# Patient Record
Sex: MALE | Race: WHITE | Employment: OTHER | URBAN - METROPOLITAN AREA
[De-identification: names, ages, dates, MRNs, and addresses within clinical notes are randomized per-mention and may not be internally consistent; named-entity substitution may affect disease eponyms.]

---

## 2017-10-21 PROBLEM — Z98.890 STATUS POST CATHETER ABLATION OF ATRIAL FLUTTER: Status: ACTIVE | Noted: 2017-10-21

## 2017-10-21 PROBLEM — Z98.890 STATUS POST CATHETER ABLATION OF ATRIAL FIBRILLATION: Status: ACTIVE | Noted: 2017-10-21

## 2018-04-23 ENCOUNTER — TELEPHONE (OUTPATIENT)
Dept: NON INVASIVE DIAGNOSTICS | Age: 68
End: 2018-04-23

## 2018-05-15 RX ORDER — DOFETILIDE 0.5 MG/1
500 CAPSULE ORAL EVERY 12 HOURS SCHEDULED
Qty: 180 CAPSULE | Refills: 1 | Status: SHIPPED | OUTPATIENT
Start: 2018-05-15 | End: 2018-11-11 | Stop reason: SDUPTHER

## 2018-07-05 PROBLEM — I45.10 RIGHT BUNDLE-BRANCH BLOCK: Status: ACTIVE | Noted: 2018-07-05

## 2018-07-10 ENCOUNTER — OFFICE VISIT (OUTPATIENT)
Dept: NON INVASIVE DIAGNOSTICS | Age: 68
End: 2018-07-10
Payer: MEDICARE

## 2018-07-10 VITALS
WEIGHT: 270.6 LBS | RESPIRATION RATE: 18 BRPM | HEIGHT: 69 IN | HEART RATE: 50 BPM | SYSTOLIC BLOOD PRESSURE: 138 MMHG | BODY MASS INDEX: 40.08 KG/M2 | DIASTOLIC BLOOD PRESSURE: 86 MMHG

## 2018-07-10 DIAGNOSIS — Z98.890 STATUS POST CATHETER ABLATION OF ATRIAL FLUTTER: ICD-10-CM

## 2018-07-10 DIAGNOSIS — I50.22 CHRONIC SYSTOLIC HEART FAILURE (HCC): ICD-10-CM

## 2018-07-10 DIAGNOSIS — I48.3 TYPICAL ATRIAL FLUTTER (HCC): ICD-10-CM

## 2018-07-10 DIAGNOSIS — Z98.890 STATUS POST CATHETER ABLATION OF ATRIAL FIBRILLATION: ICD-10-CM

## 2018-07-10 DIAGNOSIS — I48.19 PERSISTENT ATRIAL FIBRILLATION (HCC): ICD-10-CM

## 2018-07-10 DIAGNOSIS — I10 ESSENTIAL HYPERTENSION: ICD-10-CM

## 2018-07-10 DIAGNOSIS — I45.10 RIGHT BUNDLE-BRANCH BLOCK: ICD-10-CM

## 2018-07-10 PROCEDURE — 3017F COLORECTAL CA SCREEN DOC REV: CPT | Performed by: INTERNAL MEDICINE

## 2018-07-10 PROCEDURE — 93000 ELECTROCARDIOGRAM COMPLETE: CPT | Performed by: INTERNAL MEDICINE

## 2018-07-10 PROCEDURE — 4040F PNEUMOC VAC/ADMIN/RCVD: CPT | Performed by: INTERNAL MEDICINE

## 2018-07-10 PROCEDURE — 1036F TOBACCO NON-USER: CPT | Performed by: INTERNAL MEDICINE

## 2018-07-10 PROCEDURE — 99213 OFFICE O/P EST LOW 20 MIN: CPT | Performed by: INTERNAL MEDICINE

## 2018-07-10 PROCEDURE — 1101F PT FALLS ASSESS-DOCD LE1/YR: CPT | Performed by: INTERNAL MEDICINE

## 2018-07-10 PROCEDURE — 1123F ACP DISCUSS/DSCN MKR DOCD: CPT | Performed by: INTERNAL MEDICINE

## 2018-07-10 PROCEDURE — G8427 DOCREV CUR MEDS BY ELIG CLIN: HCPCS | Performed by: INTERNAL MEDICINE

## 2018-07-10 PROCEDURE — G8417 CALC BMI ABV UP PARAM F/U: HCPCS | Performed by: INTERNAL MEDICINE

## 2018-07-10 RX ORDER — MELOXICAM 15 MG/1
TABLET ORAL
Refills: 1 | COMMUNITY
Start: 2018-06-13 | End: 2019-03-19 | Stop reason: SINTOL

## 2018-07-10 NOTE — LETTER
[facility] Electrophysiology  [address] [place] [id number]  Phone: [phone]  Fax: [phone]    No ref. provider found        July 18, 2018       Patient: Daniel Rebollar   MR Number: [id number]   YOB: 1950   Date of Visit: 7/10/2018       Dear Dr. Valri Galeazzi ref. provider found: Thank you for the request for consultation for Daniel Rebollar to me for the evaluation of atrial fibrillation/atrial flutter. Below are the relevant portions of my assessment and plan of care. \  1. Atrial fibrillation and flutter  - s/p CTI ablation in May 4769, procedure complicated by airway compromise, anatomical variants c/w Eustachian ridge and pouch, extended procedure time and multiple lines required to achieve bidirectional block. He had no documented recurrence in over 2 year after procedure. Had recurrence of flutter, possibly typical.    - post RFA with subsequent development of persistent atrial fibrillation and atypical flutter. He was highly symptomatic despite apparent rate control, at least at rest. The patient  opted for biatrial catheter ablation but first underwent a series of back injections, during which he had to temporarily interrupt his oral anticoagulation therapy, so ablation was postponed. - [id number] risk score would now be considered 3 based on HTN and systolic dysfunction, age greater than 72, he is on Eliquis he denies any bleeding problems.    - He underwent redo flutter ablation and PVI (RFA) on 8/10/16 and was doing well. He was weaned off his sotalol and was started back on a low-dose Toprol-XL for his cardiomyopathy.   - On October 20, 2017 he underwent a successful BUSHRA/DCCV with Tikosyn drug loading for persistent symptomatic atrial flutter.    - Currently on Tikosyn 500 mcg BID.  - Currently in sinus bradycardia with RBBB  - discussed his relatively low burden symptoms and repetitive ablations,

## 2018-07-10 NOTE — PROGRESS NOTES
Electrophysiology Outpatient Follow-Up Note    Maykel Ellington  1950  Date of Service: 7/10/2018  Referring Provider/PCP: Celestino Valentin MD  Chief Complaint: atrial fibrillation and flutter, status post ablation, HTN        Subjective: Maykel Ellington is seen today for follow up for his AF/AFL. He is doing well. He is not having recent palpitations. He is very happy with tikosyn. He notes mild fatigue, but overall doing well. He was previously followed with Dr. Darlyn Mcgill but now is transitioning to me. His wife is with him today and he has numerous questions regarding supplements. He cannot find any pattern of exacerbation or relief regarding his fatigue.       Patient Active Problem List   Diagnosis    Typical atrial flutter (HCC)    HTN (hypertension)    Obesity    Eczema    Former smoker    Midline low back pain with right-sided sciatica    Persistent atrial fibrillation (HCC)    Chronic systolic heart failure (HCC)    CKD (chronic kidney disease)    Status post catheter ablation of atrial fibrillation    Status post catheter ablation of atrial flutter    Right bundle-branch block     Past Medical History:   Diagnosis Date    Arthritis     Atrial fibrillation ([address]     Bulging lumbar disc     L4-L5    CAD (coronary artery disease) 07/07/2016    ct scan heart    Chronic lower back pain     BREWER (dyspnea on exertion) 3/26/2013    Eczema     Hypertension     Kidney stones     Obesity     bmi 42.1   weight 276 #    Sleep apnea     c pap  stting 2     Medications:  Current Outpatient Prescriptions on File Prior to Visit   Medication Sig Dispense Refill    dofetilide (TIKOSYN) 500 MCG capsule Take 1 capsule by mouth every 12 hours 180 capsule 1    metoprolol succinate (TOPROL XL) 25 MG extended release tablet TAKE ONE TABLET BY MOUTH DAILY 30 tablet 11    magnesium oxide (MAG-OX) 400 (240 Mg) MG tablet Take 1 tablet by mouth 2 times daily (Patient taking differently: Take 400 mg by mouth daily ) 60 tablet 0    Omega-3 Fatty Acids (OMEGA-3 FISH OIL) 1200 MG CAPS Take by mouth daily      BIOTIN PO Take 1 capsule by mouth daily      apixaban (ELIQUIS) 5 MG TABS tablet TAKE ONE TABLET BY MOUTH TWO TIMES A DAY 60 tablet 11    Methylsulfonylmethane (MSM PO) Take 1 tablet by mouth daily       Multiple Vitamins-Minerals (THERAPEUTIC MULTIVITAMIN-MINERALS) tablet Take 1 tablet by mouth daily.  Coenzyme Q10 (COQ10 PO) Take 1 tablet by mouth daily.  Alpha-Lipoic Acid 300 MG CAPS Take 300 mg by mouth daily On hold      ascorbic acid (VITAMIN C) 500 MG tablet Take 500 mg by mouth daily.  Vitamin D (CHOLECALCIFEROL) 1000 UNITS CAPS capsule Take 1,000 Units by mouth daily.  Cyanocobalamin (VITAMIN B 12) 250 MCG LOZG Take 500 mcg by mouth daily. No current facility-administered medications on file prior to visit. Allergies   Allergen Reactions    Adhesive Tape Rash     bandaids     Wt Readings from Last 3 Encounters:   07/10/18 270 lb 9.6 oz (122.7 kg)   11/30/17 274 lb (124.3 kg)   10/20/17 266 lb 8 oz (120.9 kg)     Temp Readings from Last 3 Encounters:   10/23/17 98.3 °F (36.8 °C) (Oral)   09/14/17 98.1 °F (36.7 °C)   08/11/16 97.5 °F (36.4 °C) (Oral)     BP Readings from Last 3 Encounters:   07/10/18 138/86   11/30/17 (!) 140/78   10/23/17 (!) 147/62     Pulse Readings from Last 3 Encounters:   07/10/18 50   11/30/17 53   10/23/17 56     No intake or output data in the 24 hours ending 07/10/18 1023     Physical exam:  Constitutional:   /86   Pulse 50   Resp 18   Ht 5' 9\" (1.753 m)   Wt 270 lb 9.6 oz (122.7 kg)   BMI 39.96 kg/m²  well developed, in no acute distress   Eyes: conjunctivae normal, no xanthelasma   Ears, Nose, Throat: oral mucosa moist, no cyanosis   Neck: supple, no JVD, no bruits, no thyromegaly   CV: bradycardic, regular rhythm, normal S1 & S2, No S3 or S4. No murmurs, rubs, or gallops.  Peripheral pulses normal   Lungs: clear to auscultation

## 2018-11-11 DIAGNOSIS — I48.19 PERSISTENT ATRIAL FIBRILLATION (HCC): Primary | ICD-10-CM

## 2018-11-12 RX ORDER — DOFETILIDE 0.5 MG/1
CAPSULE ORAL
Qty: 14 CAPSULE | Refills: 0 | Status: SHIPPED | OUTPATIENT
Start: 2018-11-12 | End: 2018-11-21 | Stop reason: SDUPTHER

## 2018-11-20 ENCOUNTER — TELEPHONE (OUTPATIENT)
Dept: NON INVASIVE DIAGNOSTICS | Age: 68
End: 2018-11-20

## 2018-11-21 RX ORDER — DOFETILIDE 0.5 MG/1
CAPSULE ORAL
Qty: 60 CAPSULE | Refills: 0 | Status: SHIPPED | OUTPATIENT
Start: 2018-11-21 | End: 2018-12-11 | Stop reason: SDUPTHER

## 2018-12-07 ENCOUNTER — APPOINTMENT (OUTPATIENT)
Dept: GENERAL RADIOLOGY | Age: 68
End: 2018-12-07
Payer: MEDICARE

## 2018-12-07 ENCOUNTER — HOSPITAL ENCOUNTER (OUTPATIENT)
Age: 68
Setting detail: OBSERVATION
Discharge: HOME | End: 2018-12-08
Attending: EMERGENCY MEDICINE | Admitting: INTERNAL MEDICINE
Payer: MEDICARE

## 2018-12-07 DIAGNOSIS — R00.0 TACHYCARDIA: Primary | ICD-10-CM

## 2018-12-07 PROBLEM — I48.91 ATRIAL FIBRILLATION WITH RVR (HCC): Status: ACTIVE | Noted: 2018-12-07

## 2018-12-07 LAB
ALBUMIN SERPL-MCNC: 4.2 G/DL (ref 3.5–5.2)
ALP BLD-CCNC: 91 U/L (ref 40–129)
ALT SERPL-CCNC: 22 U/L (ref 0–40)
ANION GAP SERPL CALCULATED.3IONS-SCNC: 11 MMOL/L (ref 7–16)
AST SERPL-CCNC: 16 U/L (ref 0–39)
BASOPHILS ABSOLUTE: 0.11 E9/L (ref 0–0.2)
BASOPHILS RELATIVE PERCENT: 0.8 % (ref 0–2)
BILIRUB SERPL-MCNC: 0.7 MG/DL (ref 0–1.2)
BUN BLDV-MCNC: 30 MG/DL (ref 8–23)
CALCIUM SERPL-MCNC: 9.4 MG/DL (ref 8.6–10.2)
CHLORIDE BLD-SCNC: 101 MMOL/L (ref 98–107)
CO2: 25 MMOL/L (ref 22–29)
CREAT SERPL-MCNC: 1.1 MG/DL (ref 0.7–1.2)
EKG ATRIAL RATE: 202 BPM
EKG P AXIS: -75 DEGREES
EKG Q-T INTERVAL: 380 MS
EKG QRS DURATION: 136 MS
EKG QTC CALCULATION (BAZETT): 492 MS
EKG R AXIS: 17 DEGREES
EKG T AXIS: 21 DEGREES
EKG VENTRICULAR RATE: 101 BPM
EOSINOPHILS ABSOLUTE: 0.31 E9/L (ref 0.05–0.5)
EOSINOPHILS RELATIVE PERCENT: 2.2 % (ref 0–6)
GFR AFRICAN AMERICAN: >60
GFR NON-AFRICAN AMERICAN: >60 ML/MIN/1.73
GLUCOSE BLD-MCNC: 102 MG/DL (ref 74–99)
HCT VFR BLD CALC: 50.4 % (ref 37–54)
HEMOGLOBIN: 17 G/DL (ref 12.5–16.5)
IMMATURE GRANULOCYTES #: 0.08 E9/L
IMMATURE GRANULOCYTES %: 0.6 % (ref 0–5)
LYMPHOCYTES ABSOLUTE: 2.45 E9/L (ref 1.5–4)
LYMPHOCYTES RELATIVE PERCENT: 17.6 % (ref 20–42)
MCH RBC QN AUTO: 31.4 PG (ref 26–35)
MCHC RBC AUTO-ENTMCNC: 33.7 % (ref 32–34.5)
MCV RBC AUTO: 93.2 FL (ref 80–99.9)
MONOCYTES ABSOLUTE: 0.85 E9/L (ref 0.1–0.95)
MONOCYTES RELATIVE PERCENT: 6.1 % (ref 2–12)
NEUTROPHILS ABSOLUTE: 10.09 E9/L (ref 1.8–7.3)
NEUTROPHILS RELATIVE PERCENT: 72.7 % (ref 43–80)
PDW BLD-RTO: 14.9 FL (ref 11.5–15)
PLATELET # BLD: 223 E9/L (ref 130–450)
PMV BLD AUTO: 11.5 FL (ref 7–12)
POTASSIUM SERPL-SCNC: 4.3 MMOL/L (ref 3.5–5)
PRO-BNP: 97 PG/ML (ref 0–125)
RBC # BLD: 5.41 E12/L (ref 3.8–5.8)
SODIUM BLD-SCNC: 137 MMOL/L (ref 132–146)
TOTAL PROTEIN: 7.9 G/DL (ref 6.4–8.3)
TROPONIN: <0.01 NG/ML (ref 0–0.03)
TSH SERPL DL<=0.05 MIU/L-ACNC: 1.25 UIU/ML (ref 0.27–4.2)
WBC # BLD: 13.9 E9/L (ref 4.5–11.5)

## 2018-12-07 PROCEDURE — 93005 ELECTROCARDIOGRAM TRACING: CPT | Performed by: EMERGENCY MEDICINE

## 2018-12-07 PROCEDURE — 2500000003 HC RX 250 WO HCPCS: Performed by: EMERGENCY MEDICINE

## 2018-12-07 PROCEDURE — 85025 COMPLETE CBC W/AUTO DIFF WBC: CPT

## 2018-12-07 PROCEDURE — 96374 THER/PROPH/DIAG INJ IV PUSH: CPT

## 2018-12-07 PROCEDURE — G0378 HOSPITAL OBSERVATION PER HR: HCPCS

## 2018-12-07 PROCEDURE — 36415 COLL VENOUS BLD VENIPUNCTURE: CPT

## 2018-12-07 PROCEDURE — 80053 COMPREHEN METABOLIC PANEL: CPT

## 2018-12-07 PROCEDURE — 2580000003 HC RX 258: Performed by: EMERGENCY MEDICINE

## 2018-12-07 PROCEDURE — 94760 N-INVAS EAR/PLS OXIMETRY 1: CPT

## 2018-12-07 PROCEDURE — 84484 ASSAY OF TROPONIN QUANT: CPT

## 2018-12-07 PROCEDURE — 84443 ASSAY THYROID STIM HORMONE: CPT

## 2018-12-07 PROCEDURE — 99285 EMERGENCY DEPT VISIT HI MDM: CPT

## 2018-12-07 PROCEDURE — 71045 X-RAY EXAM CHEST 1 VIEW: CPT

## 2018-12-07 PROCEDURE — 83880 ASSAY OF NATRIURETIC PEPTIDE: CPT

## 2018-12-07 PROCEDURE — 96361 HYDRATE IV INFUSION ADD-ON: CPT

## 2018-12-07 RX ORDER — MELOXICAM 7.5 MG/1
15 TABLET ORAL DAILY
Status: DISCONTINUED | OUTPATIENT
Start: 2018-12-08 | End: 2018-12-08 | Stop reason: HOSPADM

## 2018-12-07 RX ORDER — SODIUM CHLORIDE 0.9 % (FLUSH) 0.9 %
10 SYRINGE (ML) INJECTION EVERY 12 HOURS SCHEDULED
Status: DISCONTINUED | OUTPATIENT
Start: 2018-12-07 | End: 2018-12-08 | Stop reason: HOSPADM

## 2018-12-07 RX ORDER — VIT C/B6/B5/MAGNESIUM/HERB 173 50-5-6-5MG
500 CAPSULE ORAL DAILY
COMMUNITY
End: 2019-08-23

## 2018-12-07 RX ORDER — ALPHA LIPOIC ACID 300 MG
300 CAPSULE ORAL DAILY
Status: DISCONTINUED | OUTPATIENT
Start: 2018-12-08 | End: 2018-12-07 | Stop reason: CLARIF

## 2018-12-07 RX ORDER — DOFETILIDE 0.5 MG/1
500 CAPSULE ORAL EVERY 12 HOURS SCHEDULED
Status: DISCONTINUED | OUTPATIENT
Start: 2018-12-08 | End: 2018-12-08 | Stop reason: HOSPADM

## 2018-12-07 RX ORDER — LANOLIN ALCOHOL/MO/W.PET/CERES
400 CREAM (GRAM) TOPICAL DAILY
Status: DISCONTINUED | OUTPATIENT
Start: 2018-12-08 | End: 2018-12-08 | Stop reason: HOSPADM

## 2018-12-07 RX ORDER — SODIUM CHLORIDE 0.9 % (FLUSH) 0.9 %
10 SYRINGE (ML) INJECTION PRN
Status: DISCONTINUED | OUTPATIENT
Start: 2018-12-07 | End: 2018-12-08 | Stop reason: HOSPADM

## 2018-12-07 RX ORDER — METOPROLOL SUCCINATE 25 MG/1
25 TABLET, EXTENDED RELEASE ORAL DAILY
Status: DISCONTINUED | OUTPATIENT
Start: 2018-12-08 | End: 2018-12-08

## 2018-12-07 RX ORDER — M-VIT,TX,IRON,MINS/CALC/FOLIC 27MG-0.4MG
1 TABLET ORAL DAILY
Status: DISCONTINUED | OUTPATIENT
Start: 2018-12-08 | End: 2018-12-08 | Stop reason: HOSPADM

## 2018-12-07 RX ORDER — METOPROLOL TARTRATE 5 MG/5ML
5 INJECTION INTRAVENOUS ONCE
Status: COMPLETED | OUTPATIENT
Start: 2018-12-07 | End: 2018-12-07

## 2018-12-07 RX ORDER — ASCORBIC ACID 500 MG
500 TABLET ORAL DAILY
Status: DISCONTINUED | OUTPATIENT
Start: 2018-12-08 | End: 2018-12-08 | Stop reason: HOSPADM

## 2018-12-07 RX ORDER — 0.9 % SODIUM CHLORIDE 0.9 %
1000 INTRAVENOUS SOLUTION INTRAVENOUS ONCE
Status: COMPLETED | OUTPATIENT
Start: 2018-12-07 | End: 2018-12-07

## 2018-12-07 RX ORDER — ONDANSETRON 2 MG/ML
4 INJECTION INTRAMUSCULAR; INTRAVENOUS EVERY 6 HOURS PRN
Status: DISCONTINUED | OUTPATIENT
Start: 2018-12-07 | End: 2018-12-08 | Stop reason: HOSPADM

## 2018-12-07 RX ADMIN — SODIUM CHLORIDE 1000 ML: 9 INJECTION, SOLUTION INTRAVENOUS at 18:30

## 2018-12-07 RX ADMIN — METOPROLOL TARTRATE 5 MG: 1 INJECTION, SOLUTION INTRAVENOUS at 18:05

## 2018-12-07 ASSESSMENT — ENCOUNTER SYMPTOMS
SORE THROAT: 0
DIARRHEA: 0
SHORTNESS OF BREATH: 1
SINUS PAIN: 0
NAUSEA: 0
ABDOMINAL PAIN: 0
CONSTIPATION: 0
RHINORRHEA: 0
CHEST TIGHTNESS: 0
COUGH: 0

## 2018-12-07 ASSESSMENT — PAIN SCALES - GENERAL: PAINLEVEL_OUTOF10: 0

## 2018-12-07 NOTE — ED PROVIDER NOTES
Neutrophils # 10.09 (H) 1.80 - 7.30 E9/L    Immature Granulocytes # 0.08 E9/L    Lymphocytes # 2.45 1.50 - 4.00 E9/L    Monocytes # 0.85 0.10 - 0.95 E9/L    Eosinophils # 0.31 0.05 - 0.50 E9/L    Basophils # 0.11 0.00 - 0.20 E9/L   Brain Natriuretic Peptide   Result Value Ref Range    Pro-BNP 97 0 - 125 pg/mL   TSH without Reflex   Result Value Ref Range    TSH 1.250 0.270 - 4.200 uIU/mL   EKG 12 Lead   Result Value Ref Range    Ventricular Rate 101 BPM    Atrial Rate 202 BPM    QRS Duration 136 ms    Q-T Interval 380 ms    QTc Calculation (Bazett) 492 ms    P Axis -75 degrees    R Axis 17 degrees    T Axis 21 degrees       RADIOLOGY:  XR CHEST PORTABLE   Final Result   Bibasilar subsegmental atelectatic changes. No obvious focal pneumonic   infiltrate at this point.              ------------------------- NURSING NOTES AND VITALS REVIEWED ---------------------------  Date / Time Roomed:  12/7/2018  5:57 PM  ED Bed Assignment:  04/04    The nursing notes within the ED encounter and vital signs as below have been reviewed. Patient Vitals for the past 24 hrs:   BP Temp Temp src Pulse Resp SpO2 Height Weight   12/07/18 1932 (!) 159/99 - - 103 22 92 % - -   12/07/18 1824 (!) 144/99 - - 101 18 92 % - -   12/07/18 1806 (!) 187/93 - - 109 16 94 % - -   12/07/18 1800 (!) 113/99 97.5 °F (36.4 °C) Temporal (!) 204 18 97 % 5' 9\" (1.753 m) 272 lb (123.4 kg)       Oxygen Saturation Interpretation: normal    ------------------------------------------ PROGRESS NOTES ------------------------------------------  Re-evaluation(s):      Counseling:  I have spoken with the patient and discussed todays results, in addition to providing specific details for the plan of care and counseling regarding the diagnosis and prognosis.   Their questions are answered at this time and they are agreeable with the plan of admission.    --------------------------------- ADDITIONAL PROVIDER NOTES ---------------------------------  Consultations:  See

## 2018-12-08 VITALS
DIASTOLIC BLOOD PRESSURE: 65 MMHG | HEART RATE: 71 BPM | BODY MASS INDEX: 38.26 KG/M2 | SYSTOLIC BLOOD PRESSURE: 137 MMHG | WEIGHT: 258.3 LBS | HEIGHT: 69 IN | TEMPERATURE: 96.7 F | RESPIRATION RATE: 16 BRPM | OXYGEN SATURATION: 91 %

## 2018-12-08 PROCEDURE — 99220 PR INITIAL OBSERVATION CARE/DAY 70 MINUTES: CPT | Performed by: INTERNAL MEDICINE

## 2018-12-08 PROCEDURE — 6370000000 HC RX 637 (ALT 250 FOR IP): Performed by: INTERNAL MEDICINE

## 2018-12-08 PROCEDURE — G0378 HOSPITAL OBSERVATION PER HR: HCPCS

## 2018-12-08 PROCEDURE — 2580000003 HC RX 258: Performed by: INTERNAL MEDICINE

## 2018-12-08 RX ORDER — ZOLPIDEM TARTRATE 5 MG/1
5 TABLET ORAL ONCE
Status: COMPLETED | OUTPATIENT
Start: 2018-12-08 | End: 2018-12-08

## 2018-12-08 RX ORDER — METOPROLOL SUCCINATE 25 MG/1
50 TABLET, EXTENDED RELEASE ORAL DAILY
Qty: 30 TABLET | Refills: 11
Start: 2018-12-08 | End: 2018-12-11 | Stop reason: SDUPTHER

## 2018-12-08 RX ORDER — METOPROLOL SUCCINATE 50 MG/1
50 TABLET, EXTENDED RELEASE ORAL DAILY
Status: DISCONTINUED | OUTPATIENT
Start: 2018-12-09 | End: 2018-12-08 | Stop reason: HOSPADM

## 2018-12-08 RX ADMIN — Medication 400 MG: at 09:04

## 2018-12-08 RX ADMIN — MULTIPLE VITAMINS W/ MINERALS TAB 1 TABLET: TAB at 09:04

## 2018-12-08 RX ADMIN — Medication 10 ML: at 09:04

## 2018-12-08 RX ADMIN — Medication 500 MG: at 09:04

## 2018-12-08 RX ADMIN — METOPROLOL SUCCINATE 25 MG: 25 TABLET, FILM COATED, EXTENDED RELEASE ORAL at 09:04

## 2018-12-08 RX ADMIN — ZOLPIDEM TARTRATE 5 MG: 5 TABLET ORAL at 01:34

## 2018-12-08 RX ADMIN — APIXABAN 5 MG: 5 TABLET, FILM COATED ORAL at 09:02

## 2018-12-08 RX ADMIN — Medication 10 ML: at 00:18

## 2018-12-08 ASSESSMENT — PAIN SCALES - GENERAL
PAINLEVEL_OUTOF10: 0
PAINLEVEL_OUTOF10: 4
PAINLEVEL_OUTOF10: 0

## 2018-12-08 ASSESSMENT — PAIN DESCRIPTION - LOCATION: LOCATION: GENERALIZED

## 2018-12-08 ASSESSMENT — PAIN DESCRIPTION - PAIN TYPE: TYPE: CHRONIC PAIN

## 2018-12-08 NOTE — H&P
12/07/18   1823   TROPONINI  <0.01       Urinalysis:    No results found for: Nery Mantis, BACTERIA, RBCUA, BLOODU, SPECGRAV, GLUCOSEU      ASSESSMENT:    Active Hospital Problems    Diagnosis Date Noted    Tachyarrhythmia [R00.0] 12/07/2018    Atrial fibrillation with RVR ([address] [I48.91] 12/07/2018   CAD  HTN  Mild dehydration    PLAN:  Vitals per protocol  Continue toprol, tikosyn  Continue home meds  Consult EP      DVT Prophylaxis: eliquis   Diet: DIET GENERAL;  Code Status: Full Code    PT/OT Eval Status: na    Dispo - obs        Mirta Ibanez MD    Thank you Ivy Reza MD for the opportunity to be involved in this patient's care. If you have any questions or concerns please feel free to contact me at [phone].

## 2018-12-08 NOTE — PROGRESS NOTES
Discharge instructions given to the patient , verbalized understanding, d/c hep lock and cardiac monitor.

## 2018-12-08 NOTE — CONSULTS
[address]  [place] [facility] [address] [facility] Electrophysiology  Consultation Report  Patient: Yong Kern  Medical Record Number: [id number]  Date of Service:  12/8/2018  Attending Electrophysiologist: Jaye Jay MD  Referring Physician: Garo Malik MD and No ref. provider found and Dr. Dacosta So: paroxysmal atrial flutter    HPI: This is a 80 y.o. male with a history of HTN, CKD, RBBB, obesity, prior AFL ablation x2 and AF PVI x1 with Dr. Zechariah Muse. He had acute onset palpitations and SOB yesterday and came to ER. He was found to be in atrial flutter. He was given IV lopressor which dropped his AFL from ~1:1 to 2:1. He spontaneously converted overnight. His main questions revolve around his arthritis and AF. He denies any current chest pain, SOB, palpitations or syncope.       Patient Active Problem List    Diagnosis Date Noted    Obesity 06/03/2013     Priority: Medium    Former smoker 06/03/2013     Priority: Medium    Typical atrial flutter ([address] 04/05/2013     Priority: Medium     Overview Note:     S/p atrial flutter ablation 5/07/2013      HTN (hypertension) 04/05/2013     Priority: Medium    Eczema 06/03/2013     Priority: Low    Tachyarrhythmia 12/07/2018    Atrial fibrillation with RVR ([address] 12/07/2018    Right bundle-branch block 07/05/2018    Status post catheter ablation of atrial fibrillation 10/21/2017    Status post catheter ablation of atrial flutter 10/21/2017    CKD (chronic kidney disease)     Chronic systolic heart failure (HCC)     Persistent atrial fibrillation (HCC)     Midline low back pain with right-sided sciatica 01/12/2016       Past Medical History:   Diagnosis Date    Arthritis     Atrial fibrillation ([address]     Bulging lumbar disc     L4-L5    CAD (coronary artery disease) 07/07/2016    ct scan heart    Chronic lower back pain     BREWER (dyspnea on exertion) 3/26/2013    Eczema     Hypertension     Kidney stones     Obesity PM   Exam: XR CHEST PORTABLE   Number of Images: 1 views       Indication:   cough    cough       COMPARISON:  10/19/2017       FINDINGS:   Heart size and pulmonary vascular distribution unremarkable.     Somewhat shallow pulmonary inflation with basilar subsegmental   atelectatic changes. No visible focal pulmonary consolidation or pleural effusion.               Impression   Bibasilar subsegmental atelectatic changes. No obvious focal pneumonic   infiltrate at this point. Telemetry: AFL->sinus    EKG: AFL- 2:1 likely typical  - see scan in Cardiology    Last Echo: 2/3/2017  Imelda Gastelum MD 2/3/2017    Narrative     Transthoracic Echocardiography Report (TTE)     Demographics      Patient Name         Jerri Olguin Gender                 Male      Medical Record Number [id number]    Room Number            RME      Account #             [de-identified]  Procedure Date         02/01/2017      Corporate ID                      Ordering Physician     Joan Mitchell MD      Accession Number      [id number]   Referring Physician    IVET Alvares      Date of Birth         1950  Sonographer           Michael Adams RDCS      Age                   73 year(s)  Interpreting Physician Omkar Gatica MD                                        Any Other     Procedure    Type of Study      TTE procedure:Echo Complete W/ Dop & Color Flow.     Procedure Date  Date: 02/01/2017 Start: 01:02 PM    Study Location: Echo Lab  Technical Quality: Adequate visualization    Indications:Atrial fibrillation and Cardiomyopathy.     Patient Status: Routine    Height: 69 inches Weight: 277 pounds BSA: 2.37 m^2 BMI: 40.91 kg/m^2    BP: 120/70 mmHg    Allergies    - Adhesive tape:Severity - Mild;Sensitivity - Intolerance.     Findings      Left Ventricle   Left ventricle size is normal.   Proximal septal thickening (no LVOT obstruction).   Normal left ventricular systolic function.   Ejection fraction is calculated at 55%.   There is doppler evidence of

## 2018-12-08 NOTE — PROGRESS NOTES
Pharmacy Note    Kevin Lauren was ordered Alpha-lipioc acid caps. As per the [address] [place] [address], herbals and certain dietary supplements will be discontinued.   The herbal or dietary supplement may be continued after discharge from the hospital.

## 2018-12-10 ENCOUNTER — TELEPHONE (OUTPATIENT)
Dept: NON INVASIVE DIAGNOSTICS | Age: 68
End: 2018-12-10

## 2018-12-10 DIAGNOSIS — I48.91 ATRIAL FIBRILLATION WITH RVR (HCC): Primary | ICD-10-CM

## 2018-12-10 DIAGNOSIS — Z98.890 STATUS POST CATHETER ABLATION OF ATRIAL FIBRILLATION: ICD-10-CM

## 2018-12-11 ENCOUNTER — NURSE ONLY (OUTPATIENT)
Dept: NON INVASIVE DIAGNOSTICS | Age: 68
End: 2018-12-11

## 2018-12-11 RX ORDER — METOPROLOL SUCCINATE 50 MG/1
50 TABLET, EXTENDED RELEASE ORAL DAILY
Qty: 90 TABLET | Refills: 3 | Status: SHIPPED | OUTPATIENT
Start: 2018-12-11 | End: 2019-12-03 | Stop reason: SDUPTHER

## 2018-12-11 RX ORDER — DOFETILIDE 0.5 MG/1
CAPSULE ORAL
Qty: 180 CAPSULE | Refills: 1 | Status: SHIPPED | OUTPATIENT
Start: 2018-12-11 | End: 2019-06-14 | Stop reason: SDUPTHER

## 2019-01-03 DIAGNOSIS — I48.91 ATRIAL FIBRILLATION WITH RVR (HCC): ICD-10-CM

## 2019-01-03 DIAGNOSIS — Z98.890 STATUS POST CATHETER ABLATION OF ATRIAL FIBRILLATION: ICD-10-CM

## 2019-01-07 ENCOUNTER — TELEPHONE (OUTPATIENT)
Dept: CARDIOLOGY CLINIC | Age: 69
End: 2019-01-07

## 2019-02-12 ENCOUNTER — OFFICE VISIT (OUTPATIENT)
Dept: PAIN MANAGEMENT | Age: 69
End: 2019-02-12
Payer: MEDICARE

## 2019-02-12 VITALS
WEIGHT: 265 LBS | DIASTOLIC BLOOD PRESSURE: 88 MMHG | OXYGEN SATURATION: 96 % | TEMPERATURE: 98.8 F | HEART RATE: 76 BPM | HEIGHT: 69 IN | RESPIRATION RATE: 18 BRPM | BODY MASS INDEX: 39.25 KG/M2 | SYSTOLIC BLOOD PRESSURE: 152 MMHG

## 2019-02-12 DIAGNOSIS — M21.371 RIGHT FOOT DROP: ICD-10-CM

## 2019-02-12 DIAGNOSIS — M25.531 WRIST PAIN, CHRONIC, RIGHT: ICD-10-CM

## 2019-02-12 DIAGNOSIS — G89.4 CHRONIC PAIN SYNDROME: ICD-10-CM

## 2019-02-12 DIAGNOSIS — M19.012 PRIMARY OSTEOARTHRITIS OF BOTH SHOULDERS: ICD-10-CM

## 2019-02-12 DIAGNOSIS — M47.816 LUMBAR FACET ARTHROPATHY: ICD-10-CM

## 2019-02-12 DIAGNOSIS — M15.9 PRIMARY OSTEOARTHRITIS INVOLVING MULTIPLE JOINTS: ICD-10-CM

## 2019-02-12 DIAGNOSIS — M19.011 PRIMARY OSTEOARTHRITIS OF BOTH SHOULDERS: ICD-10-CM

## 2019-02-12 DIAGNOSIS — G89.29 WRIST PAIN, CHRONIC, RIGHT: ICD-10-CM

## 2019-02-12 DIAGNOSIS — M17.0 PRIMARY OSTEOARTHRITIS OF BOTH KNEES: Primary | ICD-10-CM

## 2019-02-12 PROCEDURE — 1123F ACP DISCUSS/DSCN MKR DOCD: CPT | Performed by: PAIN MEDICINE

## 2019-02-12 PROCEDURE — 1036F TOBACCO NON-USER: CPT | Performed by: PAIN MEDICINE

## 2019-02-12 PROCEDURE — G8417 CALC BMI ABV UP PARAM F/U: HCPCS | Performed by: PAIN MEDICINE

## 2019-02-12 PROCEDURE — G8427 DOCREV CUR MEDS BY ELIG CLIN: HCPCS | Performed by: PAIN MEDICINE

## 2019-02-12 PROCEDURE — 3017F COLORECTAL CA SCREEN DOC REV: CPT | Performed by: PAIN MEDICINE

## 2019-02-12 PROCEDURE — 99204 OFFICE O/P NEW MOD 45 MIN: CPT | Performed by: PAIN MEDICINE

## 2019-02-12 PROCEDURE — G8484 FLU IMMUNIZE NO ADMIN: HCPCS | Performed by: PAIN MEDICINE

## 2019-02-12 PROCEDURE — 1101F PT FALLS ASSESS-DOCD LE1/YR: CPT | Performed by: PAIN MEDICINE

## 2019-02-12 PROCEDURE — 4040F PNEUMOC VAC/ADMIN/RCVD: CPT | Performed by: PAIN MEDICINE

## 2019-02-15 ENCOUNTER — TELEPHONE (OUTPATIENT)
Dept: PAIN MANAGEMENT | Age: 69
End: 2019-02-15

## 2019-02-26 ENCOUNTER — PROCEDURE VISIT (OUTPATIENT)
Dept: PAIN MANAGEMENT | Age: 69
End: 2019-02-26
Payer: MEDICARE

## 2019-02-26 VITALS
WEIGHT: 252 LBS | DIASTOLIC BLOOD PRESSURE: 72 MMHG | SYSTOLIC BLOOD PRESSURE: 132 MMHG | TEMPERATURE: 98.5 F | RESPIRATION RATE: 16 BRPM | BODY MASS INDEX: 37.33 KG/M2 | OXYGEN SATURATION: 95 % | HEART RATE: 54 BPM | HEIGHT: 69 IN

## 2019-02-26 DIAGNOSIS — M17.0 PRIMARY OSTEOARTHRITIS OF BOTH KNEES: Primary | ICD-10-CM

## 2019-02-26 PROCEDURE — 20610 DRAIN/INJ JOINT/BURSA W/O US: CPT | Performed by: PAIN MEDICINE

## 2019-02-26 RX ORDER — HYALURONATE SODIUM 10 MG/ML
20 SYRINGE (ML) INTRAARTICULAR ONCE
Status: COMPLETED | OUTPATIENT
Start: 2019-02-26 | End: 2019-02-26

## 2019-02-26 RX ADMIN — Medication 20 MG: at 13:34

## 2019-02-26 RX ADMIN — Medication 20 MG: at 13:32

## 2019-03-05 ENCOUNTER — PROCEDURE VISIT (OUTPATIENT)
Dept: PAIN MANAGEMENT | Age: 69
End: 2019-03-05
Payer: MEDICARE

## 2019-03-05 VITALS
BODY MASS INDEX: 37.03 KG/M2 | OXYGEN SATURATION: 98 % | WEIGHT: 250 LBS | SYSTOLIC BLOOD PRESSURE: 138 MMHG | DIASTOLIC BLOOD PRESSURE: 72 MMHG | HEART RATE: 84 BPM | RESPIRATION RATE: 18 BRPM | HEIGHT: 69 IN

## 2019-03-05 DIAGNOSIS — M17.0 PRIMARY OSTEOARTHRITIS OF BOTH KNEES: Primary | ICD-10-CM

## 2019-03-05 PROCEDURE — 20610 DRAIN/INJ JOINT/BURSA W/O US: CPT | Performed by: ANESTHESIOLOGY

## 2019-03-05 RX ORDER — HYALURONATE SODIUM 10 MG/ML
20 SYRINGE (ML) INTRAARTICULAR ONCE
Status: COMPLETED | OUTPATIENT
Start: 2019-03-05 | End: 2019-03-05

## 2019-03-05 RX ADMIN — Medication 20 MG: at 10:50

## 2019-03-12 ENCOUNTER — PROCEDURE VISIT (OUTPATIENT)
Dept: PAIN MANAGEMENT | Age: 69
End: 2019-03-12
Payer: MEDICARE

## 2019-03-12 VITALS
BODY MASS INDEX: 36.29 KG/M2 | HEIGHT: 69 IN | RESPIRATION RATE: 18 BRPM | OXYGEN SATURATION: 98 % | WEIGHT: 245 LBS | HEART RATE: 88 BPM | SYSTOLIC BLOOD PRESSURE: 144 MMHG | DIASTOLIC BLOOD PRESSURE: 82 MMHG

## 2019-03-12 DIAGNOSIS — M17.0 PRIMARY OSTEOARTHRITIS OF BOTH KNEES: Primary | ICD-10-CM

## 2019-03-12 PROCEDURE — 20610 DRAIN/INJ JOINT/BURSA W/O US: CPT | Performed by: PAIN MEDICINE

## 2019-03-12 RX ORDER — HYALURONATE SODIUM 10 MG/ML
20 SYRINGE (ML) INTRAARTICULAR ONCE
Status: COMPLETED | OUTPATIENT
Start: 2019-03-12 | End: 2019-03-12

## 2019-03-12 RX ADMIN — Medication 20 MG: at 15:04

## 2019-03-19 ENCOUNTER — OFFICE VISIT (OUTPATIENT)
Dept: NON INVASIVE DIAGNOSTICS | Age: 69
End: 2019-03-19
Payer: MEDICARE

## 2019-03-19 VITALS
HEART RATE: 51 BPM | DIASTOLIC BLOOD PRESSURE: 80 MMHG | WEIGHT: 253 LBS | SYSTOLIC BLOOD PRESSURE: 138 MMHG | RESPIRATION RATE: 18 BRPM | HEIGHT: 69 IN | BODY MASS INDEX: 37.47 KG/M2

## 2019-03-19 DIAGNOSIS — I48.19 PERSISTENT ATRIAL FIBRILLATION (HCC): Primary | ICD-10-CM

## 2019-03-19 PROCEDURE — 93000 ELECTROCARDIOGRAM COMPLETE: CPT | Performed by: INTERNAL MEDICINE

## 2019-03-19 PROCEDURE — 4040F PNEUMOC VAC/ADMIN/RCVD: CPT | Performed by: INTERNAL MEDICINE

## 2019-03-19 PROCEDURE — 3017F COLORECTAL CA SCREEN DOC REV: CPT | Performed by: INTERNAL MEDICINE

## 2019-03-19 PROCEDURE — G8427 DOCREV CUR MEDS BY ELIG CLIN: HCPCS | Performed by: INTERNAL MEDICINE

## 2019-03-19 PROCEDURE — G8484 FLU IMMUNIZE NO ADMIN: HCPCS | Performed by: INTERNAL MEDICINE

## 2019-03-19 PROCEDURE — 1101F PT FALLS ASSESS-DOCD LE1/YR: CPT | Performed by: INTERNAL MEDICINE

## 2019-03-19 PROCEDURE — 1123F ACP DISCUSS/DSCN MKR DOCD: CPT | Performed by: INTERNAL MEDICINE

## 2019-03-19 PROCEDURE — 1036F TOBACCO NON-USER: CPT | Performed by: INTERNAL MEDICINE

## 2019-03-19 PROCEDURE — 99214 OFFICE O/P EST MOD 30 MIN: CPT | Performed by: INTERNAL MEDICINE

## 2019-03-19 PROCEDURE — G8417 CALC BMI ABV UP PARAM F/U: HCPCS | Performed by: INTERNAL MEDICINE

## 2019-03-26 ENCOUNTER — PROCEDURE VISIT (OUTPATIENT)
Dept: PAIN MANAGEMENT | Age: 69
End: 2019-03-26
Payer: MEDICARE

## 2019-03-26 ENCOUNTER — OFFICE VISIT (OUTPATIENT)
Dept: PAIN MANAGEMENT | Age: 69
End: 2019-03-26
Payer: MEDICARE

## 2019-03-26 VITALS
DIASTOLIC BLOOD PRESSURE: 74 MMHG | HEIGHT: 69 IN | BODY MASS INDEX: 36.29 KG/M2 | WEIGHT: 245 LBS | OXYGEN SATURATION: 94 % | TEMPERATURE: 98.5 F | SYSTOLIC BLOOD PRESSURE: 138 MMHG | HEART RATE: 84 BPM | RESPIRATION RATE: 18 BRPM

## 2019-03-26 DIAGNOSIS — M19.012 PRIMARY OSTEOARTHRITIS OF BOTH SHOULDERS: Primary | ICD-10-CM

## 2019-03-26 DIAGNOSIS — G89.29 WRIST PAIN, CHRONIC, RIGHT: ICD-10-CM

## 2019-03-26 DIAGNOSIS — M19.011 PRIMARY OSTEOARTHRITIS OF BOTH SHOULDERS: Primary | ICD-10-CM

## 2019-03-26 DIAGNOSIS — M15.9 PRIMARY OSTEOARTHRITIS INVOLVING MULTIPLE JOINTS: ICD-10-CM

## 2019-03-26 DIAGNOSIS — M47.816 LUMBAR FACET ARTHROPATHY: ICD-10-CM

## 2019-03-26 DIAGNOSIS — M19.011 PRIMARY OSTEOARTHRITIS OF BOTH SHOULDERS: ICD-10-CM

## 2019-03-26 DIAGNOSIS — G89.4 CHRONIC PAIN SYNDROME: ICD-10-CM

## 2019-03-26 DIAGNOSIS — M17.0 PRIMARY OSTEOARTHRITIS OF BOTH KNEES: Primary | ICD-10-CM

## 2019-03-26 DIAGNOSIS — M25.531 WRIST PAIN, CHRONIC, RIGHT: ICD-10-CM

## 2019-03-26 DIAGNOSIS — M21.371 RIGHT FOOT DROP: ICD-10-CM

## 2019-03-26 DIAGNOSIS — M19.012 PRIMARY OSTEOARTHRITIS OF BOTH SHOULDERS: ICD-10-CM

## 2019-03-26 PROCEDURE — 3017F COLORECTAL CA SCREEN DOC REV: CPT | Performed by: PAIN MEDICINE

## 2019-03-26 PROCEDURE — 1036F TOBACCO NON-USER: CPT | Performed by: PAIN MEDICINE

## 2019-03-26 PROCEDURE — G8417 CALC BMI ABV UP PARAM F/U: HCPCS | Performed by: PAIN MEDICINE

## 2019-03-26 PROCEDURE — 4040F PNEUMOC VAC/ADMIN/RCVD: CPT | Performed by: PAIN MEDICINE

## 2019-03-26 PROCEDURE — 1123F ACP DISCUSS/DSCN MKR DOCD: CPT | Performed by: PAIN MEDICINE

## 2019-03-26 PROCEDURE — 20610 DRAIN/INJ JOINT/BURSA W/O US: CPT | Performed by: PAIN MEDICINE

## 2019-03-26 PROCEDURE — 99214 OFFICE O/P EST MOD 30 MIN: CPT | Performed by: PAIN MEDICINE

## 2019-03-26 PROCEDURE — G8484 FLU IMMUNIZE NO ADMIN: HCPCS | Performed by: PAIN MEDICINE

## 2019-03-26 PROCEDURE — G8427 DOCREV CUR MEDS BY ELIG CLIN: HCPCS | Performed by: PAIN MEDICINE

## 2019-03-26 RX ORDER — METHYLPREDNISOLONE ACETATE 40 MG/ML
40 INJECTION, SUSPENSION INTRA-ARTICULAR; INTRALESIONAL; INTRAMUSCULAR; SOFT TISSUE ONCE
Qty: 1 ML | Refills: 0
Start: 2019-03-26 | End: 2019-03-26

## 2019-03-26 RX ORDER — BUPIVACAINE HYDROCHLORIDE 2.5 MG/ML
30 INJECTION, SOLUTION EPIDURAL; INFILTRATION; INTRACAUDAL ONCE
Status: COMPLETED | OUTPATIENT
Start: 2019-03-26 | End: 2019-03-26

## 2019-03-26 RX ORDER — METHYLPREDNISOLONE ACETATE 40 MG/ML
40 INJECTION, SUSPENSION INTRA-ARTICULAR; INTRALESIONAL; INTRAMUSCULAR; SOFT TISSUE ONCE
Status: COMPLETED | OUTPATIENT
Start: 2019-03-26 | End: 2019-03-26

## 2019-03-26 RX ADMIN — BUPIVACAINE HYDROCHLORIDE 75 MG: 2.5 INJECTION, SOLUTION EPIDURAL; INFILTRATION; INTRACAUDAL at 17:00

## 2019-03-26 RX ADMIN — METHYLPREDNISOLONE ACETATE 40 MG: 40 INJECTION, SUSPENSION INTRA-ARTICULAR; INTRALESIONAL; INTRAMUSCULAR; SOFT TISSUE at 17:03

## 2019-04-01 ENCOUNTER — PROCEDURE VISIT (OUTPATIENT)
Dept: PAIN MANAGEMENT | Age: 69
End: 2019-04-01
Payer: MEDICARE

## 2019-04-01 VITALS
SYSTOLIC BLOOD PRESSURE: 132 MMHG | BODY MASS INDEX: 36.29 KG/M2 | OXYGEN SATURATION: 95 % | HEART RATE: 84 BPM | RESPIRATION RATE: 18 BRPM | DIASTOLIC BLOOD PRESSURE: 78 MMHG | HEIGHT: 69 IN | WEIGHT: 245 LBS

## 2019-04-01 DIAGNOSIS — M17.0 OSTEOARTHRITIS OF BOTH KNEES, UNSPECIFIED OSTEOARTHRITIS TYPE: Primary | ICD-10-CM

## 2019-04-01 PROCEDURE — 20610 DRAIN/INJ JOINT/BURSA W/O US: CPT | Performed by: ANESTHESIOLOGY

## 2019-04-01 RX ORDER — HYALURONATE SODIUM 10 MG/ML
20 SYRINGE (ML) INTRAARTICULAR ONCE
Status: COMPLETED | OUTPATIENT
Start: 2019-04-01 | End: 2019-04-01

## 2019-04-01 RX ADMIN — Medication 20 MG: at 16:12

## 2019-04-01 NOTE — PROGRESS NOTES
(TIKOSYN) 500 MCG capsule TAKE ONE CAPSULE BY MOUTH EVERY 12 HOURS 12/11/18  Yes Rocky Rodriguez MD   metoprolol succinate (TOPROL XL) 50 MG extended release tablet Take 1 tablet by mouth daily 12/11/18  Yes Rocky Rodriguez MD   Turmeric 500 MG CAPS Take 500 mg by mouth daily   Yes Historical Provider, MD   apixaban (ELIQUIS) 5 MG TABS tablet TAKE ONE TABLET BY MOUTH TWO TIMES A DAY 7/10/18  Yes Rocky Rodriguez MD   magnesium oxide (MAG-OX) 400 (240 Mg) MG tablet Take 1 tablet by mouth 2 times daily  Patient taking differently: Take 400 mg by mouth daily  10/23/17  Yes Genny Hill,    Omega-3 Fatty Acids (OMEGA-3 FISH OIL) 1200 MG CAPS Take by mouth daily   Yes Historical Provider, MD   Methylsulfonylmethane (MSM PO) Take 1 tablet by mouth daily    Yes Historical Provider, MD   Multiple Vitamins-Minerals (THERAPEUTIC MULTIVITAMIN-MINERALS) tablet Take 1 tablet by mouth daily. Yes Historical Provider, MD   Coenzyme Q10 (COQ10 PO) Take 1 tablet by mouth daily. Yes Historical Provider, MD   Alpha-Lipoic Acid 300 MG CAPS Take 300 mg by mouth daily On hold   Yes Historical Provider, MD   ascorbic acid (VITAMIN C) 500 MG tablet Take 500 mg by mouth daily. Yes Historical Provider, MD   Vitamin D (CHOLECALCIFEROL) 1000 UNITS CAPS capsule Take 1,000 Units by mouth daily. Yes Historical Provider, MD   Cyanocobalamin (VITAMIN B 12 PO) Take 500 mcg by mouth daily.    Yes Historical Provider, MD       Allergies   Allergen Reactions    Adhesive Tape Rash     bandaids       Social History     Socioeconomic History    Marital status:      Spouse name: Not on file    Number of children: Not on file    Years of education: Not on file    Highest education level: Not on file   Occupational History    Not on file   Social Needs    Financial resource strain: Not on file    Food insecurity:     Worry: Not on file     Inability: Not on file    Transportation needs:     Medical: Not on file     Non-medical: Not on file   Tobacco Use    Smoking status: Former Smoker     Packs/day: 1.00     Years: 20.00     Pack years: 20.00     Types: Cigarettes     Last attempt to quit: 2007     Years since quittin.2    Smokeless tobacco: Never Used    Tobacco comment: 1.5 PACKS EACH DAY stop    Substance and Sexual Activity    Alcohol use: Yes     Alcohol/week: 1.8 oz     Types: 3 Shots of liquor per week     Comment: 1 x week    Drug use: No    Sexual activity: Not on file   Lifestyle    Physical activity:     Days per week: Not on file     Minutes per session: Not on file    Stress: Not on file   Relationships    Social connections:     Talks on phone: Not on file     Gets together: Not on file     Attends Christianity service: Not on file     Active member of club or organization: Not on file     Attends meetings of clubs or organizations: Not on file     Relationship status: Not on file    Intimate partner violence:     Fear of current or ex partner: Not on file     Emotionally abused: Not on file     Physically abused: Not on file     Forced sexual activity: Not on file   Other Topics Concern    Not on file   Social History Narrative    Not on file       Family History   Problem Relation Age of Onset    Cancer Mother     Diabetes Mother     Cancer Father     Diabetes Sister     Kidney Disease Sister     Early Death Neg Hx        REVIEW OF SYSTEMS:     Tawnya Lizama denies fever/chills, chest pain, shortness of breath, new bowel or bladder complaints. All other review of systems was negative. PHYSICAL EXAMINATION:      /78   Pulse 84   Resp 18   Ht 5' 9\" (1.753 m)   Wt 245 lb (111.1 kg)   SpO2 95%   BMI 36.18 kg/m²     Knee:   Left knee scar from the prior surgery noted, ROM some limitation with pain on ROM, tenderness of the joint line +    Right knee: ROM - some pain noted, tenderness over the joint line +    Assessment/Plan:   Diagnosis Orders   1.  Osteoarthritis of both knees, unspecified osteoarthritis type         Knee OA B/l for Hyalgan injection #4    Piter AYLIN Brightgegowda      Procedure notes:     Patient: Paco Fernandes  :  1950  Age: Cielo.Coffee y.o.  Sex:  male      PRE-OPERATIVE DIAGNOSIS:Bilateral Knee pain, osteoarthritis.     POST-OPERATIVE DIAGNOSIS: Same.     PROCEDURE PERFORMED: Bilateral knee Hyalgan injection # 4.     SURGEON: Evans Cherry MD     ANESTHESIA: None      ESTIMATED BLOOD LOSS: None.     BRIEF HISTORY: Paco Fernandes comes in today for Bilateral Knee hylagan injection. After discussing the potential risks and benefits of the procedure with the patient. Paco did request that we proceed. A complete History & Physical was reviewed and it is unchanged.     DESCRIPTION OF PROCEDURE:   The patient was placed in a seated position. The area of the Bilateral knee was prepped with chloraprep and draped in a sterile manner. Then the targeted area of the patellar tendon was palpated and marked. A 25 gauge 1- 1/2 inch  needle was advanced into the joint capsule on both sides (one side injected at a time). 2cc of  Hyalgan (20mg/2ml-Sodium Hyluronate) was injected on each side easily without complications.  The needle was then removed and Band-Aid applied.     Disposition the patient tolerated the procedure well and there were no complications .      Paco will follow up with Dr. Deborha Dakin, MD for # 5 injection as scheduled.      He was encouraged to call with questions, concerns or if worsening of symptoms occurs.     Evans Cherry MD      CC:  Dr. Edinson England

## 2019-04-01 NOTE — PROGRESS NOTES
4/1/2019    Patient Active Problem List   Diagnosis    Typical atrial flutter (HCC)    HTN (hypertension)    Obesity    Eczema    Former smoker    Midline low back pain with right-sided sciatica    Persistent atrial fibrillation (HCC)    Chronic systolic heart failure (HCC)    CKD (chronic kidney disease)    Status post catheter ablation of atrial fibrillation    Status post catheter ablation of atrial flutter    Right bundle-branch block    Tachyarrhythmia    Atrial fibrillation with RVR (HCC)    Primary osteoarthritis of both shoulders    Primary osteoarthritis of both knees    Wrist pain, chronic, right    Chronic pain syndrome    Primary osteoarthritis involving multiple joints    Right foot drop    Lumbar facet arthropathy       Allergies as of 04/01/2019 - Review Complete 04/01/2019   Allergen Reaction Noted    Adhesive tape Rash 03/26/2013        Procedure: hyalgan injection  Bilateral knee     Blocks:  n    y consent signed y procedure verified with patient  y allergies noted y pt confirms not pregnant    Patient rates pain a 5/10 on the VAS scale. Skin Prep:  Chlor prep    Anesthetic:  n    Medication:  Hyalgan     Vitals:    04/01/19 1513   BP: 132/78   Pulse: 84   Resp: 18   SpO2: 95%   Weight: 245 lb (111.1 kg)   Height: 5' 9\" (1.753 m)       I witnessed patient signing consent to Medical Procedure and Treatment form.      Goldy Cha

## 2019-04-08 ENCOUNTER — TELEPHONE (OUTPATIENT)
Dept: PAIN MANAGEMENT | Age: 69
End: 2019-04-08

## 2019-04-08 ENCOUNTER — PROCEDURE VISIT (OUTPATIENT)
Dept: PAIN MANAGEMENT | Age: 69
End: 2019-04-08
Payer: MEDICARE

## 2019-04-08 VITALS
OXYGEN SATURATION: 98 % | SYSTOLIC BLOOD PRESSURE: 138 MMHG | RESPIRATION RATE: 18 BRPM | HEART RATE: 84 BPM | WEIGHT: 235 LBS | HEIGHT: 69 IN | DIASTOLIC BLOOD PRESSURE: 72 MMHG | BODY MASS INDEX: 34.8 KG/M2

## 2019-04-08 DIAGNOSIS — M17.0 OSTEOARTHRITIS OF BOTH KNEES, UNSPECIFIED OSTEOARTHRITIS TYPE: Primary | ICD-10-CM

## 2019-04-08 PROCEDURE — 20610 DRAIN/INJ JOINT/BURSA W/O US: CPT | Performed by: ANESTHESIOLOGY

## 2019-04-08 RX ORDER — HYALURONATE SODIUM 10 MG/ML
20 SYRINGE (ML) INTRAARTICULAR ONCE
Status: COMPLETED | OUTPATIENT
Start: 2019-04-08 | End: 2019-04-08

## 2019-04-08 RX ADMIN — Medication 20 MG: at 15:54

## 2019-04-08 NOTE — TELEPHONE ENCOUNTER
Patient had his series of 5 injection Hyalgan he feels that they are not working,  He stated you told him he may need a rheumatist he wanted to know if you will do a referral now he does see you in 4 weeks.

## 2019-04-08 NOTE — PROGRESS NOTES
4/8/2019    Patient Active Problem List   Diagnosis    Typical atrial flutter (HCC)    HTN (hypertension)    Obesity    Eczema    Former smoker    Midline low back pain with right-sided sciatica    Persistent atrial fibrillation (HCC)    Chronic systolic heart failure (HCC)    CKD (chronic kidney disease)    Status post catheter ablation of atrial fibrillation    Status post catheter ablation of atrial flutter    Right bundle-branch block    Tachyarrhythmia    Atrial fibrillation with RVR (HCC)    Primary osteoarthritis of both shoulders    Primary osteoarthritis of both knees    Wrist pain, chronic, right    Chronic pain syndrome    Primary osteoarthritis involving multiple joints    Right foot drop    Lumbar facet arthropathy       Allergies as of 04/08/2019 - Review Complete 04/08/2019   Allergen Reaction Noted    Adhesive tape Rash 03/26/2013        Procedure: hyalgan in bilateral knees      Blocks:  n      y consent signed y procedure verified with patient  y allergies noted y pt confirms not pregnant    Patient rates pain a 10/10 on the VAS scale. Skin Prep:  cholea prep    Anesthetic:  n    Medication:  hyalgan     Vitals:    04/08/19 1457   BP: 138/72   Pulse: 84   Resp: 18   SpO2: 98%   Weight: 235 lb (106.6 kg)   Height: 5' 9\" (1.753 m)       I witnessed patient signing consent to Medical Procedure and Treatment form.      Goldy Cha

## 2019-04-08 NOTE — PROGRESS NOTES
[facility]        [address] [facility], [facility] [place] Matilde Garcia  Dept: [phone]        Follow up Note      Maude Loredo     Date of Visit:  4/8/2019    CC:  Patient presents for follow up   Chief Complaint   Patient presents with    Pain     5 hylagan        HPI:  Mr. Maude Loredo is a 80 yrs pleasant gentleman, a patient of Dr. Shantanu Nguyen with H/o b/l knee pain from OA. Has been getting Hyalgan injection. Had total of 4 injection so far.     He is here for # 5 injection.     Pain is unchanged. Change in quality of symptoms:no. Medication side effects:none. Recent diagnostic testing:none. Recent interventional procedures:b/l knee Hyalgan    He has been on anticoagulation medications to include Eliquis. Imaging studies:none recently       Potential Aberrant Drug-Related Behavior:  No    Urine Drug Screening:No    OARRS report::No    Past Medical History:   Diagnosis Date    Arthritis     Atrial fibrillation (HCC)     Bulging lumbar disc     L4-L5    CAD (coronary artery disease) 07/07/2016    ct scan heart    Chronic lower back pain     BREWER (dyspnea on exertion) 3/26/2013    Eczema     Hypertension     Kidney stones     Obesity     bmi 42.1   weight 276 #    Sleep apnea     c pap  stting 2       Past Surgical History:   Procedure Laterality Date    ABLATION OF DYSRHYTHMIC FOCUS  05/2013    heart ablation dr John Hayes  08/10/2016    CARDIOVERSION  3-    Dr. Jared Randolph  10/20/2017    Dr. Emely Corey ECHOCARDIOGRAM TRANSESOPHAGEAL  5/7/2013         KNEE SURGERY Left 1969    repair left knee ligaments    LITHOTRIPSY  1986    LUMBAR [address]  11/30/2016    [facility] [place]    TONSILLECTOMY      TRANSESOPHAGEAL ECHOCARDIOGRAM  3-    Dr. Laila Narvaez       Prior to Admission medications    Medication Sig Start Date End Date Taking?  Authorizing Provider   NONFORMULARY Compound cream for knee's and shoulder's   Yes Historical Provider, MD   dofetilide (TIKOSYN) 500 MCG capsule TAKE ONE CAPSULE BY MOUTH EVERY 12 HOURS 12/11/18  Yes Taylor Fall MD   metoprolol succinate (TOPROL XL) 50 MG extended release tablet Take 1 tablet by mouth daily 12/11/18  Yes Taylor Fall MD   Turmeric 500 MG CAPS Take 500 mg by mouth daily   Yes Historical Provider, MD   apixaban (ELIQUIS) 5 MG TABS tablet TAKE ONE TABLET BY MOUTH TWO TIMES A DAY 7/10/18  Yes Taylor Fall MD   magnesium oxide (MAG-OX) 400 (240 Mg) MG tablet Take 1 tablet by mouth 2 times daily  Patient taking differently: Take 400 mg by mouth daily  10/23/17  Yes Montserrat Hill,    Omega-3 Fatty Acids (OMEGA-3 FISH OIL) 1200 MG CAPS Take by mouth daily   Yes Historical Provider, MD   Methylsulfonylmethane (MSM PO) Take 1 tablet by mouth daily    Yes Historical Provider, MD   Multiple Vitamins-Minerals (THERAPEUTIC MULTIVITAMIN-MINERALS) tablet Take 1 tablet by mouth daily. Yes Historical Provider, MD   Coenzyme Q10 (COQ10 PO) Take 1 tablet by mouth daily. Yes Historical Provider, MD   Alpha-Lipoic Acid 300 MG CAPS Take 300 mg by mouth daily On hold   Yes Historical Provider, MD   ascorbic acid (VITAMIN C) 500 MG tablet Take 500 mg by mouth daily. Yes Historical Provider, MD   Vitamin D (CHOLECALCIFEROL) 1000 UNITS CAPS capsule Take 1,000 Units by mouth daily. Yes Historical Provider, MD   Cyanocobalamin (VITAMIN B 12 PO) Take 500 mcg by mouth daily.    Yes Historical Provider, MD       Allergies   Allergen Reactions    Adhesive Tape Rash     bandaids       Social History     Socioeconomic History    Marital status:      Spouse name: Not on file    Number of children: Not on file    Years of education: Not on file    Highest education level: Not on file   Occupational History    Not on file   Social Needs    Financial resource strain: Not on file    Food insecurity:     Worry: Not on file     Inability: Not on file    Transportation needs: Medical: Not on file     Non-medical: Not on file   Tobacco Use    Smoking status: Former Smoker     Packs/day: 1.00     Years: 20.00     Pack years: 20.00     Types: Cigarettes     Last attempt to quit: 2007     Years since quittin.2    Smokeless tobacco: Never Used    Tobacco comment: 1.5 PACKS EACH DAY stop    Substance and Sexual Activity    Alcohol use: Yes     Alcohol/week: 1.8 oz     Types: 3 Shots of liquor per week     Comment: 1 x week    Drug use: No    Sexual activity: Not on file   Lifestyle    Physical activity:     Days per week: Not on file     Minutes per session: Not on file    Stress: Not on file   Relationships    Social connections:     Talks on phone: Not on file     Gets together: Not on file     Attends Yazdanism service: Not on file     Active member of club or organization: Not on file     Attends meetings of clubs or organizations: Not on file     Relationship status: Not on file    Intimate partner violence:     Fear of current or ex partner: Not on file     Emotionally abused: Not on file     Physically abused: Not on file     Forced sexual activity: Not on file   Other Topics Concern    Not on file   Social History Narrative    Not on file       Family History   Problem Relation Age of Onset    Cancer Mother     Diabetes Mother     Cancer Father     Diabetes Sister     Kidney Disease Sister     Early Death Neg Hx        REVIEW OF SYSTEMS:     Biscoe Promise denies fever/chills, chest pain, shortness of breath, new bowel or bladder complaints. All other review of systems was negative. PHYSICAL EXAMINATION:      /72   Pulse 84   Resp 18   Ht 5' 9\" (1.753 m)   Wt 235 lb (106.6 kg)   SpO2 98%   BMI 34.70 kg/m²   Knee:   Left knee scar from the prior surgery noted, ROM some limitation with pain on ROM, tenderness of the joint line +     Right knee: ROM - some pain noted, tenderness over the joint line +      Assessment/Plan:   Diagnosis Orders   1. Osteoarthritis of both knees, unspecified osteoarthritis type       H/o Knee OA B/l for Hyalgan injection #5     Piter Kohli       Procedure notes:     Patient: Paco Fernandes  :  1950  Age: [age] y.o.  Sex:  male      PRE-OPERATIVE DIAGNOSIS:Bilateral Knee pain, osteoarthritis.     POST-OPERATIVE DIAGNOSIS: Same.     PROCEDURE PERFORMED: Bilateral knee Hyalgan injection # 5.     SURGEON:  Piter Kohli MD     ANESTHESIA: None      ESTIMATED BLOOD LOSS: None.     BRIEF HISTORY: Paco Fernandes comes in today for Bilateral Knee hylagan injection. After discussing the potential risks and benefits of the procedure with the patient. Paco did request that we proceed. A complete History & Physical was reviewed and it is unchanged.     DESCRIPTION OF PROCEDURE:   The patient was placed in a seated position. The area of the Bilateral knee was prepped with chloraprep and draped in a sterile manner. Then the targeted area of the patellar tendon was palpated and marked. A 25 gauge 1- 1/2 inch  needle was advanced into the joint capsule on both sides (one side injected at a time). 2cc of  Hyalgan (20mg/2ml-Sodium Hyluronate) was injected on each side easily without complications.  The needle was then removed and Band-Aid applied.     Disposition the patient tolerated the procedure well and there were no complications .      Paco will follow Cameron Conrad MD for office visit in 4-6 weeks for further plan of care.     He was encouraged to call with questions, concerns or if worsening of symptoms occurs.     Orion Ji MD

## 2019-05-07 ENCOUNTER — OFFICE VISIT (OUTPATIENT)
Dept: PAIN MANAGEMENT | Age: 69
End: 2019-05-07
Payer: MEDICARE

## 2019-05-07 VITALS
OXYGEN SATURATION: 98 % | WEIGHT: 231 LBS | HEIGHT: 69 IN | HEART RATE: 60 BPM | BODY MASS INDEX: 34.21 KG/M2 | DIASTOLIC BLOOD PRESSURE: 80 MMHG | SYSTOLIC BLOOD PRESSURE: 132 MMHG | RESPIRATION RATE: 18 BRPM | TEMPERATURE: 98 F

## 2019-05-07 DIAGNOSIS — M17.0 OSTEOARTHRITIS OF BOTH KNEES, UNSPECIFIED OSTEOARTHRITIS TYPE: ICD-10-CM

## 2019-05-07 DIAGNOSIS — M21.371 RIGHT FOOT DROP: ICD-10-CM

## 2019-05-07 DIAGNOSIS — M15.9 PRIMARY OSTEOARTHRITIS INVOLVING MULTIPLE JOINTS: ICD-10-CM

## 2019-05-07 DIAGNOSIS — M19.012 PRIMARY OSTEOARTHRITIS OF BOTH SHOULDERS: ICD-10-CM

## 2019-05-07 DIAGNOSIS — G89.29 WRIST PAIN, CHRONIC, RIGHT: ICD-10-CM

## 2019-05-07 DIAGNOSIS — M17.0 PRIMARY OSTEOARTHRITIS OF BOTH KNEES: Primary | ICD-10-CM

## 2019-05-07 DIAGNOSIS — M25.531 WRIST PAIN, CHRONIC, RIGHT: ICD-10-CM

## 2019-05-07 DIAGNOSIS — M47.816 LUMBAR FACET ARTHROPATHY: ICD-10-CM

## 2019-05-07 DIAGNOSIS — M19.011 PRIMARY OSTEOARTHRITIS OF BOTH SHOULDERS: ICD-10-CM

## 2019-05-07 DIAGNOSIS — G89.4 CHRONIC PAIN SYNDROME: ICD-10-CM

## 2019-05-07 PROCEDURE — 4040F PNEUMOC VAC/ADMIN/RCVD: CPT | Performed by: PAIN MEDICINE

## 2019-05-07 PROCEDURE — 99213 OFFICE O/P EST LOW 20 MIN: CPT | Performed by: PAIN MEDICINE

## 2019-05-07 PROCEDURE — 1036F TOBACCO NON-USER: CPT | Performed by: PAIN MEDICINE

## 2019-05-07 PROCEDURE — G8427 DOCREV CUR MEDS BY ELIG CLIN: HCPCS | Performed by: PAIN MEDICINE

## 2019-05-07 PROCEDURE — G8417 CALC BMI ABV UP PARAM F/U: HCPCS | Performed by: PAIN MEDICINE

## 2019-05-07 PROCEDURE — 1123F ACP DISCUSS/DSCN MKR DOCD: CPT | Performed by: PAIN MEDICINE

## 2019-05-07 PROCEDURE — 3017F COLORECTAL CA SCREEN DOC REV: CPT | Performed by: PAIN MEDICINE

## 2019-05-07 RX ORDER — TRAMADOL HYDROCHLORIDE 50 MG/1
50 TABLET ORAL 2 TIMES DAILY PRN
Qty: 60 TABLET | Refills: 0 | Status: SHIPPED | OUTPATIENT
Start: 2019-05-07 | End: 2019-06-06

## 2019-05-07 NOTE — PROGRESS NOTES
[address]  [address] [facility], [address] [place] [address] Hector  [phone]    Follow up Note      Michoacano Brace     Date of Visit:  5/7/2019    CC:  Patient presents for follow up   Chief Complaint   Patient presents with    Pain     knee     HPI:    Pain is unchanged. Change in quality of symptoms:no. Medication side effects:none. Recent diagnostic testing:none. Recent interventional procedures:knee Hylgan and Right shoulder poor    He has been on anticoagulation medications to include Eliquis and has not been on herbal supplements. He is not diabetic.     Imaging:  Lumbar spine MRI 01/2016  1. No acute fracture or subluxation. 2. No focal disc herniation or central canal stenosis. 3. Spondylitic changes and degenerative disc disease is noted   especially in the posterior elements. There is no focal disc   herniation or central canal stenosis. 4. There is mild-to-moderate central canal stenosis at the level   of L4-L5 with bilateral neural foraminal narrowing at the level of   L3-L4 but there is no focal disc herniation or central canal   stenosis.      Left knee Xray 02/2019  Tricompartmental osteoarthritis, most pronounced and severe medially  Lateral tibiofemoral chondrocalcinosis     Right knee Xray 02/2019  Tricompartmental osteoarthritis, most pronounced in the medial compartment   Multiple intra-articular loose bodies  Probable joint effusion     Left shoulder Xray 02/2019  Marked degenerative hypertrophic changes of acromioclavicular and glenohumeral joint   Suspect chronic impingement. MRI Left shoulder for continued pain    Right shoulder Xray 02/2019   Glenohumeral and acromioclavicular osteoarthritis with a 6 to 7 mm loose body in the glenohumeral joint     Previous treatments: Physical Therapy, Surgery low back surgery/Left knee surgery, LESI and medications. .       Potential Aberrant Drug-Related Behavior:  No    Urine Drug Screening:  None no opioids started OARRS report:  03/2019 consistent   05/2019 consistent     Past Medical History:   Diagnosis Date    Arthritis     Atrial fibrillation ([address]     Bulging lumbar disc     L4-L5    CAD (coronary artery disease) 07/07/2016    ct scan heart    Chronic lower back pain     BREWER (dyspnea on exertion) 3/26/2013    Eczema     Hypertension     Kidney stones     Obesity     bmi 42.1   weight 276 #    Sleep apnea     c pap  stting 2     Past Surgical History:   Procedure Laterality Date    ABLATION OF DYSRHYTHMIC FOCUS  05/2013    heart ablation dr Lanita Councilman  08/10/2016    CARDIOVERSION  3-    Dr. Pietro Chavez  10/20/2017    Dr. Blake Vanceburg ECHOCARDIOGRAM TRANSESOPHAGEAL  5/7/2013         KNEE SURGERY Left 1969    repair left knee ligaments    LITHOTRIPSY  1986    LUMBAR [address]  11/30/2016    [facility] [place]    TONSILLECTOMY      TRANSESOPHAGEAL ECHOCARDIOGRAM  3-    Dr. Lemuel Fernando     Prior to Admission medications    Medication Sig Start Date End Date Taking?  Authorizing Provider   NONFORMULARY Compound cream for knee's and shoulder's   Yes Historical Provider, MD   dofetilide (TIKOSYN) 500 MCG capsule TAKE ONE CAPSULE BY MOUTH EVERY 12 HOURS 12/11/18  Yes Luis Alfredo Estevez MD   metoprolol succinate (TOPROL XL) 50 MG extended release tablet Take 1 tablet by mouth daily 12/11/18  Yes Luis Alfredo Estevez MD   Turmeric 500 MG CAPS Take 500 mg by mouth daily   Yes Historical Provider, MD   apixaban (ELIQUIS) 5 MG TABS tablet TAKE ONE TABLET BY MOUTH TWO TIMES A DAY 7/10/18  Yes Luis Alfredo Estevez MD   magnesium oxide (MAG-OX) 400 (240 Mg) MG tablet Take 1 tablet by mouth 2 times daily  Patient taking differently: Take 400 mg by mouth daily  10/23/17  Yes Attila Hill,    Omega-3 Fatty Acids (OMEGA-3 FISH OIL) 1200 MG CAPS Take by mouth daily   Yes Historical Provider, MD   Methylsulfonylmethane (MSM PO) Take 1 tablet by mouth daily    Yes Historical Provider, MD   Multiple Vitamins-Minerals (THERAPEUTIC MULTIVITAMIN-MINERALS) tablet Take 1 tablet by mouth daily. Yes Historical Provider, MD   Coenzyme Q10 (COQ10 PO) Take 1 tablet by mouth daily. Yes Historical Provider, MD   Alpha-Lipoic Acid 300 MG CAPS Take 300 mg by mouth daily On hold   Yes Historical Provider, MD   ascorbic acid (VITAMIN C) 500 MG tablet Take 500 mg by mouth daily. Yes Historical Provider, MD   Vitamin D (CHOLECALCIFEROL) 1000 UNITS CAPS capsule Take 1,000 Units by mouth daily. Yes Historical Provider, MD   Cyanocobalamin (VITAMIN B 12 PO) Take 500 mcg by mouth daily. Yes Historical Provider, MD     Allergies   Allergen Reactions    Adhesive Tape Rash     bandaids     Social History     Socioeconomic History    Marital status:      Spouse name: Not on file    Number of children: Not on file    Years of education: Not on file    Highest education level: Not on file   Occupational History    Not on file   Social Needs    Financial resource strain: Not on file    Food insecurity:     Worry: Not on file     Inability: Not on file    Transportation needs:     Medical: Not on file     Non-medical: Not on file   Tobacco Use    Smoking status: Former Smoker     Packs/day: 1.00     Years: 20.00     Pack years: 20.00     Types: Cigarettes     Last attempt to quit: 2007     Years since quittin.3    Smokeless tobacco: Never Used    Tobacco comment: 1.5 PACKS EACH DAY stop    Substance and Sexual Activity    Alcohol use:  Yes     Alcohol/week: 1.8 oz     Types: 3 Shots of liquor per week     Comment: 1 x week    Drug use: No    Sexual activity: Not on file   Lifestyle    Physical activity:     Days per week: Not on file     Minutes per session: Not on file    Stress: Not on file   Relationships    Social connections:     Talks on phone: Not on file     Gets together: Not on file     Attends Hinduism service: Not on file     Active member of club or organization: Not on file     Attends meetings of clubs or organizations: Not on file     Relationship status: Not on file    Intimate partner violence:     Fear of current or ex partner: Not on file     Emotionally abused: Not on file     Physically abused: Not on file     Forced sexual activity: Not on file   Other Topics Concern    Not on file   Social History Narrative    Not on file     Family History   Problem Relation Age of Onset    Cancer Mother     Diabetes Mother    Yasmani Mckeon Father     Diabetes Sister     Kidney Disease Sister     Early Death Neg Hx      REVIEW OF SYSTEMS:     Orlando Breaux denies fever/chills, chest pain, shortness of breath, new bowel or bladder complaints. All other review of systems was negative. PHYSICAL EXAMINATION:      /80   Pulse 60   Temp 98 °F (36.7 °C)   Resp 18   Ht 5' 9\" (1.753 m)   Wt 231 lb (104.8 kg)   SpO2 98%   BMI 34.11 kg/m²     General:       General appearance:   elderly, pleasant and well-hydrated. , in moderate discomfort and A & O x3  Build:Overweight  Function:Rises from a seated position with difficulty     HEENT:     Head:normocephalic and atraumatic  Pupils:regular, round and equal.  Sclera: icterus absent,      Lungs:     Breathing:Breathing Pattern: regular, no distress     Abdomen:     Shape:obese and non-distended  Tenderness:none  Guarding:none     Cervical spine:     Inspection:normal  Palpation:facet loading, left, right, positive and tenderness. Range of motion:abnormal mildly flexion, extension rotation bilateral and is not painful.     Lumbar spine:     Spine inspection:surgical incision scar   CVA tenderness:No   Palpation:facet loading, left, right, positive and tenderness.   Range of motion:abnormal moderately Lateral bending, flexion, extension rotation bilateral and is  painful.     Musculoskeletal:     Trigger points in trapezius:absent bilaterally  Trigger points in Paraveteral:absent bilaterally  Trigger points in supraspinatus/infraspinatus:present  Spurling's:negative right, negative left    Miranda's:negative right, negative left   SI joint tenderness:negative right, negative left              KUSUM test:not done right, not done             left  Piriformis tenderness:negative right, negative left  Trochanteric bursa tenderness:negative right, negative left  SLR:negative right, negative left, sitting      Extremities:     Tremors:None bilaterally upper and lower  Range of motion:Limited abduction of right shoulder above 90 degrees/limited extension/normal Right, pain with internal rotation of hips negative.   Intact:Yes  Edema:Normal  Very limited extension/flexsion of right wrist     Knee:     Inspection:asymmetric, swelling none bilaterally  Tenderness of Bony Landmarks:Posterior and Medial, left  Drawer Test:negative  Effusion:absent bilaterally  Crepitus:present right  ROM:Left limited by pain  Right limited by pain      Neurological:     Sensory:normal to light touch bilateral upper and lower extremities   Motor:   Right Grip5/5              Left Grip5/5               Right Bicep5/5           Left Bicep5/5              Right Triceps5/5       Left Triceps5/5          Right Deltoid5/5     Left Deltoid5/5                  Right Quadriceps5/5          Left Quadriceps5/5           Right Gastrocnemius3+/5    Left Gastrocnemius5/5  Right Ant Tibialis3-/5  Left Ant Tibialis5/5  Reflexes:    Right Quadriceps reflex2+  Left Quadriceps reflex2+  Right Achilles reflex0  Left Achilles reflex0  Gait:antalgic     Dermatology:     Skin:no unusual rashes and no skin lesions     Impression:  Chronic bilateral shoulder/wrist/knee and foot pain with h/o low back surgery currently not complaining of low back pain  Patient is against referral to orthopedic surgery  Plan:  Follow up on his shoulder/wrist and bilateral knee, no acute issues   Failed Hylgan injection and Right shoulder steroid injection   Patient will discuss with his PCP possible referral to a rheuamtologist  Will start patient on Ultram 50 mg BID  Continue with compound cream  Patient is not a candidate for NSAIDs(blood thinners)  OARRS report reviewed  Patient encouraged to stay active and to lose weight, discussed aquatherapy but patient wants to hold off   Treatment plan discussed with the patient including medications side effects     ccreferring cesia Red M.D.

## 2019-05-07 NOTE — PROGRESS NOTES
Nick García presents to the [facility] on 5/7/2019. Helen Ybarra is complaining of pain knee . The pain is constant. The pain is described as aching, throbbing, shooting and stabbing. Pain is rated on his best day at a , on his worst day at a 7, and on average at a 7 on the VAS scale. He took his last dose of pain cream    Any procedures since your last visit: Yes, with no  % relief. Pacemaker or defibrilator: No managed by none. He has been on anticoagulation medications to include eliquis and is managed by michael.       /80   Pulse 60   Temp 98 °F (36.7 °C)   Resp 18   Ht 5' 9\" (1.753 m)   Wt 231 lb (104.8 kg)   SpO2 98%   BMI 34.11 kg/m²

## 2019-06-05 ENCOUNTER — HOSPITAL ENCOUNTER (EMERGENCY)
Age: 69
Discharge: HOME | End: 2019-06-05
Payer: MEDICARE

## 2019-06-05 ENCOUNTER — APPOINTMENT (OUTPATIENT)
Dept: GENERAL RADIOLOGY | Age: 69
End: 2019-06-05
Payer: MEDICARE

## 2019-06-05 ENCOUNTER — APPOINTMENT (OUTPATIENT)
Dept: CT IMAGING | Age: 69
End: 2019-06-05
Payer: MEDICARE

## 2019-06-05 VITALS
TEMPERATURE: 97.6 F | DIASTOLIC BLOOD PRESSURE: 98 MMHG | RESPIRATION RATE: 18 BRPM | SYSTOLIC BLOOD PRESSURE: 200 MMHG | OXYGEN SATURATION: 98 % | HEART RATE: 82 BPM | WEIGHT: 225 LBS | BODY MASS INDEX: 33.23 KG/M2

## 2019-06-05 DIAGNOSIS — N20.0 KIDNEY STONE: Primary | ICD-10-CM

## 2019-06-05 LAB
ANION GAP SERPL CALCULATED.3IONS-SCNC: 10 MMOL/L (ref 7–16)
BACTERIA: ABNORMAL /HPF
BASOPHILS ABSOLUTE: 0.04 E9/L (ref 0–0.2)
BASOPHILS RELATIVE PERCENT: 0.4 % (ref 0–2)
BILIRUBIN URINE: NEGATIVE
BLOOD, URINE: ABNORMAL
BUN BLDV-MCNC: 28 MG/DL (ref 8–23)
CALCIUM SERPL-MCNC: 9.4 MG/DL (ref 8.6–10.2)
CHLORIDE BLD-SCNC: 96 MMOL/L (ref 98–107)
CLARITY: CLEAR
CO2: 26 MMOL/L (ref 22–29)
COLOR: YELLOW
CREAT SERPL-MCNC: 1.3 MG/DL (ref 0.7–1.2)
EOSINOPHILS ABSOLUTE: 0.05 E9/L (ref 0.05–0.5)
EOSINOPHILS RELATIVE PERCENT: 0.5 % (ref 0–6)
EPITHELIAL CELLS, UA: ABNORMAL /HPF
GFR AFRICAN AMERICAN: >60
GFR NON-AFRICAN AMERICAN: 55 ML/MIN/1.73
GLUCOSE BLD-MCNC: 112 MG/DL (ref 74–99)
GLUCOSE URINE: NEGATIVE MG/DL
HCT VFR BLD CALC: 47.6 % (ref 37–54)
HEMOGLOBIN: 15.9 G/DL (ref 12.5–16.5)
IMMATURE GRANULOCYTES #: 0.06 E9/L
IMMATURE GRANULOCYTES %: 0.6 % (ref 0–5)
KETONES, URINE: ABNORMAL MG/DL
LEUKOCYTE ESTERASE, URINE: NEGATIVE
LYMPHOCYTES ABSOLUTE: 1.04 E9/L (ref 1.5–4)
LYMPHOCYTES RELATIVE PERCENT: 10.5 % (ref 20–42)
MCH RBC QN AUTO: 32.4 PG (ref 26–35)
MCHC RBC AUTO-ENTMCNC: 33.4 % (ref 32–34.5)
MCV RBC AUTO: 97.1 FL (ref 80–99.9)
MONOCYTES ABSOLUTE: 0.72 E9/L (ref 0.1–0.95)
MONOCYTES RELATIVE PERCENT: 7.3 % (ref 2–12)
NEUTROPHILS ABSOLUTE: 7.98 E9/L (ref 1.8–7.3)
NEUTROPHILS RELATIVE PERCENT: 80.7 % (ref 43–80)
NITRITE, URINE: NEGATIVE
PDW BLD-RTO: 13.8 FL (ref 11.5–15)
PH UA: 5 (ref 5–9)
PLATELET # BLD: 151 E9/L (ref 130–450)
PMV BLD AUTO: 10.9 FL (ref 7–12)
POTASSIUM SERPL-SCNC: 4.5 MMOL/L (ref 3.5–5)
PROTEIN UA: ABNORMAL MG/DL
RBC # BLD: 4.9 E12/L (ref 3.8–5.8)
RBC UA: ABNORMAL /HPF (ref 0–2)
SODIUM BLD-SCNC: 132 MMOL/L (ref 132–146)
SPECIFIC GRAVITY UA: 1.02 (ref 1–1.03)
TROPONIN: 0.03 NG/ML (ref 0–0.03)
UROBILINOGEN, URINE: 0.2 E.U./DL
WBC # BLD: 9.9 E9/L (ref 4.5–11.5)
WBC UA: ABNORMAL /HPF (ref 0–5)

## 2019-06-05 PROCEDURE — 6370000000 HC RX 637 (ALT 250 FOR IP): Performed by: NURSE PRACTITIONER

## 2019-06-05 PROCEDURE — 93010 ELECTROCARDIOGRAM REPORT: CPT | Performed by: INTERNAL MEDICINE

## 2019-06-05 PROCEDURE — 36415 COLL VENOUS BLD VENIPUNCTURE: CPT

## 2019-06-05 PROCEDURE — 85025 COMPLETE CBC W/AUTO DIFF WBC: CPT

## 2019-06-05 PROCEDURE — 96372 THER/PROPH/DIAG INJ SC/IM: CPT

## 2019-06-05 PROCEDURE — 74176 CT ABD & PELVIS W/O CONTRAST: CPT

## 2019-06-05 PROCEDURE — 99284 EMERGENCY DEPT VISIT MOD MDM: CPT

## 2019-06-05 PROCEDURE — 6360000002 HC RX W HCPCS: Performed by: NURSE PRACTITIONER

## 2019-06-05 PROCEDURE — 71045 X-RAY EXAM CHEST 1 VIEW: CPT

## 2019-06-05 PROCEDURE — 80048 BASIC METABOLIC PNL TOTAL CA: CPT

## 2019-06-05 PROCEDURE — 93005 ELECTROCARDIOGRAM TRACING: CPT | Performed by: NURSE PRACTITIONER

## 2019-06-05 PROCEDURE — 84484 ASSAY OF TROPONIN QUANT: CPT

## 2019-06-05 PROCEDURE — 81001 URINALYSIS AUTO W/SCOPE: CPT

## 2019-06-05 RX ORDER — TAMSULOSIN HYDROCHLORIDE 0.4 MG/1
0.4 CAPSULE ORAL DAILY
Qty: 14 CAPSULE | Refills: 0 | Status: SHIPPED | OUTPATIENT
Start: 2019-06-05 | End: 2019-08-23

## 2019-06-05 RX ORDER — OXYCODONE HYDROCHLORIDE AND ACETAMINOPHEN 5; 325 MG/1; MG/1
1 TABLET ORAL EVERY 6 HOURS PRN
Qty: 10 TABLET | Refills: 0 | Status: SHIPPED | OUTPATIENT
Start: 2019-06-05 | End: 2019-06-08

## 2019-06-05 RX ORDER — KETOROLAC TROMETHAMINE 30 MG/ML
30 INJECTION, SOLUTION INTRAMUSCULAR; INTRAVENOUS ONCE
Status: DISCONTINUED | OUTPATIENT
Start: 2019-06-05 | End: 2019-06-05

## 2019-06-05 RX ORDER — KETOROLAC TROMETHAMINE 30 MG/ML
30 INJECTION, SOLUTION INTRAMUSCULAR; INTRAVENOUS ONCE
Status: COMPLETED | OUTPATIENT
Start: 2019-06-05 | End: 2019-06-05

## 2019-06-05 RX ORDER — OXYCODONE HYDROCHLORIDE AND ACETAMINOPHEN 5; 325 MG/1; MG/1
1 TABLET ORAL ONCE
Status: COMPLETED | OUTPATIENT
Start: 2019-06-05 | End: 2019-06-05

## 2019-06-05 RX ADMIN — OXYCODONE HYDROCHLORIDE AND ACETAMINOPHEN 1 TABLET: 5; 325 TABLET ORAL at 15:56

## 2019-06-05 RX ADMIN — KETOROLAC TROMETHAMINE 30 MG: 30 INJECTION, SOLUTION INTRAMUSCULAR; INTRAVENOUS at 16:01

## 2019-06-05 ASSESSMENT — PAIN SCALES - GENERAL
PAINLEVEL_OUTOF10: 10
PAINLEVEL_OUTOF10: 10
PAINLEVEL_OUTOF10: 6

## 2019-06-05 ASSESSMENT — PAIN DESCRIPTION - PAIN TYPE: TYPE: ACUTE PAIN

## 2019-06-05 ASSESSMENT — PAIN DESCRIPTION - ORIENTATION: ORIENTATION: LEFT

## 2019-06-05 ASSESSMENT — PAIN DESCRIPTION - LOCATION: LOCATION: ABDOMEN;FLANK

## 2019-06-05 ASSESSMENT — PAIN DESCRIPTION - DESCRIPTORS: DESCRIPTORS: SHARP

## 2019-06-05 NOTE — ED PROVIDER NOTES
Adhesive tape    -------------------------------------------------- RESULTS -------------------------------------------------  All laboratory and radiology results have been personally reviewed by myself   LABS:  Results for orders placed or performed during the hospital encounter of 06/05/19   Urinalysis   Result Value Ref Range    Color, UA Yellow Straw/Yellow    Clarity, UA Clear Clear    Glucose, Ur Negative Negative mg/dL    Bilirubin Urine Negative Negative    Ketones, Urine TRACE (A) Negative mg/dL    Specific Gravity, UA 1.020 1.005 - 1.030    Blood, Urine LARGE (A) Negative    pH, UA 5.0 5.0 - 9.0    Protein, UA TRACE Negative mg/dL    Urobilinogen, Urine 0.2 <2.0 E.U./dL    Nitrite, Urine Negative Negative    Leukocyte Esterase, Urine Negative Negative   Troponin   Result Value Ref Range    Troponin 0.03 0.00 - 0.03 ng/mL   CBC Auto Differential   Result Value Ref Range    WBC 9.9 4.5 - 11.5 E9/L    RBC 4.90 3.80 - 5.80 E12/L    Hemoglobin 15.9 12.5 - 16.5 g/dL    Hematocrit 47.6 37.0 - 54.0 %    MCV 97.1 80.0 - 99.9 fL    MCH 32.4 26.0 - 35.0 pg    MCHC 33.4 32.0 - 34.5 %    RDW 13.8 11.5 - 15.0 fL    Platelets 187 108 - 333 E9/L    MPV 10.9 7.0 - 12.0 fL    Neutrophils % 80.7 (H) 43.0 - 80.0 %    Immature Granulocytes % 0.6 0.0 - 5.0 %    Lymphocytes % 10.5 (L) 20.0 - 42.0 %    Monocytes % 7.3 2.0 - 12.0 %    Eosinophils % 0.5 0.0 - 6.0 %    Basophils % 0.4 0.0 - 2.0 %    Neutrophils # 7.98 (H) 1.80 - 7.30 E9/L    Immature Granulocytes # 0.06 E9/L    Lymphocytes # 1.04 (L) 1.50 - 4.00 E9/L    Monocytes # 0.72 0.10 - 0.95 E9/L    Eosinophils # 0.05 0.05 - 0.50 E9/L    Basophils # 0.04 0.00 - 0.20 L7/J   Basic Metabolic Panel   Result Value Ref Range    Sodium 132 132 - 146 mmol/L    Potassium 4.5 3.5 - 5.0 mmol/L    Chloride 96 (L) 98 - 107 mmol/L    CO2 26 22 - 29 mmol/L    Anion Gap 10 7 - 16 mmol/L    Glucose 112 (H) 74 - 99 mg/dL    BUN 28 (H) 8 - 23 mg/dL    CREATININE 1.3 (H) 0.7 - 1.2 mg/dL    GFR Non- 55 >=60 mL/min/1.73    GFR African American >60     Calcium 9.4 8.6 - 10.2 mg/dL   Microscopic Urinalysis   Result Value Ref Range    WBC, UA NONE 0 - 5 /HPF    RBC, UA 5-10 (A) 0 - 2 /HPF    Epi Cells NONE /HPF    Bacteria, UA NONE /HPF       RADIOLOGY:  Interpreted by Radiologist.  CT ABDOMEN PELVIS WO CONTRAST   Final Result   1. Presence of a 6 x 4 mm calculus in the bladder site of the left   ureterovesical junction (UVJ) which is near to be passed through the   bladder wall, causing severe obstructive uropathy for the left kidney. 2. Presence of bilateral nonobstructing renal calculi, the largest   ones are seen in the right kidney. 3. No obstructive uropathy in the right kidney. 4. Multiple gallstones. 5. Fat infiltration of the liver. ALERT:  ABNORMAL REPORT. Presence of a      XR CHEST PORTABLE   Final Result   1. Indeterminate right and left basilar opacifications, greatest on   the right, findings can be seen in atelectasis/scarring and/or early   developing infiltrate/pneumonia. 2. Vascular calcifications thoracic aorta. 3. Suspected underlying mild central pulmonary vascular congestion          ------------------------- NURSING NOTES AND VITALS REVIEWED ---------------------------   The nursing notes within the ED encounter and vital signs as below have been reviewed. BP (!) 200/98   Pulse 82   Temp 97.6 °F (36.4 °C) (Temporal)   Resp 18   Wt 225 lb (102.1 kg)   SpO2 98%   BMI 33.23 kg/m²   Oxygen Saturation Interpretation: Normal      ---------------------------------------------------PHYSICAL EXAM--------------------------------------      Constitutional/General: Alert and oriented x3, well appearing, non toxic in NAD  Head: NC/AT  Eyes: PERRL, EOMI  Mouth: Oropharynx clear, handling secretions, no trismus  Neck: Supple, full ROM, no meningeal signs  Pulmonary: Lungs clear to auscultation bilaterally, no wheezes, rales, or rhonchi.  Not in

## 2019-06-06 LAB
EKG ATRIAL RATE: 69 BPM
EKG P AXIS: 77 DEGREES
EKG P-R INTERVAL: 162 MS
EKG Q-T INTERVAL: 466 MS
EKG QRS DURATION: 136 MS
EKG QTC CALCULATION (BAZETT): 499 MS
EKG R AXIS: 39 DEGREES
EKG T AXIS: 57 DEGREES
EKG VENTRICULAR RATE: 69 BPM

## 2019-06-14 ENCOUNTER — TELEPHONE (OUTPATIENT)
Dept: NON INVASIVE DIAGNOSTICS | Age: 69
End: 2019-06-14

## 2019-06-14 RX ORDER — DOFETILIDE 0.5 MG/1
CAPSULE ORAL
Qty: 180 CAPSULE | Refills: 1 | Status: SHIPPED | OUTPATIENT
Start: 2019-06-14 | End: 2019-11-26 | Stop reason: SDUPTHER

## 2019-06-14 NOTE — TELEPHONE ENCOUNTER
Requesting Tikosyn refill - CrCl calculated off the following information:    Tikosyn dosage: 500 mcg bid    Age: 69   Ht: 1.753 m  Wt: 102.1 kg  Cr: 1.3 mg/dl (based off labs on 1.3)  CrCl: 54.41 mL/min    Last QTc: 499 msec (based on last EKG on 6/2019 while in the hospital)  Last OTc: 522 msec on office EKG in 3/2019  Last Cr: 1.1 mg/dl from lab work in December 2018    Please advise if Tikosyn needs adjusted or if to continue on the current dosage.

## 2019-08-13 ENCOUNTER — HOSPITAL ENCOUNTER (OUTPATIENT)
Dept: MRI IMAGING | Age: 69
Discharge: HOME | End: 2019-08-15
Payer: MEDICARE

## 2019-08-13 DIAGNOSIS — S89.90XA KNEE INJURY, UNSPECIFIED LATERALITY, INITIAL ENCOUNTER: ICD-10-CM

## 2019-08-13 PROCEDURE — 73721 MRI JNT OF LWR EXTRE W/O DYE: CPT

## 2019-08-14 ENCOUNTER — TELEPHONE (OUTPATIENT)
Dept: ORTHOPEDIC SURGERY | Age: 69
End: 2019-08-14

## 2019-08-23 ENCOUNTER — OFFICE VISIT (OUTPATIENT)
Dept: ORTHOPEDIC SURGERY | Age: 69
End: 2019-08-23
Payer: MEDICARE

## 2019-08-23 VITALS
HEART RATE: 57 BPM | BODY MASS INDEX: 33.62 KG/M2 | WEIGHT: 227 LBS | SYSTOLIC BLOOD PRESSURE: 168 MMHG | DIASTOLIC BLOOD PRESSURE: 79 MMHG | HEIGHT: 69 IN

## 2019-08-23 DIAGNOSIS — M17.0 PRIMARY OSTEOARTHRITIS OF BOTH KNEES: Primary | ICD-10-CM

## 2019-08-23 PROCEDURE — 4040F PNEUMOC VAC/ADMIN/RCVD: CPT | Performed by: ORTHOPAEDIC SURGERY

## 2019-08-23 PROCEDURE — 1123F ACP DISCUSS/DSCN MKR DOCD: CPT | Performed by: ORTHOPAEDIC SURGERY

## 2019-08-23 PROCEDURE — 2500000003 HC RX 250 WO HCPCS

## 2019-08-23 PROCEDURE — 99203 OFFICE O/P NEW LOW 30 MIN: CPT | Performed by: ORTHOPAEDIC SURGERY

## 2019-08-23 PROCEDURE — 3017F COLORECTAL CA SCREEN DOC REV: CPT | Performed by: ORTHOPAEDIC SURGERY

## 2019-08-23 PROCEDURE — 6360000002 HC RX W HCPCS

## 2019-08-23 PROCEDURE — G8427 DOCREV CUR MEDS BY ELIG CLIN: HCPCS | Performed by: ORTHOPAEDIC SURGERY

## 2019-08-23 PROCEDURE — G8417 CALC BMI ABV UP PARAM F/U: HCPCS | Performed by: ORTHOPAEDIC SURGERY

## 2019-08-23 PROCEDURE — 1036F TOBACCO NON-USER: CPT | Performed by: ORTHOPAEDIC SURGERY

## 2019-08-23 PROCEDURE — 20610 DRAIN/INJ JOINT/BURSA W/O US: CPT | Performed by: ORTHOPAEDIC SURGERY

## 2019-08-23 PROCEDURE — 99212 OFFICE O/P EST SF 10 MIN: CPT | Performed by: ORTHOPAEDIC SURGERY

## 2019-08-23 RX ORDER — TRIAMCINOLONE ACETONIDE 40 MG/ML
40 INJECTION, SUSPENSION INTRA-ARTICULAR; INTRAMUSCULAR ONCE
Status: COMPLETED | OUTPATIENT
Start: 2019-08-23 | End: 2019-08-23

## 2019-08-23 RX ORDER — GABAPENTIN 100 MG/1
100 CAPSULE ORAL PRN
COMMUNITY
End: 2021-12-15

## 2019-08-23 RX ORDER — BUPIVACAINE HYDROCHLORIDE 2.5 MG/ML
3 INJECTION, SOLUTION INFILTRATION; PERINEURAL ONCE
Status: COMPLETED | OUTPATIENT
Start: 2019-08-23 | End: 2019-08-23

## 2019-08-23 RX ORDER — LIDOCAINE HYDROCHLORIDE 10 MG/ML
3 INJECTION, SOLUTION INFILTRATION; PERINEURAL ONCE
Status: COMPLETED | OUTPATIENT
Start: 2019-08-23 | End: 2019-08-23

## 2019-08-23 RX ADMIN — LIDOCAINE HYDROCHLORIDE 3 ML: 10 INJECTION, SOLUTION INFILTRATION; PERINEURAL at 15:24

## 2019-08-23 RX ADMIN — TRIAMCINOLONE ACETONIDE 40 MG: 40 INJECTION, SUSPENSION INTRA-ARTICULAR; INTRAMUSCULAR at 15:25

## 2019-08-23 RX ADMIN — BUPIVACAINE HYDROCHLORIDE 7.5 MG: 2.5 INJECTION, SOLUTION INFILTRATION; PERINEURAL at 15:24

## 2019-09-24 ENCOUNTER — OFFICE VISIT (OUTPATIENT)
Dept: NON INVASIVE DIAGNOSTICS | Age: 69
End: 2019-09-24
Payer: MEDICARE

## 2019-09-24 VITALS
BODY MASS INDEX: 33.62 KG/M2 | WEIGHT: 227 LBS | SYSTOLIC BLOOD PRESSURE: 132 MMHG | RESPIRATION RATE: 18 BRPM | HEART RATE: 56 BPM | DIASTOLIC BLOOD PRESSURE: 84 MMHG | HEIGHT: 69 IN

## 2019-09-24 DIAGNOSIS — I48.19 PERSISTENT ATRIAL FIBRILLATION (HCC): Primary | ICD-10-CM

## 2019-09-24 PROCEDURE — 1123F ACP DISCUSS/DSCN MKR DOCD: CPT | Performed by: INTERNAL MEDICINE

## 2019-09-24 PROCEDURE — 99214 OFFICE O/P EST MOD 30 MIN: CPT | Performed by: INTERNAL MEDICINE

## 2019-09-24 PROCEDURE — 1036F TOBACCO NON-USER: CPT | Performed by: INTERNAL MEDICINE

## 2019-09-24 PROCEDURE — 4040F PNEUMOC VAC/ADMIN/RCVD: CPT | Performed by: INTERNAL MEDICINE

## 2019-09-24 PROCEDURE — 3017F COLORECTAL CA SCREEN DOC REV: CPT | Performed by: INTERNAL MEDICINE

## 2019-09-24 PROCEDURE — 93000 ELECTROCARDIOGRAM COMPLETE: CPT | Performed by: INTERNAL MEDICINE

## 2019-09-24 PROCEDURE — G8417 CALC BMI ABV UP PARAM F/U: HCPCS | Performed by: INTERNAL MEDICINE

## 2019-09-24 PROCEDURE — G8427 DOCREV CUR MEDS BY ELIG CLIN: HCPCS | Performed by: INTERNAL MEDICINE

## 2019-09-24 NOTE — PROGRESS NOTES
Constitutional: Oriented to person, place, and time. Well-developed and cooperative. Head: Normocephalic and atraumatic. Eyes: Conjunctivae are normal. Pupils are equal, round, and reactive to light. Neck: No hepatojugular reflux and no JVD present. Carotid bruit is not present. Cardiovascular: S1 normal, S2 normal and intact distal pulses. A regular rhythm present. PMI is not displaced. Pulmonary/Chest: Effort normal and breath sounds normal. No respiratory distress. Abdominal: Soft. Normal appearance and bowel sounds are normal.  No abdominal bruit and no pulsatile midline mass. There is no hepatomegaly. No tenderness. Musculoskeletal: Normal range of motion of all extremities, no muscle weakness. Neurological: Alert and oriented to person, place, and time. Gait normal.   Skin: Skin is warm and dry. No bruising, no ecchymosis and no rash noted. Extremity: No clubbing or cyanosis. No edema. Psychiatric: Normal mood and affect. Thought content normal.    EC19: Sinus bradycardia at 56 bpm, NAD, RBBB, LAE, QTc: 470 ms    Data:   Lab Results   Component Value Date    WBC 9.9 2019    HGB 15.9 2019    HCT 47.6 2019    MCV 97.1 2019     2019     Lab Results   Component Value Date    CREATININE 1.3 (H) 2019     Lab Results   Component Value Date    INR 1.3 2016    INR 1.2 2013    PROTIME 14.0 (H) 2016    PROTIME 12.8 2013     Lab Results   Component Value Date    TSH 1.250 2018     Lab Results   Component Value Date    ALT 22 2018    AST 16 2018    ALKPHOS 91 2018    BILITOT 0.7 2018 2019: 24-hour Holter monitor revealing sinus rhythm from  bpm.   There was no evidence of AF, AFl, SVT, VT, or AV block. No symptoms were reported. I have independently reviewed all of the ECGs as per above.     I have reviewed all progress notes & records from the time of the patient's last office visit up to present. Impression:    1. Atrial fibrillation and flutter  - s/p CTI ablation in May 6358, procedure complicated by airway compromise, anatomical variants c/w Eustachian ridge and pouch, extended procedure time and multiple lines required to achieve bidirectional block. He had no documented recurrence in over 2 year after procedure. Had recurrence of flutter, possibly typical.    - post RFA with subsequent development of persistent atrial fibrillation and atypical flutter. He was highly symptomatic despite apparent rate control, at least at rest. The patient  opted for biatrial catheter ablation but first underwent a series of back injections, during which he had to temporarily interrupt his oral anticoagulation therapy, so ablation was postponed. - [id number] risk score would now be considered 3 based on HTN and systolic dysfunction, age greater than 72, he is on Eliquis he denies any bleeding problems.    - He underwent redo flutter ablation and PVI (RFA) on 8/10/16 and was doing well. He was weaned off his sotalol and was started back on a low-dose Toprol-XL for his cardiomyopathy.   - On October 20, 2017 he underwent a successful BUSHRA/DCCV with Tikosyn drug loading for persistent symptomatic atrial flutter. - Currently on Tikosyn 500 mcg BID.  - Currently in sinus bradycardia with RBBB  - discussed his relatively low burden symptoms and repetitive ablations, he right now is happy with his symptom control, we emphasized need for risk factor modification  - 24-hour Holter monitor- January 2019 - revealing sinus rhythm from  bpm.   There was no evidence of AF, AFl, SVT, VT, or AV block. No symptoms were reported. 2. Systolic heart failure  - It was likely tachycardia-mediated in nature.  EF appeared lower on BUSHRA; had TTE in sinus rhythm and EF 50-55% per Dr. Akabr Tuttle.    - He appeared to be well compensated and euvolemic to dry at that time  - previously treated with Toprol-XL, discontinued as noted above due to bradycardia, as well as lisinopril which was discontinued due to hyperkalemia. - he is on Toprol-XL 25 mg daily. 3.Hypertension    - controlled currently  - continue current medications    4. Chronic kidney disease - Stage III  - history of elevated creatinine  Lab Results   Component Value Date     06/05/2019    K 4.5 06/05/2019    CL 96 06/05/2019    CO2 26 06/05/2019    BUN 28 06/05/2019    CREATININE 1.3 06/05/2019    GLUCOSE 112 06/05/2019    CALCIUM 9.4 06/05/2019      5. Obstructive Sleep Apnea    - compliant with his CPAP        6. Obesity  - Encouraged weight loss  - Body mass index is 33.52 kg/m².       7. RBBB  - Chronic  - no evidence of symptomatic bradycardia      8. Chronic back pain  - with foot drop, status post injections, recurrent symptoms  - history of back surgery     Recommendations:      1. Continue Eliquis 5 mg po BID. 2. Continue Toprol-XL 50 mg once daily. 3. Continue Tikosyn 500 mcg BID. 4. Follow up in 6 mo. Encouraged the patient to call with any questions or concerns. 5.  Q. 3-6-month BMP, MG level while on Tikosyn. Thank you for allowing me the opportunity to participate in the care of your patient. I have spent a total of 25 minutes with the patient and his family reviewing the above stated recommendations. A total of >50% of that time involved face-to-face time providing counseling and or coordination of care with the other providers. Dulce Maria Franks DO  [facility] Cardiac Electrophysiology  [address] [facility]     NOTE: This report was transcribed using voice recognition software. Every effort was made to ensure accuracy; however, inadvertent computerized transcription errors may be present.

## 2019-11-27 RX ORDER — DOFETILIDE 0.5 MG/1
CAPSULE ORAL
Qty: 180 CAPSULE | Refills: 0 | Status: SHIPPED
Start: 2019-11-27 | End: 2020-02-25

## 2019-12-03 RX ORDER — METOPROLOL SUCCINATE 50 MG/1
50 TABLET, EXTENDED RELEASE ORAL DAILY
Qty: 90 TABLET | Refills: 3 | Status: SHIPPED | OUTPATIENT
Start: 2019-12-03 | End: 2020-02-04 | Stop reason: ALTCHOICE

## 2020-01-24 ENCOUNTER — TELEPHONE (OUTPATIENT)
Dept: NON INVASIVE DIAGNOSTICS | Age: 70
End: 2020-01-24

## 2020-01-24 NOTE — TELEPHONE ENCOUNTER
Spoke with patient's wife, let her know typically PCP or Cardiology manages BP but [facility] would recommend patient starting Norvasc 5 mg QD in addition to him taking his Toprol LX. Patient's wife verbalized understanding and would like to schedule an appointment with Radha Hanley to mange his BP. I transferred her to [employer].

## 2020-01-24 NOTE — TELEPHONE ENCOUNTER
I do not remember this conversation since normally the PCP would handle this but in any event, I would start him on norvasc 5 mg QD in addition to his Toprol XL. Can't increase Toprol XL base on his HR. Allow 1-2 weeks and follow BP. If his BP remains above 833 mmHg systolic then let me know.     [facility]

## 2020-01-28 ENCOUNTER — TELEPHONE (OUTPATIENT)
Dept: ADMINISTRATIVE | Age: 70
End: 2020-01-28

## 2020-01-31 NOTE — PROGRESS NOTES
[facility]  Colten Winslow. Jose Daniel Barton M.D. ISABELLA Douglas M.D. William Motley M.D. Victor Manuel Fernandes M.D. MD Reilly Valdez     Catracho Ryan MD      This 70-year-old man is seen today for outpatient cardiac assessment. He has a history of recurrent atrial fibrillation and flutter and has had several cardioversions and has had an ablation procedure. He has had coronary CT evidence for ASCVD and had calcified atheroma of the abdominal aorta on CT exam.  He has not had history of MI, angina or CHF. He is somewhat sedentary primarily related to a history of dropfoot. According to the patient and his wife he has had persistently elevated BP. He was advised to start Norvasc by Dr. Silvana Hughes but was noncompliant with this. The patient is a retired/disabled [employer] () f. He is . Non-smoker since .  20 pack years. Family history: Father  age 79 and mother  age 79. No cause given. Medical history:  1 Eczema. 2 Morbid obesity. BMI 37 2020  3 Chronic essential hypertension. 4 PAF spontaneously converted on Cardizem. 5 No known drug allergies. 6 No family history of premature CAD. 7 History of cigarette abuse  60 pack year smoking history, stopped about .    8 [facility] 3 admission, 2013 with palpitations, AF/flutter. Converted with Cardizem. Discharged on beta blocker. 9 Echo, 2013. EF 60-65%, no LVH, normal RV . Mild LAE. No significant valvular abnormality  10 Exercise MPS, 2013. 7.3 minutes  Naga protocol without chest pain or ST changes. There was no ventricular ectopy. Baseline RBBB. He developed atrial flutter with 1:1 conduction at peak exercise, HR approximately 200. He had no symptoms. BP response normal.   Perfusion normal, no ischemia, infarction or TID. Gated wall motion normal  EF 49%.    Rate slowed to about 80 bpm after 5 mg IV metoprolol     11  EKG 03/28/2013  sinus rhythm Recurrent atrial flutter  04/05/2013.    12 Intolerant of metoprolol >> fatigue. 15   CTI ablation in May 9083, procedure complicated by airway compromise, anatomical variants c/w. Eustachian ridge and pouch, extended procedure time and multiple lines required to achieve bidirectional block. No documented recurrence in over 2 years 1 documented AF episode 3/27/13  14     BUSHRA-guided flutter ablation successful 05/07/2013  15     BUSHRA guided DCCV 11/04/2015 successful. Bicuspid aortic valve. EF 40%. 16      BUSHRA guided DCCV 03/24/16  17   [id number] risk score would now be considered 3 based on HTN and systolic dysfunction, age greater then 72, he is on Eliquis 2016  18    coronary CT angiogram 07/07/2016: Dilated left atrium. Mild to moderate atherosclerotic calcifications of the coronary arteries mainly LAD. No coronary artery calcium score given 2016  19  radiofrequency catheter ablation of atrial fibrillation. BUSHRA guided. 08/10/2016  20   DCCV for atrial flutter with RVR 09/14/2017  21     DCCV for atrial flutter successful 10/20/2017  22     hospital admission 10/2017. Tikosyn initiated. In sinus rhythm. 23     hospital admission 12/2018 for palpitations. Atrial flutter with RVR. Rx IV Lopressor and spontaneously converted to sinus  24      history nephrolithiasis CT scan 06/05/2019: Multiple gallstones. Calculi.   Calcification atheroma abdominal aorta            Review of Systems:  Constitutional: negative for fever and chills  Respiratory: negative for cough and hemoptysis  Cardiovascular:   Gastrointestinal: negative for abdominal pain, diarrhea, nausea and vomiting  Genitourinary:negative for dysuria and hematuria  Derm: negative for rash and skin lesion(s)  Neurological: negative for seizures and tremors  Endocrine: negative for diabetic symptoms including polydipsia and polyuria  Musculoskeletal: negative for Disp: , Rfl:     magnesium oxide (MAG-OX) 400 (240 Mg) MG tablet, Take 1 tablet by mouth 2 times daily (Patient taking differently: Take 400 mg by mouth daily ), Disp: 60 tablet, Rfl: 0    Omega-3 Fatty Acids (OMEGA-3 FISH OIL) 1200 MG CAPS, Take by mouth daily, Disp: , Rfl:     Multiple Vitamins-Minerals (THERAPEUTIC MULTIVITAMIN-MINERALS) tablet, Take 1 tablet by mouth daily. , Disp: , Rfl:     Coenzyme Q10 (COQ10 PO), Take 1 tablet by mouth daily. , Disp: , Rfl:     Alpha-Lipoic Acid 300 MG CAPS, Take 300 mg by mouth daily On hold, Disp: , Rfl:     ascorbic acid (VITAMIN C) 500 MG tablet, Take 500 mg by mouth daily. , Disp: , Rfl:     Vitamin D (CHOLECALCIFEROL) 1000 UNITS CAPS capsule, Take 1,000 Units by mouth daily. , Disp: , Rfl:     Cyanocobalamin (VITAMIN B 12 PO), Take 500 mcg by mouth daily. , Disp: , Rfl:     metoprolol succinate (TOPROL XL) 50 MG extended release tablet, Take 1 tablet by mouth daily (Patient not taking: Reported on 2/4/2020), Disp: 90 tablet, Rfl: 3    gabapentin (NEURONTIN) 100 MG capsule, Take 100 mg by mouth 2 times daily. , Disp: , Rfl:       Note: This report was completed utilizing computer voice recognition software. Every effort has been made to ensure accuracy, however; inadvertent computerized transcription errors may be present. Jay Blackman MD

## 2020-02-04 ENCOUNTER — OFFICE VISIT (OUTPATIENT)
Dept: CARDIOLOGY CLINIC | Age: 70
End: 2020-02-04
Payer: MEDICARE

## 2020-02-04 ENCOUNTER — HOSPITAL ENCOUNTER (OUTPATIENT)
Age: 70
Discharge: HOME | End: 2020-02-06
Payer: MEDICARE

## 2020-02-04 VITALS
BODY MASS INDEX: 37.12 KG/M2 | HEIGHT: 69 IN | RESPIRATION RATE: 16 BRPM | HEART RATE: 57 BPM | DIASTOLIC BLOOD PRESSURE: 86 MMHG | SYSTOLIC BLOOD PRESSURE: 160 MMHG | WEIGHT: 250.6 LBS

## 2020-02-04 LAB
CHOLESTEROL, TOTAL: 172 MG/DL (ref 0–199)
HDLC SERPL-MCNC: 61 MG/DL
LDL CHOLESTEROL CALCULATED: 76 MG/DL (ref 0–99)
TRIGL SERPL-MCNC: 177 MG/DL (ref 0–149)
VLDLC SERPL CALC-MCNC: 35 MG/DL

## 2020-02-04 PROCEDURE — G8427 DOCREV CUR MEDS BY ELIG CLIN: HCPCS | Performed by: INTERNAL MEDICINE

## 2020-02-04 PROCEDURE — 99999 PR OFFICE/OUTPT VISIT,PROCEDURE ONLY: CPT | Performed by: INTERNAL MEDICINE

## 2020-02-04 PROCEDURE — G8417 CALC BMI ABV UP PARAM F/U: HCPCS | Performed by: INTERNAL MEDICINE

## 2020-02-04 PROCEDURE — 93000 ELECTROCARDIOGRAM COMPLETE: CPT | Performed by: INTERNAL MEDICINE

## 2020-02-04 PROCEDURE — G8484 FLU IMMUNIZE NO ADMIN: HCPCS | Performed by: INTERNAL MEDICINE

## 2020-02-04 PROCEDURE — 80061 LIPID PANEL: CPT

## 2020-02-04 RX ORDER — CHLORTHALIDONE 25 MG/1
25 TABLET ORAL DAILY
Qty: 90 TABLET | Refills: 3 | Status: SHIPPED
Start: 2020-02-04 | End: 2020-11-09

## 2020-02-07 ENCOUNTER — TELEPHONE (OUTPATIENT)
Dept: CARDIOLOGY CLINIC | Age: 70
End: 2020-02-07

## 2020-02-07 NOTE — TELEPHONE ENCOUNTER
Per Dr Nicole Lazo, tell the patient that his lipid profile is not too bad at this point so right now I am not recommending a medication change to we have additional information I believe he has an office appointment in the next several weeks. Left patient a message to call the office regarding his lab results.

## 2020-02-17 NOTE — PROGRESS NOTES
ablation in May 7660, procedure complicated by airway compromise, anatomical variants c/w. Eustachian ridge and pouch, extended procedure time and multiple lines required to achieve bidirectional block. No documented recurrence in over 2 years 1 documented AF episode 3/27/13  14. BUSHRA-guided flutter ablation successful 05/07/2013  15. BUSHRA guided DCCV 11/04/2015 successful. Bicuspid aortic valve. EF 40%. 16. BUSHRA guided DCCV 03/24/16  17. [id number] risk score would now be considered 3 based on HTN and systolic dysfunction, age greater then 72, he is on Eliquis 2016  18. Coronary CT angiogram 07/07/2016: Dilated left atrium. Mild to moderate atherosclerotic calcifications of the coronary arteries mainly LAD. No coronary artery calcium score given 2016  19. Radiofrequency catheter ablation of atrial fibrillation. BUSHRA guided,  08/10/2016.  20. DCCV for atrial flutter with RVR, 09/14/2017. 21. DCCV for atrial flutter successful, 10/20/2017.  22. Hospital admission, 10/2017. Tikosyn initiated. In sinus rhythm. [facility]16 [facility] admission 12/2018 for palpitations. Atrial flutter with RVR. Rx IV Lopressor and spontaneously converted to sinus  24. Nephrolithiasis. CT scan, 06/05/2019. Multiple gallstones. Calculi. Calcification atheroma abdominal aorta. 25. Lipid panel, 02/04/2020.  Total cholesterol is 172, triglycerides 177, HDL 61, LDL 76.        Review of Systems:  Constitutional: negative for fever and chills  Respiratory: negative for cough and hemoptysis  Cardiovascular:   Gastrointestinal: negative for abdominal pain, diarrhea, nausea and vomiting  Genitourinary:negative for dysuria and hematuria  Derm: negative for rash and skin lesion(s)  Neurological: negative for seizures and tremors  Endocrine: negative for diabetic symptoms including polydipsia and polyuria  Musculoskeletal: negative for CTD  Psychiatric: negative for psychosis and major depression    On examination, . Skin is warm and dry. Vitamins-Minerals (THERAPEUTIC MULTIVITAMIN-MINERALS) tablet, Take 1 tablet by mouth daily. , Disp: , Rfl:     Coenzyme Q10 (COQ10 PO), Take 1 tablet by mouth daily. , Disp: , Rfl:     Alpha-Lipoic Acid 300 MG CAPS, Take 300 mg by mouth daily On hold, Disp: , Rfl:     ascorbic acid (VITAMIN C) 500 MG tablet, Take 500 mg by mouth daily. , Disp: , Rfl:     Vitamin D (CHOLECALCIFEROL) 1000 UNITS CAPS capsule, Take 1,000 Units by mouth daily. , Disp: , Rfl:     Cyanocobalamin (VITAMIN B 12 PO), Take 500 mcg by mouth daily. , Disp: , Rfl:     Metoprolol Succinate 25 MG CS24, Take 25 mg by mouth daily, Disp: , Rfl:       Note: This report was completed utilizing computer voice recognition software. Every effort has been made to ensure accuracy, however; inadvertent computerized transcription errors may be present. Evelyn Velásquez.  Sara Vazquez MD

## 2020-02-18 ENCOUNTER — OFFICE VISIT (OUTPATIENT)
Dept: CARDIOLOGY CLINIC | Age: 70
End: 2020-02-18
Payer: MEDICARE

## 2020-02-18 VITALS
HEIGHT: 69 IN | HEART RATE: 55 BPM | BODY MASS INDEX: 36.56 KG/M2 | SYSTOLIC BLOOD PRESSURE: 122 MMHG | DIASTOLIC BLOOD PRESSURE: 70 MMHG | WEIGHT: 246.8 LBS | RESPIRATION RATE: 16 BRPM

## 2020-02-18 PROCEDURE — 1036F TOBACCO NON-USER: CPT | Performed by: INTERNAL MEDICINE

## 2020-02-18 PROCEDURE — 3017F COLORECTAL CA SCREEN DOC REV: CPT | Performed by: INTERNAL MEDICINE

## 2020-02-18 PROCEDURE — 99213 OFFICE O/P EST LOW 20 MIN: CPT | Performed by: INTERNAL MEDICINE

## 2020-02-18 PROCEDURE — 93000 ELECTROCARDIOGRAM COMPLETE: CPT | Performed by: INTERNAL MEDICINE

## 2020-02-18 PROCEDURE — G8427 DOCREV CUR MEDS BY ELIG CLIN: HCPCS | Performed by: INTERNAL MEDICINE

## 2020-02-18 PROCEDURE — 4040F PNEUMOC VAC/ADMIN/RCVD: CPT | Performed by: INTERNAL MEDICINE

## 2020-02-18 PROCEDURE — 1123F ACP DISCUSS/DSCN MKR DOCD: CPT | Performed by: INTERNAL MEDICINE

## 2020-02-18 PROCEDURE — G8417 CALC BMI ABV UP PARAM F/U: HCPCS | Performed by: INTERNAL MEDICINE

## 2020-02-18 PROCEDURE — G8484 FLU IMMUNIZE NO ADMIN: HCPCS | Performed by: INTERNAL MEDICINE

## 2020-02-18 RX ORDER — ATORVASTATIN CALCIUM 20 MG/1
20 TABLET, FILM COATED ORAL DAILY
Qty: 30 TABLET | Refills: 5 | Status: SHIPPED
Start: 2020-02-18 | End: 2020-08-11

## 2020-02-18 RX ORDER — METOPROLOL SUCCINATE 50 MG/1
50 TABLET, EXTENDED RELEASE ORAL DAILY
COMMUNITY
End: 2020-05-06

## 2020-02-20 ENCOUNTER — HOSPITAL ENCOUNTER (OUTPATIENT)
Dept: CARDIOLOGY | Age: 70
Discharge: HOME | End: 2020-02-20
Payer: MEDICARE

## 2020-02-20 VITALS
WEIGHT: 246 LBS | HEART RATE: 60 BPM | DIASTOLIC BLOOD PRESSURE: 60 MMHG | HEIGHT: 69 IN | SYSTOLIC BLOOD PRESSURE: 120 MMHG | BODY MASS INDEX: 36.43 KG/M2

## 2020-02-20 PROCEDURE — A9500 TC99M SESTAMIBI: HCPCS | Performed by: INTERNAL MEDICINE

## 2020-02-20 PROCEDURE — 2580000003 HC RX 258: Performed by: INTERNAL MEDICINE

## 2020-02-20 PROCEDURE — 6360000002 HC RX W HCPCS: Performed by: INTERNAL MEDICINE

## 2020-02-20 PROCEDURE — 78452 HT MUSCLE IMAGE SPECT MULT: CPT

## 2020-02-20 PROCEDURE — 93017 CV STRESS TEST TRACING ONLY: CPT

## 2020-02-20 PROCEDURE — 3430000000 HC RX DIAGNOSTIC RADIOPHARMACEUTICAL: Performed by: INTERNAL MEDICINE

## 2020-02-20 RX ORDER — SODIUM CHLORIDE 0.9 % (FLUSH) 0.9 %
10 SYRINGE (ML) INJECTION PRN
Status: DISCONTINUED | OUTPATIENT
Start: 2020-02-20 | End: 2020-02-21 | Stop reason: HOSPADM

## 2020-02-20 RX ADMIN — SODIUM CHLORIDE, PRESERVATIVE FREE 10 ML: 5 INJECTION INTRAVENOUS at 11:36

## 2020-02-20 RX ADMIN — Medication 31 MILLICURIE: at 11:36

## 2020-02-20 RX ADMIN — REGADENOSON 0.4 MG: 0.08 INJECTION, SOLUTION INTRAVENOUS at 11:36

## 2020-02-20 RX ADMIN — SODIUM CHLORIDE, PRESERVATIVE FREE 10 ML: 5 INJECTION INTRAVENOUS at 08:58

## 2020-02-20 RX ADMIN — Medication 10.3 MILLICURIE: at 08:58

## 2020-02-20 RX ADMIN — SODIUM CHLORIDE, PRESERVATIVE FREE 10 ML: 5 INJECTION INTRAVENOUS at 11:35

## 2020-02-20 NOTE — PROCEDURES
[address] [facility] [address] [phone]                Pharmacologic Stress Nuclear Gated SPECT Study    Name: [facility] Account Number: [de-identified]    :  1950          Sex: male         Date of Study:  2020    Height: 5' 9\" (175.3 cm)         Weight: 246 lb (111.6 kg)     Ordering Provider: David Giordano MD          PCP: Dm Jacobo MD      Cardiologist: David Giordano MD             Interpreting Physician: Ekta Bravo, DO  _________________________________________________________________________________    Indication:   Evaluation of extent and severity of coronary artery disease    Clinical History:   Patient has prior history of coronary artery disease. Resting ECG:    Normal sinus rhythm, Right Bundle Branch Block and Nonspecific ST-T wave changes    Procedure:   Pharmacologic stress testing was performed with regadenoson 0.4 mg for 15 seconds. The heart rate was 54 at baseline and nixon to 75 beats during the infusion. This corresponds to 49% of maximum predicted heart rate. The blood pressure at baseline was 118/62 and blood pressure at the end of infusion was 120/60. Blood pressure response was normal during the stress procedure. The patient tolerated the infusion well. ECG during the infusion did not change. IMAGING: Myocardial perfusion imaging was performed at rest 30-35 minutes following the intravenous injection of 10.3 mCi of (Tc-Sestamibi) followed by 10 ml of Normal Saline. As per infusion protocol, the patient was injected intravenously with 31 mCi of (Tc-Sestamibi) followed by 10 ml of Normal Saline. Gated post-stress tomographic imaging was performed 20-25 minutes after stress. FINDINGS: The overall quality of the study was fair.      Left ventricular cavity size was noted to be normal.    Rotational analog analysis demonstrated no patient motion or abnormal extracardiac radioactivity, abnormal patient motion, soft tissue breast attenuation and soft tissue diaphragmatic attenuation. The gated SPECT stress imaging in the short, vertical long, and horizontal long axis demonstrated normal homogeneous tracer distribution throughout the myocardium. The resting images are normal.     Gated SPECT left ventricular ejection fraction was calculated to be 64%, with normal myocardial thickening and wall motion. Impression:    1. ECG during the infusion did not change. 2. The myocardial perfusion imaging was normal with attenuation artifact. 3. Overall left ventricular systolic function was normal without regional wall motion abnormalities. 4. Low risk general pharmacologic stress test.    Thank you for sending your patient to this [employer].      Electronically signed by Loreta Vasquez DO on 2/20/20 at 12:57 PM

## 2020-02-21 ENCOUNTER — TELEPHONE (OUTPATIENT)
Dept: CARDIOLOGY CLINIC | Age: 70
End: 2020-02-21

## 2020-02-25 RX ORDER — DOFETILIDE 0.5 MG/1
CAPSULE ORAL
Qty: 60 CAPSULE | Refills: 0 | Status: SHIPPED
Start: 2020-02-25 | End: 2020-03-20

## 2020-02-25 NOTE — TELEPHONE ENCOUNTER
Silver Rojas is out of tikosyshalini, he understands that a 30 day supply will be send to his pharmacy, and he will get labs done at his PCP tomorrow. I faxed orders.

## 2020-03-04 LAB
BUN BLDV-MCNC: NORMAL MG/DL
CALCIUM SERPL-MCNC: NORMAL MG/DL
CHLORIDE BLD-SCNC: NORMAL MMOL/L
CO2: NORMAL
CREAT SERPL-MCNC: NORMAL MG/DL
GFR CALCULATED: NORMAL
GLUCOSE BLD-MCNC: NORMAL MG/DL
MAGNESIUM: 1.9 MG/DL
POTASSIUM SERPL-SCNC: NORMAL MMOL/L
SODIUM BLD-SCNC: NORMAL MMOL/L

## 2020-03-20 ENCOUNTER — TELEPHONE (OUTPATIENT)
Dept: ORTHOPEDIC SURGERY | Age: 70
End: 2020-03-20

## 2020-03-20 RX ORDER — DOFETILIDE 0.5 MG/1
CAPSULE ORAL
Qty: 60 CAPSULE | Refills: 5 | Status: SHIPPED
Start: 2020-03-20 | End: 2020-09-11 | Stop reason: SDUPTHER

## 2020-03-20 NOTE — TELEPHONE ENCOUNTER
Called patient to reschedule appointment for 3/25/20 due to COVID-19 virus, spoke with his wife. Will call back to reschedule appointment when restrictions are lifted. She verbalized understanding, instructed to call office with any questions or concerns.

## 2020-04-06 ENCOUNTER — TELEPHONE (OUTPATIENT)
Dept: NON INVASIVE DIAGNOSTICS | Age: 70
End: 2020-04-06

## 2020-04-06 NOTE — TELEPHONE ENCOUNTER
Patient declined virtual video visit and requested a virtual phone call visit. Patient scheduled with [facility] on April 29, 2020. We want to confirm that, for purposes of billing, this is a virtual visit with your provider for which we will submit a claim for reimbursement with your insurance company. You will be responsible for any copays, coinsurance amounts or other amounts not covered by your insurance company. If you do not accept this, unfortunately we will not be able to schedule a virtual visit with the provider. Do you accept? Patient's wife accepted and verbalized understanding.

## 2020-04-29 ENCOUNTER — TELEPHONE (OUTPATIENT)
Dept: ORTHOPEDIC SURGERY | Age: 70
End: 2020-04-29

## 2020-04-29 ENCOUNTER — VIRTUAL VISIT (OUTPATIENT)
Dept: NON INVASIVE DIAGNOSTICS | Age: 70
End: 2020-04-29
Payer: MEDICARE

## 2020-04-29 PROCEDURE — 99443 PR PHYS/QHP TELEPHONE EVALUATION 21-30 MIN: CPT | Performed by: INTERNAL MEDICINE

## 2020-05-06 ENCOUNTER — PREP FOR PROCEDURE (OUTPATIENT)
Dept: ORTHOPEDIC SURGERY | Age: 70
End: 2020-05-06

## 2020-05-06 ENCOUNTER — OFFICE VISIT (OUTPATIENT)
Dept: ORTHOPEDIC SURGERY | Age: 70
End: 2020-05-06
Payer: MEDICARE

## 2020-05-06 ENCOUNTER — HOSPITAL ENCOUNTER (OUTPATIENT)
Dept: GENERAL RADIOLOGY | Age: 70
Discharge: HOME | End: 2020-05-08
Payer: MEDICARE

## 2020-05-06 VITALS
DIASTOLIC BLOOD PRESSURE: 72 MMHG | HEIGHT: 69 IN | HEART RATE: 69 BPM | SYSTOLIC BLOOD PRESSURE: 138 MMHG | BODY MASS INDEX: 37.03 KG/M2 | WEIGHT: 250 LBS

## 2020-05-06 PROCEDURE — 73560 X-RAY EXAM OF KNEE 1 OR 2: CPT

## 2020-05-06 PROCEDURE — 99213 OFFICE O/P EST LOW 20 MIN: CPT | Performed by: ORTHOPAEDIC SURGERY

## 2020-05-06 PROCEDURE — 3017F COLORECTAL CA SCREEN DOC REV: CPT | Performed by: ORTHOPAEDIC SURGERY

## 2020-05-06 PROCEDURE — G8427 DOCREV CUR MEDS BY ELIG CLIN: HCPCS | Performed by: ORTHOPAEDIC SURGERY

## 2020-05-06 PROCEDURE — 1123F ACP DISCUSS/DSCN MKR DOCD: CPT | Performed by: ORTHOPAEDIC SURGERY

## 2020-05-06 PROCEDURE — 4040F PNEUMOC VAC/ADMIN/RCVD: CPT | Performed by: ORTHOPAEDIC SURGERY

## 2020-05-06 PROCEDURE — 99212 OFFICE O/P EST SF 10 MIN: CPT | Performed by: ORTHOPAEDIC SURGERY

## 2020-05-06 PROCEDURE — 1036F TOBACCO NON-USER: CPT | Performed by: ORTHOPAEDIC SURGERY

## 2020-05-06 PROCEDURE — G8417 CALC BMI ABV UP PARAM F/U: HCPCS | Performed by: ORTHOPAEDIC SURGERY

## 2020-05-06 NOTE — PROGRESS NOTES
Chief Complaint   Patient presents with    Knee Pain      Reports BL knee pain. Uses cane PRN. H/O torn Rt ACL. States that over the past year pain has intensified and would like to discuss possible surgery. SUBJECTIVE: Patient is a very pleasant 64-year-old male that I been following with bilateral knee severe osteoarthritis. Is where is most pronounced bilaterally in the medial compartment. His right knee hurts worse than his left due to her injury that he suffered about 6 months ago now. His last injections did not help whatsoever. He has failed conservative treatments and is not having difficulty with ADLs. He has started using a cane at all times to get around, and even with this is having difficulty. He denies any further trauma. Review of Systems   Constitutional: Negative for fever, chills, diaphoresis, appetite change and fatigue. HENT: Negative for dental issues, hearing loss and tinnitus. Negative for congestion, sinus pressure, sneezing, sore throat. Negative for headache. Eyes: Negative for visual disturbance, blurred and double vision. Negative for pain, discharge, redness and itching  Respiratory: Negative for cough, shortness of breath and wheezing. Cardiovascular: Negative for chest pain, palpitations and leg swelling. No dyspnea on exertion   Gastrointestinal:   Negative for nausea, vomiting, abdominal pain, diarrhea, constipation  or black or bloody. Hematologic\Lymphatic:  negative for bleeding, petechiae,   Genitourinary: Negative for hematuria and difficulty urinating. Musculoskeletal: Negative for neck pain and stiffness. Negative for back pain, see HPI  Skin: Negative for pallor, rash and wound. Neurological: Negative for dizziness, tremors, seizures, weakness, light-headedness, no TIA or stroke symptoms. No numbness and headaches. Psychiatric/Behavioral: Negative.         Past Medical History:   Diagnosis Date    Arthritis     Atrial fibrillation ([facility] [address]    

## 2020-05-08 NOTE — PROGRESS NOTES
 Hyperlipidemia     Hypertension     Kidney stones     Obesity     bmi 42.1   weight 276 #    Sleep apnea     c pap  stting 2     Medications:  Current Outpatient Medications on File Prior to Visit   Medication Sig Dispense Refill    dofetilide (TIKOSYN) 500 MCG capsule TAKE ONE CAPSULE BY MOUTH EVERY 12 HOURS 60 capsule 5    metoprolol succinate (TOPROL XL) 50 MG extended release tablet Take 50 mg by mouth daily Indications: Pt taking half pill 25 mg bid       atorvastatin (LIPITOR) 20 MG tablet Take 1 tablet by mouth daily 30 tablet 5    Metoprolol Succinate 25 MG CS24 Take 25 mg by mouth daily      Methylsulfonylmethane (MSM PO) Take by mouth      chlorthalidone (HYGROTON) 25 MG tablet Take 1 tablet by mouth daily 90 tablet 3    gabapentin (NEURONTIN) 100 MG capsule Take 100 mg by mouth 2 times daily.  apixaban (ELIQUIS) 5 MG TABS tablet TAKE ONE TABLET BY MOUTH TWO TIMES A DAY (Patient taking differently: 5 mg daily TAKE ONE TABLET BY MOUTH TWO TIMES A DAY) 180 tablet 3    NONFORMULARY Arthritis pain cream for knee's and shoulder's prn      magnesium oxide (MAG-OX) 400 (240 Mg) MG tablet Take 1 tablet by mouth 2 times daily (Patient taking differently: Take 400 mg by mouth daily ) 60 tablet 0    Omega-3 Fatty Acids (OMEGA-3 FISH OIL) 1200 MG CAPS Take by mouth daily      Multiple Vitamins-Minerals (THERAPEUTIC MULTIVITAMIN-MINERALS) tablet Take 1 tablet by mouth daily.  Coenzyme Q10 (COQ10 PO) Take 1 tablet by mouth daily.  Alpha-Lipoic Acid 300 MG CAPS Take 300 mg by mouth daily On hold      ascorbic acid (VITAMIN C) 500 MG tablet Take 500 mg by mouth daily.  Vitamin D (CHOLECALCIFEROL) 1000 UNITS CAPS capsule Take 1,000 Units by mouth daily.  Cyanocobalamin (VITAMIN B 12 PO) Take 500 mcg by mouth daily. No current facility-administered medications on file prior to visit.         Allergies   Allergen Reactions    Adhesive Tape Rash     bandaids     Wt Readings Z Plasty Text: The lesion was extirpated to the level of the fat with a #15 scalpel blade.  Given the location of the defect, shape of the defect and the proximity to free margins a Z-plasty was deemed most appropriate for repair.  Using a sterile surgical marker, the appropriate transposition arms of the Z-plasty were drawn incorporating the defect and placing the expected incisions within the relaxed skin tension lines where possible.    The area thus outlined was incised deep to adipose tissue with a #15 scalpel blade.  The skin margins were undermined to an appropriate distance in all directions utilizing iris scissors.  The opposing transposition arms were then transposed into place in opposite direction and anchored with interrupted buried subcutaneous sutures.

## 2020-05-13 ENCOUNTER — TELEPHONE (OUTPATIENT)
Dept: ORTHOPEDIC SURGERY | Age: 70
End: 2020-05-13

## 2020-05-13 NOTE — TELEPHONE ENCOUNTER
Patient left voicemail stating his Right TKA surgery is scheduled for 6-1-2020 and he wants to know when he is supposed to stop taking his Eliquis 5 mg prior to surgery? States this was not discussed when he was seen last in the office on 5-6-2020. Asking for a return call back to #[phone].

## 2020-05-19 RX ORDER — METOPROLOL SUCCINATE 50 MG/1
50 TABLET, EXTENDED RELEASE ORAL DAILY
COMMUNITY
End: 2020-07-30 | Stop reason: SDUPTHER

## 2020-05-20 ENCOUNTER — TELEPHONE (OUTPATIENT)
Dept: CARDIOLOGY CLINIC | Age: 70
End: 2020-05-20

## 2020-05-20 NOTE — TELEPHONE ENCOUNTER
Patient to have right TKA on 6/1/2020 by Dr. oMlly Mclean. He has been told to hold Eliquis for 3 days prior to procedure. Please advise if this is ok.

## 2020-05-21 RX ORDER — CEFAZOLIN SODIUM 2 G/50ML
2 SOLUTION INTRAVENOUS
Status: CANCELLED | OUTPATIENT
Start: 2020-05-21 | End: 2020-05-21

## 2020-05-21 RX ORDER — SODIUM CHLORIDE, SODIUM LACTATE, POTASSIUM CHLORIDE, CALCIUM CHLORIDE 600; 310; 30; 20 MG/100ML; MG/100ML; MG/100ML; MG/100ML
INJECTION, SOLUTION INTRAVENOUS CONTINUOUS
Status: CANCELLED | OUTPATIENT
Start: 2020-05-21

## 2020-05-21 RX ORDER — SODIUM CHLORIDE 0.9 % (FLUSH) 0.9 %
10 SYRINGE (ML) INJECTION PRN
Status: CANCELLED | OUTPATIENT
Start: 2020-05-21

## 2020-05-21 RX ORDER — SODIUM CHLORIDE 0.9 % (FLUSH) 0.9 %
10 SYRINGE (ML) INJECTION EVERY 12 HOURS SCHEDULED
Status: CANCELLED | OUTPATIENT
Start: 2020-05-21

## 2020-05-21 NOTE — H&P
Chief Complaint   Patient presents with    Knee Pain        Reports BL knee pain. Uses cane PRN. H/O torn Rt ACL. States that over the past year pain has intensified and would like to discuss possible surgery.         SUBJECTIVE: Patient is a very pleasant 50-year-old male that I been following with bilateral knee severe osteoarthritis. Is where is most pronounced bilaterally in the medial compartment. His right knee hurts worse than his left due to her injury that he suffered about 6 months ago now. His last injections did not help whatsoever. He has failed conservative treatments and is not having difficulty with ADLs. He has started using a cane at all times to get around, and even with this is having difficulty. He denies any further trauma.     Review of Systems   Constitutional: Negative for fever, chills, diaphoresis, appetite change and fatigue. HENT: Negative for dental issues, hearing loss and tinnitus. Negative for congestion, sinus pressure, sneezing, sore throat. Negative for headache. Eyes: Negative for visual disturbance, blurred and double vision. Negative for pain, discharge, redness and itching  Respiratory: Negative for cough, shortness of breath and wheezing. Cardiovascular: Negative for chest pain, palpitations and leg swelling. No dyspnea on exertion   Gastrointestinal:   Negative for nausea, vomiting, abdominal pain, diarrhea, constipation  or black or bloody. Hematologic\Lymphatic:  negative for bleeding, petechiae,   Genitourinary: Negative for hematuria and difficulty urinating. Musculoskeletal: Negative for neck pain and stiffness. Negative for back pain, see HPI  Skin: Negative for pallor, rash and wound. Neurological: Negative for dizziness, tremors, seizures, weakness, light-headedness, no TIA or stroke symptoms. No numbness and headaches. Psychiatric/Behavioral: Negative.              Past Medical History:   Diagnosis Date    Arthritis      Atrial arthritic knee. He would like to proceed with a right total knee arthroplasty. I did discuss the surgery in detail. I discussed his wrist in detail as well. I explained the risks and complications of surgery with the patient including but not limited to death from anesthesia, possible neurovascular damage, possible infection, possible nonunion, possible hardware failure, possible need for further surgery, etc.  Patient understood this, asked appropriate questions and deciced to proceed with right total knee.      PLAN  R TKA when medically cleared  PAT  Pre op clearance  Joint education class  COVID 19 testing  Hold NSAIDs x 7 days prior to surgery

## 2020-05-21 NOTE — TELEPHONE ENCOUNTER
Left message for patient to contact office. Per Dr. Beto Felder, ok to hold Eliquis, but usually it is for 4 doses (48hr) prior to day of surgery.

## 2020-05-26 ENCOUNTER — HOSPITAL ENCOUNTER (OUTPATIENT)
Dept: PREADMISSION TESTING | Age: 70
Discharge: HOME | End: 2020-05-26
Payer: MEDICARE

## 2020-05-26 VITALS
TEMPERATURE: 98.4 F | BODY MASS INDEX: 37.03 KG/M2 | OXYGEN SATURATION: 98 % | HEART RATE: 66 BPM | DIASTOLIC BLOOD PRESSURE: 77 MMHG | RESPIRATION RATE: 12 BRPM | SYSTOLIC BLOOD PRESSURE: 162 MMHG | WEIGHT: 250 LBS | HEIGHT: 69 IN

## 2020-05-26 LAB
ABO/RH: NORMAL
ALBUMIN SERPL-MCNC: 4.5 G/DL (ref 3.5–5.2)
ALP BLD-CCNC: 121 U/L (ref 40–129)
ALT SERPL-CCNC: 42 U/L (ref 0–40)
ANION GAP SERPL CALCULATED.3IONS-SCNC: 13 MMOL/L (ref 7–16)
ANTIBODY SCREEN: NORMAL
AST SERPL-CCNC: 27 U/L (ref 0–39)
BASOPHILS ABSOLUTE: 0.08 E9/L (ref 0–0.2)
BASOPHILS RELATIVE PERCENT: 0.8 % (ref 0–2)
BILIRUB SERPL-MCNC: 0.5 MG/DL (ref 0–1.2)
BUN BLDV-MCNC: 33 MG/DL (ref 8–23)
CALCIUM SERPL-MCNC: 9.7 MG/DL (ref 8.6–10.2)
CHLORIDE BLD-SCNC: 103 MMOL/L (ref 98–107)
CO2: 26 MMOL/L (ref 22–29)
CREAT SERPL-MCNC: 1.2 MG/DL (ref 0.7–1.2)
EOSINOPHILS ABSOLUTE: 0.44 E9/L (ref 0.05–0.5)
EOSINOPHILS RELATIVE PERCENT: 4.3 % (ref 0–6)
GFR AFRICAN AMERICAN: >60
GFR NON-AFRICAN AMERICAN: >60 ML/MIN/1.73
GLUCOSE BLD-MCNC: 101 MG/DL (ref 74–99)
HCT VFR BLD CALC: 45.4 % (ref 37–54)
HEMOGLOBIN: 15.2 G/DL (ref 12.5–16.5)
IMMATURE GRANULOCYTES #: 0.05 E9/L
IMMATURE GRANULOCYTES %: 0.5 % (ref 0–5)
INR BLD: 1.1
LYMPHOCYTES ABSOLUTE: 2.73 E9/L (ref 1.5–4)
LYMPHOCYTES RELATIVE PERCENT: 26.7 % (ref 20–42)
MCH RBC QN AUTO: 32.8 PG (ref 26–35)
MCHC RBC AUTO-ENTMCNC: 33.5 % (ref 32–34.5)
MCV RBC AUTO: 98.1 FL (ref 80–99.9)
MONOCYTES ABSOLUTE: 0.89 E9/L (ref 0.1–0.95)
MONOCYTES RELATIVE PERCENT: 8.7 % (ref 2–12)
NEUTROPHILS ABSOLUTE: 6.03 E9/L (ref 1.8–7.3)
NEUTROPHILS RELATIVE PERCENT: 59 % (ref 43–80)
PDW BLD-RTO: 12.6 FL (ref 11.5–15)
PLATELET # BLD: 161 E9/L (ref 130–450)
PMV BLD AUTO: 11.2 FL (ref 7–12)
POTASSIUM SERPL-SCNC: 4 MMOL/L (ref 3.5–5)
PROTHROMBIN TIME: 12.9 SEC (ref 9.3–12.4)
RBC # BLD: 4.63 E12/L (ref 3.8–5.8)
SODIUM BLD-SCNC: 142 MMOL/L (ref 132–146)
TOTAL PROTEIN: 8 G/DL (ref 6.4–8.3)
WBC # BLD: 10.2 E9/L (ref 4.5–11.5)

## 2020-05-26 PROCEDURE — 86850 RBC ANTIBODY SCREEN: CPT

## 2020-05-26 PROCEDURE — 80053 COMPREHEN METABOLIC PANEL: CPT

## 2020-05-26 PROCEDURE — 85025 COMPLETE CBC W/AUTO DIFF WBC: CPT

## 2020-05-26 PROCEDURE — 36415 COLL VENOUS BLD VENIPUNCTURE: CPT

## 2020-05-26 PROCEDURE — 86900 BLOOD TYPING SEROLOGIC ABO: CPT

## 2020-05-26 PROCEDURE — 86901 BLOOD TYPING SEROLOGIC RH(D): CPT

## 2020-05-26 PROCEDURE — 87081 CULTURE SCREEN ONLY: CPT

## 2020-05-26 PROCEDURE — 85610 PROTHROMBIN TIME: CPT

## 2020-05-27 ENCOUNTER — HOSPITAL ENCOUNTER (OUTPATIENT)
Age: 70
Discharge: HOME | End: 2020-05-29
Payer: MEDICARE

## 2020-05-27 LAB — MRSA CULTURE ONLY: NORMAL

## 2020-05-27 PROCEDURE — U0003 INFECTIOUS AGENT DETECTION BY NUCLEIC ACID (DNA OR RNA); SEVERE ACUTE RESPIRATORY SYNDROME CORONAVIRUS 2 (SARS-COV-2) (CORONAVIRUS DISEASE [COVID-19]), AMPLIFIED PROBE TECHNIQUE, MAKING USE OF HIGH THROUGHPUT TECHNOLOGIES AS DESCRIBED BY CMS-2020-01-R: HCPCS

## 2020-05-28 LAB
SARS-COV-2: NOT DETECTED
SOURCE: NORMAL

## 2020-05-31 ENCOUNTER — ANESTHESIA EVENT (OUTPATIENT)
Dept: OPERATING ROOM | Age: 70
End: 2020-05-31
Payer: MEDICARE

## 2020-06-01 ENCOUNTER — ANESTHESIA (OUTPATIENT)
Dept: OPERATING ROOM | Age: 70
End: 2020-06-01
Payer: MEDICARE

## 2020-06-01 ENCOUNTER — APPOINTMENT (OUTPATIENT)
Dept: GENERAL RADIOLOGY | Age: 70
End: 2020-06-01
Attending: ORTHOPAEDIC SURGERY
Payer: MEDICARE

## 2020-06-01 ENCOUNTER — HOSPITAL ENCOUNTER (OUTPATIENT)
Age: 70
Setting detail: OBSERVATION
Discharge: HOME | End: 2020-06-02
Attending: ORTHOPAEDIC SURGERY | Admitting: ORTHOPAEDIC SURGERY
Payer: MEDICARE

## 2020-06-01 VITALS — OXYGEN SATURATION: 94 % | TEMPERATURE: 97.3 F | DIASTOLIC BLOOD PRESSURE: 82 MMHG | SYSTOLIC BLOOD PRESSURE: 149 MMHG

## 2020-06-01 PROBLEM — Z96.651 S/P TKR (TOTAL KNEE REPLACEMENT) USING CEMENT, RIGHT: Status: ACTIVE | Noted: 2020-06-01

## 2020-06-01 LAB
EKG ATRIAL RATE: 68 BPM
EKG P AXIS: 65 DEGREES
EKG P-R INTERVAL: 182 MS
EKG Q-T INTERVAL: 504 MS
EKG QRS DURATION: 138 MS
EKG QTC CALCULATION (BAZETT): 535 MS
EKG R AXIS: 7 DEGREES
EKG T AXIS: 36 DEGREES
EKG VENTRICULAR RATE: 68 BPM
RAPID HIV 1&2: NORMAL

## 2020-06-01 PROCEDURE — 6370000000 HC RX 637 (ALT 250 FOR IP): Performed by: ANESTHESIOLOGY

## 2020-06-01 PROCEDURE — 6360000002 HC RX W HCPCS

## 2020-06-01 PROCEDURE — 6360000002 HC RX W HCPCS: Performed by: ORTHOPAEDIC SURGERY

## 2020-06-01 PROCEDURE — 2720000010 HC SURG SUPPLY STERILE: Performed by: ORTHOPAEDIC SURGERY

## 2020-06-01 PROCEDURE — 36415 COLL VENOUS BLD VENIPUNCTURE: CPT

## 2020-06-01 PROCEDURE — 2500000003 HC RX 250 WO HCPCS

## 2020-06-01 PROCEDURE — 27447 TOTAL KNEE ARTHROPLASTY: CPT | Performed by: ORTHOPAEDIC SURGERY

## 2020-06-01 PROCEDURE — 2580000003 HC RX 258: Performed by: ORTHOPAEDIC SURGERY

## 2020-06-01 PROCEDURE — G0378 HOSPITAL OBSERVATION PER HR: HCPCS

## 2020-06-01 PROCEDURE — 7100000000 HC PACU RECOVERY - FIRST 15 MIN: Performed by: ORTHOPAEDIC SURGERY

## 2020-06-01 PROCEDURE — 3700000001 HC ADD 15 MINUTES (ANESTHESIA): Performed by: ORTHOPAEDIC SURGERY

## 2020-06-01 PROCEDURE — 86803 HEPATITIS C AB TEST: CPT

## 2020-06-01 PROCEDURE — 6370000000 HC RX 637 (ALT 250 FOR IP): Performed by: ORTHOPAEDIC SURGERY

## 2020-06-01 PROCEDURE — 73560 X-RAY EXAM OF KNEE 1 OR 2: CPT

## 2020-06-01 PROCEDURE — 6370000000 HC RX 637 (ALT 250 FOR IP): Performed by: PHYSICIAN ASSISTANT

## 2020-06-01 PROCEDURE — C1776 JOINT DEVICE (IMPLANTABLE): HCPCS | Performed by: ORTHOPAEDIC SURGERY

## 2020-06-01 PROCEDURE — 2709999900 HC NON-CHARGEABLE SUPPLY: Performed by: ORTHOPAEDIC SURGERY

## 2020-06-01 PROCEDURE — 97530 THERAPEUTIC ACTIVITIES: CPT

## 2020-06-01 PROCEDURE — 7100000001 HC PACU RECOVERY - ADDTL 15 MIN: Performed by: ORTHOPAEDIC SURGERY

## 2020-06-01 PROCEDURE — 2500000003 HC RX 250 WO HCPCS: Performed by: ORTHOPAEDIC SURGERY

## 2020-06-01 PROCEDURE — 3600000015 HC SURGERY LEVEL 5 ADDTL 15MIN: Performed by: ORTHOPAEDIC SURGERY

## 2020-06-01 PROCEDURE — 93005 ELECTROCARDIOGRAM TRACING: CPT | Performed by: ORTHOPAEDIC SURGERY

## 2020-06-01 PROCEDURE — 2580000003 HC RX 258: Performed by: PHYSICIAN ASSISTANT

## 2020-06-01 PROCEDURE — 3700000000 HC ANESTHESIA ATTENDED CARE: Performed by: ORTHOPAEDIC SURGERY

## 2020-06-01 PROCEDURE — 88311 DECALCIFY TISSUE: CPT

## 2020-06-01 PROCEDURE — 93010 ELECTROCARDIOGRAM REPORT: CPT | Performed by: INTERNAL MEDICINE

## 2020-06-01 PROCEDURE — 6360000002 HC RX W HCPCS: Performed by: PHYSICIAN ASSISTANT

## 2020-06-01 PROCEDURE — 97161 PT EVAL LOW COMPLEX 20 MIN: CPT

## 2020-06-01 PROCEDURE — 2500000003 HC RX 250 WO HCPCS: Performed by: PHYSICIAN ASSISTANT

## 2020-06-01 PROCEDURE — 87340 HEPATITIS B SURFACE AG IA: CPT

## 2020-06-01 PROCEDURE — 86701 HIV-1ANTIBODY: CPT

## 2020-06-01 PROCEDURE — 97165 OT EVAL LOW COMPLEX 30 MIN: CPT

## 2020-06-01 PROCEDURE — 2700000000 HC OXYGEN THERAPY PER DAY

## 2020-06-01 PROCEDURE — 6360000002 HC RX W HCPCS: Performed by: ANESTHESIOLOGY

## 2020-06-01 PROCEDURE — 88305 TISSUE EXAM BY PATHOLOGIST: CPT

## 2020-06-01 PROCEDURE — 76942 ECHO GUIDE FOR BIOPSY: CPT | Performed by: ANESTHESIOLOGY

## 2020-06-01 PROCEDURE — 3600000005 HC SURGERY LEVEL 5 BASE: Performed by: ORTHOPAEDIC SURGERY

## 2020-06-01 DEVICE — IMPLANTABLE DEVICE: Type: IMPLANTABLE DEVICE | Site: KNEE | Status: FUNCTIONAL

## 2020-06-01 DEVICE — CEMENT BNE 40 GM RADIOPAQUE BA SIMPLEX P: Type: IMPLANTABLE DEVICE | Site: KNEE | Status: FUNCTIONAL

## 2020-06-01 DEVICE — BASEPLATE TIB SZ 7 KNEE TRITANIUM CEM PRI LO PROF TRIATHLON: Type: IMPLANTABLE DEVICE | Site: KNEE | Status: FUNCTIONAL

## 2020-06-01 DEVICE — IMPLANT PAT DIA32MM THK10MM X3 ASYM TRIATHLON: Type: IMPLANTABLE DEVICE | Site: PATELLA | Status: FUNCTIONAL

## 2020-06-01 RX ORDER — ROCURONIUM BROMIDE 10 MG/ML
INJECTION, SOLUTION INTRAVENOUS PRN
Status: DISCONTINUED | OUTPATIENT
Start: 2020-06-01 | End: 2020-06-01 | Stop reason: SDUPTHER

## 2020-06-01 RX ORDER — UBIDECARENONE 50 MG
1 CAPSULE ORAL DAILY
Status: DISCONTINUED | OUTPATIENT
Start: 2020-06-01 | End: 2020-06-01 | Stop reason: CLARIF

## 2020-06-01 RX ORDER — GLYCOPYRROLATE 1 MG/5 ML
SYRINGE (ML) INTRAVENOUS PRN
Status: DISCONTINUED | OUTPATIENT
Start: 2020-06-01 | End: 2020-06-01 | Stop reason: SDUPTHER

## 2020-06-01 RX ORDER — ONDANSETRON 2 MG/ML
INJECTION INTRAMUSCULAR; INTRAVENOUS PRN
Status: DISCONTINUED | OUTPATIENT
Start: 2020-06-01 | End: 2020-06-01 | Stop reason: SDUPTHER

## 2020-06-01 RX ORDER — ATORVASTATIN CALCIUM 20 MG/1
20 TABLET, FILM COATED ORAL DAILY
Status: DISCONTINUED | OUTPATIENT
Start: 2020-06-01 | End: 2020-06-02 | Stop reason: HOSPADM

## 2020-06-01 RX ORDER — FENTANYL CITRATE 50 UG/ML
50 INJECTION, SOLUTION INTRAMUSCULAR; INTRAVENOUS EVERY 10 MIN PRN
Status: COMPLETED | OUTPATIENT
Start: 2020-06-01 | End: 2020-06-01

## 2020-06-01 RX ORDER — METOPROLOL SUCCINATE 50 MG/1
50 TABLET, EXTENDED RELEASE ORAL DAILY
Status: DISCONTINUED | OUTPATIENT
Start: 2020-06-02 | End: 2020-06-02 | Stop reason: HOSPADM

## 2020-06-01 RX ORDER — SODIUM CHLORIDE 0.9 % (FLUSH) 0.9 %
10 SYRINGE (ML) INJECTION EVERY 12 HOURS SCHEDULED
Status: DISCONTINUED | OUTPATIENT
Start: 2020-06-01 | End: 2020-06-02 | Stop reason: HOSPADM

## 2020-06-01 RX ORDER — VANCOMYCIN HYDROCHLORIDE 1 G/20ML
INJECTION, POWDER, LYOPHILIZED, FOR SOLUTION INTRAVENOUS PRN
Status: DISCONTINUED | OUTPATIENT
Start: 2020-06-01 | End: 2020-06-01 | Stop reason: ALTCHOICE

## 2020-06-01 RX ORDER — SODIUM CHLORIDE 9 MG/ML
INJECTION, SOLUTION INTRAVENOUS CONTINUOUS
Status: ACTIVE | OUTPATIENT
Start: 2020-06-01 | End: 2020-06-02

## 2020-06-01 RX ORDER — CHLORTHALIDONE 25 MG/1
25 TABLET ORAL DAILY
Status: DISCONTINUED | OUTPATIENT
Start: 2020-06-01 | End: 2020-06-02 | Stop reason: HOSPADM

## 2020-06-01 RX ORDER — VITAMIN B COMPLEX
1000 TABLET ORAL DAILY
Status: DISCONTINUED | OUTPATIENT
Start: 2020-06-01 | End: 2020-06-02 | Stop reason: HOSPADM

## 2020-06-01 RX ORDER — SENNA AND DOCUSATE SODIUM 50; 8.6 MG/1; MG/1
1 TABLET, FILM COATED ORAL 2 TIMES DAILY
Status: DISCONTINUED | OUTPATIENT
Start: 2020-06-01 | End: 2020-06-02 | Stop reason: HOSPADM

## 2020-06-01 RX ORDER — METOPROLOL SUCCINATE 50 MG/1
50 TABLET, EXTENDED RELEASE ORAL DAILY
Status: DISCONTINUED | OUTPATIENT
Start: 2020-06-01 | End: 2020-06-01

## 2020-06-01 RX ORDER — OXYCODONE HYDROCHLORIDE 10 MG/1
10 TABLET ORAL EVERY 4 HOURS PRN
Status: DISCONTINUED | OUTPATIENT
Start: 2020-06-01 | End: 2020-06-02 | Stop reason: HOSPADM

## 2020-06-01 RX ORDER — ALPHA LIPOIC ACID 300 MG
300 CAPSULE ORAL DAILY
Status: DISCONTINUED | OUTPATIENT
Start: 2020-06-01 | End: 2020-06-01 | Stop reason: CLARIF

## 2020-06-01 RX ORDER — ROPIVACAINE HYDROCHLORIDE 5 MG/ML
30 INJECTION, SOLUTION EPIDURAL; INFILTRATION; PERINEURAL ONCE
Status: COMPLETED | OUTPATIENT
Start: 2020-06-01 | End: 2020-06-01

## 2020-06-01 RX ORDER — GABAPENTIN 100 MG/1
200 CAPSULE ORAL ONCE
Status: COMPLETED | OUTPATIENT
Start: 2020-06-01 | End: 2020-06-01

## 2020-06-01 RX ORDER — CEFAZOLIN SODIUM 2 G/50ML
2 SOLUTION INTRAVENOUS EVERY 8 HOURS
Status: COMPLETED | OUTPATIENT
Start: 2020-06-01 | End: 2020-06-02

## 2020-06-01 RX ORDER — MORPHINE SULFATE 2 MG/ML
2 INJECTION, SOLUTION INTRAMUSCULAR; INTRAVENOUS
Status: DISCONTINUED | OUTPATIENT
Start: 2020-06-01 | End: 2020-06-02 | Stop reason: HOSPADM

## 2020-06-01 RX ORDER — OXYCODONE HYDROCHLORIDE 5 MG/1
5 TABLET ORAL ONCE
Status: COMPLETED | OUTPATIENT
Start: 2020-06-01 | End: 2020-06-01

## 2020-06-01 RX ORDER — ONDANSETRON 2 MG/ML
4 INJECTION INTRAMUSCULAR; INTRAVENOUS
Status: DISCONTINUED | OUTPATIENT
Start: 2020-06-01 | End: 2020-06-01 | Stop reason: HOSPADM

## 2020-06-01 RX ORDER — DEXAMETHASONE SODIUM PHOSPHATE 10 MG/ML
INJECTION INTRAMUSCULAR; INTRAVENOUS PRN
Status: DISCONTINUED | OUTPATIENT
Start: 2020-06-01 | End: 2020-06-01 | Stop reason: SDUPTHER

## 2020-06-01 RX ORDER — ASCORBIC ACID 500 MG
500 TABLET ORAL DAILY
Status: DISCONTINUED | OUTPATIENT
Start: 2020-06-01 | End: 2020-06-02 | Stop reason: HOSPADM

## 2020-06-01 RX ORDER — SUCCINYLCHOLINE/SOD CL,ISO/PF 200MG/10ML
SYRINGE (ML) INTRAVENOUS PRN
Status: DISCONTINUED | OUTPATIENT
Start: 2020-06-01 | End: 2020-06-01 | Stop reason: SDUPTHER

## 2020-06-01 RX ORDER — ACETAMINOPHEN 325 MG/1
650 TABLET ORAL EVERY 6 HOURS
Status: DISCONTINUED | OUTPATIENT
Start: 2020-06-01 | End: 2020-06-02 | Stop reason: HOSPADM

## 2020-06-01 RX ORDER — PROMETHAZINE HYDROCHLORIDE 25 MG/ML
6.25 INJECTION, SOLUTION INTRAMUSCULAR; INTRAVENOUS
Status: DISCONTINUED | OUTPATIENT
Start: 2020-06-01 | End: 2020-06-01 | Stop reason: HOSPADM

## 2020-06-01 RX ORDER — EPHEDRINE SULFATE/0.9% NACL/PF 50 MG/5 ML
SYRINGE (ML) INTRAVENOUS PRN
Status: DISCONTINUED | OUTPATIENT
Start: 2020-06-01 | End: 2020-06-01 | Stop reason: SDUPTHER

## 2020-06-01 RX ORDER — MIDAZOLAM HYDROCHLORIDE 1 MG/ML
1 INJECTION INTRAMUSCULAR; INTRAVENOUS PRN
Status: COMPLETED | OUTPATIENT
Start: 2020-06-01 | End: 2020-06-01

## 2020-06-01 RX ORDER — HYDROCODONE BITARTRATE AND ACETAMINOPHEN 5; 325 MG/1; MG/1
1 TABLET ORAL EVERY 4 HOURS PRN
Status: DISCONTINUED | OUTPATIENT
Start: 2020-06-01 | End: 2020-06-01

## 2020-06-01 RX ORDER — PROPOFOL 10 MG/ML
INJECTION, EMULSION INTRAVENOUS PRN
Status: DISCONTINUED | OUTPATIENT
Start: 2020-06-01 | End: 2020-06-01 | Stop reason: SDUPTHER

## 2020-06-01 RX ORDER — HYDROCODONE BITARTRATE AND ACETAMINOPHEN 5; 325 MG/1; MG/1
2 TABLET ORAL EVERY 4 HOURS PRN
Status: DISCONTINUED | OUTPATIENT
Start: 2020-06-01 | End: 2020-06-01

## 2020-06-01 RX ORDER — MORPHINE SULFATE 4 MG/ML
4 INJECTION, SOLUTION INTRAMUSCULAR; INTRAVENOUS
Status: DISCONTINUED | OUTPATIENT
Start: 2020-06-01 | End: 2020-06-02 | Stop reason: HOSPADM

## 2020-06-01 RX ORDER — CALCIUM CHLORIDE 100 MG/ML
INJECTION INTRAVENOUS; INTRAVENTRICULAR PRN
Status: DISCONTINUED | OUTPATIENT
Start: 2020-06-01 | End: 2020-06-01 | Stop reason: ALTCHOICE

## 2020-06-01 RX ORDER — LIDOCAINE HYDROCHLORIDE 20 MG/ML
INJECTION, SOLUTION INTRAVENOUS PRN
Status: DISCONTINUED | OUTPATIENT
Start: 2020-06-01 | End: 2020-06-01 | Stop reason: SDUPTHER

## 2020-06-01 RX ORDER — MEPERIDINE HYDROCHLORIDE 25 MG/ML
12.5 INJECTION INTRAMUSCULAR; INTRAVENOUS; SUBCUTANEOUS EVERY 5 MIN PRN
Status: DISCONTINUED | OUTPATIENT
Start: 2020-06-01 | End: 2020-06-01 | Stop reason: HOSPADM

## 2020-06-01 RX ORDER — M-VIT,TX,IRON,MINS/CALC/FOLIC 27MG-0.4MG
1 TABLET ORAL DAILY
Status: DISCONTINUED | OUTPATIENT
Start: 2020-06-01 | End: 2020-06-02 | Stop reason: HOSPADM

## 2020-06-01 RX ORDER — ACETAMINOPHEN 500 MG
1000 TABLET ORAL ONCE
Status: COMPLETED | OUTPATIENT
Start: 2020-06-01 | End: 2020-06-01

## 2020-06-01 RX ORDER — SODIUM CHLORIDE 0.9 % (FLUSH) 0.9 %
10 SYRINGE (ML) INJECTION EVERY 12 HOURS SCHEDULED
Status: DISCONTINUED | OUTPATIENT
Start: 2020-06-01 | End: 2020-06-01 | Stop reason: HOSPADM

## 2020-06-01 RX ORDER — NEOSTIGMINE METHYLSULFATE 1 MG/ML
INJECTION, SOLUTION INTRAVENOUS PRN
Status: DISCONTINUED | OUTPATIENT
Start: 2020-06-01 | End: 2020-06-01 | Stop reason: SDUPTHER

## 2020-06-01 RX ORDER — CEFAZOLIN SODIUM 2 G/50ML
2 SOLUTION INTRAVENOUS
Status: COMPLETED | OUTPATIENT
Start: 2020-06-01 | End: 2020-06-01

## 2020-06-01 RX ORDER — FENTANYL CITRATE 50 UG/ML
INJECTION, SOLUTION INTRAMUSCULAR; INTRAVENOUS PRN
Status: DISCONTINUED | OUTPATIENT
Start: 2020-06-01 | End: 2020-06-01 | Stop reason: SDUPTHER

## 2020-06-01 RX ORDER — OXYCODONE HYDROCHLORIDE 5 MG/1
5 TABLET ORAL EVERY 4 HOURS PRN
Status: DISCONTINUED | OUTPATIENT
Start: 2020-06-01 | End: 2020-06-02 | Stop reason: HOSPADM

## 2020-06-01 RX ORDER — GABAPENTIN 100 MG/1
100 CAPSULE ORAL 2 TIMES DAILY
Status: DISCONTINUED | OUTPATIENT
Start: 2020-06-01 | End: 2020-06-02 | Stop reason: HOSPADM

## 2020-06-01 RX ORDER — DOFETILIDE 0.5 MG/1
500 CAPSULE ORAL EVERY 12 HOURS
Status: DISCONTINUED | OUTPATIENT
Start: 2020-06-01 | End: 2020-06-02 | Stop reason: HOSPADM

## 2020-06-01 RX ORDER — SODIUM CHLORIDE, SODIUM LACTATE, POTASSIUM CHLORIDE, CALCIUM CHLORIDE 600; 310; 30; 20 MG/100ML; MG/100ML; MG/100ML; MG/100ML
INJECTION, SOLUTION INTRAVENOUS CONTINUOUS
Status: DISCONTINUED | OUTPATIENT
Start: 2020-06-01 | End: 2020-06-01

## 2020-06-01 RX ORDER — SODIUM CHLORIDE 0.9 % (FLUSH) 0.9 %
10 SYRINGE (ML) INJECTION PRN
Status: DISCONTINUED | OUTPATIENT
Start: 2020-06-01 | End: 2020-06-01 | Stop reason: HOSPADM

## 2020-06-01 RX ORDER — ASPIRIN 81 MG/1
81 TABLET ORAL 2 TIMES DAILY
Qty: 56 TABLET | Refills: 0 | Status: SHIPPED | OUTPATIENT
Start: 2020-06-01 | End: 2020-06-01 | Stop reason: HOSPADM

## 2020-06-01 RX ORDER — LANOLIN ALCOHOL/MO/W.PET/CERES
500 CREAM (GRAM) TOPICAL DAILY
Status: DISCONTINUED | OUTPATIENT
Start: 2020-06-01 | End: 2020-06-02 | Stop reason: HOSPADM

## 2020-06-01 RX ORDER — HYDROCODONE BITARTRATE AND ACETAMINOPHEN 5; 325 MG/1; MG/1
1 TABLET ORAL EVERY 6 HOURS PRN
Qty: 28 TABLET | Refills: 0 | Status: SHIPPED | OUTPATIENT
Start: 2020-06-01 | End: 2020-06-02 | Stop reason: HOSPADM

## 2020-06-01 RX ORDER — SODIUM CHLORIDE 0.9 % (FLUSH) 0.9 %
10 SYRINGE (ML) INJECTION PRN
Status: DISCONTINUED | OUTPATIENT
Start: 2020-06-01 | End: 2020-06-02 | Stop reason: HOSPADM

## 2020-06-01 RX ADMIN — HYDROCODONE BITARTRATE AND ACETAMINOPHEN 2 TABLET: 5; 325 TABLET ORAL at 12:33

## 2020-06-01 RX ADMIN — FENTANYL CITRATE 50 MCG: 50 INJECTION, SOLUTION INTRAMUSCULAR; INTRAVENOUS at 07:12

## 2020-06-01 RX ADMIN — LIDOCAINE HYDROCHLORIDE 40 MG: 20 INJECTION, SOLUTION INTRAVENOUS at 07:38

## 2020-06-01 RX ADMIN — FENTANYL CITRATE 100 MCG: 50 INJECTION, SOLUTION INTRAMUSCULAR; INTRAVENOUS at 07:38

## 2020-06-01 RX ADMIN — ROCURONIUM BROMIDE 40 MG: 10 INJECTION, SOLUTION INTRAVENOUS at 07:46

## 2020-06-01 RX ADMIN — ROPIVACAINE HYDROCHLORIDE 30 ML: 5 INJECTION EPIDURAL; INFILTRATION; PERINEURAL at 07:21

## 2020-06-01 RX ADMIN — ATORVASTATIN CALCIUM 20 MG: 20 TABLET, FILM COATED ORAL at 20:17

## 2020-06-01 RX ADMIN — Medication 500 MCG: at 14:16

## 2020-06-01 RX ADMIN — MAGNESIUM GLUCONATE 500 MG ORAL TABLET 400 MG: 500 TABLET ORAL at 14:16

## 2020-06-01 RX ADMIN — VITAMIN D, TAB 1000IU (100/BT) 1000 UNITS: 25 TAB at 14:16

## 2020-06-01 RX ADMIN — PROPOFOL 200 MG: 10 INJECTION, EMULSION INTRAVENOUS at 07:38

## 2020-06-01 RX ADMIN — FENTANYL CITRATE 50 MCG: 50 INJECTION, SOLUTION INTRAMUSCULAR; INTRAVENOUS at 08:34

## 2020-06-01 RX ADMIN — CEFAZOLIN SODIUM 2 G: 2 SOLUTION INTRAVENOUS at 07:31

## 2020-06-01 RX ADMIN — CEFAZOLIN SODIUM 2 G: 2 SOLUTION INTRAVENOUS at 23:54

## 2020-06-01 RX ADMIN — CEFAZOLIN SODIUM 2 G: 2 SOLUTION INTRAVENOUS at 15:15

## 2020-06-01 RX ADMIN — Medication 0.6 MG: at 09:39

## 2020-06-01 RX ADMIN — FENTANYL CITRATE 50 MCG: 50 INJECTION, SOLUTION INTRAMUSCULAR; INTRAVENOUS at 08:10

## 2020-06-01 RX ADMIN — FENTANYL CITRATE 50 MCG: 50 INJECTION, SOLUTION INTRAMUSCULAR; INTRAVENOUS at 09:38

## 2020-06-01 RX ADMIN — TRANEXAMIC ACID 1000 MG: 1 INJECTION, SOLUTION INTRAVENOUS at 07:46

## 2020-06-01 RX ADMIN — Medication 10 MG: at 07:50

## 2020-06-01 RX ADMIN — Medication 3 MG: at 09:39

## 2020-06-01 RX ADMIN — GABAPENTIN 200 MG: 100 CAPSULE ORAL at 06:47

## 2020-06-01 RX ADMIN — MIDAZOLAM 1 MG: 1 INJECTION INTRAMUSCULAR; INTRAVENOUS at 07:13

## 2020-06-01 RX ADMIN — DOFETILIDE 500 MCG: 0.5 CAPSULE ORAL at 18:03

## 2020-06-01 RX ADMIN — SODIUM CHLORIDE: 9 INJECTION, SOLUTION INTRAVENOUS at 12:32

## 2020-06-01 RX ADMIN — MIDAZOLAM 1 MG: 1 INJECTION INTRAMUSCULAR; INTRAVENOUS at 07:18

## 2020-06-01 RX ADMIN — GABAPENTIN 100 MG: 100 CAPSULE ORAL at 20:18

## 2020-06-01 RX ADMIN — SODIUM CHLORIDE, POTASSIUM CHLORIDE, SODIUM LACTATE AND CALCIUM CHLORIDE: 600; 310; 30; 20 INJECTION, SOLUTION INTRAVENOUS at 06:44

## 2020-06-01 RX ADMIN — GABAPENTIN 100 MG: 100 CAPSULE ORAL at 14:16

## 2020-06-01 RX ADMIN — OXYCODONE HYDROCHLORIDE 5 MG: 5 TABLET ORAL at 06:47

## 2020-06-01 RX ADMIN — SODIUM CHLORIDE, POTASSIUM CHLORIDE, SODIUM LACTATE AND CALCIUM CHLORIDE: 600; 310; 30; 20 INJECTION, SOLUTION INTRAVENOUS at 09:24

## 2020-06-01 RX ADMIN — Medication 500 MG: at 14:16

## 2020-06-01 RX ADMIN — ONDANSETRON HYDROCHLORIDE 4 MG: 2 INJECTION, SOLUTION INTRAMUSCULAR; INTRAVENOUS at 09:24

## 2020-06-01 RX ADMIN — FENTANYL CITRATE 50 MCG: 50 INJECTION, SOLUTION INTRAMUSCULAR; INTRAVENOUS at 07:19

## 2020-06-01 RX ADMIN — MORPHINE SULFATE 4 MG: 4 INJECTION, SOLUTION INTRAMUSCULAR; INTRAVENOUS at 15:14

## 2020-06-01 RX ADMIN — MULTIPLE VITAMINS W/ MINERALS TAB 1 TABLET: TAB at 14:16

## 2020-06-01 RX ADMIN — DOCUSATE SODIUM 50MG AND SENNOSIDES 8.6MG 1 TABLET: 8.6; 5 TABLET, FILM COATED ORAL at 20:16

## 2020-06-01 RX ADMIN — ACETAMINOPHEN 1000 MG: 500 TABLET ORAL at 06:47

## 2020-06-01 RX ADMIN — OXYCODONE HYDROCHLORIDE 10 MG: 10 TABLET ORAL at 20:16

## 2020-06-01 RX ADMIN — ROCURONIUM BROMIDE 10 MG: 10 INJECTION, SOLUTION INTRAVENOUS at 07:38

## 2020-06-01 RX ADMIN — ROCURONIUM BROMIDE 20 MG: 10 INJECTION, SOLUTION INTRAVENOUS at 08:16

## 2020-06-01 RX ADMIN — TRANEXAMIC ACID 1000 MG: 1 INJECTION, SOLUTION INTRAVENOUS at 10:54

## 2020-06-01 RX ADMIN — Medication 160 MG: at 07:38

## 2020-06-01 RX ADMIN — DEXAMETHASONE SODIUM PHOSPHATE 10 MG: 10 INJECTION INTRAMUSCULAR; INTRAVENOUS at 07:38

## 2020-06-01 RX ADMIN — FENTANYL CITRATE 50 MCG: 50 INJECTION, SOLUTION INTRAMUSCULAR; INTRAVENOUS at 09:44

## 2020-06-01 ASSESSMENT — PULMONARY FUNCTION TESTS
PIF_VALUE: 16
PIF_VALUE: 28
PIF_VALUE: 17
PIF_VALUE: 28
PIF_VALUE: 21
PIF_VALUE: 28
PIF_VALUE: 28
PIF_VALUE: 4
PIF_VALUE: 28
PIF_VALUE: 22
PIF_VALUE: 4
PIF_VALUE: 27
PIF_VALUE: 27
PIF_VALUE: 0
PIF_VALUE: 26
PIF_VALUE: 26
PIF_VALUE: 27
PIF_VALUE: 28
PIF_VALUE: 27
PIF_VALUE: 27
PIF_VALUE: 28
PIF_VALUE: 0
PIF_VALUE: 28
PIF_VALUE: 26
PIF_VALUE: 28
PIF_VALUE: 21
PIF_VALUE: 28
PIF_VALUE: 26
PIF_VALUE: 26
PIF_VALUE: 27
PIF_VALUE: 27
PIF_VALUE: 28
PIF_VALUE: 16
PIF_VALUE: 21
PIF_VALUE: 17
PIF_VALUE: 26
PIF_VALUE: 28
PIF_VALUE: 26
PIF_VALUE: 22
PIF_VALUE: 27
PIF_VALUE: 28
PIF_VALUE: 27
PIF_VALUE: 17
PIF_VALUE: 26
PIF_VALUE: 27
PIF_VALUE: 20
PIF_VALUE: 26
PIF_VALUE: 27
PIF_VALUE: 27
PIF_VALUE: 28
PIF_VALUE: 27
PIF_VALUE: 4
PIF_VALUE: 2
PIF_VALUE: 27
PIF_VALUE: 25
PIF_VALUE: 28
PIF_VALUE: 27
PIF_VALUE: 28
PIF_VALUE: 27
PIF_VALUE: 28
PIF_VALUE: 1
PIF_VALUE: 26
PIF_VALUE: 28
PIF_VALUE: 1
PIF_VALUE: 25
PIF_VALUE: 27
PIF_VALUE: 27
PIF_VALUE: 28
PIF_VALUE: 28
PIF_VALUE: 26
PIF_VALUE: 27
PIF_VALUE: 28
PIF_VALUE: 18
PIF_VALUE: 27
PIF_VALUE: 12
PIF_VALUE: 27
PIF_VALUE: 27
PIF_VALUE: 28
PIF_VALUE: 26
PIF_VALUE: 25
PIF_VALUE: 2
PIF_VALUE: 24
PIF_VALUE: 27
PIF_VALUE: 27
PIF_VALUE: 26
PIF_VALUE: 27
PIF_VALUE: 18
PIF_VALUE: 1
PIF_VALUE: 28
PIF_VALUE: 27
PIF_VALUE: 14
PIF_VALUE: 27
PIF_VALUE: 1
PIF_VALUE: 27
PIF_VALUE: 26
PIF_VALUE: 26
PIF_VALUE: 28
PIF_VALUE: 26
PIF_VALUE: 14
PIF_VALUE: 26
PIF_VALUE: 26
PIF_VALUE: 27
PIF_VALUE: 28
PIF_VALUE: 26
PIF_VALUE: 26
PIF_VALUE: 27
PIF_VALUE: 26
PIF_VALUE: 1
PIF_VALUE: 27
PIF_VALUE: 1
PIF_VALUE: 28
PIF_VALUE: 22
PIF_VALUE: 2
PIF_VALUE: 26
PIF_VALUE: 0
PIF_VALUE: 27
PIF_VALUE: 27
PIF_VALUE: 4
PIF_VALUE: 16
PIF_VALUE: 26
PIF_VALUE: 27
PIF_VALUE: 11
PIF_VALUE: 26
PIF_VALUE: 28
PIF_VALUE: 28
PIF_VALUE: 27
PIF_VALUE: 1
PIF_VALUE: 28
PIF_VALUE: 28
PIF_VALUE: 3
PIF_VALUE: 26
PIF_VALUE: 17
PIF_VALUE: 28
PIF_VALUE: 26
PIF_VALUE: 28
PIF_VALUE: 2
PIF_VALUE: 26
PIF_VALUE: 26
PIF_VALUE: 28
PIF_VALUE: 4
PIF_VALUE: 28
PIF_VALUE: 28

## 2020-06-01 ASSESSMENT — PAIN SCALES - GENERAL
PAINLEVEL_OUTOF10: 8
PAINLEVEL_OUTOF10: 0
PAINLEVEL_OUTOF10: 7
PAINLEVEL_OUTOF10: 6
PAINLEVEL_OUTOF10: 8
PAINLEVEL_OUTOF10: 0
PAINLEVEL_OUTOF10: 8
PAINLEVEL_OUTOF10: 7
PAINLEVEL_OUTOF10: 8
PAINLEVEL_OUTOF10: 0
PAINLEVEL_OUTOF10: 8
PAINLEVEL_OUTOF10: 0
PAINLEVEL_OUTOF10: 0

## 2020-06-01 ASSESSMENT — PAIN DESCRIPTION - LOCATION
LOCATION: KNEE

## 2020-06-01 ASSESSMENT — PAIN DESCRIPTION - PAIN TYPE
TYPE: SURGICAL PAIN

## 2020-06-01 ASSESSMENT — PAIN DESCRIPTION - FREQUENCY
FREQUENCY: CONTINUOUS
FREQUENCY: INTERMITTENT
FREQUENCY: INTERMITTENT
FREQUENCY: CONTINUOUS

## 2020-06-01 ASSESSMENT — PAIN DESCRIPTION - ONSET
ONSET: ON-GOING
ONSET: ON-GOING

## 2020-06-01 ASSESSMENT — PAIN - FUNCTIONAL ASSESSMENT
PAIN_FUNCTIONAL_ASSESSMENT: PREVENTS OR INTERFERES WITH MANY ACTIVE NOT PASSIVE ACTIVITIES
PAIN_FUNCTIONAL_ASSESSMENT: 0-10

## 2020-06-01 ASSESSMENT — PAIN DESCRIPTION - ORIENTATION
ORIENTATION: RIGHT

## 2020-06-01 ASSESSMENT — PAIN DESCRIPTION - DESCRIPTORS
DESCRIPTORS: ACHING;DISCOMFORT;DULL
DESCRIPTORS: CONSTANT
DESCRIPTORS: ACHING;DISCOMFORT;SORE
DESCRIPTORS: ACHING;DISCOMFORT;DULL
DESCRIPTORS: ACHING;DISCOMFORT;SORE

## 2020-06-01 ASSESSMENT — PAIN DESCRIPTION - PROGRESSION
CLINICAL_PROGRESSION: NOT CHANGED
CLINICAL_PROGRESSION: GRADUALLY IMPROVING

## 2020-06-01 ASSESSMENT — ENCOUNTER SYMPTOMS: SHORTNESS OF BREATH: 1

## 2020-06-01 NOTE — H&P
Chief Complaint   Patient presents with    Knee Pain        Reports BL knee pain. Uses cane PRN. H/O torn Rt ACL. States that over the past year pain has intensified and would like to discuss possible surgery.         SUBJECTIVE: Patient is a very pleasant 81-year-old male that I been following with bilateral knee severe osteoarthritis. Is where is most pronounced bilaterally in the medial compartment. His right knee hurts worse than his left due to her injury that he suffered about 6 months ago now. His last injections did not help whatsoever. He has failed conservative treatments and is not having difficulty with ADLs. He has started using a cane at all times to get around, and even with this is having difficulty. He denies any further trauma.     Review of Systems   Constitutional: Negative for fever, chills, diaphoresis, appetite change and fatigue. HENT: Negative for dental issues, hearing loss and tinnitus. Negative for congestion, sinus pressure, sneezing, sore throat. Negative for headache. Eyes: Negative for visual disturbance, blurred and double vision. Negative for pain, discharge, redness and itching  Respiratory: Negative for cough, shortness of breath and wheezing. Cardiovascular: Negative for chest pain, palpitations and leg swelling. No dyspnea on exertion   Gastrointestinal:   Negative for nausea, vomiting, abdominal pain, diarrhea, constipation  or black or bloody. Hematologic\Lymphatic:  negative for bleeding, petechiae,   Genitourinary: Negative for hematuria and difficulty urinating. Musculoskeletal: Negative for neck pain and stiffness. Negative for back pain, see HPI  Skin: Negative for pallor, rash and wound. Neurological: Negative for dizziness, tremors, seizures, weakness, light-headedness, no TIA or stroke symptoms. No numbness and headaches. Psychiatric/Behavioral: Negative.              Past Medical History:   Diagnosis Date    Arthritis      Atrial

## 2020-06-01 NOTE — ANESTHESIA PRE PROCEDURE
mouth daily. Yes Historical Provider, MD   Coenzyme Q10 (COQ10 PO) Take 1 tablet by mouth daily. Yes Historical Provider, MD   Alpha-Lipoic Acid 300 MG CAPS Take 300 mg by mouth daily On hold   Yes Historical Provider, MD   ascorbic acid (VITAMIN C) 500 MG tablet Take 500 mg by mouth daily. Yes Historical Provider, MD   Vitamin D (CHOLECALCIFEROL) 1000 UNITS CAPS capsule Take 1,000 Units by mouth daily. Yes Historical Provider, MD   Cyanocobalamin (VITAMIN B 12 PO) Take 500 mcg by mouth daily.    Yes Historical Provider, MD       Current medications:    Current Facility-Administered Medications   Medication Dose Route Frequency Provider Last Rate Last Dose    ceFAZolin (ANCEF) 2 g in dextrose 3 % 50 mL IVPB (duplex)  2 g Intravenous On Call to 5579 BRETT Whitmore PA-C        lactated ringers infusion   Intravenous Continuous Ely Watkins PA-C 125 mL/hr at 06/01/20 0644      sodium chloride flush 0.9 % injection 10 mL  10 mL Intravenous 2 times per day Ely Watkins PA-C        sodium chloride flush 0.9 % injection 10 mL  10 mL Intravenous PRN Ely Watkins PA-C        tranexamic acid (CYKLOKAPRON) 1,000 mg in dextrose 5 % 100 mL IVPB  1,000 mg Intravenous Once Ely Watkins PA-C        tranexamic acid (CYKLOKAPRON) 1,000 mg in dextrose 5 % 100 mL IVPB  1,000 mg Intravenous Once Ely Watkins PA-C        HYDROmorphone (DILAUDID) injection 0.25 mg  0.25 mg Intravenous Q5 Min PRN Yaw Barnse Erlen, DO        HYDROmorphone (DILAUDID) injection 0.5 mg  0.5 mg Intravenous Q5 Min PRN Yaw Barnes Erlen, DO        HYDROmorphone (DILAUDID) injection 0.25 mg  0.25 mg Intravenous Q5 Min PRN Yaw Barnes Erlen, DO        HYDROmorphone (DILAUDID) injection 0.5 mg  0.5 mg Intravenous Q5 Min PRN Yaw Corrales, DO        ondansetron [facility]) injection 4 mg  4 mg Intravenous Once PRN Yaw Corrales, DO        promethazine [facility]) Wt Readings from Last 3 Encounters:   06/01/20 250 lb (113.4 kg)   05/26/20 250 lb (113.4 kg)   05/06/20 250 lb (113.4 kg)     Body mass index is 36.92 kg/m². CBC:   Lab Results   Component Value Date    WBC 10.2 05/26/2020    RBC 4.63 05/26/2020    HGB 15.2 05/26/2020    HCT 45.4 05/26/2020    MCV 98.1 05/26/2020    RDW 12.6 05/26/2020     05/26/2020       CMP:   Lab Results   Component Value Date     05/26/2020    K 4.0 05/26/2020     05/26/2020    CO2 26 05/26/2020    BUN 33 05/26/2020    CREATININE 1.2 05/26/2020    GFRAA >60 05/26/2020    LABGLOM >60 05/26/2020    GLUCOSE 101 05/26/2020    PROT 8.0 05/26/2020    CALCIUM 9.7 05/26/2020    BILITOT 0.5 05/26/2020    ALKPHOS 121 05/26/2020    AST 27 05/26/2020    ALT 42 05/26/2020       POC Tests: No results for input(s): POCGLU, POCNA, POCK, POCCL, POCBUN, POCHEMO, POCHCT in the last 72 hours. Coags:   Lab Results   Component Value Date    PROTIME 12.9 05/26/2020    INR 1.1 05/26/2020    APTT 31.9 05/07/2013       HCG (If Applicable): No results found for: PREGTESTUR, PREGSERUM, HCG, HCGQUANT     ABGs: No results found for: PHART, PO2ART, HCX4IKW, EDO4CKG, BEART, S4NBNDOE     Type & Screen (If Applicable):  No results found for: LABABO, LABRH    Drug/Infectious Status (If Applicable):  No results found for: HIV, HEPCAB    COVID-19 Screening (If Applicable):   Lab Results   Component Value Date    COVID19 Not Detected 05/27/2020     EKG 6/5/2019  Narrative & Impression   Normal sinus rhythm with sinus arrhythmia rate 69  Right bundle branch block  Abnormal ECG     ECHO 10/20/2017  Findings      Left Ventricle   Normal LV segmental wall motion. Ejection fraction is visually estimated at 50%. Right Ventricle   Normal right ventricular size and function. Left Atrium   No thrombus in left atrial appendage. Normal LA appendage velocity >0.4m/s      Mitral Valve   Normal mitral valve structure and function.       Tricuspid Valve

## 2020-06-01 NOTE — OP NOTE
Operative Note      Patient: Cata Sullivan  YOB: 1950  MRN: [id number]    Date of Procedure: 6/1/2020       Department of Orthopedic Surgery  Operative Report        Pre-operative Diagnosis:  Right Knee Osteoarthritis    Post-operative Diagnosis:  Right Knee Osteoarthritis    Procedure:  Right Knee Arthroplasty    Surgeon:  Oanh Mccoy MD    Anesthesia:  General endotrachial anesthesia and with block adductor    Estimated blood loss:  Less than 50 ml    Specimens:  bone    Implants: Size #7 CR femur, size #7 baseplate, 9 mm polyethylene liner, a 32 patella    Complications:  none    Condition:  Stable    Brief Hospital Course:  Cata Sullivan is a patient known to Oanh Mccoy MD's practice with persistent complaints of Right knee pain. Knee pain has failed to be relieved by non-operative conservative measures, and has began affecting daily activities of living. After examination of the patient, review of the radiologic studies, and appropriate pre-operative risk assessment, Oanh Mccoy MD recommended Right knee arthroplasty, which the patient was agreeable towards. Operative Course: The patient was seen and identified outside the operative suite, in which the operative site was marked as appropriate by patient, surgeon, staff, and anesthesia. The patient was then taken into the operative suite, transferred to the operative table with all bony prominences and neurovascular structures well padded and protected. A tourniquet was placed high on the proximal thigh of the operative extremity. The patient was sedated under the care of the anesthesia team. The operative site was prepped and draped in standard sterile fashion. The tourniquet was inflated to 300 mmHg. The patient was seen and identified outside the operative suite, in which the operative site was marked as appropriate by patient, surgeon, staff, and anesthesia.  The patient was then taken into the operative suite, transferred to the operative table with all bony prominences and neurovascular structures well padded and protected. A tourniquet was placed high on the left thigh of the operative extremity. The patient was sedated under the care of the anesthesia team. The operative site was prepped and draped in standard sterile fashion. The tourniquet was inflated to 300 mmHg. Anterior midline incision was made centered about the patella, dissection carried down in the midline to the extensor mechanism where a medial  parapatellar arthrotomy was made. The patella was everted. The knee was  flexed showing 7  After resection of all osteophytes, the varus deformity was uncorrectable to a normal medial joint space. The knee was now flexed using a kinematic alignment principles. The distal femoral cutting guide from the Triathlon MIS system was placed to restore the joint line recreating the medial deficiency. The knee was now placed in extension and using the anticipated  femoral cut as a guide and holding the knee passively in neutral correctable  alignment, the proximal tibia was marked in a parallel fashion at an appropriate distance from the femur. The knee was now flexed and the distal femur was cut using the femoral cutting guide resecting 8 mm lateral and 8 mm  medial from the distal femur. Intraoperative sizing was performed and was consistent a size 2 on the femoral size. Rotation was adjusted to parallel the anticipated tibial cut, which turned out to be neutral relative to the posterior condyles. the size 7 Triathlon 4-in-1 block  was now secured in the distal femur and the appropriate anterior and posterior cuts were now made. Remnants of menisci were sharply excised.  The proximal tibia was delivered forward and then the articular surface of the tibia was resected with the  oscillating saw at the previously indicated level, recreating the patient's  native anterior-to-posterior slope and resecting more bone lateral than medial.  A size 7 Triathlon baseplate trial was inserted with a 9-mm CR liner and a  size 7 femoral trial was press-fit onto to the distal femur. The knee was  reduced showing excellent tissue balance through a completely normal range of  motion with normal patellar tracking. (However, given the patellofemoral disease, it was elected to resurface the patella which was achieved with the appropriate cutting guide and oscillating saw resecting the patella to the  appropriate depth and pre-drilling and placing a 32-mm patellar trial. The knee was reduced again showing excellent range of motion, tissue balance  and normal patellar tracking.)   The knee was flexed, femur prosthesis holes were drilled. Trial implants were removed. Cancellous surfaces were copiously irrigated with pulsatile lavage  and then dried. Polymethylmethacrylate was mixed and applied to the tibial Triathlon baseplate which was now press-fit into place. Excess cement  was trimmed, and a poly insert was snapped into the baseplate. Then with the knee flexion, the Triathlon femoral component was press-fit  with PMMA onto the distal femur. (The knee was placed  in extension while the patellar component was cemented into place) After all cement cured, the knee was again examined showing excellent range of motion, normal tissue  balance, and normal patellar tracking. The knee was irrigated then dried. Platelet-rich activated platelet gel was sprayed into the depths of the wound  and around the margins of the implant. The arthrotomy was closed with stratifix barbed wire suture. Platelet-poor APG was sprayed into the subcutaneous tissues, which were then closed with interrupted 2-0 Vicryl. Skin was closed with staples. A dry sterile dressing was applied followed by release of the tourniquet. The patient was then awakened and taken to recovery having tolerated the procedure well.      Disposition: The patient was taken to PACU in

## 2020-06-01 NOTE — CONSULTS
CAD (coronary artery disease) 07/07/2016    ct scan heart    Chronic lower back pain     BREWER (dyspnea on exertion) 3/26/2013    Eczema     Hyperlipidemia     Hypertension     Kidney stones     Obesity     weight 250#    Sleep apnea     c pap  stting 2       Past Surgical History:        Procedure Laterality Date    ABLATION OF DYSRHYTHMIC FOCUS  05/2013    heart ablation dr Sergey Golden  08/10/2016    CARDIOVERSION  3-    Dr. Amanda Mcdonald  10/20/2017    Dr. Chris Chavez ECHOCARDIOGRAM TRANSESOPHAGEAL  5/7/2013         KNEE SURGERY Left 1969    repair left knee ligaments    LITHOTRIPSY  1986    LUMBAR [address]  11/30/2016    [facility] [place]    TONSILLECTOMY      TOTAL KNEE ARTHROPLASTY Right 6/1/2020    RIGHT KNEE TOTAL ARTHROPLASTY -- SHRUTHI performed by Mahnaz Haro MD at [place] [id number] TRANSESOPHAGEAL ECHOCARDIOGRAM  3-    Dr. Leandro Doshi       Medications Prior to Admission:    Prior to Admission medications    Medication Sig Start Date End Date Taking? Authorizing Provider   HYDROcodone-acetaminophen (NORCO) 5-325 MG per tablet Take 1 tablet by mouth every 6 hours as needed for Pain for up to 7 days. Intended supply: 7 days.  Take lowest dose possible to manage pain 6/1/20 6/8/20 Yes Tania Palacio, DO   metoprolol succinate (TOPROL XL) 50 MG extended release tablet Take 50 mg by mouth daily   Yes Historical Provider, MD LICONABERRY PO Take by mouth STOP PREOP MED   Yes Historical Provider, MD   Misc Natural Products (TART CHERRY ADVANCED PO) Take by mouth STOP PREOP MED   Yes Historical Provider, MD   dofetilide (TIKOSYN) 500 MCG capsule TAKE ONE CAPSULE BY MOUTH EVERY 12 HOURS 3/20/20  Yes Zeeshan Robles, DO   atorvastatin (LIPITOR) 20 MG tablet Take 1 tablet by mouth daily 2/18/20  Yes Marcin Pandya MD   chlorthalidone (HYGROTON) 25 MG tablet Take 1 tablet by mouth daily 2/4/20  Yes Marcin Pandya MD   gabapentin (NEURONTIN) 100 MG capsule Take 100 mg by mouth 2 times daily. Yes Historical Provider, MD   apixaban (ELIQUIS) 5 MG TABS tablet TAKE ONE TABLET BY MOUTH TWO TIMES A DAY  Patient taking differently: 5 mg daily TAKE ONE TABLET BY MOUTH TWO TIMES A DAY 7/16/19  Yes Lalo Mccormick, DO   magnesium oxide (MAG-OX) 400 (240 Mg) MG tablet Take 1 tablet by mouth 2 times daily  Patient taking differently: Take 400 mg by mouth daily  10/23/17  Yes Meche Hill, DO   Multiple Vitamins-Minerals (THERAPEUTIC MULTIVITAMIN-MINERALS) tablet Take 1 tablet by mouth daily. Yes Historical Provider, MD   Coenzyme Q10 (COQ10 PO) Take 1 tablet by mouth daily. Yes Historical Provider, MD   Alpha-Lipoic Acid 300 MG CAPS Take 300 mg by mouth daily On hold   Yes Historical Provider, MD   ascorbic acid (VITAMIN C) 500 MG tablet Take 500 mg by mouth daily. Yes Historical Provider, MD   Vitamin D (CHOLECALCIFEROL) 1000 UNITS CAPS capsule Take 1,000 Units by mouth daily. Yes Historical Provider, MD   Cyanocobalamin (VITAMIN B 12 PO) Take 500 mcg by mouth daily. Yes Historical Provider, MD       Allergies:  Adhesive tape    Social History:      TOBACCO:   reports that he quit smoking about 13 years ago. His smoking use included cigarettes. He has a 20.00 pack-year smoking history. He has never used smokeless tobacco.  ETOH:   reports current alcohol use of about 3.0 standard drinks of alcohol per week. Family History:     Reviewed in detail and negative for DM, CAD, Cancer, CVA. Positive as follows:        Problem Relation Age of Onset    Cancer Mother     Diabetes Mother     Cancer Father     Diabetes Sister     Kidney Disease Sister     Early Death Neg Hx        REVIEW OF SYSTEMS:   Pertinent positives as noted in the HPI. All other systems reviewed and negative.     PHYSICAL EXAM:  /72   Pulse 62   Temp 97.5 °F (36.4 °C) (Temporal)   Resp 16   Ht 5' 9\" (1.753 m)   Wt 250 lb (113.4 kg)   SpO2 98%   BMI 36.92 fibrillation  Chronic systolic heart failure    PLAN:  1. Right knee pain status post total knee replacement POD#0-patient status post total knee replacement. Patient states that he has been out of bed and ambulating. Patient notes that he does have some pain. We will need to ensure adequate pain control. Care as per primary team.  2. Hypertension-patient with history of hypertension. Patient to be continued on his home blood pressure medications. 3. Persistent atrial fibrillation-patient with history of atrial fibrillation. Patient will be continued on Tikosyn, metoprolol XL and Eliquis. 4. Chronic systolic heart failure-patient with history of heart failure. Patient does not appear to be in heart failure at this time. Patient will be continued on his home cardiac medications. 5. Disposition- patient should be out of bed to chair and ambulating. Diet: DIET GENERAL; Thank you for allowing us to participate in this patient's care.    +++++++++++++++++++++++++++++++++++++++++++++++++  [address] [place]  +++++++++++++++++++++++++++++++++++++++++++++++++  NOTE: This report was transcribed using voice recognition software. Every effort was made to ensure accuracy; however, inadvertent computerized transcription errors may be present.

## 2020-06-01 NOTE — PROGRESS NOTES
Occupational Therapy  OCCUPATIONAL THERAPY INITIAL EVALUATION      Date:2020  Patient Name: Leola Patten  MRN: [id number]  : 1950  Room: [address] [place] [address]    Evaluating OT: Annia Milton. Tika Zapata, OTR/L #[id number]    Referring physician: Gustavo Calzada DO  AM-PAC Daily Activity Raw Score: 16  Recommended Adaptive Equipment: ww, elevated 3:1 commode, shower seat, possible AE for LE dressing and bathing     Diagnosis: OA   Surgery: s/p R TKR    Pertinent Medical History: Afib, CAD, HTN, sleep apnea  Precautions:  Falls, knee precautions, R foot drop     Home Living: Pt lives with wife in a one story home  with 2+1 step(s) to enter and no rail(s); bed/bath on main floor with mancave with full flight with HR   Bathroom setup: tub shower and standard commode   Equipment owned: spc  Prior Level of Function:   Independent with ADLs ,  Independent with IADLs; using spc for ambulation.    Driving: yes                           Medication Management self  Occupation: enjoys working outside and music    Pain Level: 6-7/10 R knee pain   Cognition: A&O: 3/3; Follows 1-2 step directions   Memory:  good    Sequencing:  fair    Problem solving:  fair    Judgement/safety:  good -     Functional Assessment:   Initial Eval Status  Date: 20 Treatment Status  Date: Short Term Goals=LTG  Treatment frequency: PRN 2-4 x/week   Feeding Independent     Grooming Min A   Mod I standing    UB Dressing setup  Independent   LB Dressing Max A   Mod I     Bathing Max A   Mod I     Toileting Mod A   Mod I    Bed Mobility  Supine to sit: SBA   Sit to supine: n/t  Supine to sit: mod I    Sit to supine: mod I    Functional Transfers Sit to stand: min A   Stand to sit:  Min A   Stand pivot: min  A  Mod I with ww   Functional Mobility Min A with noted one LOB with ww  Mod I with ww   Balance Sitting:     Static:  good    Dynamic:good-  Standing: good-     Activity Tolerance Fair+ O2 room air 95%  good   Visual/  Perceptual Glasses: reading         Safety Good- walker safety education frequently                  good     Hand dominance: R   UE ROM: RUE: limited to 80* shoulder and elbow -20* distal  WFL  LUE:  WFL  Strength: RUE:  Proximal 3-/5 distal 4/5 LUE:  5/5   Strength: B WFL  Fine Motor Coordination:  WFL    Hearing: WFL  Sensation:  No c/o numbness or tingling  Tone:  WFL  Edema: mod R knee                            Comments: Upon arrival, patient supine and agreeable to evaluation. OT evaluation performed with  Education on walker safety, knee precautions, and bathroom safety. At end of session, patient sitting up in chair and  with call light and phone within reach, all lines and tubes intact. Nursing notified. Skilled O2 monitoring performed throughout evaluation. OT treatment: OT for functional assessment of  ADL, Functional Transfer/Mobility Training, Equipment Needs, Energy Conservation Techniques, Pt/Family Education, Ther Ex- deep breathing for edema and pain control., OT role and POC reviewed. Patient  demo good understanding of walker safety, knee precautions, bathroom safety. Pt would benefit from continued skilled OT to increase functional independence and quality of life.     Eval Complexity: low    Assessment of current deficits   Functional mobility [x]  ADLs [x] Strength []  Cognition []  Functional transfers  [x] IADLs [x] Safety Awareness [x]  Endurance [x]  Fine Motor Coordination [] Balance [x] Vision/perception [] Sensation []   Gross Motor Coordination [] ROM [] Delirium []                  Motor Control []    Plan of Care: 5-7 days     ADL retraining [x]   Equipment needs [x]   Neuromuscular re-education [] Energy Conservation Techniques [x]  Functional Transfer training [x] Patient and/or Family Education [x]  Functional Mobility training [x]  Environmental Modifications [x]  Cognitive re-training []   Compensatory techniques for ADLs [x]  Splinting Needs []   Positioning to improve overall function [x]   Therapeutic Activity

## 2020-06-02 VITALS
SYSTOLIC BLOOD PRESSURE: 136 MMHG | WEIGHT: 250 LBS | OXYGEN SATURATION: 98 % | DIASTOLIC BLOOD PRESSURE: 70 MMHG | HEIGHT: 69 IN | HEART RATE: 76 BPM | RESPIRATION RATE: 18 BRPM | BODY MASS INDEX: 37.03 KG/M2 | TEMPERATURE: 97 F

## 2020-06-02 LAB
ANION GAP SERPL CALCULATED.3IONS-SCNC: 12 MMOL/L (ref 7–16)
BUN BLDV-MCNC: 32 MG/DL (ref 8–23)
CALCIUM SERPL-MCNC: 8.6 MG/DL (ref 8.6–10.2)
CHLORIDE BLD-SCNC: 100 MMOL/L (ref 98–107)
CO2: 26 MMOL/L (ref 22–29)
CREAT SERPL-MCNC: 1.1 MG/DL (ref 0.7–1.2)
GFR AFRICAN AMERICAN: >60
GFR NON-AFRICAN AMERICAN: >60 ML/MIN/1.73
GLUCOSE BLD-MCNC: 150 MG/DL (ref 74–99)
HCT VFR BLD CALC: 34.8 % (ref 37–54)
HEMOGLOBIN: 11.8 G/DL (ref 12.5–16.5)
HEPATITIS B SURFACE ANTIGEN INTERPRETATION: NORMAL
HEPATITIS C ANTIBODY INTERPRETATION: NORMAL
MCH RBC QN AUTO: 32.7 PG (ref 26–35)
MCHC RBC AUTO-ENTMCNC: 33.9 % (ref 32–34.5)
MCV RBC AUTO: 96.4 FL (ref 80–99.9)
PDW BLD-RTO: 12.9 FL (ref 11.5–15)
PLATELET # BLD: 165 E9/L (ref 130–450)
PMV BLD AUTO: 11.4 FL (ref 7–12)
POTASSIUM REFLEX MAGNESIUM: 4.5 MMOL/L (ref 3.5–5)
RBC # BLD: 3.61 E12/L (ref 3.8–5.8)
SODIUM BLD-SCNC: 138 MMOL/L (ref 132–146)
WBC # BLD: 13.6 E9/L (ref 4.5–11.5)

## 2020-06-02 PROCEDURE — 6370000000 HC RX 637 (ALT 250 FOR IP): Performed by: PHYSICIAN ASSISTANT

## 2020-06-02 PROCEDURE — 85027 COMPLETE CBC AUTOMATED: CPT

## 2020-06-02 PROCEDURE — 97530 THERAPEUTIC ACTIVITIES: CPT

## 2020-06-02 PROCEDURE — 6370000000 HC RX 637 (ALT 250 FOR IP): Performed by: ORTHOPAEDIC SURGERY

## 2020-06-02 PROCEDURE — G0378 HOSPITAL OBSERVATION PER HR: HCPCS

## 2020-06-02 PROCEDURE — 80048 BASIC METABOLIC PNL TOTAL CA: CPT

## 2020-06-02 PROCEDURE — 36415 COLL VENOUS BLD VENIPUNCTURE: CPT

## 2020-06-02 PROCEDURE — 99024 POSTOP FOLLOW-UP VISIT: CPT | Performed by: PHYSICIAN ASSISTANT

## 2020-06-02 PROCEDURE — 97535 SELF CARE MNGMENT TRAINING: CPT

## 2020-06-02 PROCEDURE — 2580000003 HC RX 258: Performed by: ORTHOPAEDIC SURGERY

## 2020-06-02 RX ORDER — OXYCODONE HYDROCHLORIDE AND ACETAMINOPHEN 5; 325 MG/1; MG/1
1 TABLET ORAL EVERY 6 HOURS PRN
Qty: 28 TABLET | Refills: 0 | Status: SHIPPED | OUTPATIENT
Start: 2020-06-02 | End: 2020-06-02 | Stop reason: SDUPTHER

## 2020-06-02 RX ORDER — OXYCODONE HYDROCHLORIDE AND ACETAMINOPHEN 5; 325 MG/1; MG/1
1 TABLET ORAL EVERY 6 HOURS PRN
Qty: 28 TABLET | Refills: 0 | Status: SHIPPED | OUTPATIENT
Start: 2020-06-02 | End: 2020-06-09 | Stop reason: SDUPTHER

## 2020-06-02 RX ADMIN — APIXABAN 5 MG: 5 TABLET, FILM COATED ORAL at 08:10

## 2020-06-02 RX ADMIN — ACETAMINOPHEN 650 MG: 325 TABLET, FILM COATED ORAL at 11:46

## 2020-06-02 RX ADMIN — Medication 500 MCG: at 08:10

## 2020-06-02 RX ADMIN — METOPROLOL SUCCINATE 50 MG: 50 TABLET, EXTENDED RELEASE ORAL at 08:11

## 2020-06-02 RX ADMIN — MULTIPLE VITAMINS W/ MINERALS TAB 1 TABLET: TAB at 08:10

## 2020-06-02 RX ADMIN — SODIUM CHLORIDE, PRESERVATIVE FREE 10 ML: 5 INJECTION INTRAVENOUS at 08:09

## 2020-06-02 RX ADMIN — ACETAMINOPHEN 650 MG: 325 TABLET, FILM COATED ORAL at 05:57

## 2020-06-02 RX ADMIN — OXYCODONE HYDROCHLORIDE 10 MG: 10 TABLET ORAL at 08:11

## 2020-06-02 RX ADMIN — Medication 500 MG: at 08:10

## 2020-06-02 RX ADMIN — GABAPENTIN 100 MG: 100 CAPSULE ORAL at 08:11

## 2020-06-02 RX ADMIN — DOCUSATE SODIUM 50MG AND SENNOSIDES 8.6MG 1 TABLET: 8.6; 5 TABLET, FILM COATED ORAL at 08:10

## 2020-06-02 RX ADMIN — OXYCODONE HYDROCHLORIDE 10 MG: 10 TABLET ORAL at 14:28

## 2020-06-02 RX ADMIN — DOFETILIDE 500 MCG: 0.5 CAPSULE ORAL at 05:57

## 2020-06-02 RX ADMIN — CHLORTHALIDONE 25 MG: 25 TABLET ORAL at 08:11

## 2020-06-02 RX ADMIN — VITAMIN D, TAB 1000IU (100/BT) 1000 UNITS: 25 TAB at 08:10

## 2020-06-02 RX ADMIN — MAGNESIUM GLUCONATE 500 MG ORAL TABLET 400 MG: 500 TABLET ORAL at 08:10

## 2020-06-02 RX ADMIN — SODIUM CHLORIDE: 9 INJECTION, SOLUTION INTRAVENOUS at 01:43

## 2020-06-02 ASSESSMENT — PAIN DESCRIPTION - LOCATION
LOCATION: KNEE
LOCATION: KNEE

## 2020-06-02 ASSESSMENT — PAIN - FUNCTIONAL ASSESSMENT
PAIN_FUNCTIONAL_ASSESSMENT: PREVENTS OR INTERFERES SOME ACTIVE ACTIVITIES AND ADLS
PAIN_FUNCTIONAL_ASSESSMENT: PREVENTS OR INTERFERES SOME ACTIVE ACTIVITIES AND ADLS

## 2020-06-02 ASSESSMENT — PAIN DESCRIPTION - DESCRIPTORS
DESCRIPTORS: ACHING;DISCOMFORT;NAGGING
DESCRIPTORS: ACHING;DISCOMFORT;NAGGING

## 2020-06-02 ASSESSMENT — PAIN DESCRIPTION - PROGRESSION
CLINICAL_PROGRESSION: GRADUALLY IMPROVING
CLINICAL_PROGRESSION: GRADUALLY IMPROVING

## 2020-06-02 ASSESSMENT — PAIN SCALES - GENERAL
PAINLEVEL_OUTOF10: 6
PAINLEVEL_OUTOF10: 8
PAINLEVEL_OUTOF10: 6
PAINLEVEL_OUTOF10: 7
PAINLEVEL_OUTOF10: 3
PAINLEVEL_OUTOF10: 8

## 2020-06-02 ASSESSMENT — PAIN DESCRIPTION - PAIN TYPE
TYPE: SURGICAL PAIN
TYPE: SURGICAL PAIN

## 2020-06-02 ASSESSMENT — PAIN DESCRIPTION - ONSET
ONSET: GRADUAL
ONSET: GRADUAL

## 2020-06-02 ASSESSMENT — PAIN DESCRIPTION - ORIENTATION
ORIENTATION: RIGHT
ORIENTATION: RIGHT

## 2020-06-02 ASSESSMENT — PAIN DESCRIPTION - FREQUENCY
FREQUENCY: INTERMITTENT
FREQUENCY: INTERMITTENT

## 2020-06-02 NOTE — PROGRESS NOTES
Hospitalist Progress Note      PCP: Margarita David MD    Date of Admission: 6/1/2020    Chief Complaint: Medical management    Hospital Course: 72 y.o. male that underwent a Right TKR  by Dr. Wan Godinez MD, on 6/1/2020. The patient is well known to the Orthopedic practice and being seen for bilateral  severe knee osteoarthritis. His right knee hurts worse than his left due to her injury that he suffered about 6 months ago now. [facility] last injections did not help whatsoever. [facility] has failed conservative treatments and is not having difficulty with ADLs.  He has started using a cane at all times to get around, and even with this is having difficulty.  He denies any further trauma. Subjective: Patient is sitting in the chair. Complaining of right knee pain described as 5/10, he had just received a pain pill prior to my arrival. States that he has been up and walking with PT/OT and is doing well, and is suppose to do the steps with them later today and Ortho may send him home. Denies dyspnea with exertion.          Medications:  Reviewed    Infusion Medications    sodium chloride 100 mL/hr at 06/02/20 0143     Scheduled Medications    ascorbic acid  500 mg Oral Daily    Vitamin D  1,000 Units Oral Daily    vitamin B-12  500 mcg Oral Daily    therapeutic multivitamin-minerals  1 tablet Oral Daily    magnesium oxide  400 mg Oral Daily    apixaban  5 mg Oral BID    gabapentin  100 mg Oral BID    chlorthalidone  25 mg Oral Daily    atorvastatin  20 mg Oral Daily    dofetilide  500 mcg Oral Q12H    sodium chloride flush  10 mL Intravenous 2 times per day    acetaminophen  650 mg Oral Q6H    sennosides-docusate sodium  1 tablet Oral BID    metoprolol succinate  50 mg Oral Daily     PRN Meds: sodium chloride flush, magnesium hydroxide, morphine **OR** morphine, oxyCODONE **OR** oxyCODONE      Intake/Output Summary (Last 24 hours) at 6/2/2020 0902  Last data filed at 6/2/2020 0622  Gross per 24

## 2020-06-02 NOTE — DISCHARGE SUMMARY
[facility]THO [facility]   8/26/2020 11:30 AM Leann Hanson MD  [facility]th[facility]      Time Spent on discharge is more than 30 minutes  This time was spent on discharge papers, medication review, medication reconciliation, prescriptions, chart review, patient exam, discussion with patient and available family as well as dictation where needed.      Signed:  Electronically signed by Judy Ramires PA-C on 6/3/2020 at 6:15 PM

## 2020-06-02 NOTE — PROGRESS NOTES
SBA  Dynamic Standing:  Milad  Sitting EOB:  Independent    Dynamic Standing:  Modified Independent  WW     Pt is A & O x 4  Sensation:  Pt denies numbness and tingling to extremities  Edema:  WNL      Exercise :NT      Patient education  Pt educated on safety w/ functional mobility     Patient response to education:   Pt verbalized understanding Pt demonstrated skill Pt requires further education in this area   x X w/ cues x     ASSESSMENT:    Comments:  Pt seated EOB , agreeable to PT. Pt performed bed mobility without physical assistance. Pt requiring assistance and cues for functional transfers. Pt continues to ambulate with step to, antalgic gait. Pt with RLE foot drag, states he does not have his AFO . Pt was transported to the [facility] via wc . pt was instructed in stair negotiation as noted. Pt declined to attempt steps w/ 1 rail as indicated in goals. pt stated \" I have an Army of people to assist me if I need it. \" Patient would benefit from continued skilled PT to maximize functional mobility independence. Pt remained in B/S  w/ R LE supported on floor. Treatment:  Patient practiced and was instructed in the following treatment:       Functional transfers-Verbal cues for proper positioning and sequencing to perform transfers safely with maximum independence.  Gait training-Verbal cues for proper positioning and sequencing using assistive device to maximize functional mobility independence.  Stair negotiation as noted above      Pt's/ family goals   1. Get better    Patient and or family understand(s) diagnosis, prognosis, and plan of care. yes    PLAN:    PT care will be provided in accordance with the objectives noted above. Exercises and functional mobility practice will be used as well as appropriate assistive devices or modalities to obtain goals. Patient and family education will also be administered as needed.     Frequency of treatments: BID    Time in  1305  Time out  1335    Total Treatment Time  30 minutes         CPT codes:  [] Low Complexity PT evaluation 00671  [] Moderate Complexity PT evaluation 75367  [] High Complexity PT evaluation 41841  [] PT Re-evaluation 89226  [] Gait training 57991 0 minutes  [] Manual therapy 07384 0 minutes  [x] Therapeutic activities 64036 30 minutes  [] Therapeutic exercises 77625 0 minutes  [] Neuromuscular reeducation 93212 0 minutes       Deon Bumpers PTA [id number]

## 2020-06-02 NOTE — PROGRESS NOTES
Occupational Therapy  OT BEDSIDE TREATMENT NOTE      Date:2020  Patient Name: Karen Pain  MRN: [id number]  : 1950  Room: [address] [place] [address]     Evaluating OT: Gerri Herrera. Jaqui Jensen OTR/L #[id number]     Referring physician: Irlanda Mariscal DO  AM-PAC Daily Activity Raw Score: 17  Recommended Adaptive Equipment: ww, elevated 3:1 commode, shower seat     Diagnosis: OA   Surgery: s/p R TKR    Pertinent Medical History: Afib, CAD, HTN, sleep apnea  Precautions:  Falls, knee precautions, R foot drop     Home Living: Pt lives with wife in a one story home  with 2+1 step(s) to enter and no rail(s); bed/bath on main floor with mancave with full flight with HR   Bathroom setup: tub shower and standard commode   Equipment owned: spc  Prior Level of Function:   Independent with ADLs ,  Independent with IADLs; using spc for ambulation. Driving: yes                           Medication Management self  Occupation: enjoys working outside and music     Pain Level: Pt reports min right knee pain  Cognition: A&O: 3/3; Follows 1-2 step directions              Memory:  good               Sequencing:  fair               Problem solving:  fair               Judgement/safety:  good -                Functional Assessment:    Initial Eval Status  Date: 20 Treatment Status  Date: 20 Short Term Goals=LTG  Treatment frequency: PRN 2-4 x/week   Feeding Independent Independent      Grooming Min A  SBA  standing  Mod I standing    UB Dressing setup Set up  Independent   LB Dressing Max A  Max A  Pt reports owning LB AE to complete, declines completing this date.  Mod I     Bathing Max A  Min A  Min  A for tub transfer using grab bar.  Pt educated on several techniques to increase independence.  Mod I     Toileting Mod A   Mod A  Raised toilet seat at discharge Mod I    Bed Mobility  Supine to sit: SBA   Sit to supine: n/t SBA- sit to supine  Educated pt on technique to increase independence.    Supine to sit: mod I    Sit to supine: mod I

## 2020-06-03 ENCOUNTER — TELEPHONE (OUTPATIENT)
Dept: ORTHOPEDIC SURGERY | Age: 70
End: 2020-06-03

## 2020-06-09 RX ORDER — OXYCODONE HYDROCHLORIDE AND ACETAMINOPHEN 5; 325 MG/1; MG/1
1 TABLET ORAL EVERY 6 HOURS PRN
Qty: 28 TABLET | Refills: 0 | Status: SHIPPED
Start: 2020-06-09 | End: 2020-06-19 | Stop reason: SDUPTHER

## 2020-06-09 NOTE — TELEPHONE ENCOUNTER
Pt left Vm requesting refill of pain medication. Date of Procedure: 6/1/2020   Procedure:  Right Knee Arthroplasty    Order pended and routed for decision and signature.

## 2020-06-11 RX ORDER — OXYCODONE HYDROCHLORIDE AND ACETAMINOPHEN 5; 325 MG/1; MG/1
1 TABLET ORAL EVERY 6 HOURS PRN
Qty: 28 TABLET | Refills: 0 | Status: CANCELLED | OUTPATIENT
Start: 2020-06-11 | End: 2020-06-18

## 2020-06-15 ENCOUNTER — HOSPITAL ENCOUNTER (OUTPATIENT)
Dept: GENERAL RADIOLOGY | Age: 70
Discharge: HOME | End: 2020-06-17
Payer: MEDICARE

## 2020-06-15 ENCOUNTER — OFFICE VISIT (OUTPATIENT)
Dept: ORTHOPEDIC SURGERY | Age: 70
End: 2020-06-15
Payer: MEDICARE

## 2020-06-15 VITALS
DIASTOLIC BLOOD PRESSURE: 80 MMHG | BODY MASS INDEX: 38.06 KG/M2 | HEIGHT: 69 IN | SYSTOLIC BLOOD PRESSURE: 139 MMHG | HEART RATE: 82 BPM | WEIGHT: 257 LBS

## 2020-06-15 PROCEDURE — 73560 X-RAY EXAM OF KNEE 1 OR 2: CPT

## 2020-06-15 PROCEDURE — 99024 POSTOP FOLLOW-UP VISIT: CPT | Performed by: PHYSICIAN ASSISTANT

## 2020-06-15 PROCEDURE — 99212 OFFICE O/P EST SF 10 MIN: CPT | Performed by: PHYSICIAN ASSISTANT

## 2020-06-15 NOTE — PATIENT INSTRUCTIONS
Remove staples  Steri strips should fall off in the shower at some point, if they do not fall off in 10 days, remove them  Dressing: Can remove dressing in 1-2 days then open to air  Can shower in a couple days, NO Soaking or submerging incision in water until fully healed & all scabs are gone    WB:  Weight bearing as tolerated on right lower extremity    Therapy: Start outpatient therapy-- order written today     DVT: Continue with Eliquis as ordered  Pain control : Call for refill when needed    Continue with ice to the injured extremity 2-3 times per day for swelling  If able continue with elevation and compression    Follow up in 4 weeks with XR of the right knee    Incision and Scar Care    Can remove dressing in 1-2 days then open to air. Can remove Steri Strips after 10 days if they do not fall off on their own. Can shower in a couple days, NO Soaking or submerging incision in water until skin is fully healed. Once your incision has fully healed and no there is no drainage, swelling, redness, or red streaking, you can begin scar massage. Use Vaseline or lotion to perform scar massage several times per day. Massage your incision line to break up any fibrous adhesions and scar tissue. Only massage areas of skin that have fully healed. Do not pull your scabs off. Let them fall off on their own. How much pressure should I apply? You should apply as much pressure as you can tolerate. Begin with light pressure and progress to deeper and  firmer pressure. Massage lotion in, applying enough pressure to make the scar area lighten in color or turn  white. How often should I massage my scar? Massage should be done two to three times daily for ten minutes each time. How long is massaging necessary? You should massage your scars as instructed for at least six months following your surgery or injury.   Massaging for more than six months will not hurt your scars and may actually prove beneficial.  When should

## 2020-06-15 NOTE — PROGRESS NOTES
computerize voiced recognition software. Every effort has been made to ensure accuracy; however, inadvertent computerized transcription errors may be present.

## 2020-06-19 RX ORDER — OXYCODONE HYDROCHLORIDE AND ACETAMINOPHEN 5; 325 MG/1; MG/1
1 TABLET ORAL EVERY 8 HOURS PRN
Qty: 21 TABLET | Refills: 0 | Status: SHIPPED
Start: 2020-06-19 | End: 2020-07-02 | Stop reason: SDUPTHER

## 2020-06-30 ENCOUNTER — HOSPITAL ENCOUNTER (OUTPATIENT)
Age: 70
Setting detail: OBSERVATION
Discharge: HOME | End: 2020-07-01
Attending: EMERGENCY MEDICINE | Admitting: FAMILY MEDICINE
Payer: MEDICARE

## 2020-06-30 ENCOUNTER — APPOINTMENT (OUTPATIENT)
Dept: GENERAL RADIOLOGY | Age: 70
End: 2020-06-30
Payer: MEDICARE

## 2020-06-30 PROBLEM — N17.9 AKI (ACUTE KIDNEY INJURY) (HCC): Status: ACTIVE | Noted: 2020-06-30

## 2020-06-30 LAB
ALBUMIN SERPL-MCNC: 4.1 G/DL (ref 3.5–5.2)
ALP BLD-CCNC: 109 U/L (ref 40–129)
ALT SERPL-CCNC: 22 U/L (ref 0–40)
ANION GAP SERPL CALCULATED.3IONS-SCNC: 15 MMOL/L (ref 7–16)
AST SERPL-CCNC: 23 U/L (ref 0–39)
BASOPHILS ABSOLUTE: 0.09 E9/L (ref 0–0.2)
BASOPHILS RELATIVE PERCENT: 0.7 % (ref 0–2)
BILIRUB SERPL-MCNC: 0.6 MG/DL (ref 0–1.2)
BUN BLDV-MCNC: 37 MG/DL (ref 8–23)
CALCIUM SERPL-MCNC: 9.6 MG/DL (ref 8.6–10.2)
CHLORIDE BLD-SCNC: 101 MMOL/L (ref 98–107)
CK MB: 9.2 NG/ML (ref 0–7.7)
CO2: 23 MMOL/L (ref 22–29)
CREAT SERPL-MCNC: 1.5 MG/DL (ref 0.7–1.2)
D DIMER: 737 NG/ML DDU
EOSINOPHILS ABSOLUTE: 0.71 E9/L (ref 0.05–0.5)
EOSINOPHILS RELATIVE PERCENT: 5.7 % (ref 0–6)
GFR AFRICAN AMERICAN: 56
GFR NON-AFRICAN AMERICAN: 46 ML/MIN/1.73
GLUCOSE BLD-MCNC: 109 MG/DL (ref 74–99)
HCT VFR BLD CALC: 39.7 % (ref 37–54)
HEMOGLOBIN: 13.1 G/DL (ref 12.5–16.5)
IMMATURE GRANULOCYTES #: 0.09 E9/L
IMMATURE GRANULOCYTES %: 0.7 % (ref 0–5)
LYMPHOCYTES ABSOLUTE: 2.49 E9/L (ref 1.5–4)
LYMPHOCYTES RELATIVE PERCENT: 20.1 % (ref 20–42)
MAGNESIUM: 1.9 MG/DL (ref 1.6–2.6)
MCH RBC QN AUTO: 31.4 PG (ref 26–35)
MCHC RBC AUTO-ENTMCNC: 33 % (ref 32–34.5)
MCV RBC AUTO: 95.2 FL (ref 80–99.9)
MONOCYTES ABSOLUTE: 1.05 E9/L (ref 0.1–0.95)
MONOCYTES RELATIVE PERCENT: 8.5 % (ref 2–12)
NEUTROPHILS ABSOLUTE: 7.97 E9/L (ref 1.8–7.3)
NEUTROPHILS RELATIVE PERCENT: 64.3 % (ref 43–80)
PDW BLD-RTO: 13.3 FL (ref 11.5–15)
PLATELET # BLD: 182 E9/L (ref 130–450)
PMV BLD AUTO: 10.9 FL (ref 7–12)
POTASSIUM REFLEX MAGNESIUM: 4.1 MMOL/L (ref 3.5–5)
PROCALCITONIN: 0.1 NG/ML (ref 0–0.08)
RBC # BLD: 4.17 E12/L (ref 3.8–5.8)
SEDIMENTATION RATE, ERYTHROCYTE: 47 MM/HR (ref 0–15)
SODIUM BLD-SCNC: 139 MMOL/L (ref 132–146)
TOTAL CK: 160 U/L (ref 20–200)
TOTAL PROTEIN: 8.1 G/DL (ref 6.4–8.3)
TROPONIN: 0.05 NG/ML (ref 0–0.03)
TROPONIN: 0.06 NG/ML (ref 0–0.03)
TSH SERPL DL<=0.05 MIU/L-ACNC: 2.34 UIU/ML (ref 0.27–4.2)
WBC # BLD: 12.4 E9/L (ref 4.5–11.5)

## 2020-06-30 PROCEDURE — 84145 PROCALCITONIN (PCT): CPT

## 2020-06-30 PROCEDURE — 84484 ASSAY OF TROPONIN QUANT: CPT

## 2020-06-30 PROCEDURE — 2580000003 HC RX 258: Performed by: FAMILY MEDICINE

## 2020-06-30 PROCEDURE — 83735 ASSAY OF MAGNESIUM: CPT

## 2020-06-30 PROCEDURE — 86140 C-REACTIVE PROTEIN: CPT

## 2020-06-30 PROCEDURE — 96360 HYDRATION IV INFUSION INIT: CPT

## 2020-06-30 PROCEDURE — 94761 N-INVAS EAR/PLS OXIMETRY MLT: CPT

## 2020-06-30 PROCEDURE — 85378 FIBRIN DEGRADE SEMIQUANT: CPT

## 2020-06-30 PROCEDURE — 85651 RBC SED RATE NONAUTOMATED: CPT

## 2020-06-30 PROCEDURE — 6370000000 HC RX 637 (ALT 250 FOR IP): Performed by: FAMILY MEDICINE

## 2020-06-30 PROCEDURE — 80053 COMPREHEN METABOLIC PANEL: CPT

## 2020-06-30 PROCEDURE — 93005 ELECTROCARDIOGRAM TRACING: CPT | Performed by: EMERGENCY MEDICINE

## 2020-06-30 PROCEDURE — 93005 ELECTROCARDIOGRAM TRACING: CPT | Performed by: NURSE PRACTITIONER

## 2020-06-30 PROCEDURE — 36415 COLL VENOUS BLD VENIPUNCTURE: CPT

## 2020-06-30 PROCEDURE — 71046 X-RAY EXAM CHEST 2 VIEWS: CPT

## 2020-06-30 PROCEDURE — G0378 HOSPITAL OBSERVATION PER HR: HCPCS

## 2020-06-30 PROCEDURE — 82553 CREATINE MB FRACTION: CPT

## 2020-06-30 PROCEDURE — 82550 ASSAY OF CK (CPK): CPT

## 2020-06-30 PROCEDURE — 51798 US URINE CAPACITY MEASURE: CPT

## 2020-06-30 PROCEDURE — 84443 ASSAY THYROID STIM HORMONE: CPT

## 2020-06-30 PROCEDURE — 2580000003 HC RX 258: Performed by: EMERGENCY MEDICINE

## 2020-06-30 PROCEDURE — 85025 COMPLETE CBC W/AUTO DIFF WBC: CPT

## 2020-06-30 PROCEDURE — 99285 EMERGENCY DEPT VISIT HI MDM: CPT

## 2020-06-30 RX ORDER — ATORVASTATIN CALCIUM 20 MG/1
20 TABLET, FILM COATED ORAL DAILY
Status: DISCONTINUED | OUTPATIENT
Start: 2020-07-01 | End: 2020-07-01 | Stop reason: HOSPADM

## 2020-06-30 RX ORDER — ACETAMINOPHEN 325 MG/1
650 TABLET ORAL EVERY 6 HOURS PRN
Status: DISCONTINUED | OUTPATIENT
Start: 2020-06-30 | End: 2020-07-01 | Stop reason: HOSPADM

## 2020-06-30 RX ORDER — GABAPENTIN 100 MG/1
100 CAPSULE ORAL 2 TIMES DAILY
Status: DISCONTINUED | OUTPATIENT
Start: 2020-06-30 | End: 2020-07-01 | Stop reason: HOSPADM

## 2020-06-30 RX ORDER — METOPROLOL SUCCINATE 50 MG/1
50 TABLET, EXTENDED RELEASE ORAL DAILY
Status: DISCONTINUED | OUTPATIENT
Start: 2020-07-01 | End: 2020-07-01 | Stop reason: HOSPADM

## 2020-06-30 RX ORDER — VITAMIN B COMPLEX
1000 TABLET ORAL DAILY
Status: DISCONTINUED | OUTPATIENT
Start: 2020-07-01 | End: 2020-07-01 | Stop reason: HOSPADM

## 2020-06-30 RX ORDER — M-VIT,TX,IRON,MINS/CALC/FOLIC 27MG-0.4MG
1 TABLET ORAL DAILY
Status: DISCONTINUED | OUTPATIENT
Start: 2020-07-01 | End: 2020-07-01 | Stop reason: HOSPADM

## 2020-06-30 RX ORDER — SODIUM CHLORIDE 0.9 % (FLUSH) 0.9 %
10 SYRINGE (ML) INJECTION PRN
Status: DISCONTINUED | OUTPATIENT
Start: 2020-06-30 | End: 2020-07-01 | Stop reason: HOSPADM

## 2020-06-30 RX ORDER — ALPHA LIPOIC ACID 300 MG
300 CAPSULE ORAL DAILY
Status: DISCONTINUED | OUTPATIENT
Start: 2020-07-01 | End: 2020-06-30 | Stop reason: CLARIF

## 2020-06-30 RX ORDER — 0.9 % SODIUM CHLORIDE 0.9 %
1000 INTRAVENOUS SOLUTION INTRAVENOUS ONCE
Status: COMPLETED | OUTPATIENT
Start: 2020-06-30 | End: 2020-06-30

## 2020-06-30 RX ORDER — HYDROCODONE BITARTRATE AND ACETAMINOPHEN 5; 325 MG/1; MG/1
1 TABLET ORAL DAILY PRN
COMMUNITY
End: 2020-07-30 | Stop reason: ALTCHOICE

## 2020-06-30 RX ORDER — HYDROCODONE BITARTRATE AND ACETAMINOPHEN 5; 325 MG/1; MG/1
1 TABLET ORAL EVERY 6 HOURS PRN
Status: DISCONTINUED | OUTPATIENT
Start: 2020-06-30 | End: 2020-07-01 | Stop reason: HOSPADM

## 2020-06-30 RX ORDER — SODIUM CHLORIDE 0.9 % (FLUSH) 0.9 %
10 SYRINGE (ML) INJECTION EVERY 12 HOURS SCHEDULED
Status: DISCONTINUED | OUTPATIENT
Start: 2020-06-30 | End: 2020-07-01 | Stop reason: HOSPADM

## 2020-06-30 RX ORDER — LANOLIN ALCOHOL/MO/W.PET/CERES
500 CREAM (GRAM) TOPICAL DAILY
Status: DISCONTINUED | OUTPATIENT
Start: 2020-07-01 | End: 2020-07-01 | Stop reason: HOSPADM

## 2020-06-30 RX ORDER — SODIUM CHLORIDE 9 MG/ML
INJECTION, SOLUTION INTRAVENOUS CONTINUOUS
Status: ACTIVE | OUTPATIENT
Start: 2020-06-30 | End: 2020-07-01

## 2020-06-30 RX ORDER — ACETAMINOPHEN 650 MG/1
650 SUPPOSITORY RECTAL EVERY 6 HOURS PRN
Status: DISCONTINUED | OUTPATIENT
Start: 2020-06-30 | End: 2020-07-01 | Stop reason: HOSPADM

## 2020-06-30 RX ORDER — DOFETILIDE 0.25 MG/1
500 CAPSULE ORAL EVERY 12 HOURS SCHEDULED
Status: DISCONTINUED | OUTPATIENT
Start: 2020-06-30 | End: 2020-07-01 | Stop reason: HOSPADM

## 2020-06-30 RX ORDER — LANOLIN ALCOHOL/MO/W.PET/CERES
3 CREAM (GRAM) TOPICAL NIGHTLY PRN
Status: DISCONTINUED | OUTPATIENT
Start: 2020-06-30 | End: 2020-07-01 | Stop reason: HOSPADM

## 2020-06-30 RX ORDER — ASCORBIC ACID 500 MG
500 TABLET ORAL DAILY
Status: DISCONTINUED | OUTPATIENT
Start: 2020-07-01 | End: 2020-07-01 | Stop reason: HOSPADM

## 2020-06-30 RX ADMIN — HYDROCODONE BITARTRATE AND ACETAMINOPHEN 1 TABLET: 5; 325 TABLET ORAL at 22:28

## 2020-06-30 RX ADMIN — MELATONIN 3 MG ORAL TABLET 3 MG: 3 TABLET ORAL at 22:28

## 2020-06-30 RX ADMIN — SODIUM CHLORIDE 1000 ML: 9 INJECTION, SOLUTION INTRAVENOUS at 20:01

## 2020-06-30 RX ADMIN — SODIUM CHLORIDE, PRESERVATIVE FREE 10 ML: 5 INJECTION INTRAVENOUS at 23:52

## 2020-06-30 RX ADMIN — APIXABAN 5 MG: 5 TABLET, FILM COATED ORAL at 22:28

## 2020-06-30 ASSESSMENT — PAIN SCALES - GENERAL
PAINLEVEL_OUTOF10: 6
PAINLEVEL_OUTOF10: 0

## 2020-06-30 NOTE — ED NOTES
FIRST PROVIDER CONTACT ASSESSMENT NOTE      Department of Emergency Medicine   Admit Date: No admission date for patient encounter. Chief Complaint: Irregular Heart Beat (thinks he's back in afib, started 1 hr ago, denies cp/sob)      History of Present Illness:    Kt Joya is a 72 y.o. male who presents to the ED for sensation of being in atrial fibrillation. He took an extra Tigas and and half a dose of metoprolol and believes this may have corrected the problem. He denies any chest pain, shortness of breath or syncopal episode. Patient is alert and oriented, skin clammy will pulse that is irregular. Lung sounds clear and equal bilaterally, respirations easy nonlabored.   Normal capillary refill.        -----------------END OF FIRST PROVIDER CONTACT ASSESSMENT NOTE--------------  Electronically signed by MARIA E Ennis CNP   DD: 6/30/20               MARIA E Ennis CNP  06/30/20 1732

## 2020-07-01 VITALS
BODY MASS INDEX: 39.25 KG/M2 | DIASTOLIC BLOOD PRESSURE: 64 MMHG | HEART RATE: 68 BPM | RESPIRATION RATE: 16 BRPM | HEIGHT: 69 IN | WEIGHT: 265 LBS | TEMPERATURE: 97.4 F | OXYGEN SATURATION: 96 % | SYSTOLIC BLOOD PRESSURE: 132 MMHG

## 2020-07-01 PROBLEM — R79.89 ELEVATED TROPONIN: Status: ACTIVE | Noted: 2020-07-01

## 2020-07-01 PROBLEM — R23.8 REDNESS AND SWELLING OF KNEE: Status: ACTIVE | Noted: 2020-07-01

## 2020-07-01 PROBLEM — M25.469 REDNESS AND SWELLING OF KNEE: Status: ACTIVE | Noted: 2020-07-01

## 2020-07-01 PROBLEM — I25.10 CAD (CORONARY ARTERY DISEASE): Status: ACTIVE | Noted: 2020-07-01

## 2020-07-01 PROBLEM — E86.0 DEHYDRATION: Status: ACTIVE | Noted: 2020-07-01

## 2020-07-01 PROBLEM — R77.8 ELEVATED TROPONIN: Status: ACTIVE | Noted: 2020-07-01

## 2020-07-01 PROBLEM — E66.9 OBESITY (BMI 30-39.9): Status: ACTIVE | Noted: 2020-07-01

## 2020-07-01 PROBLEM — E78.5 HLD (HYPERLIPIDEMIA): Status: ACTIVE | Noted: 2020-07-01

## 2020-07-01 LAB
ALBUMIN SERPL-MCNC: 3.7 G/DL (ref 3.5–5.2)
ANION GAP SERPL CALCULATED.3IONS-SCNC: 12 MMOL/L (ref 7–16)
BACTERIA: NORMAL /HPF
BASOPHILS ABSOLUTE: 0.1 E9/L (ref 0–0.2)
BASOPHILS RELATIVE PERCENT: 1 % (ref 0–2)
BILIRUBIN URINE: NEGATIVE
BLOOD, URINE: NEGATIVE
BUN BLDV-MCNC: 33 MG/DL (ref 8–23)
C-REACTIVE PROTEIN: 3.3 MG/DL (ref 0–0.4)
CALCIUM SERPL-MCNC: 9 MG/DL (ref 8.6–10.2)
CHLORIDE BLD-SCNC: 101 MMOL/L (ref 98–107)
CLARITY: CLEAR
CO2: 25 MMOL/L (ref 22–29)
COLOR: YELLOW
CREAT SERPL-MCNC: 1.4 MG/DL (ref 0.7–1.2)
CREATININE URINE: 79 MG/DL (ref 40–278)
EKG ATRIAL RATE: 70 BPM
EKG ATRIAL RATE: 72 BPM
EKG ATRIAL RATE: 74 BPM
EKG P AXIS: 73 DEGREES
EKG P AXIS: 75 DEGREES
EKG P-R INTERVAL: 170 MS
EKG P-R INTERVAL: 174 MS
EKG P-R INTERVAL: 174 MS
EKG Q-T INTERVAL: 422 MS
EKG Q-T INTERVAL: 478 MS
EKG Q-T INTERVAL: 480 MS
EKG QRS DURATION: 136 MS
EKG QRS DURATION: 140 MS
EKG QRS DURATION: 142 MS
EKG QTC CALCULATION (BAZETT): 468 MS
EKG QTC CALCULATION (BAZETT): 516 MS
EKG QTC CALCULATION (BAZETT): 525 MS
EKG R AXIS: 11 DEGREES
EKG R AXIS: 13 DEGREES
EKG R AXIS: 16 DEGREES
EKG T AXIS: 39 DEGREES
EKG T AXIS: 40 DEGREES
EKG T AXIS: 47 DEGREES
EKG VENTRICULAR RATE: 70 BPM
EKG VENTRICULAR RATE: 72 BPM
EKG VENTRICULAR RATE: 74 BPM
EOSINOPHIL, URINE: 1 % (ref 0–1)
EOSINOPHILS ABSOLUTE: 0.94 E9/L (ref 0.05–0.5)
EOSINOPHILS RELATIVE PERCENT: 9.6 % (ref 0–6)
GFR AFRICAN AMERICAN: >60
GFR NON-AFRICAN AMERICAN: 50 ML/MIN/1.73
GLUCOSE BLD-MCNC: 98 MG/DL (ref 74–99)
GLUCOSE URINE: NEGATIVE MG/DL
HCT VFR BLD CALC: 35.9 % (ref 37–54)
HEMOGLOBIN: 11.7 G/DL (ref 12.5–16.5)
IMMATURE GRANULOCYTES #: 0.06 E9/L
IMMATURE GRANULOCYTES %: 0.6 % (ref 0–5)
KETONES, URINE: NEGATIVE MG/DL
LEUKOCYTE ESTERASE, URINE: NEGATIVE
LYMPHOCYTES ABSOLUTE: 2.55 E9/L (ref 1.5–4)
LYMPHOCYTES RELATIVE PERCENT: 26.1 % (ref 20–42)
MCH RBC QN AUTO: 31.6 PG (ref 26–35)
MCHC RBC AUTO-ENTMCNC: 32.6 % (ref 32–34.5)
MCV RBC AUTO: 97 FL (ref 80–99.9)
MONOCYTES ABSOLUTE: 0.96 E9/L (ref 0.1–0.95)
MONOCYTES RELATIVE PERCENT: 9.8 % (ref 2–12)
NEUTROPHILS ABSOLUTE: 5.16 E9/L (ref 1.8–7.3)
NEUTROPHILS RELATIVE PERCENT: 52.9 % (ref 43–80)
NITRITE, URINE: NEGATIVE
PDW BLD-RTO: 13.4 FL (ref 11.5–15)
PH UA: 5.5 (ref 5–9)
PHOSPHORUS: 4 MG/DL (ref 2.5–4.5)
PLATELET # BLD: 169 E9/L (ref 130–450)
PMV BLD AUTO: 11.3 FL (ref 7–12)
POTASSIUM SERPL-SCNC: 3.8 MMOL/L (ref 3.5–5)
PROTEIN UA: NEGATIVE MG/DL
RBC # BLD: 3.7 E12/L (ref 3.8–5.8)
RBC UA: NORMAL /HPF (ref 0–2)
SODIUM BLD-SCNC: 138 MMOL/L (ref 132–146)
SODIUM URINE: 114 MMOL/L
SPECIFIC GRAVITY UA: 1.02 (ref 1–1.03)
TROPONIN: 0.04 NG/ML (ref 0–0.03)
UROBILINOGEN, URINE: 0.2 E.U./DL
WBC # BLD: 9.8 E9/L (ref 4.5–11.5)
WBC UA: NORMAL /HPF (ref 0–5)

## 2020-07-01 PROCEDURE — 6370000000 HC RX 637 (ALT 250 FOR IP): Performed by: FAMILY MEDICINE

## 2020-07-01 PROCEDURE — 87205 SMEAR GRAM STAIN: CPT

## 2020-07-01 PROCEDURE — 99214 OFFICE O/P EST MOD 30 MIN: CPT | Performed by: INTERNAL MEDICINE

## 2020-07-01 PROCEDURE — 82570 ASSAY OF URINE CREATININE: CPT

## 2020-07-01 PROCEDURE — 85025 COMPLETE CBC W/AUTO DIFF WBC: CPT

## 2020-07-01 PROCEDURE — G0378 HOSPITAL OBSERVATION PER HR: HCPCS

## 2020-07-01 PROCEDURE — 80069 RENAL FUNCTION PANEL: CPT

## 2020-07-01 PROCEDURE — 36415 COLL VENOUS BLD VENIPUNCTURE: CPT

## 2020-07-01 PROCEDURE — 93010 ELECTROCARDIOGRAM REPORT: CPT | Performed by: INTERNAL MEDICINE

## 2020-07-01 PROCEDURE — APPSS60 APP SPLIT SHARED TIME 46-60 MINUTES: Performed by: NURSE PRACTITIONER

## 2020-07-01 PROCEDURE — 84300 ASSAY OF URINE SODIUM: CPT

## 2020-07-01 PROCEDURE — 84484 ASSAY OF TROPONIN QUANT: CPT

## 2020-07-01 PROCEDURE — 96361 HYDRATE IV INFUSION ADD-ON: CPT

## 2020-07-01 PROCEDURE — 2580000003 HC RX 258: Performed by: FAMILY MEDICINE

## 2020-07-01 PROCEDURE — 93005 ELECTROCARDIOGRAM TRACING: CPT | Performed by: FAMILY MEDICINE

## 2020-07-01 PROCEDURE — 81001 URINALYSIS AUTO W/SCOPE: CPT

## 2020-07-01 RX ORDER — CEPHALEXIN 500 MG/1
500 CAPSULE ORAL EVERY 6 HOURS SCHEDULED
Status: DISCONTINUED | OUTPATIENT
Start: 2020-07-01 | End: 2020-07-01 | Stop reason: HOSPADM

## 2020-07-01 RX ORDER — CEPHALEXIN 500 MG/1
500 CAPSULE ORAL 4 TIMES DAILY
Qty: 28 CAPSULE | Refills: 0 | Status: SHIPPED | OUTPATIENT
Start: 2020-07-01 | End: 2020-07-08

## 2020-07-01 RX ADMIN — Medication 500 MG: at 08:16

## 2020-07-01 RX ADMIN — DOFETILIDE 500 MCG: 0.25 CAPSULE ORAL at 06:16

## 2020-07-01 RX ADMIN — VITAMIN D, TAB 1000IU (100/BT) 1000 UNITS: 25 TAB at 08:15

## 2020-07-01 RX ADMIN — Medication 500 MCG: at 08:16

## 2020-07-01 RX ADMIN — METOPROLOL SUCCINATE 50 MG: 50 TABLET, EXTENDED RELEASE ORAL at 08:15

## 2020-07-01 RX ADMIN — CEPHALEXIN 500 MG: 500 CAPSULE ORAL at 12:07

## 2020-07-01 RX ADMIN — MULTIPLE VITAMINS W/ MINERALS TAB 1 TABLET: TAB at 08:15

## 2020-07-01 RX ADMIN — ATORVASTATIN CALCIUM 20 MG: 20 TABLET, FILM COATED ORAL at 08:16

## 2020-07-01 RX ADMIN — APIXABAN 5 MG: 5 TABLET, FILM COATED ORAL at 08:15

## 2020-07-01 RX ADMIN — GABAPENTIN 100 MG: 100 CAPSULE ORAL at 08:14

## 2020-07-01 RX ADMIN — CEPHALEXIN 500 MG: 500 CAPSULE ORAL at 08:16

## 2020-07-01 RX ADMIN — MAGNESIUM GLUCONATE 500 MG ORAL TABLET 400 MG: 500 TABLET ORAL at 08:16

## 2020-07-01 RX ADMIN — HYDROCODONE BITARTRATE AND ACETAMINOPHEN 1 TABLET: 5; 325 TABLET ORAL at 08:22

## 2020-07-01 RX ADMIN — SODIUM CHLORIDE: 9 INJECTION, SOLUTION INTRAVENOUS at 02:03

## 2020-07-01 ASSESSMENT — ENCOUNTER SYMPTOMS
FACIAL SWELLING: 0
SHORTNESS OF BREATH: 0
NAUSEA: 0
VOMITING: 0
PHOTOPHOBIA: 0
CONSTIPATION: 0
EYE REDNESS: 0
COUGH: 0
EYE PAIN: 0
DIARRHEA: 0
CHEST TIGHTNESS: 0
SORE THROAT: 0
ABDOMINAL PAIN: 0
ALLERGIC/IMMUNOLOGIC NEGATIVE: 1

## 2020-07-01 ASSESSMENT — PAIN SCALES - GENERAL
PAINLEVEL_OUTOF10: 2
PAINLEVEL_OUTOF10: 4
PAINLEVEL_OUTOF10: 6

## 2020-07-01 ASSESSMENT — PAIN DESCRIPTION - LOCATION: LOCATION: KNEE

## 2020-07-01 ASSESSMENT — PAIN DESCRIPTION - ORIENTATION: ORIENTATION: RIGHT

## 2020-07-01 ASSESSMENT — PAIN DESCRIPTION - PAIN TYPE: TYPE: CHRONIC PAIN

## 2020-07-01 ASSESSMENT — PAIN DESCRIPTION - DESCRIPTORS: DESCRIPTORS: ACHING;DISCOMFORT;SORE

## 2020-07-01 NOTE — PROGRESS NOTES
Pt stated that heweighed 10 pounds less on admission the  This floor yesterday. Weighed on 2 different scales with same result of 265#.

## 2020-07-01 NOTE — CONSULTS
History:       Problem Relation Age of Onset   [facility] Cancer Mother     Diabetes Mother     Cancer Father     Diabetes Sister     Kidney Disease Sister     Early Death Neg Hx        REVIEW OF SYSTEMS:  CONSTITUTIONAL: No fevers no chills  EYES: Negative for any vision changes  HEENT:  negative for hearing changes  RESPIRATORY:  negative for current shortness of breath  CARDIOVASCULAR: Positive palpitations earlier today  GASTROINTESTINAL:  negative for nausea, vomiting  HEMATOLOGIC/LYMPHATIC:  negative for bleeding and petechiae  MUSCULOSKELETAL: Previous right total knee arthroplasty  NEUROLOGICAL:  negative for headaches, dizziness  BEHAVIOR/PSYCH:  negative for increased agitation and anxiety    PHYSICAL EXAM:    VITALS:  BP (!) 143/73   Pulse 82   Temp 97.3 °F (36.3 °C) (Temporal)   Resp 18   Ht 5' 9\" (1.753 m)   Wt 255 lb (115.7 kg)   SpO2 97%   BMI 37.66 kg/m²   CONSTITUTIONAL:  awake, alert, cooperative, no apparent distress, and appears stated age  MUSCULOSKELETAL:  Right lower Extremity:  Skin is intact circumferentially he is a well-healing incision at the midline of his right knee. There is some redness at the distal pole. There is no active drainage, no sinus tract, minimal warmth compared to contralateral side. He has expected swelling to the right knee. He is able to walk and bear weight without pain. He is able to flex and extend the knee without pain. Passive range of motion elicits no pain currently. Compartments soft and compressible, calf non-tender  Brisk Cap refill, Toes warm and perfused  Sensation grossly intact superficial/deep peroneal,saphenous,sural,tibial n. distributions  · +GS/TA/EHL.        DATA:    CBC:   Lab Results   Component Value Date    WBC 12.4 06/30/2020    RBC 4.17 06/30/2020    HGB 13.1 06/30/2020    HCT 39.7 06/30/2020    MCV 95.2 06/30/2020    MCH 31.4 06/30/2020    MCHC 33.0 06/30/2020    RDW 13.3 06/30/2020     06/30/2020    MPV 10.9 06/30/2020 PT/INR:    Lab Results   Component Value Date    PROTIME 12.9 05/26/2020    INR 1.1 05/26/2020     CRP/ESR: No results found for: CRP, SEDRATE  Lactic Acid : No results found for: [address]    Radiology Review:  06/30/20 - No new x-rays have been obtained at this time. IMPRESSION:   · Status post right total knee arthroplasty    PLAN:  WBAT -right LE  Currently there is no concern for septic joint. There is redness at the distal pole incision. Due to this recommend starting oral antibiotics. Keflex 500 4 times daily. No acute orthopedic interventions at this time. At this point aspiration risks inoculating the joint with infection.   Pain Control per primary  PT/OT   Review labs  · Plan on Follow-up in office with Dr. Lloyd Tineo  · Discuss with Dr. Lloyd Tineo

## 2020-07-01 NOTE — H&P
Hospital Medicine History & Physical      PCP: Brissa Rose MD    Date of Admission: 6/30/2020    Date of Service: Pt seen/examined on 6/30/2020 and Placed in Observation. Chief Complaint: Palpitation      History Of Present Illness:      72 y.o. male who presented to [facility] with past medical history of atrial fibrillation status post ablation procedure and cardioversions in the past, maintained on beta-blocker and Tikosyn and anticoagulated with Eliquis, CHF, chronic pain, hypertension, right bundle branch block, AURELIANO on CPAP 2cmH20, osteoarthritis status post right total knee arthroplasty done in the past month and hyperlipidemia. Patient was in his usual state of health at a convenience store when he started having palpitations. He took a dose of Tikosyn and half a pill of metoprolol with the assumption that he was in atrial fibrillation he had improvement of palpitation which eventually resolved in the emergency room. He denies any chest pain or tightness. No shortness of breath. States that he has not drank as much fluid as he normally would drink in a day and as had 2 cups of coffee and diet pop today. He had right total knee arthroplasty in the past month, complains of pain in the right knee on and off which is achy, moderate to severe in intensity depending on activity, worse with rehabilitation, associated with swelling and redness. He just had staples removed from his wound in the past week. He is doing well, walking without assistance. Denies any drainage. No fever. He states that he has been stable from A. fib standpoint for the past year. Vital signs are stable, labs notable for creatinine of 1.5, usual baseline 1.0-1.1, white count 12.4, glucose 109, troponin 0 0.06 and TSH of 2.43. Chest x-ray is negative. Stress test in February was low risk with EF of 64%. Holter monitoring January 2019 was negative for any arrhythmias. Last echo was in 2017 with EF of 50%.   His initial EKG the ER showed sinus arrhythmia/atrial fibrillation rate of 74 with right bundle branch block and subsequent EKG showed he had converted to sinus rhythm rate of 72 with QTC of 525. He is being admitted for further management. Past Medical History:          Diagnosis Date    Arthritis     Atrial fibrillation ([address]     Bulging lumbar disc     L4-L5    CAD (coronary artery disease) 07/07/2016    ct scan heart    Chronic lower back pain     BREWER (dyspnea on exertion) 3/26/2013    Eczema     Hyperlipidemia     Hypertension     Kidney stones     Obesity     weight 250#    Sleep apnea     c pap  stting 2       Past Surgical History:          Procedure Laterality Date    ABLATION OF DYSRHYTHMIC FOCUS  05/2013    heart ablation dr Medina Bishop  08/10/2016    CARDIOVERSION  3-    Dr. Grace Jiménez  10/20/2017    Dr. Srivastava Him ECHOCARDIOGRAM TRANSESOPHAGEAL  5/7/2013         KNEE SURGERY Left 1969    repair left knee ligaments    LITHOTRIPSY  1986    LUMBAR [address]  11/30/2016    [facility] [place]    TONSILLECTOMY      TOTAL KNEE ARTHROPLASTY Right 6/1/2020    RIGHT KNEE TOTAL ARTHROPLASTY -- SHRUTHI performed by Nesha Pickens MD at [facility] TRANSESOPHAGEAL ECHOCARDIOGRAM  3-    Dr. Shaylee Thomas       Medications Prior to Admission:      Prior to Admission medications    Medication Sig Start Date End Date Taking? Authorizing Provider   HYDROcodone-acetaminophen (NORCO) 5-325 MG per tablet Take 1 tablet by mouth every 6 hours as needed for Pain.  Indications: Pain   Yes Historical Provider, MD   apixaban (ELIQUIS) 5 MG TABS tablet Take 1 tablet by mouth daily TAKE ONE TABLET BY MOUTH TWO TIMES A DAY 6/22/20  Yes Karen Hoffmann DO   diclofenac (VOLTAREN) 50 MG EC tablet Take 1 tablet by mouth 2 times daily (with meals) 6/15/20  Yes Ely Watkins PA-C   metoprolol succinate (TOPROL XL) 50 MG extended release tablet Take 50 mg by mouth daily   Yes Historical Provider, MD   dofetilide (TIKOSYN) 500 MCG capsule TAKE ONE CAPSULE BY MOUTH EVERY 12 HOURS 3/20/20  Yes Matthew Fields,    atorvastatin (LIPITOR) 20 MG tablet Take 1 tablet by mouth daily 2/18/20  Yes Eliot Wisdom MD   chlorthalidone (HYGROTON) 25 MG tablet Take 1 tablet by mouth daily 2/4/20  Yes Eliot Wisdom MD   gabapentin (NEURONTIN) 100 MG capsule Take 100 mg by mouth 2 times daily. Yes Historical Provider, MD   Cyanocobalamin (VITAMIN B 12 PO) Take 500 mcg by mouth daily. Yes Historical Provider, MD   ELDERBERRY PO Take by mouth STOP PREOP MED    Historical Provider, MD   Misc Natural Products (TART CHERRY ADVANCED PO) Take by mouth STOP PREOP MED    Historical Provider, MD   magnesium oxide (MAG-OX) 400 (240 Mg) MG tablet Take 1 tablet by mouth 2 times daily  Patient taking differently: Take 400 mg by mouth daily  10/23/17   Danny Stearns, DO   Multiple Vitamins-Minerals (THERAPEUTIC MULTIVITAMIN-MINERALS) tablet Take 1 tablet by mouth daily. Historical Provider, MD   Coenzyme Q10 (COQ10 PO) Take 1 tablet by mouth daily. Historical Provider, MD   Alpha-Lipoic Acid 300 MG CAPS Take 300 mg by mouth daily On hold    Historical Provider, MD   ascorbic acid (VITAMIN C) 500 MG tablet Take 500 mg by mouth daily. Historical Provider, MD   Vitamin D (CHOLECALCIFEROL) 1000 UNITS CAPS capsule Take 1,000 Units by mouth daily. Historical Provider, MD       Allergies:  Adhesive tape    Social History:      The patient currently lives at home. TOBACCO:   reports that he quit smoking about 13 years ago. His smoking use included cigarettes. He has a 20.00 pack-year smoking history. He has never used smokeless tobacco.  ETOH:   reports current alcohol use of about 3.0 standard drinks of alcohol per week.       Family History:     Reviewed in detail Positive as follows:        Problem Relation Age of Onset    Cancer Mother     Diabetes Mother     Cancer Father Component Value Date    NITRU Negative 06/05/2019    WBCUA NONE 06/05/2019    BACTERIA NONE 06/05/2019    RBCUA 5-10 06/05/2019    BLOODU LARGE 06/05/2019    SPECGRAV 1.020 06/05/2019    GLUCOSEU Negative 06/05/2019       Radiology:   Reviewed and documented  XR CHEST STANDARD (2 VW)   Final Result      No airspace opacities or pleural effusion. US DUP LOWER EXTREMITY RIGHT CELINA    (Results Pending)       ASSESSMENT:    Active Hospital Problems    Diagnosis Date Noted    Essential hypertension [I10] 04/05/2013     Priority: Medium    Dehydration [E86.0] 07/01/2020    Elevated troponin [R79.89] 07/01/2020    HLD (hyperlipidemia) [E78.5] 07/01/2020    Redness and swelling of knee [M24.469, R23.8] 07/01/2020    Obesity (BMI 30-39. 9) [E66.9] 07/01/2020    CAD (coronary artery disease) [I25.10] 07/01/2020    NATALI (acute kidney injury) ([facility] [address] [N17.9] 06/30/2020    S/P TKR (total knee replacement) using cement, right [Z96.651] 06/01/2020    Chronic pain syndrome [G89.4] 02/12/2019    Atrial fibrillation ([facility] [address] [I48.91] 12/07/2018    Right bundle-branch block [I45.10] 07/05/2018     PLAN:  #1.  Acute kidney injury secondary to dehydration, IV fluids, monitor labs. #2.  Elevated troponin level in the setting of renal insufficiency, palpitations with possible A. fib RVR, no chest pain, cycle enzymes, consult cardiology, patient is anticoagulated already. #3. Palpitations secondary to paroxysmal atrial fibrillation. Patient is currently rate controlled and in sinus. Consult cardiology to evaluate him. Continue home medications with EKG monitoring. #4.  Recent right total knee arthroplasty, right knee is swollen and red, rule out cellulitis or joint infection. Consult orthopedic surgery for wound review, send ESR, CRP and procalcitonin, antibiotics as needed. Pain control. Continue physical therapy as tolerated. Check d-dimer to evaluate for DVT, if abnormal will obtain ultrasound.   I doubt DVT at this time because patient is anticoagulated and compliant. #5.  History of A. fib on anticoagulation with Eliquis. Continue anticoagulation. #6.  Dehydration, gentle fluids  #7. QT prolongation, monitor EKG given Tikosyn use. Monitor magnesium level. #8. Hyperlipidemia, statin  #9. Coronary artery disease identified on prior CT scan, no active chest pain, new elevation of troponin. Negative stress test in February. Cardiology evaluation. #10. Obesity, dietary and lifestyle modification. #11. Hypertension, continue home medications, blood pressures controlled    DVT Prophylaxis: Eliquis  Diet: DIET RENAL;  Code Status: Full Code    PT/OT Eval Status: Evaluation and treatment    Dispo -observation/telemetry       Boni Jon MD    Thank you Alie Dockery MD for the opportunity to be involved in this patient's care.

## 2020-07-01 NOTE — DISCHARGE SUMMARY
Discharge Summary    Patient ID   Ebenezer Mckee   [birth year]  [id number]    Primary Care:   Roseline Orosco MD    Admit date: 6/30/2020   Discharge date: 7/1/2020    Medical Record number: [id number]   Admitting Physician: Moises Royal MD   Discharge Physician: Sherice France MD    Consultants: orthopedic surgery    Procedures:     Discharge Diagnoses:      Patient Active Problem List   Diagnosis Code    Typical atrial flutter ([address] I48.3    Essential hypertension I10    Obesity E66.9    Eczema L30.9    Former smoker Z87.891    Midline low back pain with right-sided sciatica M54.41    Persistent atrial fibrillation I48.19    Chronic systolic heart failure (HCC) I50.22    CKD (chronic kidney disease) N18.9    Status post catheter ablation of atrial fibrillation Z98.890    Status post catheter ablation of atrial flutter Z98.890    Right bundle-branch block I45.10    Tachyarrhythmia R00.0    Atrial fibrillation ([address] I48.91    Primary osteoarthritis of both shoulders M19.011, M19.012    Primary osteoarthritis of both knees M17.0    Wrist pain, chronic, right M25.531, G89.29    Chronic pain syndrome G89.4    Primary osteoarthritis involving multiple joints M15.0    Right foot drop M21.371    Lumbar facet arthropathy M47.816    COVID-19 U07.1    S/P TKR (total knee replacement) using cement, right Z96.651    NATALI (acute kidney injury) ([address] N17.9    Dehydration E86.0    Elevated troponin R79.89    HLD (hyperlipidemia) E78.5    Redness and swelling of knee M24.469, R23.8    Obesity (BMI 30-39. 9) E66.9    CAD (coronary artery disease) I25.10         Hospital Course:  Mr Kain Muller is 72-year-old male presented to the emergency department due to palpitations x1 day.   Stated he had taken some medication and it did improve his symptoms he was placed in observation on hospitalist service with cardiology and Ortho surgery in consultation due to  right knee osteoarthritis status post right knee arthroplasty on 06/01/2020. Patient improved with medical management and at this time he is stable by orthopedic surgery and cardiology to be discharged home. To follow-up with his primary care provider in 1 to 2 weeks. Denies any chest pain, shortness of breath or palpitations at this time.     Physical Exam:   /64   Pulse 68   Temp 97.4 °F (36.3 °C)   Resp 16   Ht 5' 9\" (1.753 m)   Wt 265 lb (120.2 kg)   SpO2 96%   BMI 39.13 kg/m²   Neck: no JVD  Lungs: equal BS, clear   CV: RRR, normal S1S2, no significant murmur  Abdomen: soft, nontender, normally active BS, no masses or tenderness  Extremities: no edema or cords  Neurologic: alert, oriented, no focal CN or motor deficit        Medications: see computerized discharge medication list     Medication List      START taking these medications    cephALEXin 500 MG capsule  Commonly known as:  KEFLEX  Take 1 capsule by mouth 4 times daily for 7 days        CHANGE how you take these medications    magnesium oxide 400 (240 Mg) MG tablet  Commonly known as:  MAG-OX  Take 1 tablet by mouth 2 times daily  What changed:  when to take this        CONTINUE taking these medications    Alpha-Lipoic Acid 300 MG Caps     apixaban 5 MG Tabs tablet  Commonly known as:  Eliquis  Take 1 tablet by mouth daily TAKE ONE TABLET BY MOUTH TWO TIMES A DAY     ascorbic acid 500 MG tablet  Commonly known as:  VITAMIN C     atorvastatin 20 MG tablet  Commonly known as:  LIPITOR  Take 1 tablet by mouth daily     chlorthalidone 25 MG tablet  Commonly known as:  HYGROTON  Take 1 tablet by mouth daily     COQ10 PO     diclofenac 50 MG EC tablet  Commonly known as:  VOLTAREN  Take 1 tablet by mouth 2 times daily (with meals)     dofetilide 500 MCG capsule  Commonly known as:  TIKOSYN  TAKE ONE CAPSULE BY MOUTH EVERY 12 HOURS     ELDERBERRY PO     gabapentin 100 MG capsule  Commonly known as:  NEURONTIN     HYDROcodone-acetaminophen 5-325 MG per tablet  Commonly known as:  Daphne Mcallister TART CHERRY ADVANCED PO     therapeutic multivitamin-minerals tablet     Toprol XL 50 MG extended release tablet  Generic drug:  metoprolol succinate     VITAMIN B 12 PO     Vitamin D 1000 units Caps capsule  Commonly known as:  CHOLECALCIFEROL           Where to Get Your Medications      These medications were sent to [facility] [place] - [address] - F [phone]  [address] [place] [id number]    Phone:  [phone]   · cephALEXin 500 MG capsule       Patient Instructions: Resume home medications and any changes while in hospital      Discharged Condition: good  Disposition: home  Activity: activity as tolerated  Diet: regular diet  Wound Care: none needed    Follow-up: Dr. Angle Gtz in 12 weeks        Electronically signed by Teddy Loo MD on 7/1/2020 at 12:13 PM  [facility] Hospitalist   Time spent on discharge 43  minutes

## 2020-07-01 NOTE — PROGRESS NOTES
Cardiology consult was placed through perfect serve to be handled in AM.     Ortho consult placed to ortho resident via perfect serve.        Emil Beckwith RN

## 2020-07-01 NOTE — CONSULTS
Inpatient Cardiology Consultation      Reason for Consult:  Elevated troponin and Afib. Consulting Physician: Dr. Brunilda Mancilla    Requesting Physician:  Dr. Cornelius Toro    Date of Consultation: 7/1/2020    HISTORY OF PRESENT ILLNESS:   Mr. Cecilia Gimenez is a 61-year-old obese  male who is previously known to Dr. Rica Anne and Dr. Blackman. Patient was most recently seen by Dr. Rica Anne in virtual visit 4/28/2020 for follow-up of Afib/atrial flutter --> no complaints from a EP perspective --> no medication changes. [facility] 6/1/2020-6/2/2020 right knee osteoarthritis status post right total knee arthroplasty 6/2/2020. Postoperatively he has complained of right knee pain with on and off achiness with swelling and redness. He has been working with PT and is able to walk 100 ft without CP or SOB. [facility] on 6/30/2020 with palpitations. Patient stated that he has been feeling well and in fact went out shopping on 6/30/2020. While he was sitting in the car, he suddenly felt \"fluttering\" in his chest and was concerned he went into Atrial flutter despite being on Tikosyn and Toprol-XL. He did take his medications earlier that morning as instructed. At ~ 4 PM, when he felt the palpitations he went home and took Tikosyn (2 hours early) and took Toprol-XL 25 mg (extra half dose). Within several minutes he felt better and the fluttering in his chest went away. He became concerned and presented to the ED for further evaluation. Denies syncope, near syncope, palpitations, CP, nausea, vomiting, fever, cough or chills. Upon arrival to the ED: Blood pressure 132/87, heart rate 77, afebrile, 92% on room air. Labs: Sodium 139, potassium 4.1, BUN 37, creatinine 1.5 (prior creatinine 1.1 on 6/2/2020), magnesium 1.9, procalcitonin 0.10, troponin 0.06,, 0.05, 0.04 (normal total CK, CK-MB 9.2). CRP 3.3.  TSH 2.340 WBC 12.4.,  H/H stable, platelet count 462. Sed rate 47. D-dimer 737. UA: Negative.  In addition he still complains of swelling, redness and achiness over his right knee. Orthopedic surgery was consulted and started on Keflex x10 days. Ultrasound Doppler of lower extremities ordered to rule out DVT. No acute orthopedic interventions needed or planned. EKG: SR, RBBB, NSSTT changes, rate 74 bpm. Due to SUKHI, he was started on IV fluids at 75 cc/hr. His home medications were resumed and he was admitted to a telemetry monitored unit. Renal function slightly improved this am (SCr 1.4). Please note: past medical records were reviewed per electronic medical record (EMR) - see detailed reports under Past Medical/ Surgical History. Past Medical History:    1. Atrial fibrillation and flutter  · s/p CTI ablation in May 0309, procedure complicated by airway compromise, anatomical variants c/w Eustachian ridge and pouch, extended procedure time and multiple lines required to achieve bidirectional block. He had no documented recurrence in over 2 year after procedure. Had recurrence of flutter, possibly typical.    ·  post RFA with subsequent development of persistent atrial fibrillation and atypical flutter. He was highly symptomatic despite apparent rate control, at least at rest. The patient  opted for biatrial catheter ablation but first underwent a series of back injections, during which he had to temporarily interrupt his oral anticoagulation therapy, so ablation was postponed. · IOE1YC1-DKCe risk score would now be considered 3 based on HTN and systolic dysfunction, age greater than 72, he is on Eliquis he denies any bleeding problems.    · He underwent redo flutter ablation and PVI (RFA) on 8/10/16 and was doing well. He was weaned off his sotalol and was started back on a low-dose Toprol-XL for his cardiomyopathy. · On October 20, 2017 he underwent a successful BUSHRA/DCCV with Tikosyn drug loading for persistent symptomatic atrial flutter. · Currently on Tikosyn 500 mcg BID.   · Currently in sinus bradycardia with RBBB  · discussed his relatively low burden symptoms and repetitive ablations, he right now is happy with his symptom control, we emphasized need for risk factor modification  · 24-hour Holter monitor- January 2019 - revealing sinus rhythm from  bpm.   There was no evidence of AF, AFl, SVT, VT, or AV block. No symptoms were reported. · Maintaining SR  2. Eczema  3. Morbid obesity: BMI 37  4. Hypertension  5. History of cigarette abuse 60 pack years: Stopped 2007. 6.  TTE: 03/19/2013.   EF 60-65%, no LVH, normal RV .  Mild LAE.  No significant valvular abnormality. 7.  Exercise MPS: 03/27/2013.    7.3 minutes  Naga protocol without chest pain or ST changes.  There was no ventricular ectopy.   Baseline RBBB.  He developed atrial flutter with 1:1 conduction at peak exercise, HR approximately 200.   He had no symptoms.  BP response normal.   Perfusion normal, no ischemia, infarction or TID.  Gated wall motion normal  EF 49%.   Rate slowed to about 80 bpm after 5 mg IV metoprolol. 8. Coronary CT angiogram 07/07/2016: Dilated left atrium.  Mild to moderate atherosclerotic calcifications of the coronary arteries mainly LAD.  No coronary artery calcium score given 2016    9. TTE: 2/1/2017: (Dr. Kali Armenta): Normal left ventricular systolic function. Ejection fraction is calculated at 55%. Normal right ventricular function (TAPSE 1.7 cm). There is doppler evidence of stage II diastolic dysfunction. Mildly dilated left atrium by volume index. Unable to estimate PASP due to incomplete tricuspid regurgitation envelope. Mild pulmonic regurgitation. 10. BUSHRA: 10/20/2017: (Dr. Janki Ann): EF 50%, no thrombus in the left atrial appendage, mild aortic atherosclerosis  Intolerant of metoprolol-fatigue  11. Nephrolithiasis. CT scan, 06/05/2019. Multiple gallstones. [address]. Calcification atheroma abdominal aorta. 11. CKD: stage III SCr 1.3 (6/5/2020)  12. AURELINAO: Compliant with CPAP  13.   Chronic right bundle branch block  14. Chronic back pain with history of foot drop status post injections/history of back surgery  15. Pharmacological MPS: 2/20/2020: No EKG changes, normal imaging, low risk pharmacological stress test, EF 64%. 16.  Right knee osteoarthritis status post a right total knee arthroplasty. 6/2/2020. Medications Prior to admit:  Prior to Admission medications    Medication Sig Start Date End Date Taking? Authorizing Provider   HYDROcodone-acetaminophen (NORCO) 5-325 MG per tablet Take 1 tablet by mouth every 6 hours as needed for Pain. Indications: Pain   Yes Historical Provider, MD   apixaban (ELIQUIS) 5 MG TABS tablet Take 1 tablet by mouth daily TAKE ONE TABLET BY MOUTH TWO TIMES A DAY 6/22/20  Yes Karen Hoffmann DO   diclofenac (VOLTAREN) 50 MG EC tablet Take 1 tablet by mouth 2 times daily (with meals) 6/15/20  Yes aMsha Perla PA-C   metoprolol succinate (TOPROL XL) 50 MG extended release tablet Take 50 mg by mouth daily   Yes Historical Provider, MD   dofetilide (TIKOSYN) 500 MCG capsule TAKE ONE CAPSULE BY MOUTH EVERY 12 HOURS 3/20/20  Yes Karen Hoffmann DO   atorvastatin (LIPITOR) 20 MG tablet Take 1 tablet by mouth daily 2/18/20  Yes Adam Reid MD   chlorthalidone (HYGROTON) 25 MG tablet Take 1 tablet by mouth daily 2/4/20  Yes Adam Reid MD   gabapentin (NEURONTIN) 100 MG capsule Take 100 mg by mouth 2 times daily. Yes Historical Provider, MD   Cyanocobalamin (VITAMIN B 12 PO) Take 500 mcg by mouth daily.    Yes Historical Provider, MD   ELDERBERRY PO Take by mouth STOP PREOP MED    Historical Provider, MD   Misc Natural Products (TART CHERRY ADVANCED PO) Take by mouth STOP PREOP MED    Historical Provider, MD   magnesium oxide (MAG-OX) 400 (240 Mg) MG tablet Take 1 tablet by mouth 2 times daily  Patient taking differently: Take 400 mg by mouth daily  10/23/17   Danny Torres, DO   Multiple Vitamins-Minerals (THERAPEUTIC MULTIVITAMIN-MINERALS) tablet Take 1 tablet by mouth daily. Historical Provider, MD   Coenzyme Q10 (COQ10 PO) Take 1 tablet by mouth daily. Historical Provider, MD   Alpha-Lipoic Acid 300 MG CAPS Take 300 mg by mouth daily On hold    Historical Provider, MD   ascorbic acid (VITAMIN C) 500 MG tablet Take 500 mg by mouth daily. Historical Provider, MD   Vitamin D (CHOLECALCIFEROL) 1000 UNITS CAPS capsule Take 1,000 Units by mouth daily. Historical Provider, MD       Current Medications:    Current Facility-Administered Medications: cephALEXin (KEFLEX) capsule 500 mg, 500 mg, Oral, 4 times per day  vitamin C (ASCORBIC ACID) tablet 500 mg, 500 mg, Oral, Daily  atorvastatin (LIPITOR) tablet 20 mg, 20 mg, Oral, Daily  vitamin B-12 (CYANOCOBALAMIN) tablet 500 mcg, 500 mcg, Oral, Daily  dofetilide (TIKOSYN) capsule 500 mcg, 500 mcg, Oral, 2 times per day  gabapentin (NEURONTIN) capsule 100 mg, 100 mg, Oral, BID  HYDROcodone-acetaminophen (NORCO) 5-325 MG per tablet 1 tablet, 1 tablet, Oral, Q6H PRN  magnesium oxide (MAG-OX) tablet 400 mg, 400 mg, Oral, BID  metoprolol succinate (TOPROL XL) extended release tablet 50 mg, 50 mg, Oral, Daily  therapeutic multivitamin-minerals 1 tablet, 1 tablet, Oral, Daily  Vitamin D (CHOLECALCIFEROL) tablet 1,000 Units, 1,000 Units, Oral, Daily  0.9 % sodium chloride infusion, , Intravenous, Continuous  sodium chloride flush 0.9 % injection 10 mL, 10 mL, Intravenous, 2 times per day  sodium chloride flush 0.9 % injection 10 mL, 10 mL, Intravenous, PRN  acetaminophen (TYLENOL) tablet 650 mg, 650 mg, Oral, Q6H PRN **OR** acetaminophen (TYLENOL) suppository 650 mg, 650 mg, Rectal, Q6H PRN  melatonin tablet 3 mg, 3 mg, Oral, Nightly PRN  apixaban (ELIQUIS) tablet 5 mg, 5 mg, Oral, BID    Allergies:  Adhesive tape (rash)    Social History:    Drinks on occasion (1-2 Shots of liquor) every few weeks  Ex-smoker: 60 pack years ; quit in 2007. Denies illicit drug use. Family History: Denies family history of premature CAD. REVIEW OF SYSTEMS:     · Constitutional: Denies fatigue, fevers, chills or night sweats  · Eyes: Denies visual changes or drainage  · ENT: Denies headaches or hearing loss. No mouth sores or sore throat. No epistaxis   · Cardiovascular: Denies chest pain, pressure + palpitations. Right lower extremity swelling. · Respiratory: Denies BREWER, cough, orthopnea or PND. No hemoptysis   · Gastrointestinal: Denies hematemesis or anorexia. No hematochezia or melena    · Genitourinary: Denies urgency, dysuria or hematuria. · Musculoskeletal: Denies gait disturbance, weakness. +Right knee pain / swelling / redness. · Integumentary: Denies rash, hives or pruritis   · Neurological: Denies dizziness, headaches or seizures. No numbness or tingling  · Psychiatric: Denies anxiety or depression. · Endocrine: Denies temperature intolerance. No recent weight change. .  · Hematologic/Lymphatic: Denies abnormal bruising or bleeding. No swollen lymph nodes    PHYSICAL EXAM:   /64   Pulse 58   Temp 98.2 °F (36.8 °C) (Oral)   Resp 16   Ht 5' 9\" (1.753 m)   Wt 255 lb (115.7 kg)   SpO2 97%   BMI 37.66 kg/m²   CONST:  Well developed, well nourished middle aged obese male who appears of stated age. Awake, alert and cooperative. Laying flat in bed in No apparent distress. HEENT:   Head- Normocephalic, atraumatic   Eyes- Conjunctivae pink, anicteric  Throat- Oral mucosa pink and moist  Neck-  No stridor, trachea midline, no jugular venous distention. No carotid bruit. CHEST: Chest symmetrical and non-tender to palpation. No accessory muscle use or intercostal retractions  RESPIRATORY: Lung sounds - clear throughout fields. On RA. CARDIOVASCULAR:     Heart Inspection- shows no noted pulsations  Heart Palpation- no heaves or thrills; PMI is non-displaced   Heart Ausculation- Regular rate and rhythm, no murmur. No s3, s4 or rub   PV: Right knee swelling/redness and trace right ankle edema. Trace LLE edema. Derril Kari  No varicosities. Pedal pulses palpable, no clubbing or cyanosis   ABDOMEN: Soft, obese, non-tender to light palpation. Bowel sounds present. No palpable masses no organomegaly; no abdominal bruit  MS: Good muscle strength and tone. No atrophy or abnormal movements. : Deferred  SKIN: Warm and dry no statis dermatitis or ulcers   NEURO / PSYCH: Oriented to person, place and time. Speech clear and appropriate. Follows all commands. Pleasant affect     DATA:    ECG: see HPI. Tele strips: SR.   Diagnostic:      Intake/Output Summary (Last 24 hours) at 7/1/2020 0709  Last data filed at 7/1/2020 0453  Gross per 24 hour   Intake 330 ml   Output 525 ml   Net -195 ml       Labs:   CBC:   Recent Labs     06/30/20  1731 07/01/20  0451   WBC 12.4* 9.8   HGB 13.1 11.7*   HCT 39.7 35.9*    169     BMP:   Recent Labs     06/30/20  1731      K 4.1   CO2 23   BUN 37*   CREATININE 1.5*   LABGLOM 46   CALCIUM 9.6     Mag:   Recent Labs     06/30/20  2221   MG 1.9     TSH:   Recent Labs     06/30/20  1731   TSH 2.340     CARDIAC ENZYMES:  Recent Labs     06/30/20  1731 06/30/20  2221   CKTOTAL  --  160   CKMB  --  9.2*   TROPONINI 0.06* 0.05*     FASTING LIPID PANEL:  Lab Results   Component Value Date    CHOL 172 02/04/2020    HDL 61 02/04/2020    LDLCALC 76 02/04/2020    TRIG 177 02/04/2020     LIVER PROFILE:  Recent Labs     06/30/20  1731   AST 23   ALT 22   LABALBU 4.1     CXR: 6/30/2020  No airspace opacities or pleural effusion. Ultrasound Doppler lower extremities: 6/30/2020 --pending. Assessment/plan: as per Dr. Lana Whitlock  1. Paroxysmal AF/flutter: s/p CTI ablation (2013)/redo flutter ablation (2016) / s/p DCCV (2017). On Tikosyn, Toprol. Maintaining SR.   2. Chronic Anticoagulation: on Eliquis  3. Elevated troponin: Pattern not consistent with ACS. Most likely elevated in the setting of #4. Nonischemic Lexiscan MPS (2/2020). CP free.    4. Acute on CKD: most likely secondary to dehydration; improving with IV fluids. 5. Chronic Diastolic HFpEF: Euvolemic on exam. LVEF 55% (TTE 2017). 6. Recent right knee replacement (6/1/2020) with redness/swelling/pain. On Keflex. 7. AURELIANO: compliant with CPAP  8. Chronic RBBB  9. HTN  10. Ex-smoker: 60 pack years; quit in 2007  11. Morbid Obesity: BMI 39  12. Hyperkalemia: On [place]. -Continue current cardiac medications   -Monitor renal function  -Recommend discontinuing IV fluids   -No further cardiac testing needed or planned.   -Rest as per primary and other consultants.   -Cardiology will sign off; please call if needed. Above assessment and plan as per Dr. Karina Dean.  Electronically signed by MARIA E Mensah CNP on 7/1/20 at 8:40 AM EDT    _______________________________________________________________________________  I independently interviewed and examined the patient. I have reviewed the above documentation completed by the JENNIFER. Please see my additional contributions to the HPI, physical exam, and assessment / medical decision making. HPI, ROS, PMH, PSH, [facility], , and medications independently reviewed (agree; see above documentation)    History of Present Illness:  Currently with no chest pain, respiratory distress, palpitations. SR on EKG and telemetry.     Review of Systems:   Cardiac: As per HPI  General: No fever, chills  Pulmonary: As per HPI  HEENT: No visual disturbances, difficult swallowing  GI: No nausea, vomiting  Musculoskeletal: SHETH x 4, no focal motor deficits  Skin: Intact, no rashes  Neuro/Psych: No headache or seizures    Physical Exam:  /64   Pulse 68   Temp 97.4 °F (36.3 °C)   Resp 16   Ht 5' 9\" (1.753 m)   Wt 265 lb (120.2 kg)   SpO2 96%   BMI 39.13 kg/m²   Wt Readings from Last 3 Encounters:   07/01/20 265 lb (120.2 kg)   06/15/20 257 lb (116.6 kg)   06/01/20 250 lb (113.4 kg)     Appearance: Awake, alert, no acute respiratory distress  Skin: Intact, no rash  Head: Normocephalic, atraumatic  Eyes: EOMI, no conjunctival erythema  ENMT: No pharyngeal erythema, MMM, no rhinorrhea  Neck: Supple, no elevated JVP, no carotid bruits  Lungs: Clear to auscultation bilaterally. No wheezes, rales, or rhonchi. Cardiac: Regular rate and rhythm, no murmurs apparent  Abdomen: Soft, nontender, +bowel sounds  Extremities: Moves all extremities x 4, no lower extremity edema  Neurologic: No focal motor deficits apparent, normal mood and affect    Cardiac Tests:  Telemetry: SR, rate 60's    - SR on telemetry  - Currently with no new cardiac complaints  - Continue current medications  - Monitor QTc  - Outpatient cardiology follow-up    Thank you for allowing me to participate in your patient's care. Please feel free to contact me if you have any questions or concerns.     Lieutenant Ranjith MD  [facility]) Cardiology

## 2020-07-01 NOTE — PROGRESS NOTES
Department of Orthopedic Surgery  Resident Progress Note    Patient seen and examined. Pain controlled. No new complaints. Denies chest pain, shortness of breath, dizziness/lightheadedness. Feels well medically as well as from an orthopedic standpoint. VITALS:  /64   Pulse 58   Temp 98.2 °F (36.8 °C) (Oral)   Resp 16   Ht 5' 9\" (1.753 m)   Wt 255 lb (115.7 kg)   SpO2 97%   BMI 37.66 kg/m²     General: alert and oriented to person, place and time, well-developed and well-nourished, in no acute distress    MUSCULOSKELETAL:   right lower extremity:  · Skin is intact circumferentially he is a well-healing incision at the midline of his right knee. There is some redness at the distal pole. There is no active drainage, no sinus tract, minimal warmth compared to contralateral side. He has expected swelling to the right knee. · He is able to walk and bear weight without pain. He is able to flex and extend the knee without pain. Passive range of motion elicits no pain currently. · Compartments soft and compressible, calf non-tender  · Brisk Cap refill, Toes warm and perfused  · Sensation grossly intact superficial/deep peroneal,saphenous,sural,tibial n. distributions  · +GS/TA/EHL.      CBC:   Lab Results   Component Value Date    WBC 12.4 06/30/2020    HGB 13.1 06/30/2020    HCT 39.7 06/30/2020     06/30/2020     PT/INR:    Lab Results   Component Value Date    PROTIME 12.9 05/26/2020    INR 1.1 05/26/2020       ASSESSMENT  · S/P R TKA - 6/01/20    PLAN      · Continue physical therapy and protocol: WBAT - RLE  · Keflex  TID x 10 days  · Deep venous thrombosis prophylaxis - per primary, early mobilization  · PT/OT  · Pain Control: per primary  · Monitor labs  · No acute orthopedic interventions planned  · Discuss with Dr. Konstantin [place]

## 2020-07-01 NOTE — CARE COORDINATION
SOCIAL WORK/CASEMANAGEMENT TRANSITION OF CARE [id number] Meredith Pozo, [address], [place], [phone]): I met with pt in the room this a.m. he and wife live in a ranch home with 1 step to enter. Pt and wife are both retired and independent and drive. No dme or hhc pta. He is not a . Plan is home with discharge today. Frankie Gay  . 7/1/2020

## 2020-07-01 NOTE — ED PROVIDER NOTES
Patient is 43-year-old male presenting to the emergency department due to palpitations that began earlier today. He states that when they began he took a Tikosyn and his home dose of metoprolol in order to improve his symptoms. Then he had his wife bring him to the emergency department to be evaluated. Does have a history of irregular rhythm with bouts of atrial fibrillation. Patient states he is usually in sinus rhythm however he has been cardioverted in the past and has also had a cardio ablation procedure done in the past.  He states he has not had an episode of palpitation like this in over a year. Patient at this time feels like he is back in rhythm and is in no acute distress in the room. He denies any chest pain but states that he is just had the palpitations today without any other issues or symptoms. Symptoms are worsened by nothing       Symptoms are improved by nothing    Denies any associated chest pain, nausea, vomiting. Review of Systems   Constitutional: Positive for fatigue. Negative for chills and fever. HENT: Negative for congestion, facial swelling, hearing loss and sore throat. Eyes: Negative for photophobia, pain and redness. Respiratory: Negative for cough, chest tightness and shortness of breath. Cardiovascular: Positive for palpitations. Negative for chest pain. Gastrointestinal: Negative for abdominal pain, constipation, diarrhea, nausea and vomiting. Endocrine: Negative for cold intolerance, polydipsia and polyuria. Genitourinary: Negative for dysuria, flank pain and frequency. Musculoskeletal: Negative for arthralgias, joint swelling and myalgias. Skin: Negative. Allergic/Immunologic: Negative. Neurological: Negative. Physical Exam  Vitals signs and nursing note reviewed. Constitutional:       General: He is not in acute distress. Appearance: Normal appearance. He is well-developed.    HENT:      Head: Normocephalic and atraumatic. Right Ear: Tympanic membrane normal.      Left Ear: Tympanic membrane normal.      Nose: Nose normal. No congestion. Mouth/Throat:      Mouth: Mucous membranes are moist.      Pharynx: Oropharynx is clear. Eyes:      Pupils: Pupils are equal, round, and reactive to light. Neck:      Musculoskeletal: Normal range of motion and neck supple. Cardiovascular:      Rate and Rhythm: Tachycardia present. Rhythm irregular. Heart sounds: Normal heart sounds. No murmur. Pulmonary:      Effort: Pulmonary effort is normal. No respiratory distress. Breath sounds: Normal breath sounds. No wheezing or rales. Abdominal:      General: Bowel sounds are normal.      Palpations: Abdomen is soft. Tenderness: There is no abdominal tenderness. There is no guarding or rebound. Musculoskeletal:         General: No swelling or tenderness. Skin:     General: Skin is warm and dry. Neurological:      Mental Status: He is alert and oriented to person, place, and time. Cranial Nerves: No cranial nerve deficit. Coordination: Coordination normal.          Procedures     EKG: This EKG is signed and interpreted by me. Rate: 72  Rhythm: Sinus  Interpretation: no acute changes  Comparison: stable as compared to patient's most recent EKG       MDM  Number of Diagnoses or Management Options  Diagnosis management comments: Patient 41-year-old male with a history of having bouts of atrial fibrillation presented to the emergency department due to having palpitations at home. He took his Tigas and and metoprolol at home when he started on the palpitations which he feels may have aborted the palpitations upon arrival to the emerge department. He decided to continue to be checked out.   Patient's lab work showed elevated troponin of 0.06 together with moderate heart score even without having any active chest pain patient will be admitted to the hospital for observation for this elevated troponin and relative risk of cardiac event. Patient does have some slight acute kidney injury and dehydration however does not appear to be impressive enough to warrant this elevated troponin. Due to this delta tropes will be drawn to assess if there is any  cardiac injury. Amount and/or Complexity of Data Reviewed  Clinical lab tests: reviewed  Decide to obtain previous medical records or to obtain history from someone other than the patient: yes                    --------------------------------------------- PAST HISTORY ---------------------------------------------  Past Medical History:  has a past medical history of Arthritis, Atrial fibrillation ([address] Bulging lumbar disc, CAD (coronary artery disease), Chronic lower back pain, BREWER (dyspnea on exertion), Eczema, Hyperlipidemia, Hypertension, Kidney stones, Obesity, and Sleep apnea. Past Surgical History:  has a past surgical history that includes ECHO Transesophageal (5/7/2013); Tonsillectomy; transesophageal echocardiogram (3-); Cardioversion (3-); Colonoscopy; Lithotripsy (1986); Atrial ablation surgery (08/10/2016); knee surgery (Left, 1969); ablation of dysrhythmic focus (05/2013); Lumbar disc surgery (11/30/2016); Cardioversion (10/20/2017); and Total knee arthroplasty (Right, 6/1/2020). Social History:  reports that he quit smoking about 13 years ago. His smoking use included cigarettes. He has a 20.00 pack-year smoking history. He has never used smokeless tobacco. He reports current alcohol use of about 3.0 standard drinks of alcohol per week. He reports that he does not use drugs. Family History: family history includes Cancer in his father and mother; Diabetes in his mother and sister; Kidney Disease in his sister. The patients home medications have been reviewed.     Allergies: Adhesive tape    -------------------------------------------------- RESULTS -------------------------------------------------    LABS:  Results for orders placed or performed during the hospital encounter of 06/30/20   CBC Auto Differential   Result Value Ref Range    WBC 12.4 (H) 4.5 - 11.5 E9/L    RBC 4.17 3.80 - 5.80 E12/L    Hemoglobin 13.1 12.5 - 16.5 g/dL    Hematocrit 39.7 37.0 - 54.0 %    MCV 95.2 80.0 - 99.9 fL    MCH 31.4 26.0 - 35.0 pg    MCHC 33.0 32.0 - 34.5 %    RDW 13.3 11.5 - 15.0 fL    Platelets 608 968 - 516 E9/L    MPV 10.9 7.0 - 12.0 fL    Neutrophils % 64.3 43.0 - 80.0 %    Immature Granulocytes % 0.7 0.0 - 5.0 %    Lymphocytes % 20.1 20.0 - 42.0 %    Monocytes % 8.5 2.0 - 12.0 %    Eosinophils % 5.7 0.0 - 6.0 %    Basophils % 0.7 0.0 - 2.0 %    Neutrophils Absolute 7.97 (H) 1.80 - 7.30 E9/L    Immature Granulocytes # 0.09 E9/L    Lymphocytes Absolute 2.49 1.50 - 4.00 E9/L    Monocytes Absolute 1.05 (H) 0.10 - 0.95 E9/L    Eosinophils Absolute 0.71 (H) 0.05 - 0.50 E9/L    Basophils Absolute 0.09 0.00 - 0.20 E9/L   Comprehensive Metabolic Panel w/ Reflex to MG   Result Value Ref Range    Sodium 139 132 - 146 mmol/L    Potassium reflex Magnesium 4.1 3.5 - 5.0 mmol/L    Chloride 101 98 - 107 mmol/L    CO2 23 22 - 29 mmol/L    Anion Gap 15 7 - 16 mmol/L    Glucose 109 (H) 74 - 99 mg/dL    BUN 37 (H) 8 - 23 mg/dL    CREATININE 1.5 (H) 0.7 - 1.2 mg/dL    GFR Non-African American 46 >=60 mL/min/1.73    GFR African American 56     Calcium 9.6 8.6 - 10.2 mg/dL    Total Protein 8.1 6.4 - 8.3 g/dL    Alb 4.1 3.5 - 5.2 g/dL    Total Bilirubin 0.6 0.0 - 1.2 mg/dL    Alkaline Phosphatase 109 40 - 129 U/L    ALT 22 0 - 40 U/L    AST 23 0 - 39 U/L   Troponin   Result Value Ref Range    Troponin 0.06 (H) 0.00 - 0.03 ng/mL   TSH without Reflex   Result Value Ref Range    TSH 2.340 0.270 - 4.200 uIU/mL   Troponin   Result Value Ref Range    Troponin 0.05 (H) 0.00 - 0.03 ng/mL   CK   Result Value Ref Range    Total  20 - 200 U/L   CK MB   Result Value Ref Range    CK-MB 9.2 (H) 0.0 - 7.7 ng/mL   Magnesium   Result Value Ref Range    Magnesium 1.9 1.6 - 2.6 mg/dL   Procalcitonin   Result Value Ref Range    Procalcitonin 0.10 (H) 0.00 - 0.08 ng/mL   Sedimentation Rate   Result Value Ref Range    Sed Rate 47 (H) 0 - 15 mm/Hr   D-dimer, quantitative   Result Value Ref Range    D-Dimer, Quant 737 ng/mL DDU       RADIOLOGY:  XR CHEST STANDARD (2 VW)   Final Result      No airspace opacities or pleural effusion. US DUP LOWER EXTREMITY RIGHT CELINA    (Results Pending)             ------------------------- NURSING NOTES AND VITALS REVIEWED ---------------------------  Date / Time Roomed:  6/30/2020  7:06 PM  ED Bed Assignment:  [id number]    The nursing notes within the ED encounter and vital signs as below have been reviewed. Patient Vitals for the past 24 hrs:   BP Temp Temp src Pulse Resp SpO2 Height Weight   06/30/20 2345 118/60 98.1 °F (36.7 °C) Oral 66 18 98 % -- --   06/30/20 2140 (!) 143/73 97.3 °F (36.3 °C) Temporal 82 18 97 % 5' 9\" (1.753 m) 255 lb (115.7 kg)   06/30/20 2000 129/67 -- -- 69 18 97 % -- --   06/30/20 1900 127/69 -- -- 68 20 97 % -- --   06/30/20 1721 132/87 -- -- -- 16 -- -- 257 lb (116.6 kg)   06/30/20 1714 -- 97.5 °F (36.4 °C) -- 77 -- 92 % -- --       Oxygen Saturation Interpretation: Normal    ------------------------------------------ PROGRESS NOTES ------------------------------------------  Re-evaluation(s):  Time: 2150  Patients symptoms show no change  Repeat physical examination is not changed    Counseling:  I have spoken with the patient and discussed todays results, in addition to providing specific details for the plan of care and counseling regarding the diagnosis and prognosis. Their questions are answered at this time and they are agreeable with the plan of admission.    --------------------------------- ADDITIONAL PROVIDER NOTES ---------------------------------  Consultations:  Time: 2047. Spoke with Dr. Ashtyn Jones. Discussed case.   They will

## 2020-07-01 NOTE — PROGRESS NOTES
Pharmacy Note    Matias Ambriz was ordered Alpha-Lipoic Acid caps. As per the [address] [place] [address], herbals and certain dietary supplements will be discontinued.   The herbal or dietary supplement may be continued after discharge from the hospital.

## 2020-07-02 RX ORDER — OXYCODONE HYDROCHLORIDE AND ACETAMINOPHEN 5; 325 MG/1; MG/1
1 TABLET ORAL EVERY 12 HOURS PRN
Qty: 14 TABLET | Refills: 0 | Status: SHIPPED | OUTPATIENT
Start: 2020-07-02 | End: 2020-07-09

## 2020-07-02 NOTE — TELEPHONE ENCOUNTER
Pt left Vm requesting refill of pain medication. DATE OF PROCEDURE: 6/1/2020  PROCEDURE:Right Knee Arthroplasty     Order pended and routed for decision and signature.

## 2020-07-13 ENCOUNTER — HOSPITAL ENCOUNTER (OUTPATIENT)
Dept: GENERAL RADIOLOGY | Age: 70
Discharge: HOME | End: 2020-07-15
Payer: MEDICARE

## 2020-07-13 ENCOUNTER — OFFICE VISIT (OUTPATIENT)
Dept: ORTHOPEDIC SURGERY | Age: 70
End: 2020-07-13
Payer: MEDICARE

## 2020-07-13 VITALS — SYSTOLIC BLOOD PRESSURE: 141 MMHG | DIASTOLIC BLOOD PRESSURE: 75 MMHG | HEART RATE: 65 BPM

## 2020-07-13 PROCEDURE — 73560 X-RAY EXAM OF KNEE 1 OR 2: CPT

## 2020-07-13 PROCEDURE — 99024 POSTOP FOLLOW-UP VISIT: CPT | Performed by: PHYSICIAN ASSISTANT

## 2020-07-13 PROCEDURE — 99212 OFFICE O/P EST SF 10 MIN: CPT | Performed by: PHYSICIAN ASSISTANT

## 2020-07-13 RX ORDER — METHOCARBAMOL 750 MG/1
750 TABLET, FILM COATED ORAL 3 TIMES DAILY
Qty: 30 TABLET | Refills: 0 | Status: SHIPPED | OUTPATIENT
Start: 2020-07-13 | End: 2020-07-23

## 2020-07-13 NOTE — PROGRESS NOTES
leg  Calf supple and nontender  Demonstrates active knee flexion/extension, ankle plantar/dorsiflexion/great toe extension. AROM at the R knee 0-80, PROM 0-90  No TTP about the R knee   States sensation intact to touch in sural/deep peroneal/superficial peroneal/saphenous/posterior tibial nerve distributions to foot/ankle. Palpable dorsalis pedis and posterior tibialis pulses, cap refill brisk in toes, foot warm/perfused. BP (!) 141/75   Pulse 65     XR:   Views of right knee obtained today demonstrate well-seated right total knee arthroplasty with no evidence of lucency, displacement, failure. No fractures or dislocations. Assessment:   Diagnosis Orders   1. Total knee replacement status, right  methocarbamol (ROBAXIN-750) 750 MG tablet     Plan:   Reviewed x-rays with patient today in office of right knee   Continue physical therapy and home exercise program as instructed every day to work aggressively with ROM    Patient will not be getting refills on any narcotics   Prescription for Robaxin 750 mg 3 times daily x10 days given to patient today   Continue ice and elevation and over-the-counter Tylenol as needed   WB:  Weight bearing as tolerated    Follow up in 6 weeks with XR of the R knee    Electronically signed by Vidhya Holliday PA-C on 7/13/2020 at 10:56 AM  Note: This report was completed using computerWhatSalon voiced recognition software. Every effort has been made to ensure accuracy; however, inadvertent computerized transcription errors may be present.

## 2020-07-20 ENCOUNTER — TELEPHONE (OUTPATIENT)
Dept: ADMINISTRATIVE | Age: 70
End: 2020-07-20

## 2020-07-23 NOTE — TELEPHONE ENCOUNTER
Left message for patient to contact office. Per Dr. Alysia Howell, patient to be scheduled for HFU 7-10 days (wk of 7/27/2020).

## 2020-07-30 ENCOUNTER — OFFICE VISIT (OUTPATIENT)
Dept: CARDIOLOGY CLINIC | Age: 70
End: 2020-07-30
Payer: MEDICARE

## 2020-07-30 VITALS
WEIGHT: 266.4 LBS | HEIGHT: 69 IN | BODY MASS INDEX: 39.46 KG/M2 | HEART RATE: 57 BPM | RESPIRATION RATE: 16 BRPM | DIASTOLIC BLOOD PRESSURE: 72 MMHG | SYSTOLIC BLOOD PRESSURE: 146 MMHG

## 2020-07-30 PROCEDURE — 93000 ELECTROCARDIOGRAM COMPLETE: CPT | Performed by: INTERNAL MEDICINE

## 2020-07-30 PROCEDURE — 1036F TOBACCO NON-USER: CPT | Performed by: INTERNAL MEDICINE

## 2020-07-30 PROCEDURE — 99213 OFFICE O/P EST LOW 20 MIN: CPT | Performed by: INTERNAL MEDICINE

## 2020-07-30 PROCEDURE — 1123F ACP DISCUSS/DSCN MKR DOCD: CPT | Performed by: INTERNAL MEDICINE

## 2020-07-30 PROCEDURE — 4040F PNEUMOC VAC/ADMIN/RCVD: CPT | Performed by: INTERNAL MEDICINE

## 2020-07-30 PROCEDURE — G8427 DOCREV CUR MEDS BY ELIG CLIN: HCPCS | Performed by: INTERNAL MEDICINE

## 2020-07-30 PROCEDURE — 3017F COLORECTAL CA SCREEN DOC REV: CPT | Performed by: INTERNAL MEDICINE

## 2020-07-30 PROCEDURE — G8417 CALC BMI ABV UP PARAM F/U: HCPCS | Performed by: INTERNAL MEDICINE

## 2020-07-30 RX ORDER — METOPROLOL SUCCINATE 100 MG/1
100 TABLET, EXTENDED RELEASE ORAL DAILY
Qty: 30 TABLET | Refills: 5 | Status: SHIPPED
Start: 2020-07-30 | End: 2021-01-25

## 2020-07-30 NOTE — PROGRESS NOTES
is warm and dry. Respirations are unlabored. BP (!) 146/72   Pulse 57   Resp 16   Ht 5' 9\" (1.753 m)   Wt 266 lb 6.4 oz (120.8 kg)   BMI 39.34 kg/m² . HEENT negative for scleral icterus. Extraocular muscles intact. No facial asymmetry or central cyanosis. Neck without masses or goiter. No bruit or JVD. Cardiac apex not displaced. Rhythm regular. Abdomen normal.  Extremities without edema. Lungs are clear    EKG today shows sinus rhythm. 57/min. RBBB    In an effort to control breakthrough AF rates as well as hypertension, Toprol XL will be increased to 100 mg/day. He will be seen back in the office in 6 months. Medications are reviewed and there are no other changes. At completion of today's visit, medications include the following:    Current Outpatient Medications:     metoprolol succinate (TOPROL XL) 100 MG extended release tablet, Take 1 tablet by mouth daily, Disp: 30 tablet, Rfl: 5    apixaban (ELIQUIS) 5 MG TABS tablet, Take 1 tablet by mouth daily TAKE ONE TABLET BY MOUTH TWO TIMES A DAY, Disp: 180 tablet, Rfl: 3    ELDERBERRY PO, Take by mouth STOP PREOP MED, Disp: , Rfl:     Misc Natural Products (TART CHERRY ADVANCED PO), Take by mouth STOP PREOP MED, Disp: , Rfl:     dofetilide (TIKOSYN) 500 MCG capsule, TAKE ONE CAPSULE BY MOUTH EVERY 12 HOURS, Disp: 60 capsule, Rfl: 5    atorvastatin (LIPITOR) 20 MG tablet, Take 1 tablet by mouth daily, Disp: 30 tablet, Rfl: 5    chlorthalidone (HYGROTON) 25 MG tablet, Take 1 tablet by mouth daily, Disp: 90 tablet, Rfl: 3    gabapentin (NEURONTIN) 100 MG capsule, Take 100 mg by mouth 2 times daily. , Disp: , Rfl:     magnesium oxide (MAG-OX) 400 (240 Mg) MG tablet, Take 1 tablet by mouth 2 times daily (Patient taking differently: Take 400 mg by mouth daily ), Disp: 60 tablet, Rfl: 0    Multiple Vitamins-Minerals (THERAPEUTIC MULTIVITAMIN-MINERALS) tablet, Take 1 tablet by mouth daily. , Disp: , Rfl:     Coenzyme Q10 (COQ10 PO), Take 1 tablet by mouth daily. , Disp: , Rfl:     Alpha-Lipoic Acid 300 MG CAPS, Take 300 mg by mouth daily On hold, Disp: , Rfl:     ascorbic acid (VITAMIN C) 500 MG tablet, Take 500 mg by mouth daily. , Disp: , Rfl:     Vitamin D (CHOLECALCIFEROL) 1000 UNITS CAPS capsule, Take 1,000 Units by mouth daily. , Disp: , Rfl:     Cyanocobalamin (VITAMIN B 12 PO), Take 500 mcg by mouth daily. , Disp: , Rfl:       Note: This report was completed utilizing computer voice recognition software. Every effort has been made to ensure accuracy, however; inadvertent computerized transcription errors may be present.     --Sameera Burris MD on 7/30/2020 at 11:40 AM

## 2020-07-31 PROBLEM — E86.0 DEHYDRATION: Status: RESOLVED | Noted: 2020-07-01 | Resolved: 2020-07-31

## 2020-07-31 PROBLEM — R79.89 ELEVATED TROPONIN: Status: RESOLVED | Noted: 2020-07-01 | Resolved: 2020-07-31

## 2020-07-31 PROBLEM — R77.8 ELEVATED TROPONIN: Status: RESOLVED | Noted: 2020-07-01 | Resolved: 2020-07-31

## 2020-08-07 ENCOUNTER — TELEPHONE (OUTPATIENT)
Dept: ORTHOPEDIC SURGERY | Age: 70
End: 2020-08-07

## 2020-08-11 RX ORDER — ATORVASTATIN CALCIUM 20 MG/1
TABLET, FILM COATED ORAL
Qty: 30 TABLET | Refills: 5 | Status: SHIPPED
Start: 2020-08-11 | End: 2021-02-04

## 2020-08-26 ENCOUNTER — OFFICE VISIT (OUTPATIENT)
Dept: ORTHOPEDIC SURGERY | Age: 70
End: 2020-08-26
Payer: MEDICARE

## 2020-08-26 ENCOUNTER — HOSPITAL ENCOUNTER (OUTPATIENT)
Dept: GENERAL RADIOLOGY | Age: 70
Discharge: HOME | End: 2020-08-28
Payer: MEDICARE

## 2020-08-26 VITALS
TEMPERATURE: 97.1 F | HEART RATE: 61 BPM | SYSTOLIC BLOOD PRESSURE: 135 MMHG | HEIGHT: 69 IN | DIASTOLIC BLOOD PRESSURE: 70 MMHG | WEIGHT: 260 LBS | BODY MASS INDEX: 38.51 KG/M2

## 2020-08-26 PROCEDURE — 73560 X-RAY EXAM OF KNEE 1 OR 2: CPT

## 2020-08-26 PROCEDURE — 99212 OFFICE O/P EST SF 10 MIN: CPT | Performed by: PHYSICIAN ASSISTANT

## 2020-08-26 PROCEDURE — 99024 POSTOP FOLLOW-UP VISIT: CPT | Performed by: PHYSICIAN ASSISTANT

## 2020-08-26 NOTE — PATIENT INSTRUCTIONS
Continue activity as tolerated  Continue to progress strengthening and maintaining motion  Ice, elevation and compression for swelling    Antibiotics before any dental procedure- contact the office for this if needed

## 2020-08-26 NOTE — PROGRESS NOTES
OP: SURGEON: Dr. Allan Kendall MD  DATE OF PROCEDURE: 6/1/2020  PROCEDURE:R TKA  Implants: Size #7 CR femur, size #7 baseplate, 9 mm polyethylene liner, a 32 patella     Subjective:  Sharda Petit is approximately 3 months follow-up from the above surgery. Patient is WBAT on that extremity. He ambulates without assistive device, none. Pain to extremity is mild described more as a tension with terminal ROM. They complains of numbness at the incision site. Denies Calf pain. Patient chronically on eliquis. Patient is  participating in therapy, outpatient therapy  Patient feels he no longer is getting benefit from PT and can continue with home exercises independently at this time. Patient did speak with PCP regarding Voltaren and [address], PCP is OK with patient continuing this, and continues to monitor renal function as patient has significant benefit to pain with NSAID. Review of Systems -    General ROS: negative for - chills, fatigue, fever or night sweats  Respiratory ROS: no cough, shortness of breath, or wheezing  Cardiovascular ROS: no chest pain or dyspnea on exertion  Gastrointestinal ROS: no abdominal pain, nausea, vomiting, diarrhea, constipation,or black or bloody stools  Genitourinary: no hematuria, dysuria, or incontinence   Musculoskeletal ROS: negative for -back or neck pain or stiffness, also see HPI  Neurological ROS: no TIA or stroke symptoms       Objective:    General: Alert and oriented X 3, normocephalic atraumatic, external ears and eye normal, sclera clear, no acute distress, respirations easy and unlabored with no audible wheezes, skin warm and dry, speech and dress appropriate for noted age, affect euthymic.     Extremity:  Right Lower Extremity  Skin clean dry and intact, without signs of infection  Incisions well healed without signs of redness, warmth or drainage- no notable palpable adhesions   Mild edema noted to the knee but equal to the contralateral side  Compartments supple throughout thigh and leg  Calf supple and nontender  Demonstrates active knee flexion/extension, ankle plantar/dorsiflexion/great toe extension. AROM of the knee in office 0-105 without difficulty  Knee stable to varus and valgus stress  No instability the poly-appreciated anterior posterior drawer  States sensation intact to touch in sural/deep peroneal/superficial peroneal/saphenous/posterior tibial nerve distributions to foot/ankle. Palpable dorsalis pedis and posterior tibialis pulses, cap refill brisk in toes, foot warm/perfused. /70   Pulse 61   Temp 97.1 °F (36.2 °C)   Ht 5' 9\" (1.753 m)   Wt 260 lb (117.9 kg)   BMI 38.40 kg/m²     XR:   AP/Lateral views of the right knee demonstrates arthroplasty in appropriate alignment, no signs of lucency or subsidence. No other acute osseous abnormality    Assessment:   Diagnosis Orders   1. S/P TKR (total knee replacement) using cement, right         Plan:  Continue activity as tolerated  Continue to progress strengthening and maintaining motion  Ice, elevation and compression for swelling    Antibiotics before any dental procedure- contact the office for this if needed    Planning for LEFT TKA in January 2021    Electronically signed by Michelle Muñoz PA-C on 8/26/2020 at 2:25 PM  Note: This report was completed using computerDOZ voiced recognition software. Every effort has been made to ensure accuracy; however, inadvertent computerized transcription errors may be present.

## 2020-09-11 RX ORDER — DOFETILIDE 0.5 MG/1
500 CAPSULE ORAL EVERY 12 HOURS
Qty: 60 CAPSULE | Refills: 0 | Status: SHIPPED
Start: 2020-09-11 | End: 2020-10-14 | Stop reason: DRUGHIGH

## 2020-09-22 LAB
BUN BLDV-MCNC: 50 MG/DL
CALCIUM SERPL-MCNC: 9.7 MG/DL
CHLORIDE BLD-SCNC: 100 MMOL/L
CO2: 30 MMOL/L
CREAT SERPL-MCNC: 1.5 MG/DL
GFR CALCULATED: 47
GLUCOSE BLD-MCNC: 108 MG/DL
MAGNESIUM: 1.8 MG/DL
POTASSIUM SERPL-SCNC: 4.1 MMOL/L
SODIUM BLD-SCNC: 139 MMOL/L

## 2020-09-29 LAB
BUN BLDV-MCNC: 33.3 MG/DL
CALCIUM SERPL-MCNC: 9.7 MG/DL
CHLORIDE BLD-SCNC: 100 MMOL/L
CO2: 30 MMOL/L
CREAT SERPL-MCNC: 1.5 MG/DL
GFR CALCULATED: NORMAL
GLUCOSE BLD-MCNC: 108 MG/DL
MAGNESIUM: 1.8 MG/DL
POTASSIUM SERPL-SCNC: 4.1 MMOL/L
SODIUM BLD-SCNC: 139 MMOL/L

## 2020-10-14 ENCOUNTER — TELEPHONE (OUTPATIENT)
Dept: NON INVASIVE DIAGNOSTICS | Age: 70
End: 2020-10-14

## 2020-10-14 ENCOUNTER — NURSE ONLY (OUTPATIENT)
Dept: NON INVASIVE DIAGNOSTICS | Age: 70
End: 2020-10-14
Payer: MEDICARE

## 2020-10-14 PROCEDURE — 93000 ELECTROCARDIOGRAM COMPLETE: CPT | Performed by: INTERNAL MEDICINE

## 2020-10-14 RX ORDER — DOFETILIDE 0.25 MG/1
CAPSULE ORAL
Qty: 180 CAPSULE | Refills: 1 | Status: SHIPPED
Start: 2020-10-14 | End: 2021-04-13

## 2020-10-14 RX ORDER — DOFETILIDE 0.12 MG/1
CAPSULE ORAL
Qty: 180 CAPSULE | Refills: 1 | Status: SHIPPED
Start: 2020-10-14 | End: 2021-04-13

## 2020-10-14 NOTE — TELEPHONE ENCOUNTER
Patient came in today for an ekg, wants to know if he will stay on same dose on Tikosyn or not. Patient will need a refill tomorrow (has enough of current dose right now).

## 2020-10-14 NOTE — PROGRESS NOTES
Patient was seen in the office today for an EKG per . Patient tolerated it well with no problems. Haylee Belcher MA.

## 2020-10-14 NOTE — TELEPHONE ENCOUNTER
----- Message from Elissa Richard MD sent at 10/14/2020 12:43 PM EDT -----  Please decrease Tikosyn to 375 mcg every 12 hours due to CrCl decrease to 46 mL/min and ask him to come in for EKG in 1 week. Thanks.  ----- Message -----  From: Alexis Recio  Sent: 10/14/2020  12:10 PM EDT  To: Elissa Richard MD    Please let me know if it is ok to refill Tikosyn at 500 mg BID.  Thank you

## 2020-11-03 ENCOUNTER — TELEPHONE (OUTPATIENT)
Dept: NON INVASIVE DIAGNOSTICS | Age: 70
End: 2020-11-03

## 2020-11-03 NOTE — TELEPHONE ENCOUNTER
Patient called in to schedule for overdue 1 week ekg s/p decrease in tikosyn. Called patient back and l/m for patient to call and schedule.

## 2020-11-09 ENCOUNTER — NURSE ONLY (OUTPATIENT)
Dept: NON INVASIVE DIAGNOSTICS | Age: 70
End: 2020-11-09
Payer: MEDICARE

## 2020-11-09 VITALS — SYSTOLIC BLOOD PRESSURE: 126 MMHG | TEMPERATURE: 98.9 F | DIASTOLIC BLOOD PRESSURE: 72 MMHG

## 2020-11-09 PROCEDURE — 93000 ELECTROCARDIOGRAM COMPLETE: CPT | Performed by: INTERNAL MEDICINE

## 2020-11-09 RX ORDER — LOSARTAN POTASSIUM 50 MG/1
50 TABLET ORAL DAILY
COMMUNITY

## 2020-11-09 RX ORDER — CHLORTHALIDONE 25 MG/1
TABLET ORAL
Qty: 90 TABLET | Refills: 3 | Status: SHIPPED | OUTPATIENT
Start: 2020-11-09 | End: 2021-12-27 | Stop reason: SDUPTHER

## 2020-11-09 RX ORDER — TRAMADOL HYDROCHLORIDE 50 MG/1
50 TABLET ORAL 2 TIMES DAILY
COMMUNITY

## 2020-11-09 NOTE — PROGRESS NOTES
Patient was in today for EKG per Dr Lukasz Cordero. S/p decrease in tikosyn to 375 mg bid. Patient has no complaints. /72. Patient states last time he saw PCP, about 1 week ago he started him on Losartan 50 mg qd because BP was elevated.    New medications added to medication list.    Geovanny Cortes MA

## 2020-11-10 ENCOUNTER — TELEPHONE (OUTPATIENT)
Dept: NON INVASIVE DIAGNOSTICS | Age: 70
End: 2020-11-10

## 2020-11-10 NOTE — TELEPHONE ENCOUNTER
Spoke with patient, he would like lab script faxed to [phone], patient will go tomorrow for lab draw. Patient started Cozaar within the last 2-3 weeks.

## 2020-11-10 NOTE — TELEPHONE ENCOUNTER
----- Message from Noy Hutchison MD sent at 11/9/2020  3:04 PM EST -----  He is in NSR. Qtc is acceptable. Would recommend BMP now. How long has he been taking Cozzar for? And when did the last time he has BMP done.  Thanks.  ----- Message -----  From: Joao Shultz MA  Sent: 11/9/2020   2:32 PM EST  To: Noy Hutchison MD    ekg only today

## 2020-11-24 NOTE — TELEPHONE ENCOUNTER
Monitoring complete     Jayde Teague [id number]   Orders signed. Please see attached. Thank you.   Electronically signed by Thalia Jameson PA-C on 8/7/2020 at 3:38 PM

## 2020-12-03 ENCOUNTER — TELEPHONE (OUTPATIENT)
Dept: CARDIOLOGY CLINIC | Age: 70
End: 2020-12-03

## 2020-12-03 NOTE — TELEPHONE ENCOUNTER
Patient's wife called stating that when patient last saw Dr. Francisco Melgoza his blood pressure was increased and Dr. Francisco Melgoza prescribed losartan. Before the refill this medication, they want to be sure this is ok with Dr. Elizabeth Maddox. Please advise.

## 2020-12-04 NOTE — TELEPHONE ENCOUNTER
Contacted patient's wife with comments per Dr. Marium Baker.    ----- Message from Morgan Fregoso MD sent at 12/4/2020  1:24 PM EST -----  Medication changes are okay with me.

## 2021-01-04 DIAGNOSIS — M17.0 PRIMARY OSTEOARTHRITIS OF BOTH KNEES: ICD-10-CM

## 2021-01-04 DIAGNOSIS — Z96.651 S/P TKR (TOTAL KNEE REPLACEMENT) USING CEMENT, RIGHT: Primary | ICD-10-CM

## 2021-01-05 ENCOUNTER — OFFICE VISIT (OUTPATIENT)
Dept: ORTHOPEDIC SURGERY | Age: 71
End: 2021-01-05
Payer: MEDICARE

## 2021-01-05 VITALS
SYSTOLIC BLOOD PRESSURE: 130 MMHG | HEIGHT: 70 IN | DIASTOLIC BLOOD PRESSURE: 70 MMHG | BODY MASS INDEX: 37.22 KG/M2 | HEART RATE: 53 BPM | WEIGHT: 260 LBS

## 2021-01-05 DIAGNOSIS — M17.12 LOCALIZED OSTEOARTHRITIS OF LEFT KNEE: ICD-10-CM

## 2021-01-05 DIAGNOSIS — Z96.651 S/P TKR (TOTAL KNEE REPLACEMENT) USING CEMENT, RIGHT: Primary | ICD-10-CM

## 2021-01-05 PROCEDURE — G8417 CALC BMI ABV UP PARAM F/U: HCPCS | Performed by: PHYSICIAN ASSISTANT

## 2021-01-05 PROCEDURE — 99214 OFFICE O/P EST MOD 30 MIN: CPT | Performed by: PHYSICIAN ASSISTANT

## 2021-01-05 PROCEDURE — G8427 DOCREV CUR MEDS BY ELIG CLIN: HCPCS | Performed by: PHYSICIAN ASSISTANT

## 2021-01-05 PROCEDURE — 3017F COLORECTAL CA SCREEN DOC REV: CPT | Performed by: PHYSICIAN ASSISTANT

## 2021-01-05 PROCEDURE — 4040F PNEUMOC VAC/ADMIN/RCVD: CPT | Performed by: PHYSICIAN ASSISTANT

## 2021-01-05 PROCEDURE — 1036F TOBACCO NON-USER: CPT | Performed by: PHYSICIAN ASSISTANT

## 2021-01-05 PROCEDURE — G8484 FLU IMMUNIZE NO ADMIN: HCPCS | Performed by: PHYSICIAN ASSISTANT

## 2021-01-05 PROCEDURE — 1123F ACP DISCUSS/DSCN MKR DOCD: CPT | Performed by: PHYSICIAN ASSISTANT

## 2021-01-05 NOTE — PATIENT INSTRUCTIONS
You will need to be medically cleared before surgery by your family doctor. You will also need cleared by your cardiologist. Please call your doctor to set up an appointment for medical clearance as soon as possible and have the office fax your medical clearance to :   (FAX) [phone]   ATTN:  RAMILA    1. Your surgery is scheduled for Monday, February 8, 2021 at 7:30 AM  with Dr. Tanya Mccoy MD at the [facility] in [facility] . You will need to report to Preop area  that morning at 6:00 AM.    2. You are having Outpatient surgery, but plan for a few nights in the hospital for recovery. 3. Preadmission Testing (PAT) department at [facility] will contact you with all the details prior to surgery. 4. Nothing to eat or drink after midnight the night before surgery. You may take a pain pill and any other medicine PAT instructs you to take with small sip of water if needed. 5. Continue with ice and elevation to reduce swelling  6. Weightbearing as tolerated left lower extremity  7. Take pain medicine as instructed  8. Call office with any question or concerns: [phone]. Hold Lovenox/Aspirin the day of surgery. Hold all NSAIDs 7 days prior to surgery    ALL TOTAL JOINT PATIENTS WILL BE WEANED OFF ANY NARCOTIC MEDICATION GIVEN POST OPERATIVELY BY AT THE LATEST 3 WEEKS FROM DATE OF SURGERY       OUTPATIENT ORTHOPEDIC SURGERY  PRE-OP COVID TESTING    ? We need to have COVID-19 Testing done 4 days (not including Sunday) prior to your scheduled surgery    ? After your testing is completed you will need to Self Quarantine until date of surgery    ?  If your surgery is scheduled for:  Monday- Testing needs to be done Wednesday Tuesday- Testing needs to be done Thursday Wednesday- Testing needs to be done Friday Thursday- Testing needs to be done Friday Friday- Testing needs to be done Monday    Locations and hours are listed below: These sites will not test anyone without a physician order. The order can be in [facility] or can be a paper order. ? [address] [facility] [address]. Hours of Operation  Monday  Friday 6:00am  2:30pm.   ? [address] [facility] [address] [facility], [address] [place] [address]. Hours of Operation  Monday  Friday 6:00am  2:30pm. ( [place] [address])   ? [place] [address]., [place] , [address]. Hours of Operation  Monday  Friday 6:00am  2:30pm.     ? You will follow white signs that say SURGERY TESTING  ? Please bring a valid photo ID with you  ? Please bring order for COVID 19 test with you  ?  Pre-Admission Testing will also contact you to review COVID 19 testing and protocol

## 2021-01-05 NOTE — PROGRESS NOTES
 dofetilide (TIKOSYN) 250 MCG capsule Take 1 capsule by mouth BID along with 125 mg dose to equal a 375 mg dose  capsule 1    diclofenac (VOLTAREN) 50 MG EC tablet NO LONGER TAKING      atorvastatin (LIPITOR) 20 MG tablet TAKE ONE TABLET BY MOUTH DAILY 30 tablet 5    metoprolol succinate (TOPROL XL) 100 MG extended release tablet Take 1 tablet by mouth daily 30 tablet 5    apixaban (ELIQUIS) 5 MG TABS tablet Take 1 tablet by mouth daily TAKE ONE TABLET BY MOUTH TWO TIMES A  tablet 3    ELDERBERRY PO Take by mouth STOP PREOP MED      Misc Natural Products (TART CHERRY ADVANCED PO) Take by mouth STOP PREOP MED      gabapentin (NEURONTIN) 100 MG capsule Take 100 mg by mouth 2 times daily.  magnesium oxide (MAG-OX) 400 (240 Mg) MG tablet Take 1 tablet by mouth 2 times daily (Patient taking differently: Take 400 mg by mouth daily ) 60 tablet 0    Multiple Vitamins-Minerals (THERAPEUTIC MULTIVITAMIN-MINERALS) tablet Take 1 tablet by mouth daily.  Coenzyme Q10 (COQ10 PO) Take 1 tablet by mouth daily.  Alpha-Lipoic Acid 300 MG CAPS Take 300 mg by mouth daily On hold      ascorbic acid (VITAMIN C) 500 MG tablet Take 500 mg by mouth daily.  Vitamin D (CHOLECALCIFEROL) 1000 UNITS CAPS capsule Take 1,000 Units by mouth daily.  Cyanocobalamin (VITAMIN B 12 PO) Take 500 mcg by mouth daily. No current facility-administered medications for this visit.       Allergies: Adhesive tape  Past Medical History:   Diagnosis Date    Arthritis     Atrial fibrillation ([address]     Bulging lumbar disc     L4-L5    CAD (coronary artery disease) 07/07/2016    ct scan heart    Chronic lower back pain     BREWER (dyspnea on exertion) 3/26/2013    Eczema     Hyperlipidemia     Hypertension     Kidney stones     Obesity     weight 250#    Sleep apnea     c pap  stting 2     Past Surgical History:   Procedure Laterality Date    ABLATION OF DYSRHYTHMIC FOCUS  05/2013 heart ablation dr April Anderson  08/10/2016    CARDIOVERSION  3-    Dr. Melinda Cordova  10/20/2017    Dr. Logan Paz ECHOCARDIOGRAM TRANSESOPHAGEAL  2013         KNEE SURGERY Left 1969    repair left knee ligaments    LITHOTRIPSY  1986    LUMBAR [address]  2016    [facility] [place]    TONSILLECTOMY      TOTAL KNEE ARTHROPLASTY Right 2020    RIGHT KNEE TOTAL ARTHROPLASTY -- SHRUTHI performed by Supa Gallegos MD at [place] [address] TRANSESOPHAGEAL ECHOCARDIOGRAM  3-    Dr. Lavon Ledezma     Family History   Problem Relation Age of Onset    Cancer Mother     Diabetes Mother     Cancer Father     Diabetes Sister     Kidney Disease Sister     Early Death Neg Hx      Social History     Tobacco Use    Smoking status: Former Smoker     Packs/day: 1.00     Years: 20.00     Pack years: 20.00     Types: Cigarettes     Quit date: 2007     Years since quittin.0    Smokeless tobacco: Never Used    Tobacco comment: 1.5 PACKS EACH DAY stop    Substance Use Topics    Alcohol use:  Yes     Alcohol/week: 3.0 standard drinks     Types: 3 Shots of liquor per week     Comment: 1 x week                             Chief Complaint   Patient presents with    Follow Up After Procedure     2020 Rt TKA, c/o knee clicking sometimes    Surgical Consult     Would like to set up Lt TKA    X-ray      Natanael knee   OP:SURGEON: Dr. Tarah Sexton MD  DATE OF PROCEDURE: 2020  PROCEDURE:Right Knee Arthroplasty    SUBJECTIVE: Volodymyr Altman is here today for their bilateral knee. Appointment for right total knee and set up for left total knee arthroplasty today. The patient has had pain for sometime, but last 6 months or so it has become more severe with limited ROM and strength to the L knee. He  was diagnosed with OA in the past. There had been no injury to the left knee that they can remember. Volodymyr Altman has tried multiple conservative treatment. Has had therapy in the past, that worked at first, but the pain persisted. He did have steroid injections which did not help much. Patient also had used a left knee bracing without relief of symptoms. They have been working on weight loss. The pain is described as typical arthritic pain, achy in the joint, becoming more severe with stairs and any twisting motion of the left knee. Rest makes the left knee better along with NSAIDs and over the counter analgesics, but states they are now less effective. Does not use assistive devices. patient can ambulate less than 1 block prior to pain limiting their function. Volodymyr Altman is having difficulty with ADLs, and states this pain is limiting here activity decreasing their quality of life. He would like to discuss L TKA. States he still has limited flexion of the R knee, but full extension and otherwise has no pain to the R knee. Review of Systems   Constitutional: Negative for fever, chills, diaphoresis, appetite change and fatigue. HENT: Negative for dental issues, hearing loss and tinnitus. Negative for congestion, sinus pressure, sneezing, sore throat. Negative for headache. Eyes: Negative for visual disturbance, blurred and double vision. Negative for pain, discharge, redness and itching  Respiratory: Negative for cough, shortness of breath and wheezing. Cardiovascular: Negative for chest pain, palpitations and leg swelling.  No dyspnea on exertion Gastrointestinal:   Negative for nausea, vomiting, abdominal pain, diarrhea, constipation  or black or bloody. Hematologic\Lymphatic:  negative for bleeding, petechiae,   Genitourinary: Negative for hematuria and difficulty urinating. Musculoskeletal: Negative for neck pain and stiffness. Mild for back pain, negative joint swelling and gait problem. Skin: Negative for pallor, rash and wound. Neurological: Negative for dizziness, tremors, seizures, weakness, light-headedness, no TIA or stroke symptoms. No numbness and headaches. Psychiatric/Behavioral: Negative. Physical Examination:   General appearance: alert, well appearing, and in no distress,  normal appearing weight  Mental status: alert, oriented to person, place, and time, normal mood, behavior, speech, dress, motor activity, and thought processes  Abdomen: soft, nondistended, nontender, bowel sound + X 4 quads  Resp:   resp easy and unlabored, equal, regular rate, no wheezes, rhonchi, crackles noted  Cardiac: distal pulses palpable, skin well perfused.  HR regular rhythm and rate, no murmers, rubs, or clicks  Neurological: alert, oriented X3, normal speech, no focal findings or movement disorder noted, motor and sensory grossly normal bilaterally, normal muscle tone, no tremors, strength 5/5, normal gait and station  HEENT: normochephalic atraumatic, external ears and eyes normal, sclera normal, neck supple  Extremities:   peripheral pulses normal, no edema, redness or tenderness in the calves   Skin: normal coloration, no rashes or open wounds, no suspicious skin lesions noted  Psych: Affect euthymic   Musculoskeletal:    Extremity:  Bilateral Lower Extremity  Skin clean dry and intact, without signs of infection  Incision healed to the R knee  Nontender globally about both knees   Lachman and posterior drawer neg L knee  sydnee neg L knee  Stable to varus and valgus at 0 and 30 degrees L knee Mild crepitus felt bilaterally to PROM at the knees, L>R, patellae track normally   No effusions or edema noted bilaterally   AROM R knee 0-90, PROM 0-95  AROM L knee 0-115  Extensor mechanism intact bilaterally   Compartments supple throughout thigh and leg  Calf supple and nontender  Demonstrates active  ankle plantar/dorsiflexion/great toe extension. States sensation intact to touch in sural/deep peroneal/superficial peroneal/saphenous/posterior tibial nerve distributions to foot/ankle. Palpable dorsalis pedis and posterior tibialis pulses, cap refill brisk in toes, foot warm/perfused. /70 (Site: Left Upper Arm, Position: Sitting, Cuff Size: Large Adult)   Pulse 53   Ht 5' 9.5\" (1.765 m)   Wt 260 lb (117.9 kg)   BMI 37.84 kg/m²      XR: 1/5/21    Views of bilateral knees demonstrating severe OA and osteophytic formation to the L knee, worse in medial compartment. Some interval lucency noted near medial femoral condyle of R knee, however there is not any correlated tenderness on exam today or reported history of increasing pain or instability. ASSESSMENT:     Diagnosis Orders   1. S/P TKR (total knee replacement) using cement, right     2. Localized osteoarthritis of left knee         Discussion:  The patient would like to proceed with total left knee arthroplasty when cleared by their PCP. Marcelle Brennan understands the risks include but are not limited to infection, injuries to the blood vessel and nerves, bleeding, continued pain and non relief of pain, aseptic loosening dislocation, limb length discrepancy, arthrofibrosis of the joint, further operation, deep venous thrombosis, pulmonary embolism and fracture, heart attack and death. Ladi operative plan discussed, need for anticoagulation for DVT/PE prophylaxis, need for physical therapy, office follow up visits, and possible rehab stay. It was also explained patient will need to take a prophylactic dose of ATB prior to any bowel, dental or mouth procedures, including dental cleaning, for length of the implant. Did review surgery prosthesis with model with patient as well. Pain control was also discussed and the expectation is to have patient off narcotic pain meds around 3 weeks post operatively for a total joint arthroplasty     PLAN:  You will need to be medically cleared before surgery by your family doctor. You will also need cleared by your cardiologist. Please call your doctor to set up an appointment for medical clearance as soon as possible and have the office fax your medical clearance to :   (FAX) [phone]   ATTN:  RAMILA    1. Your surgery is scheduled for Monday, February 8, 2021 at 7:30 AM  with Dr. Abdulkadir Reza MD at the main [address] [place] [address] in [facility] . You will need to report to Preop area  that morning at 6:00 AM.    2. You are having Outpatient surgery, but plan for a few nights in the hospital for recovery. 3. Preadmission Testing (PAT) department at [facility] will contact you with all the details prior to surgery. 4. Nothing to eat or drink after midnight the night before surgery. You may take a pain pill and any other medicine PAT instructs you to take with small sip of water if needed. 5. Continue with ice and elevation to reduce swelling  6. Weightbearing as tolerated left lower extremity  7. Take pain medicine as instructed  8. Call office with any question or concerns: [phone]. Hold Lovenox/Aspirin the day of surgery.  Hold all NSAIDs 7 days prior to surgery    ALL TOTAL JOINT PATIENTS WILL BE WEANED OFF ANY NARCOTIC MEDICATION GIVEN POST OPERATIVELY BY AT THE LATEST 3 WEEKS FROM DATE OF SURGERY       OUTPATIENT ORTHOPEDIC SURGERY PRE-OP COVID TESTING    ? We need to have COVID-19 Testing done 4 days (not including Sunday) prior to your scheduled surgery    ? After your testing is completed you will need to Self Quarantine until date of surgery    ? If your surgery is scheduled for:  Monday- Testing needs to be done Wednesday Tuesday- Testing needs to be done Thursday Wednesday- Testing needs to be done Friday Thursday- Testing needs to be done Friday Friday- Testing needs to be done Monday    Locations and hours are listed below: These sites will not test anyone without a physician order. The order can be in [facility] or can be a paper order. ? [facility]. Hours of Operation  Monday  Friday 6:00am  2:30pm.   ? [address] Hours of Operation  Monday  Friday 6:00am  2:30pm. ( [place] [address])   ? [place] [id number] [place] [address] [place] [address] Hours of Operation  Monday  Friday 6:00am  2:30pm.     ? You will follow white signs that say SURGERY TESTING  ? Please bring a valid photo ID with you  ? Please bring order for COVID 19 test with you  ? Pre-Admission Testing will also contact you to review COVID 19 testing and protocol      Electronically signed by Sincere Layne PA-C on 1/5/2021 at 4:03 PM  Note: This report was completed using [employer] voiced recognition software. Every effort has been made to ensure accuracy; however, inadvertent computerized transcription errors may be present.

## 2021-01-20 ENCOUNTER — TELEPHONE (OUTPATIENT)
Dept: CARDIOLOGY CLINIC | Age: 71
End: 2021-01-20

## 2021-01-20 NOTE — TELEPHONE ENCOUNTER
Patient to have left total knee arthroplasty by Dr. Jus Mcintyre on 2/8/21. They are looking for cardiology clearance. Patient last seen 7/2020. Does patient need seen or can he be cleared based on last office visit? Please advise. Medical request scanned into Media.

## 2021-01-21 ENCOUNTER — TELEPHONE (OUTPATIENT)
Dept: ORTHOPEDIC SURGERY | Age: 71
End: 2021-01-21

## 2021-01-21 NOTE — TELEPHONE ENCOUNTER
Patient left voicemail asking if his 2-8-2021 surgery with Dr. Wagner Hopping can be r/s until early March 2021 sometime. Stated 2-8-2021 is \"approaching too fast\". Reviewed chart and returned call to patient. Left voicemail stating his surgery can be rescheduled per his request, but I need a return call back to figure out a new date. Office number was provided for a return call.

## 2021-01-22 ENCOUNTER — TELEPHONE (OUTPATIENT)
Dept: ORTHOPEDIC SURGERY | Age: 71
End: 2021-01-22

## 2021-01-22 NOTE — TELEPHONE ENCOUNTER
Surgery for 2-8-2021 is now cancelled. I have tried to speak with this patient on 1- (see other encounter). I will try to reach him again later today to r/s his surgery.

## 2021-01-22 NOTE — TELEPHONE ENCOUNTER
Pt called to state he would like to cancel his surgery with Dr Jim Mahmood that is scheduled for 02-08-21. He would like to r/s this for early March. Please call him back with new appt info [phone].   If NA ok to call home phone [phone]

## 2021-01-22 NOTE — TELEPHONE ENCOUNTER
Spoke to patient today and he feels like there are still some things he wants to do before having surgery done. We decided on a new surgery date of 3-8-2021 @ 7:30 am with Dr. Charo Angeles for Left TKA. Informed patient to arrive at hospital @ 5:30 am on 3-8-2021. Call PAT dept when it gets closer to his surgery date to go over surgery instructions and schedule his COVID testing #[phone]. Contact his PCP and his Cardiologist regarding medical clearance appointments and I already faxed our medical clearance forms over to both offices. Patient verbalized understanding.

## 2021-01-25 RX ORDER — METOPROLOL SUCCINATE 100 MG/1
TABLET, EXTENDED RELEASE ORAL
Qty: 30 TABLET | Refills: 5 | Status: SHIPPED
Start: 2021-01-25 | End: 2021-06-30

## 2021-01-26 NOTE — TELEPHONE ENCOUNTER
Contacted patient per Dr. Marina Toro. He verbalized understanding.  Letter forwarded to Dr. Маиря Reilly and Dr. Dulce Maria Hill.      ----- Message from Nena Rosado MD sent at 1/25/2021  4:34 PM EST -----    Brittany please let the patient know that we have made recommendations regarding anticoagulation to his surgeon and physician

## 2021-02-05 RX ORDER — ATORVASTATIN CALCIUM 20 MG/1
TABLET, FILM COATED ORAL
Qty: 30 TABLET | Refills: 5 | Status: SHIPPED | OUTPATIENT
Start: 2021-02-05 | End: 2021-08-23 | Stop reason: SDUPTHER

## 2021-02-17 ENCOUNTER — PREP FOR PROCEDURE (OUTPATIENT)
Dept: ORTHOPEDIC SURGERY | Age: 71
End: 2021-02-17

## 2021-02-17 ENCOUNTER — TELEPHONE (OUTPATIENT)
Dept: ORTHOPEDIC SURGERY | Age: 71
End: 2021-02-17

## 2021-02-17 DIAGNOSIS — Z11.59 SCREENING FOR VIRAL DISEASE: ICD-10-CM

## 2021-02-17 DIAGNOSIS — Z86.39 HISTORY OF BEING OBESE: ICD-10-CM

## 2021-02-17 DIAGNOSIS — M17.12 LOCALIZED OSTEOARTHRITIS OF LEFT KNEE: Primary | ICD-10-CM

## 2021-02-17 DIAGNOSIS — Z01.818 PRE-OP TESTING: ICD-10-CM

## 2021-02-17 RX ORDER — SODIUM CHLORIDE 0.9 % (FLUSH) 0.9 %
10 SYRINGE (ML) INJECTION EVERY 12 HOURS SCHEDULED
Status: CANCELLED | OUTPATIENT
Start: 2021-02-17

## 2021-02-17 RX ORDER — SODIUM CHLORIDE 0.9 % (FLUSH) 0.9 %
10 SYRINGE (ML) INJECTION PRN
Status: CANCELLED | OUTPATIENT
Start: 2021-02-17

## 2021-02-17 NOTE — TELEPHONE ENCOUNTER
Patient is scheduled to have Left TKA surgery on 3-8-2021 by Dr. Lea Avelar. Patient was instructed to obtain medical clearance from his PCP within 30 days of surgery. He also needs clearance from his cardiac doctor, Dr. Abner Elizabeth. Medical clearance form was faxed to Dr. Lopez Chamber office on 1-. Phone #[phone]  Fax [phone] to [facility] @ [address] [place] [address] today. He just started going to this practice and is still new there. Medical clearance appointment is scheduled for 2-. I faxed our medical clearance form over to their office today.     Phone #[phone]  Fax #[phone]

## 2021-02-17 NOTE — TELEPHONE ENCOUNTER
There is a letter dated 1- from Dr. Deena Rose in chart. Per Dr. Angie Brooke: Your patient, Piotr Orosco, is a candidate for left total knee replacement.  He represents a low risk for cardiac complications related to this.  Should the surgery require interruption of anticoagulation, this should be limited if possible to 48-72 hours prior to the operation.  No bridge anticoagulation (Lovenox) is recommended from a cardiology perspective. Copy of Dr. Zhao Guardado letter was also faxed to [address] [place] [address] today.

## 2021-02-19 NOTE — PROGRESS NOTES
Patient agreed to COVID test on 3/3 at the  [facility]  located at  [address] [place] [address]. between the hours of 6 am- 2:30 pm, instructed to bring ID. Patient instructed to self isolate until day of surgery.

## 2021-02-22 ENCOUNTER — TELEPHONE (OUTPATIENT)
Dept: ADMINISTRATIVE | Age: 71
End: 2021-02-22

## 2021-02-22 NOTE — TELEPHONE ENCOUNTER
Call placed to pt, notified he must obtain COVID test Wednesday 3/3 per protocol. Pt verbalized understanding, questions answered.

## 2021-02-22 NOTE — TELEPHONE ENCOUNTER
Pt called in. He is scheduled for upcoming surgery with Dr Naren Brandt, and has pre op testing and a covid test on 3/3. He wanted to speak with someone regarding having the covid test closer to his surgery date, due to not wanting to have to quarrantine for so long.

## 2021-03-01 NOTE — PROGRESS NOTES
[facility] PRE-ADMISSION TESTING GENERAL INSTRUCTIONS- formerly [facility]-phone number:[phone]    GENERAL INSTRUCTIONS  [x] Antibacterial Soap shower Night before and/or AM of Surgery  [x] Samuel wipe instruction sheet and wipes given. [x] Nothing by mouth after midnight, including gum, candy, mints, or water. [x] You may brush your teeth, gargle, but do NOT swallow water. [x]No smoking, chewing tobacco, illegal drugs, or alcohol within 24 hours of your surgery. [x] Jewelry, valuables or body piercing's should not be brought to the hospital. All body and/or tongue piercing's must be removed prior to arriving to hospital.  ALL hair pins must be removed. [x] No lotions, powders, deodorant. Nail polish as directed by the nurse. [x] Bring insurance card and photo ID. [x] Transfusion Bracelet: Please bring with you to hospital, day of surgery       PARKING INSTRUCTIONS:   [x] Arrival Time:__0530_______  [x] Arrive to the [facility] entrance for surgery 3/8/21. You will be directed to pre op. [x] To reach the [facility] from [facility], upon entering the hospital, take elevator B to the 3rd floor. EDUCATION INSTRUCTIONS:        [x] Pre-admission Testing educational folder given  [x] Incentive Spirometry,coughing & deep breathing exercises reviewed. [x]Fluoroscopy-Xray used in surgery reviewed with patient. Educational pamphlet placed in chart. [x]Pain: Post-op pain is normal and to be expected. You will be asked to rate your pain from 0-10(a zero is not acceptable-education is needed). Your post-op pain goal is:  [x] Ask your nurse for your pain medication. MEDICATION INSTRUCTIONS:   [x]Bring a complete list of your medications, please write the last time you took the medicine, give this list to the nurse.    [x] Take the following medications the morning of surgery with 1-2 ounces of water: see medication list on next page [x] Stop herbal supplements and vitamins 5 days before your surgery. [x] Follow physician instructions regarding any blood thinners you may be taking. WHAT TO EXPECT:  [x] The day of surgery you will be greeted and checked in by the [employer].  In addition, you will be registered in the [place] by a Patient Access Representative. Please bring your photo ID and insurance card. A nurse will greet you in accordance to the time you are needed in the pre-op area to prepare you for surgery. Please do not be discouraged if you are not greeted in the order you arrive as there are many variables that are involved in patient preparation. Your patience is greatly appreciated as you wait for your nurse. Please bring in items such as: books, magazines, newspapers, electronics, or any other items  to occupy your time in the waiting area. [x]  Delays may occur with surgery and staff will make a sincere effort to keep you informed of delays. If any delays occur with your procedure, we apologize ahead of time for your inconvenience as we recognize the value of your time.

## 2021-03-03 ENCOUNTER — HOSPITAL ENCOUNTER (OUTPATIENT)
Age: 71
Discharge: HOME | End: 2021-03-05
Payer: MEDICARE

## 2021-03-03 ENCOUNTER — HOSPITAL ENCOUNTER (OUTPATIENT)
Dept: PREADMISSION TESTING | Age: 71
Discharge: HOME | End: 2021-03-03
Payer: MEDICARE

## 2021-03-03 VITALS
RESPIRATION RATE: 20 BRPM | BODY MASS INDEX: 38.22 KG/M2 | SYSTOLIC BLOOD PRESSURE: 157 MMHG | DIASTOLIC BLOOD PRESSURE: 74 MMHG | HEART RATE: 66 BPM | TEMPERATURE: 98.1 F | HEIGHT: 70 IN | WEIGHT: 267 LBS | OXYGEN SATURATION: 97 %

## 2021-03-03 DIAGNOSIS — M17.12 LOCALIZED OSTEOARTHRITIS OF LEFT KNEE: ICD-10-CM

## 2021-03-03 DIAGNOSIS — U07.1 COVID-19: ICD-10-CM

## 2021-03-03 DIAGNOSIS — Z86.39 HISTORY OF BEING OBESE: ICD-10-CM

## 2021-03-03 DIAGNOSIS — Z01.818 PRE-OP TESTING: ICD-10-CM

## 2021-03-03 LAB
ABO/RH: NORMAL
ALBUMIN SERPL-MCNC: 4.3 G/DL (ref 3.5–5.2)
ALP BLD-CCNC: 112 U/L (ref 40–129)
ALT SERPL-CCNC: 27 U/L (ref 0–40)
ANION GAP SERPL CALCULATED.3IONS-SCNC: 9 MMOL/L (ref 7–16)
ANTIBODY SCREEN: NORMAL
AST SERPL-CCNC: 25 U/L (ref 0–39)
BASOPHILS ABSOLUTE: 0.08 E9/L (ref 0–0.2)
BASOPHILS RELATIVE PERCENT: 0.9 % (ref 0–2)
BILIRUB SERPL-MCNC: 0.4 MG/DL (ref 0–1.2)
BUN BLDV-MCNC: 36 MG/DL (ref 8–23)
CALCIUM SERPL-MCNC: 9.8 MG/DL (ref 8.6–10.2)
CHLORIDE BLD-SCNC: 100 MMOL/L (ref 98–107)
CO2: 29 MMOL/L (ref 22–29)
CREAT SERPL-MCNC: 1.7 MG/DL (ref 0.7–1.2)
EOSINOPHILS ABSOLUTE: 0.31 E9/L (ref 0.05–0.5)
EOSINOPHILS RELATIVE PERCENT: 3.4 % (ref 0–6)
GFR AFRICAN AMERICAN: 48
GFR NON-AFRICAN AMERICAN: 40 ML/MIN/1.73
GLUCOSE BLD-MCNC: 93 MG/DL (ref 74–99)
HCT VFR BLD CALC: 43.7 % (ref 37–54)
HEMOGLOBIN: 14.5 G/DL (ref 12.5–16.5)
IMMATURE GRANULOCYTES #: 0.06 E9/L
IMMATURE GRANULOCYTES %: 0.7 % (ref 0–5)
INR BLD: 1.2
LYMPHOCYTES ABSOLUTE: 2.67 E9/L (ref 1.5–4)
LYMPHOCYTES RELATIVE PERCENT: 29.2 % (ref 20–42)
MCH RBC QN AUTO: 32.4 PG (ref 26–35)
MCHC RBC AUTO-ENTMCNC: 33.2 % (ref 32–34.5)
MCV RBC AUTO: 97.8 FL (ref 80–99.9)
MONOCYTES ABSOLUTE: 0.82 E9/L (ref 0.1–0.95)
MONOCYTES RELATIVE PERCENT: 9 % (ref 2–12)
NEUTROPHILS ABSOLUTE: 5.19 E9/L (ref 1.8–7.3)
NEUTROPHILS RELATIVE PERCENT: 56.8 % (ref 43–80)
PDW BLD-RTO: 13.2 FL (ref 11.5–15)
PLATELET # BLD: 177 E9/L (ref 130–450)
PMV BLD AUTO: 10.5 FL (ref 7–12)
POTASSIUM SERPL-SCNC: 4.3 MMOL/L (ref 3.5–5)
PROTHROMBIN TIME: 12.6 SEC (ref 9.3–12.4)
RBC # BLD: 4.47 E12/L (ref 3.8–5.8)
SODIUM BLD-SCNC: 138 MMOL/L (ref 132–146)
TOTAL PROTEIN: 7.8 G/DL (ref 6.4–8.3)
WBC # BLD: 9.1 E9/L (ref 4.5–11.5)

## 2021-03-03 PROCEDURE — 86901 BLOOD TYPING SEROLOGIC RH(D): CPT

## 2021-03-03 PROCEDURE — 36415 COLL VENOUS BLD VENIPUNCTURE: CPT

## 2021-03-03 PROCEDURE — 80053 COMPREHEN METABOLIC PANEL: CPT

## 2021-03-03 PROCEDURE — 85610 PROTHROMBIN TIME: CPT

## 2021-03-03 PROCEDURE — 86850 RBC ANTIBODY SCREEN: CPT

## 2021-03-03 PROCEDURE — 85025 COMPLETE CBC W/AUTO DIFF WBC: CPT

## 2021-03-03 PROCEDURE — U0003 INFECTIOUS AGENT DETECTION BY NUCLEIC ACID (DNA OR RNA); SEVERE ACUTE RESPIRATORY SYNDROME CORONAVIRUS 2 (SARS-COV-2) (CORONAVIRUS DISEASE [COVID-19]), AMPLIFIED PROBE TECHNIQUE, MAKING USE OF HIGH THROUGHPUT TECHNOLOGIES AS DESCRIBED BY CMS-2020-01-R: HCPCS

## 2021-03-03 PROCEDURE — 86900 BLOOD TYPING SEROLOGIC ABO: CPT

## 2021-03-03 PROCEDURE — 87081 CULTURE SCREEN ONLY: CPT

## 2021-03-04 LAB
MRSA CULTURE ONLY: NORMAL
SARS-COV-2: NOT DETECTED
SOURCE: NORMAL

## 2021-03-07 ENCOUNTER — ANESTHESIA EVENT (OUTPATIENT)
Dept: OPERATING ROOM | Age: 71
DRG: 470 | End: 2021-03-07
Payer: MEDICARE

## 2021-03-08 ENCOUNTER — HOSPITAL ENCOUNTER (INPATIENT)
Age: 71
LOS: 1 days | Discharge: HOME HEALTH | DRG: 470 | End: 2021-03-09
Attending: ORTHOPAEDIC SURGERY | Admitting: ORTHOPAEDIC SURGERY
Payer: MEDICARE

## 2021-03-08 ENCOUNTER — APPOINTMENT (OUTPATIENT)
Dept: GENERAL RADIOLOGY | Age: 71
DRG: 470 | End: 2021-03-08
Attending: ORTHOPAEDIC SURGERY
Payer: MEDICARE

## 2021-03-08 ENCOUNTER — ANESTHESIA (OUTPATIENT)
Dept: OPERATING ROOM | Age: 71
DRG: 470 | End: 2021-03-08
Payer: MEDICARE

## 2021-03-08 VITALS
RESPIRATION RATE: 2 BRPM | TEMPERATURE: 97.3 F | OXYGEN SATURATION: 99 % | DIASTOLIC BLOOD PRESSURE: 78 MMHG | SYSTOLIC BLOOD PRESSURE: 182 MMHG

## 2021-03-08 DIAGNOSIS — Z96.652 H/O TOTAL KNEE REPLACEMENT, LEFT: ICD-10-CM

## 2021-03-08 DIAGNOSIS — U07.1 COVID-19: Primary | ICD-10-CM

## 2021-03-08 DIAGNOSIS — Z96.653 H/O TOTAL KNEE REPLACEMENT, BILATERAL: ICD-10-CM

## 2021-03-08 PROBLEM — M17.12 PRIMARY OSTEOARTHRITIS OF LEFT KNEE: Status: RESOLVED | Noted: 2021-01-05 | Resolved: 2021-03-08

## 2021-03-08 PROBLEM — Z96.651 S/P TKR (TOTAL KNEE REPLACEMENT) USING CEMENT, RIGHT: Status: RESOLVED | Noted: 2020-06-01 | Resolved: 2021-03-08

## 2021-03-08 PROBLEM — M19.011 PRIMARY OSTEOARTHRITIS OF BOTH SHOULDERS: Status: RESOLVED | Noted: 2019-02-12 | Resolved: 2021-03-08

## 2021-03-08 PROBLEM — M19.012 PRIMARY OSTEOARTHRITIS OF BOTH SHOULDERS: Status: RESOLVED | Noted: 2019-02-12 | Resolved: 2021-03-08

## 2021-03-08 PROBLEM — M17.12 OSTEOARTHRITIS OF LEFT KNEE: Status: ACTIVE | Noted: 2021-03-08

## 2021-03-08 PROBLEM — M17.0 PRIMARY OSTEOARTHRITIS OF BOTH KNEES: Status: RESOLVED | Noted: 2019-02-12 | Resolved: 2021-03-08

## 2021-03-08 PROCEDURE — 97165 OT EVAL LOW COMPLEX 30 MIN: CPT

## 2021-03-08 PROCEDURE — 6360000002 HC RX W HCPCS: Performed by: ANESTHESIOLOGY

## 2021-03-08 PROCEDURE — 6360000002 HC RX W HCPCS: Performed by: STUDENT IN AN ORGANIZED HEALTH CARE EDUCATION/TRAINING PROGRAM

## 2021-03-08 PROCEDURE — 3700000001 HC ADD 15 MINUTES (ANESTHESIA): Performed by: ORTHOPAEDIC SURGERY

## 2021-03-08 PROCEDURE — 6370000000 HC RX 637 (ALT 250 FOR IP): Performed by: NURSE PRACTITIONER

## 2021-03-08 PROCEDURE — 2500000003 HC RX 250 WO HCPCS: Performed by: PHYSICIAN ASSISTANT

## 2021-03-08 PROCEDURE — 64447 NJX AA&/STRD FEMORAL NRV IMG: CPT | Performed by: ANESTHESIOLOGY

## 2021-03-08 PROCEDURE — 7100000001 HC PACU RECOVERY - ADDTL 15 MIN: Performed by: ORTHOPAEDIC SURGERY

## 2021-03-08 PROCEDURE — 3600000015 HC SURGERY LEVEL 5 ADDTL 15MIN: Performed by: ORTHOPAEDIC SURGERY

## 2021-03-08 PROCEDURE — 6370000000 HC RX 637 (ALT 250 FOR IP): Performed by: STUDENT IN AN ORGANIZED HEALTH CARE EDUCATION/TRAINING PROGRAM

## 2021-03-08 PROCEDURE — 2580000003 HC RX 258: Performed by: STUDENT IN AN ORGANIZED HEALTH CARE EDUCATION/TRAINING PROGRAM

## 2021-03-08 PROCEDURE — C1713 ANCHOR/SCREW BN/BN,TIS/BN: HCPCS | Performed by: ORTHOPAEDIC SURGERY

## 2021-03-08 PROCEDURE — 97530 THERAPEUTIC ACTIVITIES: CPT

## 2021-03-08 PROCEDURE — 2580000003 HC RX 258: Performed by: PHYSICIAN ASSISTANT

## 2021-03-08 PROCEDURE — 6360000002 HC RX W HCPCS: Performed by: PHYSICIAN ASSISTANT

## 2021-03-08 PROCEDURE — 1200000000 HC SEMI PRIVATE

## 2021-03-08 PROCEDURE — C1776 JOINT DEVICE (IMPLANTABLE): HCPCS | Performed by: ORTHOPAEDIC SURGERY

## 2021-03-08 PROCEDURE — 2709999900 HC NON-CHARGEABLE SUPPLY: Performed by: ORTHOPAEDIC SURGERY

## 2021-03-08 PROCEDURE — 2580000003 HC RX 258: Performed by: ORTHOPAEDIC SURGERY

## 2021-03-08 PROCEDURE — 6360000002 HC RX W HCPCS: Performed by: ORTHOPAEDIC SURGERY

## 2021-03-08 PROCEDURE — 88311 DECALCIFY TISSUE: CPT

## 2021-03-08 PROCEDURE — 2500000003 HC RX 250 WO HCPCS: Performed by: ORTHOPAEDIC SURGERY

## 2021-03-08 PROCEDURE — 7100000000 HC PACU RECOVERY - FIRST 15 MIN: Performed by: ORTHOPAEDIC SURGERY

## 2021-03-08 PROCEDURE — 73560 X-RAY EXAM OF KNEE 1 OR 2: CPT

## 2021-03-08 PROCEDURE — 27447 TOTAL KNEE ARTHROPLASTY: CPT | Performed by: ORTHOPAEDIC SURGERY

## 2021-03-08 PROCEDURE — 3600000005 HC SURGERY LEVEL 5 BASE: Performed by: ORTHOPAEDIC SURGERY

## 2021-03-08 PROCEDURE — 2580000003 HC RX 258: Performed by: NURSE ANESTHETIST, CERTIFIED REGISTERED

## 2021-03-08 PROCEDURE — 6360000002 HC RX W HCPCS: Performed by: NURSE ANESTHETIST, CERTIFIED REGISTERED

## 2021-03-08 PROCEDURE — 3700000000 HC ANESTHESIA ATTENDED CARE: Performed by: ORTHOPAEDIC SURGERY

## 2021-03-08 PROCEDURE — 2500000003 HC RX 250 WO HCPCS: Performed by: NURSE ANESTHETIST, CERTIFIED REGISTERED

## 2021-03-08 PROCEDURE — 0SRD0J9 REPLACEMENT OF LEFT KNEE JOINT WITH SYNTHETIC SUBSTITUTE, CEMENTED, OPEN APPROACH: ICD-10-PCS | Performed by: ORTHOPAEDIC SURGERY

## 2021-03-08 PROCEDURE — 2720000010 HC SURG SUPPLY STERILE: Performed by: ORTHOPAEDIC SURGERY

## 2021-03-08 PROCEDURE — 88305 TISSUE EXAM BY PATHOLOGIST: CPT

## 2021-03-08 PROCEDURE — 6370000000 HC RX 637 (ALT 250 FOR IP): Performed by: ORTHOPAEDIC SURGERY

## 2021-03-08 DEVICE — CEMENT BNE 40 GM RADIOPAQUE BA SIMPLEX P: Type: IMPLANTABLE DEVICE | Site: KNEE | Status: FUNCTIONAL

## 2021-03-08 DEVICE — BASEPLATE TIB SZ 7 KNEE TRITANIUM CEM PRI LO PROF TRIATHLON: Type: IMPLANTABLE DEVICE | Site: KNEE | Status: FUNCTIONAL

## 2021-03-08 DEVICE — IMPLANTABLE DEVICE: Type: IMPLANTABLE DEVICE | Site: KNEE | Status: FUNCTIONAL

## 2021-03-08 DEVICE — IMPLANT PAT DIA32MM THK10MM X3 ASYM TRIATHLON: Type: IMPLANTABLE DEVICE | Site: KNEE | Status: FUNCTIONAL

## 2021-03-08 RX ORDER — GABAPENTIN 100 MG/1
100 CAPSULE ORAL 2 TIMES DAILY
Status: DISCONTINUED | OUTPATIENT
Start: 2021-03-08 | End: 2021-03-09 | Stop reason: HOSPADM

## 2021-03-08 RX ORDER — MORPHINE SULFATE 2 MG/ML
2 INJECTION, SOLUTION INTRAMUSCULAR; INTRAVENOUS
Status: DISCONTINUED | OUTPATIENT
Start: 2021-03-08 | End: 2021-03-09 | Stop reason: HOSPADM

## 2021-03-08 RX ORDER — MORPHINE SULFATE 4 MG/ML
4 INJECTION, SOLUTION INTRAMUSCULAR; INTRAVENOUS
Status: DISCONTINUED | OUTPATIENT
Start: 2021-03-08 | End: 2021-03-09 | Stop reason: HOSPADM

## 2021-03-08 RX ORDER — SUCCINYLCHOLINE/SOD CL,ISO/PF 200MG/10ML
SYRINGE (ML) INTRAVENOUS PRN
Status: DISCONTINUED | OUTPATIENT
Start: 2021-03-08 | End: 2021-03-08 | Stop reason: SDUPTHER

## 2021-03-08 RX ORDER — LABETALOL HYDROCHLORIDE 5 MG/ML
INJECTION, SOLUTION INTRAVENOUS PRN
Status: DISCONTINUED | OUTPATIENT
Start: 2021-03-08 | End: 2021-03-08 | Stop reason: SDUPTHER

## 2021-03-08 RX ORDER — OXYCODONE HYDROCHLORIDE 5 MG/1
5 TABLET ORAL EVERY 4 HOURS PRN
Status: DISCONTINUED | OUTPATIENT
Start: 2021-03-08 | End: 2021-03-09 | Stop reason: HOSPADM

## 2021-03-08 RX ORDER — MORPHINE SULFATE 2 MG/ML
2 INJECTION, SOLUTION INTRAMUSCULAR; INTRAVENOUS EVERY 5 MIN PRN
Status: DISCONTINUED | OUTPATIENT
Start: 2021-03-08 | End: 2021-03-08 | Stop reason: HOSPADM

## 2021-03-08 RX ORDER — DIPHENHYDRAMINE HYDROCHLORIDE 50 MG/ML
25 INJECTION INTRAMUSCULAR; INTRAVENOUS EVERY 6 HOURS PRN
Status: DISCONTINUED | OUTPATIENT
Start: 2021-03-08 | End: 2021-03-09 | Stop reason: HOSPADM

## 2021-03-08 RX ORDER — SODIUM CHLORIDE 0.9 % (FLUSH) 0.9 %
10 SYRINGE (ML) INJECTION EVERY 12 HOURS SCHEDULED
Status: DISCONTINUED | OUTPATIENT
Start: 2021-03-08 | End: 2021-03-08 | Stop reason: HOSPADM

## 2021-03-08 RX ORDER — LOSARTAN POTASSIUM 50 MG/1
50 TABLET ORAL DAILY
Status: DISCONTINUED | OUTPATIENT
Start: 2021-03-08 | End: 2021-03-09 | Stop reason: HOSPADM

## 2021-03-08 RX ORDER — FENTANYL CITRATE 50 UG/ML
INJECTION, SOLUTION INTRAMUSCULAR; INTRAVENOUS PRN
Status: DISCONTINUED | OUTPATIENT
Start: 2021-03-08 | End: 2021-03-08 | Stop reason: SDUPTHER

## 2021-03-08 RX ORDER — MIDAZOLAM HYDROCHLORIDE 2 MG/2ML
2 INJECTION, SOLUTION INTRAMUSCULAR; INTRAVENOUS ONCE
Status: COMPLETED | OUTPATIENT
Start: 2021-03-08 | End: 2021-03-08

## 2021-03-08 RX ORDER — SODIUM CHLORIDE 0.9 % (FLUSH) 0.9 %
10 SYRINGE (ML) INJECTION PRN
Status: DISCONTINUED | OUTPATIENT
Start: 2021-03-08 | End: 2021-03-09 | Stop reason: HOSPADM

## 2021-03-08 RX ORDER — ROCURONIUM BROMIDE 10 MG/ML
INJECTION, SOLUTION INTRAVENOUS PRN
Status: DISCONTINUED | OUTPATIENT
Start: 2021-03-08 | End: 2021-03-08 | Stop reason: SDUPTHER

## 2021-03-08 RX ORDER — GLYCOPYRROLATE 1 MG/5 ML
SYRINGE (ML) INTRAVENOUS PRN
Status: DISCONTINUED | OUTPATIENT
Start: 2021-03-08 | End: 2021-03-08 | Stop reason: SDUPTHER

## 2021-03-08 RX ORDER — ROPIVACAINE HYDROCHLORIDE 5 MG/ML
30 INJECTION, SOLUTION EPIDURAL; INFILTRATION; PERINEURAL ONCE
Status: COMPLETED | OUTPATIENT
Start: 2021-03-08 | End: 2021-03-08

## 2021-03-08 RX ORDER — EPHEDRINE SULFATE/0.9% NACL/PF 50 MG/5 ML
SYRINGE (ML) INTRAVENOUS PRN
Status: DISCONTINUED | OUTPATIENT
Start: 2021-03-08 | End: 2021-03-08 | Stop reason: SDUPTHER

## 2021-03-08 RX ORDER — METOPROLOL SUCCINATE 100 MG/1
100 TABLET, EXTENDED RELEASE ORAL DAILY
Status: DISCONTINUED | OUTPATIENT
Start: 2021-03-09 | End: 2021-03-09 | Stop reason: HOSPADM

## 2021-03-08 RX ORDER — FENTANYL CITRATE 50 UG/ML
100 INJECTION, SOLUTION INTRAMUSCULAR; INTRAVENOUS ONCE
Status: COMPLETED | OUTPATIENT
Start: 2021-03-08 | End: 2021-03-08

## 2021-03-08 RX ORDER — PROPOFOL 10 MG/ML
INJECTION, EMULSION INTRAVENOUS PRN
Status: DISCONTINUED | OUTPATIENT
Start: 2021-03-08 | End: 2021-03-08 | Stop reason: SDUPTHER

## 2021-03-08 RX ORDER — HYDROCODONE BITARTRATE AND ACETAMINOPHEN 5; 325 MG/1; MG/1
1 TABLET ORAL PRN
Status: DISCONTINUED | OUTPATIENT
Start: 2021-03-08 | End: 2021-03-08 | Stop reason: HOSPADM

## 2021-03-08 RX ORDER — ONDANSETRON 2 MG/ML
INJECTION INTRAMUSCULAR; INTRAVENOUS PRN
Status: DISCONTINUED | OUTPATIENT
Start: 2021-03-08 | End: 2021-03-08 | Stop reason: SDUPTHER

## 2021-03-08 RX ORDER — ATORVASTATIN CALCIUM 20 MG/1
20 TABLET, FILM COATED ORAL DAILY
Status: DISCONTINUED | OUTPATIENT
Start: 2021-03-08 | End: 2021-03-09 | Stop reason: HOSPADM

## 2021-03-08 RX ORDER — OXYCODONE HYDROCHLORIDE AND ACETAMINOPHEN 5; 325 MG/1; MG/1
1 TABLET ORAL EVERY 6 HOURS PRN
Qty: 28 TABLET | Refills: 0 | Status: SHIPPED | OUTPATIENT
Start: 2021-03-08 | End: 2021-03-15 | Stop reason: SDUPTHER

## 2021-03-08 RX ORDER — VANCOMYCIN HYDROCHLORIDE 1 G/20ML
INJECTION, POWDER, LYOPHILIZED, FOR SOLUTION INTRAVENOUS PRN
Status: DISCONTINUED | OUTPATIENT
Start: 2021-03-08 | End: 2021-03-08 | Stop reason: ALTCHOICE

## 2021-03-08 RX ORDER — SODIUM CHLORIDE 9 MG/ML
INJECTION, SOLUTION INTRAVENOUS CONTINUOUS PRN
Status: DISCONTINUED | OUTPATIENT
Start: 2021-03-08 | End: 2021-03-08 | Stop reason: SDUPTHER

## 2021-03-08 RX ORDER — CALCIUM GLUCONATE 94 MG/ML
INJECTION, SOLUTION INTRAVENOUS PRN
Status: DISCONTINUED | OUTPATIENT
Start: 2021-03-08 | End: 2021-03-08 | Stop reason: ALTCHOICE

## 2021-03-08 RX ORDER — SODIUM CHLORIDE 0.9 % (FLUSH) 0.9 %
10 SYRINGE (ML) INJECTION EVERY 12 HOURS SCHEDULED
Status: DISCONTINUED | OUTPATIENT
Start: 2021-03-08 | End: 2021-03-09 | Stop reason: HOSPADM

## 2021-03-08 RX ORDER — PROMETHAZINE HYDROCHLORIDE 25 MG/ML
6.25 INJECTION, SOLUTION INTRAMUSCULAR; INTRAVENOUS EVERY 10 MIN PRN
Status: DISCONTINUED | OUTPATIENT
Start: 2021-03-08 | End: 2021-03-08 | Stop reason: HOSPADM

## 2021-03-08 RX ORDER — DOFETILIDE 0.12 MG/1
375 CAPSULE ORAL EVERY 12 HOURS SCHEDULED
Status: DISCONTINUED | OUTPATIENT
Start: 2021-03-08 | End: 2021-03-09 | Stop reason: HOSPADM

## 2021-03-08 RX ORDER — OXYCODONE HYDROCHLORIDE 10 MG/1
10 TABLET ORAL EVERY 4 HOURS PRN
Status: DISCONTINUED | OUTPATIENT
Start: 2021-03-08 | End: 2021-03-09 | Stop reason: HOSPADM

## 2021-03-08 RX ORDER — HYDROMORPHONE HCL 110MG/55ML
PATIENT CONTROLLED ANALGESIA SYRINGE INTRAVENOUS PRN
Status: DISCONTINUED | OUTPATIENT
Start: 2021-03-08 | End: 2021-03-08 | Stop reason: SDUPTHER

## 2021-03-08 RX ORDER — HYDROCODONE BITARTRATE AND ACETAMINOPHEN 5; 325 MG/1; MG/1
2 TABLET ORAL PRN
Status: DISCONTINUED | OUTPATIENT
Start: 2021-03-08 | End: 2021-03-08 | Stop reason: HOSPADM

## 2021-03-08 RX ORDER — MORPHINE SULFATE 2 MG/ML
1 INJECTION, SOLUTION INTRAMUSCULAR; INTRAVENOUS EVERY 5 MIN PRN
Status: DISCONTINUED | OUTPATIENT
Start: 2021-03-08 | End: 2021-03-08 | Stop reason: HOSPADM

## 2021-03-08 RX ORDER — MEPERIDINE HYDROCHLORIDE 25 MG/ML
12.5 INJECTION INTRAMUSCULAR; INTRAVENOUS; SUBCUTANEOUS EVERY 5 MIN PRN
Status: DISCONTINUED | OUTPATIENT
Start: 2021-03-08 | End: 2021-03-08 | Stop reason: HOSPADM

## 2021-03-08 RX ORDER — LIDOCAINE HYDROCHLORIDE 20 MG/ML
INJECTION, SOLUTION INTRAVENOUS PRN
Status: DISCONTINUED | OUTPATIENT
Start: 2021-03-08 | End: 2021-03-08 | Stop reason: SDUPTHER

## 2021-03-08 RX ORDER — SODIUM CHLORIDE 0.9 % (FLUSH) 0.9 %
10 SYRINGE (ML) INJECTION PRN
Status: DISCONTINUED | OUTPATIENT
Start: 2021-03-08 | End: 2021-03-08 | Stop reason: HOSPADM

## 2021-03-08 RX ORDER — CHLORTHALIDONE 25 MG/1
25 TABLET ORAL DAILY
Status: DISCONTINUED | OUTPATIENT
Start: 2021-03-08 | End: 2021-03-09 | Stop reason: HOSPADM

## 2021-03-08 RX ORDER — NEOSTIGMINE METHYLSULFATE 1 MG/ML
INJECTION, SOLUTION INTRAVENOUS PRN
Status: DISCONTINUED | OUTPATIENT
Start: 2021-03-08 | End: 2021-03-08 | Stop reason: SDUPTHER

## 2021-03-08 RX ORDER — ROPIVACAINE HYDROCHLORIDE 5 MG/ML
INJECTION, SOLUTION EPIDURAL; INFILTRATION; PERINEURAL
Status: COMPLETED | OUTPATIENT
Start: 2021-03-08 | End: 2021-03-08

## 2021-03-08 RX ADMIN — HYDROMORPHONE HYDROCHLORIDE 1 MG: 2 INJECTION, SOLUTION INTRAMUSCULAR; INTRAVENOUS; SUBCUTANEOUS at 09:20

## 2021-03-08 RX ADMIN — CEFAZOLIN 3000 MG: 10 INJECTION, POWDER, FOR SOLUTION INTRAVENOUS at 15:35

## 2021-03-08 RX ADMIN — ROPIVACAINE HYDROCHLORIDE 30 ML: 5 INJECTION EPIDURAL; INFILTRATION; PERINEURAL at 08:14

## 2021-03-08 RX ADMIN — ROPIVACAINE HYDROCHLORIDE 30 ML: 5 INJECTION, SOLUTION EPIDURAL; INFILTRATION; PERINEURAL at 07:25

## 2021-03-08 RX ADMIN — DIPHENHYDRAMINE HYDROCHLORIDE 25 MG: 50 INJECTION, SOLUTION INTRAMUSCULAR; INTRAVENOUS at 22:09

## 2021-03-08 RX ADMIN — OXYCODONE HYDROCHLORIDE 10 MG: 10 TABLET ORAL at 18:06

## 2021-03-08 RX ADMIN — CEFAZOLIN 3000 MG: 10 INJECTION, POWDER, FOR SOLUTION INTRAVENOUS at 23:55

## 2021-03-08 RX ADMIN — Medication 160 MG: at 07:43

## 2021-03-08 RX ADMIN — Medication 10 MG: at 07:49

## 2021-03-08 RX ADMIN — HYDROMORPHONE HYDROCHLORIDE 1 MG: 2 INJECTION, SOLUTION INTRAMUSCULAR; INTRAVENOUS; SUBCUTANEOUS at 09:30

## 2021-03-08 RX ADMIN — SODIUM CHLORIDE: 9 INJECTION, SOLUTION INTRAVENOUS at 08:55

## 2021-03-08 RX ADMIN — PROPOFOL 200 MG: 10 INJECTION, EMULSION INTRAVENOUS at 07:43

## 2021-03-08 RX ADMIN — FENTANYL CITRATE 50 MCG: 50 INJECTION, SOLUTION INTRAMUSCULAR; INTRAVENOUS at 08:12

## 2021-03-08 RX ADMIN — FENTANYL CITRATE 100 MCG: 50 INJECTION, SOLUTION INTRAMUSCULAR; INTRAVENOUS at 07:43

## 2021-03-08 RX ADMIN — OXYCODONE HYDROCHLORIDE 10 MG: 10 TABLET ORAL at 22:09

## 2021-03-08 RX ADMIN — CHLORTHALIDONE 25 MG: 25 TABLET ORAL at 15:35

## 2021-03-08 RX ADMIN — FENTANYL CITRATE 100 MCG: 50 INJECTION, SOLUTION INTRAMUSCULAR; INTRAVENOUS at 07:25

## 2021-03-08 RX ADMIN — GABAPENTIN 100 MG: 100 CAPSULE ORAL at 13:38

## 2021-03-08 RX ADMIN — Medication 2 G: at 07:49

## 2021-03-08 RX ADMIN — DOFETILIDE 375 MCG: 0.12 CAPSULE ORAL at 17:14

## 2021-03-08 RX ADMIN — SODIUM CHLORIDE: 9 INJECTION, SOLUTION INTRAVENOUS at 07:23

## 2021-03-08 RX ADMIN — LABETALOL HYDROCHLORIDE 5 MG: 5 INJECTION INTRAVENOUS at 08:48

## 2021-03-08 RX ADMIN — MIDAZOLAM 2 MG: 1 INJECTION INTRAMUSCULAR; INTRAVENOUS at 07:20

## 2021-03-08 RX ADMIN — TRANEXAMIC ACID 1 G: 1 INJECTION, SOLUTION INTRAVENOUS at 07:48

## 2021-03-08 RX ADMIN — LABETALOL HYDROCHLORIDE 2.5 MG: 5 INJECTION INTRAVENOUS at 08:34

## 2021-03-08 RX ADMIN — MORPHINE SULFATE 4 MG: 4 INJECTION, SOLUTION INTRAMUSCULAR; INTRAVENOUS at 23:59

## 2021-03-08 RX ADMIN — ATORVASTATIN CALCIUM 20 MG: 20 TABLET, FILM COATED ORAL at 19:55

## 2021-03-08 RX ADMIN — MORPHINE SULFATE 4 MG: 4 INJECTION, SOLUTION INTRAMUSCULAR; INTRAVENOUS at 19:55

## 2021-03-08 RX ADMIN — FENTANYL CITRATE 50 MCG: 50 INJECTION, SOLUTION INTRAMUSCULAR; INTRAVENOUS at 08:30

## 2021-03-08 RX ADMIN — MAGNESIUM GLUCONATE 500 MG ORAL TABLET 400 MG: 500 TABLET ORAL at 15:34

## 2021-03-08 RX ADMIN — Medication 10 ML: at 19:55

## 2021-03-08 RX ADMIN — TRANEXAMIC ACID 1000 MG: 1 INJECTION, SOLUTION INTRAVENOUS at 10:50

## 2021-03-08 RX ADMIN — Medication 3 MG: at 09:18

## 2021-03-08 RX ADMIN — LIDOCAINE HYDROCHLORIDE 100 MG: 20 INJECTION, SOLUTION INTRAVENOUS at 07:43

## 2021-03-08 RX ADMIN — GABAPENTIN 100 MG: 100 CAPSULE ORAL at 19:55

## 2021-03-08 RX ADMIN — LABETALOL HYDROCHLORIDE 5 MG: 5 INJECTION INTRAVENOUS at 08:38

## 2021-03-08 RX ADMIN — FENTANYL CITRATE 50 MCG: 50 INJECTION, SOLUTION INTRAMUSCULAR; INTRAVENOUS at 08:06

## 2021-03-08 RX ADMIN — LABETALOL HYDROCHLORIDE 5 MG: 5 INJECTION INTRAVENOUS at 09:22

## 2021-03-08 RX ADMIN — Medication 0.6 MG: at 09:18

## 2021-03-08 RX ADMIN — OXYCODONE HYDROCHLORIDE 10 MG: 10 TABLET ORAL at 13:38

## 2021-03-08 RX ADMIN — SODIUM CHLORIDE, PRESERVATIVE FREE 10 ML: 5 INJECTION INTRAVENOUS at 22:09

## 2021-03-08 RX ADMIN — ONDANSETRON HYDROCHLORIDE 4 MG: 2 INJECTION, SOLUTION INTRAMUSCULAR; INTRAVENOUS at 09:13

## 2021-03-08 RX ADMIN — ROCURONIUM BROMIDE 20 MG: 10 INJECTION, SOLUTION INTRAVENOUS at 07:53

## 2021-03-08 RX ADMIN — FENTANYL CITRATE 50 MCG: 50 INJECTION, SOLUTION INTRAMUSCULAR; INTRAVENOUS at 08:15

## 2021-03-08 RX ADMIN — ROCURONIUM BROMIDE 10 MG: 10 INJECTION, SOLUTION INTRAVENOUS at 07:43

## 2021-03-08 RX ADMIN — LOSARTAN POTASSIUM 50 MG: 50 TABLET, FILM COATED ORAL at 15:34

## 2021-03-08 ASSESSMENT — PULMONARY FUNCTION TESTS
PIF_VALUE: 26
PIF_VALUE: 26
PIF_VALUE: 3
PIF_VALUE: 26
PIF_VALUE: 26
PIF_VALUE: 3
PIF_VALUE: 28
PIF_VALUE: 26
PIF_VALUE: 28
PIF_VALUE: 28
PIF_VALUE: 2
PIF_VALUE: 3
PIF_VALUE: 26
PIF_VALUE: 30
PIF_VALUE: 26
PIF_VALUE: 26
PIF_VALUE: 28
PIF_VALUE: 28
PIF_VALUE: 3
PIF_VALUE: 16
PIF_VALUE: 27
PIF_VALUE: 0
PIF_VALUE: 27
PIF_VALUE: 27
PIF_VALUE: 29
PIF_VALUE: 29
PIF_VALUE: 39
PIF_VALUE: 27
PIF_VALUE: 3
PIF_VALUE: 16
PIF_VALUE: 1
PIF_VALUE: 27
PIF_VALUE: 26
PIF_VALUE: 26
PIF_VALUE: 16
PIF_VALUE: 4
PIF_VALUE: 26
PIF_VALUE: 27
PIF_VALUE: 26
PIF_VALUE: 25
PIF_VALUE: 0
PIF_VALUE: 25
PIF_VALUE: 29
PIF_VALUE: 26
PIF_VALUE: 1
PIF_VALUE: 28
PIF_VALUE: 6
PIF_VALUE: 1
PIF_VALUE: 26
PIF_VALUE: 1
PIF_VALUE: 26
PIF_VALUE: 25
PIF_VALUE: 23
PIF_VALUE: 26
PIF_VALUE: 26
PIF_VALUE: 1
PIF_VALUE: 1

## 2021-03-08 ASSESSMENT — PAIN DESCRIPTION - FREQUENCY
FREQUENCY: INTERMITTENT
FREQUENCY: INTERMITTENT

## 2021-03-08 ASSESSMENT — PAIN DESCRIPTION - LOCATION: LOCATION: KNEE

## 2021-03-08 ASSESSMENT — PAIN SCALES - GENERAL
PAINLEVEL_OUTOF10: 7
PAINLEVEL_OUTOF10: 0

## 2021-03-08 ASSESSMENT — PAIN - FUNCTIONAL ASSESSMENT
PAIN_FUNCTIONAL_ASSESSMENT: 0-10
PAIN_FUNCTIONAL_ASSESSMENT: PREVENTS OR INTERFERES SOME ACTIVE ACTIVITIES AND ADLS

## 2021-03-08 ASSESSMENT — PAIN DESCRIPTION - ORIENTATION
ORIENTATION: LEFT
ORIENTATION: LEFT

## 2021-03-08 ASSESSMENT — PAIN DESCRIPTION - ONSET: ONSET: GRADUAL

## 2021-03-08 ASSESSMENT — PAIN DESCRIPTION - PROGRESSION
CLINICAL_PROGRESSION: GRADUALLY WORSENING
CLINICAL_PROGRESSION: GRADUALLY WORSENING

## 2021-03-08 ASSESSMENT — PAIN DESCRIPTION - DESCRIPTORS
DESCRIPTORS: DISCOMFORT;SORE;THROBBING
DESCRIPTORS: ACHING;CONSTANT

## 2021-03-08 ASSESSMENT — ENCOUNTER SYMPTOMS: SHORTNESS OF BREATH: 1

## 2021-03-08 ASSESSMENT — PAIN DESCRIPTION - PAIN TYPE
TYPE: SURGICAL PAIN
TYPE: SURGICAL PAIN

## 2021-03-08 NOTE — PROGRESS NOTES
OCCUPATIONAL THERAPY INITIAL EVALUATION      Date:3/8/2021  Patient Name: Izaiah Interiano  MRN: [id number]  : 1950  Room: [address] [place], [place] [address]A    Evaluating OT: Cyrus Green, OTR/L #[id number]    Referring physician: Jasbir Montoya, DO  AM-PAC Daily Activity Raw Score: 16/24  Recommended Adaptive Equipment: shower seat , LHS     Diagnosis: OA L knee   Surgery: L TKA   Pertinent Medical History:   Past Medical History:   Diagnosis Date    Arthritis     Atrial fibrillation ([address]     Bulging lumbar disc     L4-L5    CAD (coronary artery disease) 2016    ct scan heart    Chronic lower back pain     BREWER (dyspnea on exertion) 3/26/2013    Eczema     Hyperlipidemia     Hypertension     Kidney stones     Obesity     weight 250#    Sleep apnea     c pap  stting 2        Precautions:  Falls, WBAT to LLE, R foot drop     Home Living: Pt lives with wife in a one story home with 2+1 step(s) to enter and one rail(s); bed/bath on main floor with recreation room in basement full flight with HR   Bathroom setup: tub shower with HR and elevated BSC   Equipment owned: spc and ww  Prior Level of Function: mod   Independent with ADLs ,  Independent with IADLs; using AE prn for ambulation.    Driving: yes                           Medication Management self  Occupation: retired  Leisure:enjoys music and outdoors    Pain Level: 7/10 pain R knee- notified nursing and was medicated  Cognition: A&O: 3/3; Follows multi step directions   Memory:  good   Sequencing:  Good-   Problem solving:  Good-   Judgement/safety:  Good-     Functional Assessment:   Initial Eval Status  Date: 3/8/21 Treatment Status  Date: Short Term Goals=LTG  Treatment frequency: PRN 2-4 x/week   Feeding Independent      Grooming Setup sitting   Mod I with ww standing    UB Dressing SBA  Independent   LB Dressing Max A   Mod I with AE prn    Bathing Mod A   Mod I with LHS and shower seat    Toileting Mod A / setup for urinal   Mod I to elevated commode with HR Bed Mobility  Supine to sit: min A    Sit to supine: n/t  Supine to sit: mod I    Sit to supine: mod I    Functional Transfers Sit to stand: min A with ww  Stand to sit:  Min A   Stand pivot: min A with ww  Mod I with ww   Functional Mobility n/t  Mod I with ww   Balance Sitting:     Static:  good    Dynamic:SBA  Standing: min A      Activity Tolerance Good- 93% RA with HR 69  Good with ADL completion   Visual/  Perceptual      intact         Safety Good- cues for walker safety and knee precautions                 good     Hand dominance: R  UE ROM: RUE: limited in shoulder 100* and elbow-20/ WFL  LUE:  WFL  Strength: RUE:  3-/5 LUE:  5/5   Strength: R  WFL   L WFL   Fine Motor Coordination: R WFL  L WFL    Hearing: WFL  Sensation:  No c/o numbness or tingling  Tone:  WFL  Edema: min LLE                            Comments: Nursing approval to work with patient given. Upon arrival, patient supine and agreeable to evaluation. OT evaluation performed with  education on benefits of mobility and safety with ADL completion. Patient cooperative and motivated. At end of session, patient sitting up in chair and  with call light and phone within reach, all lines and tubes intact. Nursing notified. OT treatment: OT for functional assessment of  ADL, Functional Transfer/Mobility Training, Equipment Needs, Energy Conservation Techniques, Pt/Family Education, Ther Ex- deep breathing for edema and pain control., OT role and POC reviewed. Patient  demo good- understanding of functional limitations , bathroom safety and safety with mobility and LE ADL's. Skilled occupational therapy services provided include instruction/training on safety and adapted techniques for completion of therapeutic activities, ADLs/IADLs, and therapeutic exercise deep breathing for edema and pain control- issued spirometer. Therapist educated pt on knee precautions and walker safety and bathroom safety.   Skilled monitoring of O2 sats, HR, and pt response throughout treatment. OT treatment provided this date includes:  ·  ADL-  Instruction/training on safety and adapted techniques for completion of ADLs: Pt. Demonstrates good understanding of precautions & techniques  ·  Functional Mobility-  Instruction/training on safety and improved independence with bed mobility, /functional transfers using ww   ·  Sitting EOB SBA 4 minutes to improve dynamic sitting balance and activity tolerance during ADLs. ·  Activity tolerance- Instruction/training on energy conservation/work simplification for completion of ADLs:. Pt. Demo good- tolerance due to pain this day   ·  Cognitive retraining -  Cues for safety/technique during bed mobility, sitting balance/seated ADLs, no cues for sequencing, problem solving WFL  ·  Skilled positioning/alignment-  Proper Positioning/Alignment due to knee precautions  ·  Skilled monitoring of O2 sats-  refer to activity tolerance reporting  · Therapeutic Exercise- Instruction on deep breathing exercise to improve functional Ind. with ADLs             Patient would  benefit from continued skilled OT to increase functional independence and quality of life.     Eval Complexity: low    Assessment of current deficits   Functional mobility [x]  ADLs [x] Strength []  Cognition []  Functional transfers  [x] IADLs [x] Safety Awareness [x]  Endurance [x]  Fine Motor Coordination [] Balance [] Vision/perception [] Sensation []   Gross Motor Coordination [] ROM [] Delirium []                  Motor Control []    Plan of Care: OT 1-3x/week for 5-7 days PRN   Instruction/training on adapted ADL techniques and AE recommendations to increase functional independence within precautions  Training on energy conservation strategies/techniques to improve independence/tolerance for self-care routine  Functional transfer/mobility training/DME recommendations for increased independence, safety, and fall prevention  Patient/Family education to increase follow through with safety techniques and functional independence  Recommendation of environmental modifications for increased safety with functional transfers/mobility and ADLs    Rehab Potential: Good for established goals    Patient / Family Goal: decrease pain - home with assist     Evaluation time includes thorough review of current medical information, gathering information on past medical & social history & PLOF, completion of standardized testing, informal observation of tasks, consultation with other medical professions/disciplines, assessment of data & development of POC/goals. Patient and/or family were instructed diagnosis, prognosis/goals and plan of care. yes Demonstrated good understanding. Min Units   OT Eval Low 61042 X    OT Eval Medium 04713     OT Eval High [id number]     OT Re-Eval [id number]     Therapeutic Ex [id number]     Therapeutic Activities 61181 20 1   ADL/Self Care 23840     Orthotic Management 04766     Neuro Re-Ed 73765     Non-Billable Time              Time In: 13:25           Time Out: 13:55               [] Malnutrition indicators have been identified and nursing has been notified to ensure a dietitian consult is ordered. low OT Evaluation + 20  treatment minutes    Marleen Hogan.  [address], [address]

## 2021-03-08 NOTE — ANESTHESIA PROCEDURE NOTES
Peripheral Block    Patient location during procedure: pre-op  Start time: 3/8/2021 7:21 AM  End time: 3/8/2021 7:23 AM  Staffing  Performed: anesthesiologist   Anesthesiologist: Boo Santillan MD  Preanesthetic Checklist  Completed: patient identified, IV checked, site marked, risks and benefits discussed, surgical consent, monitors and equipment checked, pre-op evaluation, timeout performed, anesthesia consent given, oxygen available and patient being monitored  Peripheral Block  Patient position: supine  Prep: ChloraPrep  Patient monitoring: cardiac monitor, continuous pulse ox, frequent blood pressure checks and IV access  Block type: Femoral  Laterality: left  Injection technique: single-shot  Guidance: ultrasound guided  Local infiltration: lidocaine  Adductor canal  Provider prep: mask and sterile gloves  Local infiltration: lidocaine  Needle  Needle type: combined needle/nerve stimulator   Needle gauge: 21 G  Needle length: 10 cm  Needle localization: ultrasound guidance  Assessment  Injection assessment: negative aspiration for heme, no paresthesia on injection and local visualized surrounding nerve on ultrasound  Paresthesia pain: none  Slow fractionated injection: yes  Hemodynamics: stable  Additional Notes  U/S 21515.  Time out performed. (1) Under ultrasound guidance, a 21 gauge needle was inserted and placed in close proximity to the    nerve.  (2) Ultrasound was also used to visualize the spread of the anesthetic in close proximity to the nerve being blocked. (3) The nerve appeared anatomically normal, and (4 there were no apparent abnormal pathological findings on the image that were readily visible and related to the nerve being blocked. (5) A permanent ultrasound image was saved in the patient's record.             Medications Administered  Ropivacaine (NAROPIN) injection 0.5%, 30 mL  Reason for block: post-op pain management and at surgeon's request

## 2021-03-08 NOTE — H&P
Orthopaedic H&P Note     Diana Cardozo is a 72 y.o. male, his YOB: 1950 with the following history as recorded in [facility]:              Patient Active Problem List     Diagnosis Date Noted    Former smoker 06/03/2013       Priority: Medium    Typical atrial flutter ([address] 04/05/2013       Priority: Medium    Essential hypertension 04/05/2013       Priority: Medium    Eczema 06/03/2013       Priority: Low    Localized osteoarthritis of left knee 01/05/2021    HLD (hyperlipidemia) 07/01/2020    Redness and swelling of knee 07/01/2020    Obesity (BMI 30-39.9) 07/01/2020    CAD (coronary artery disease) 07/01/2020    NATALI (acute kidney injury) [facility] 06/30/2020    S/P TKR (total knee replacement) using cement, right 06/01/2020    COVID-19      Primary osteoarthritis of both shoulders 02/12/2019    Primary osteoarthritis of both knees 02/12/2019    Wrist pain, chronic, right 02/12/2019    Chronic pain syndrome 02/12/2019    Primary osteoarthritis involving multiple joints 02/12/2019    Right foot drop 02/12/2019    Lumbar facet arthropathy 02/12/2019    Tachyarrhythmia 12/07/2018    Atrial fibrillation ([facility] 12/07/2018    Right bundle-branch block 07/05/2018    Status post catheter ablation of atrial fibrillation 10/21/2017    Status post catheter ablation of atrial flutter 10/21/2017    CKD (chronic kidney disease)      Chronic systolic heart failure (HCC)      Persistent atrial fibrillation      Midline low back pain with right-sided sciatica 01/12/2016      Current Facility-Administered Medications          Current Outpatient Medications   Medication Sig Dispense Refill    chlorthalidone (HYGROTON) 25 MG tablet TAKE ONE TABLET BY MOUTH EVERY DAY 90 tablet 3    traMADol (ULTRAM) 50 MG tablet Take 50 mg by mouth 2 times daily.        losartan (COZAAR) 50 MG tablet Take 50 mg by mouth daily        dofetilide (TIKOSYN) 125 MCG capsule Take 1 capsule by mouth BID along with 250 mg dose to equal a 375 dose  capsule 1    dofetilide (TIKOSYN) 250 MCG capsule Take 1 capsule by mouth BID along with 125 mg dose to equal a 375 mg dose  capsule 1    diclofenac (VOLTAREN) 50 MG EC tablet NO LONGER TAKING        atorvastatin (LIPITOR) 20 MG tablet TAKE ONE TABLET BY MOUTH DAILY 30 tablet 5    metoprolol succinate (TOPROL XL) 100 MG extended release tablet Take 1 tablet by mouth daily 30 tablet 5    apixaban (ELIQUIS) 5 MG TABS tablet Take 1 tablet by mouth daily TAKE ONE TABLET BY MOUTH TWO TIMES A  tablet 3    ELDERBERRY PO Take by mouth STOP PREOP MED        Misc Natural Products (TART CHERRY ADVANCED PO) Take by mouth STOP PREOP MED        gabapentin (NEURONTIN) 100 MG capsule Take 100 mg by mouth 2 times daily.        magnesium oxide (MAG-OX) 400 (240 Mg) MG tablet Take 1 tablet by mouth 2 times daily (Patient taking differently: Take 400 mg by mouth daily ) 60 tablet 0    Multiple Vitamins-Minerals (THERAPEUTIC MULTIVITAMIN-MINERALS) tablet Take 1 tablet by mouth daily.        Coenzyme Q10 (COQ10 PO) Take 1 tablet by mouth daily.        Alpha-Lipoic Acid 300 MG CAPS Take 300 mg by mouth daily On hold        ascorbic acid (VITAMIN C) 500 MG tablet Take 500 mg by mouth daily.        Vitamin D (CHOLECALCIFEROL) 1000 UNITS CAPS capsule Take 1,000 Units by mouth daily.        Cyanocobalamin (VITAMIN B 12 PO) Take 500 mcg by mouth daily.          No current facility-administered medications for this visit.          Allergies: Adhesive tape  Past Medical History        Past Medical History:   Diagnosis Date    Arthritis      Atrial fibrillation (HCC)      Bulging lumbar disc       L4-L5    CAD (coronary artery disease) 07/07/2016     ct scan heart    Chronic lower back pain      BREWER (dyspnea on exertion) 3/26/2013    Eczema      Hyperlipidemia      Hypertension      Kidney stones      Obesity       weight 250#    Sleep apnea       c pap  stting 2        Past Surgical History         Past Surgical History:   Procedure Laterality Date    ABLATION OF DYSRHYTHMIC FOCUS   2013     heart ablation dr Bhavik Aldana   08/10/2016    CARDIOVERSION   [phone]     Dr. Mayela Gagnon   10/20/2017     Dr. Trenton Claire ECHOCARDIOGRAM TRANSESOPHAGEAL   2013          KNEE SURGERY Left 1969     repair left knee ligaments    LITHOTRIPSY   1986    LUMBAR [address]   2016     [facility] [place]    TONSILLECTOMY        TOTAL KNEE ARTHROPLASTY Right 2020     RIGHT KNEE TOTAL ARTHROPLASTY -- SHRUTHI performed by Pao Soto MD at [place] [address] TRANSESOPHAGEAL ECHOCARDIOGRAM   3-     Dr. Urmila Whiteside         Family History         Family History   Problem Relation Age of Onset    Cancer Mother      Diabetes Mother      Cancer Father      Diabetes Sister      Kidney Disease Sister      Early Death Neg Hx           Social History      Tobacco Use    Smoking status: Former Smoker       Packs/day: 1.00       Years: 20.00       Pack years: 20.00       Types: Cigarettes       Quit date: 2007       Years since quittin.0    Smokeless tobacco: Never Used    Tobacco comment: 1.5 PACKS EACH DAY stop    Substance Use Topics    Alcohol use: Yes       Alcohol/week: 3.0 standard drinks       Types: 3 Shots of liquor per week       Comment: 1 x week                                    Chief Complaint   Patient presents with    Follow Up After Procedure       2020 Rt TKA, c/o knee clicking sometimes    Surgical Consult       Would like to set up Lt TKA    X-ray        Natanael knee   OP:SURGEON: Dr. Jus Valle MD  DATE OF PROCEDURE: 2020  PROCEDURE:Right Knee Arthroplasty     SUBJECTIVE: Doreatha Mail is here today for their bilateral knee. Appointment for right total knee and set up for left total knee arthroplasty today.   The patient has had pain for sometime, but last 6 months or so it has Mild for back pain, negative joint swelling and gait problem. Skin: Negative for pallor, rash and wound. Neurological: Negative for dizziness, tremors, seizures, weakness, light-headedness, no TIA or stroke symptoms. No numbness and headaches. Psychiatric/Behavioral: Negative.      Physical Examination:   General appearance: alert, well appearing, and in no distress,  normal appearing weight  Mental status: alert, oriented to person, place, and time, normal mood, behavior, speech, dress, motor activity, and thought processes  Abdomen: soft, nondistended, nontender, bowel sound + X 4 quads  Resp:   resp easy and unlabored, equal, regular rate, no wheezes, rhonchi, crackles noted  Cardiac: distal pulses palpable, skin well perfused.  HR regular rhythm and rate, no murmers, rubs, or clicks  Neurological: alert, oriented X3, normal speech, no focal findings or movement disorder noted, motor and sensory grossly normal bilaterally, normal muscle tone, no tremors, strength 5/5, normal gait and station  HEENT: normochephalic atraumatic, external ears and eyes normal, sclera normal, neck supple  Extremities:   peripheral pulses normal, no edema, redness or tenderness in the calves   Skin: normal coloration, no rashes or open wounds, no suspicious skin lesions noted  Psych: Affect euthymic   Musculoskeletal:    Extremity:  Bilateral Lower Extremity  Skin clean dry and intact, without signs of infection  Incision healed to the R knee  Nontender globally about both knees   Lachman and posterior drawer neg L knee  sydnee neg L knee  Stable to varus and valgus at 0 and 30 degrees L knee  Mild crepitus felt bilaterally to PROM at the knees, L>R, patellae track normally   No effusions or edema noted bilaterally   AROM R knee 0-90, PROM 0-95  AROM L knee 0-115  Extensor mechanism intact bilaterally   Compartments supple throughout thigh and leg  Calf supple and nontender  Demonstrates active  ankle plantar/dorsiflexion/great toe extension. States sensation intact to touch in sural/deep peroneal/superficial peroneal/saphenous/posterior tibial nerve distributions to foot/ankle. Palpable dorsalis pedis and posterior tibialis pulses, cap refill brisk in toes, foot warm/perfused.                    /70 (Site: Left Upper Arm, Position: Sitting, Cuff Size: Large Adult)   Pulse 53   Ht 5' 9.5\" (1.765 m)   Wt 260 lb (117.9 kg)   BMI 37.84 kg/m²      XR: 1/5/21    Views of bilateral knees demonstrating severe OA and osteophytic formation to the L knee, worse in medial compartment. Some interval lucency noted near medial femoral condyle of R knee, however there is not any correlated tenderness on exam today or reported history of increasing pain or instability.      ASSESSMENT:       Diagnosis Orders   1. S/P TKR (total knee replacement) using cement, right      2. Localized osteoarthritis of left knee            Discussion:  The patient would like to proceed with total left knee arthroplasty when cleared by their PCP. Piotr Orosco understands the risks include but are not limited to infection, injuries to the blood vessel and nerves, bleeding, continued pain and non relief of pain, aseptic loosening dislocation, limb length discrepancy, arthrofibrosis of the joint, further operation, deep venous thrombosis, pulmonary embolism and fracture, heart attack and death. Ladi operative plan discussed, need for anticoagulation for DVT/PE prophylaxis, need for physical therapy, office follow up visits, and possible rehab stay. It was also explained patient will need to take a prophylactic dose of ATB prior to any bowel, dental or mouth procedures, including dental cleaning, for length of the implant. Did review surgery prosthesis with model with patient as well.    Pain control was also discussed and the expectation is to have patient off narcotic pain meds around 3 weeks post operatively for a total joint arthroplasty     PLAN:  You will need to be medically cleared before surgery by your family doctor. You will also need cleared by your cardiologist. Please call your doctor to set up an appointment for medical clearance as soon as possible and have the office fax your medical clearance to :   (FAX) [phone]   ATTN:  RAMILA     1. Your surgery is scheduled for Monday, February 8, 2021 at 7:30 AM  with Dr. Tiesha Anne MD at the main [facility] in [facility] . You will need to report to Preop area  that morning at 6:00 AM.    2. You are having Outpatient surgery, but plan for a few nights in the hospital for recovery. 3. Preadmission Testing (PAT) department at [facility] will contact you with all the details prior to surgery. 4. Nothing to eat or drink after midnight the night before surgery. You may take a pain pill and any other medicine PAT instructs you to take with small sip of water if needed. 5. Continue with ice and elevation to reduce swelling  6. Weightbearing as tolerated left lower extremity  7. Take pain medicine as instructed  8. Call office with any question or concerns: [phone]. Hold Lovenox/Aspirin the day of surgery.  Hold all NSAIDs 7 days prior to surgery     ALL TOTAL JOINT PATIENTS WILL BE WEANED OFF ANY NARCOTIC MEDICATION GIVEN POST OPERATIVELY BY AT THE LATEST 3 WEEKS FROM DATE OF SURGERY         OUTPATIENT ORTHOPEDIC SURGERY  PRE-OP COVID TESTING     · We need to have COVID-19 Testing done 4 days (not including Sunday) prior to your scheduled surgery     · After your testing is completed you will need to Self Quarantine until date of surgery     · If your surgery is scheduled for:  Monday- Testing needs to be done Wednesday Tuesday- Testing needs to be done Thursday Wednesday- Testing needs to be done Friday Thursday- Testing needs to be done Friday Friday- Testing needs to be done Monday     Locations and hours are listed below:     These sites will not test anyone without a

## 2021-03-08 NOTE — PROGRESS NOTES
Date: 3/8/2021       Patient Name: Shila Hernandez  : [birth year]      MRN: [id number]    PT order received and chart reviewed. Pt declined physical therapy due to pain and fatigue from mobilizing with OT.  Agreeable to perform evaluation in AM.   Will follow up as appropriate    Christian December PT, DPT  [id number]

## 2021-03-08 NOTE — OP NOTE
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 Item Serial No.  Lot No. LRB No. Used Action   CEMENT BNE 40 GM RADIOPAQUE BA SIMPLEX P Cement CEMENT BNE 40 GM RADIOPAQUE BA SIMPLEX P  [employer] [id number] Left 2 Implanted   BASEPLATE TIB SZ 7 KNEE TRITANIUM ANGELITO RAQUEL LO PROF TRIATHLON  BASEPLATE TIB SZ 7 KNEE TRITANIUM ANGELITO RAQUEL LO PROF TRIATHLON  [employer] [id number] Left 1 Implanted   COMPONENT FEM SZ 7 L KNEE CRUCE RET ANGELITO TRIATHLON  COMPONENT FEM SZ 7 L KNEE CRUCE RET ANGELITO TRIATHLON  [employer] [id number] Left 1 Implanted   INSERT TIB BEAR SZ 7 THK9MM KNEE X3 CRUCE RET TRIATHLON  INSERT TIB BEAR SZ 7 THK9MM KNEE X3 CRUCE RET TRIATHLON  [facility] [id number] Left 1 Implanted   IMPLANT PAT [id number] X3 ASYM TRIATHLON  IMPLANT PAT [id number] X3 ASYM TRIATHLON  [employer] [id number] Left 1 Implanted         Drains: * No LDAs found *    Findings: Good tracking and stability of the case. Used Riley triathlon size #7 cruciate retaining femur, size #7 universal baseplate, 9 mm polyethylene liner standard, and A32 patella    Detailed Description of Procedure:       The patient was seen and identified outside the operative suite, in which the operative site was marked as appropriate by patient, surgeon, staff, and anesthesia. The patient was then taken into the operative suite, transferred to the operative table with all bony prominences and neurovascular structures well padded and protected. A tourniquet was placed high on the left thigh of the operative extremity. The patient was sedated under the care of the anesthesia team. The operative site was prepped and draped in standard sterile fashion. The tourniquet was inflated to 275 mmHg. Anterior midline incision was made centered about the patella, dissection carried down in the midline to the extensor mechanism where a medial  parapatellar arthrotomy was made. The patella was everted. The anterior fat pad and osteophytes were meticulously removed. After resection of all osteophytes, the intramedullary femoral cutting guide was slid into position. This was set appropriately with an 8 mm resection for the distal femur. T once in position and pinned this was checked with an vargas wing. The distal femoral cut was then made. The posterior referencing guide was then pinned in position after marking out the epicondylar axis. The appropriate size was obtained utilizing both the stylette and h an vargas wing. The 4-in-1 cutting guide was then put into position. It was also checked for appropriate sizing. It was pinned into position and then the anterior and posterior cuts were created as well as the anterior and posterior chamfers. The posterior aspect of the femur was cleaned off with osteotomes and a pituitary rongeur. The menisci were then resected. The intramedullary guide was placed on the tibia. The stylette was used to obtain the appropriate depth of 2 mm off the more worn side. This was pinned into position then checked with a drop kemal. Once in appropriate position the cross pin was placed. The proximal tibia cut was then created. Cutting guide was removed and the gaps were checked with a spacer.   Once appropriate the trial baseplate was pinned in position. A trial polyethylene 9 mm liner was pinned in position. The femoral trial was pinned in position. The knee was taken through a range of motion checking patellar tracking, and medial and lateral gaps at both full extension and 30 degrees of flexion. Once appropriate size poly-was chosen the patella was then cut leaving 12 to 14 mm. The appropriate sized jig was placed for k an asymmetric patella. The cut was made the holes were drilled n and the trial was pinned in position. The entire knee was re-trialed which checked out very nicely. At this point time trial was removed, the femur was drilled and the tibia was punched. Copious irrigation was carried out along with a periarticular injection. The knee was then meticulously dried. The final tibial component was cemented in position with excess cement removed. It was impacted with a final impacted with further excess cement removed. The polyethylene liner was clipped and the position was checked for security. The final femur was impacted into position with excess cement removed and placed into extension with further excess cement removed. The final patellar component was cemented into position with all excess cement removed. The knee was then allowed to dry with a bolster underneath the heel. During this time it was copiously irrigated and a Betadine shock was performed. The final components included a size number #7 femur, a size #7 tibial baseplate with a 9 millimeter polyethylene liner and a A32 patellar component. After the final irrigation the capsule was closed with Ethibond suture in a barbed suture for watertight closure. Subcutaneous tissues closed with 2-0 Monocryl and the skin was closed with 3-0 nylon. The patient was taken to the PACU in stable condition. PRP was injected prior to closure to help with the soft tissue healing. Disposition: The patient was taken to PACU in stable condition.  Once stable, the patient will be transferred to the floor. Orders have been provided to begin physical therapy, weight bear as tolerated Left lower extremity. Patient received a dose of Ancef preoperatively. We will continue this for 24 hours postoperatively for infection prophylaxis. The patient will also be started on Lovenox in the hospital and aspirin for home-going for DVT prophylaxis. We have consulted  and case management for discharge planning and consulted the PCP for medical management.           Electronically signed by Cecilio Mcneil MD on 3/8/2021 at 9:24 AM

## 2021-03-08 NOTE — ANESTHESIA PRE PROCEDURE
Department of Anesthesiology  Preprocedure Note       Name:  Denny Renee   Age:  72 y.o.  :  1950                                          MRN:  [id number]         Date:  3/8/2021      Surgeon: Christie Manzo):  Ela Lucio MD    Procedure: Procedure(s):  RIGHT KNEE TOTAL ARTHROPLASTY -- SHRUTHI    Medications prior to admission:   Prior to Admission medications    Medication Sig Start Date End Date Taking? Authorizing Provider   metoprolol succinate (TOPROL XL) 100 MG extended release tablet TAKE ONE TABLET BY MOUTH EVERY DAY 21  Yes Aliya Gallo MD   dofetilide [facility]) 125 MCG capsule Take 1 capsule by mouth BID along with 250 mg dose to equal a 375 dose BID 10/14/20  Yes Ayush Whalen MD   dofetilide (TIKOSYN) 250 MCG capsule Take 1 capsule by mouth BID along with 125 mg dose to equal a 375 mg dose BID 10/14/20  Yes Ayush Whalen MD   atorvastatin (LIPITOR) 20 MG tablet TAKE ONE TABLET BY MOUTH EVERY DAY 21   Aliya Gallo MD   chlorthalidone (HYGROTON) 25 MG tablet TAKE ONE TABLET BY MOUTH EVERY DAY 20   Aliya Gallo MD   traMADol (ULTRAM) 50 MG tablet Take 50 mg by mouth 2 times daily. Historical Provider, MD   losartan (COZAAR) 50 MG tablet Take 50 mg by mouth daily    Historical Provider, MD   apixaban (ELIQUIS) 5 MG TABS tablet Take 1 tablet by mouth daily TAKE ONE TABLET BY MOUTH TWO TIMES A DAY 20   Tolu Art,    ELDERBERRY PO Take by mouth STOP PREOP MED    Historical Provider, MD   Misc Natural Products (TART CHERRY ADVANCED PO) Take by mouth STOP PREOP MED    Historical Provider, MD   gabapentin (NEURONTIN) 100 MG capsule Take 100 mg by mouth 2 times daily.     Historical Provider, MD   magnesium oxide (MAG-OX) 400 (240 Mg) MG tablet Take 1 tablet by mouth 2 times daily  Patient taking differently: Take 400 mg by mouth daily  10/23/17   Danny Barrow,    Multiple Vitamins-Minerals (THERAPEUTIC MULTIVITAMIN-MINERALS) tablet Take ([facility] I48.91    Primary osteoarthritis of both shoulders M19.011, M19.012    Primary osteoarthritis of both knees M17.0    Wrist pain, chronic, right M25.531, G89.29    Chronic pain syndrome G89.4    Primary osteoarthritis involving multiple joints M89.49    Right foot drop M21.371    Lumbar facet arthropathy M47.816    COVID-19 U07.1    S/P TKR (total knee replacement) using cement, right Z96.651    NATALI (acute kidney injury) (HCC) N17.9    HLD (hyperlipidemia) E78.5    Redness and swelling of knee M25.469, R23.8    Obesity (BMI 30-39. 9) E66.9    CAD (coronary artery disease) I25.10    Localized osteoarthritis of left knee M17.12       Past Medical History:        Diagnosis Date    Arthritis     Atrial fibrillation (HCC)     Bulging lumbar disc     L4-L5    CAD (coronary artery disease) 2016    ct scan heart    Chronic lower back pain     BREWER (dyspnea on exertion) 3/26/2013    Eczema     Hyperlipidemia     Hypertension     Kidney stones     Obesity     weight 250#    Sleep apnea     c pap  stting 2       Past Surgical History:        Procedure Laterality Date    ABLATION OF DYSRHYTHMIC FOCUS  2013    heart ablation dr Melvi Gilman  08/10/2016    CARDIOVERSION  3-    Dr. Rocael Albarado  10/20/2017    Dr. Guardado Levon ECHOCARDIOGRAM TRANSESOPHAGEAL  2013         KNEE SURGERY Left 1969    repair left knee ligaments    LITHOTRIPSY  1986    LUMBAR [address]  2016    [facility] [place]    TONSILLECTOMY      TOTAL KNEE ARTHROPLASTY Right 2020    RIGHT KNEE TOTAL ARTHROPLASTY -- SHRUTHI performed by [facility] at [facility] OR    TRANSESOPHAGEAL ECHOCARDIOGRAM  3-    Dr. Kristy Albarado History:    Social History     Tobacco Use    Smoking status: Former Smoker     Packs/day: 1.00     Years: 20.00     Pack years: 20.00     Types: Cigarettes     Quit date: 2007     Years since quittin.1  Smokeless tobacco: Never Used    Tobacco comment: 1.5 PACKS EACH DAY stop 2006   Substance Use Topics    Alcohol use: Yes     Alcohol/week: 3.0 standard drinks     Types: 3 Shots of liquor per week     Comment: 1 x week                                Counseling given: Not Answered  Comment: 1.5 PACKS EACH DAY stop 2006      Vital Signs (Current):   Vitals:    03/08/21 0616   BP: (!) 156/71   Pulse: 56   Resp: 16   Temp: 36.7 °C (98.1 °F)   TempSrc: Temporal   SpO2: 97%   Weight: 267 lb (121.1 kg)   Height: 5' 10\" (1.778 m)                                              BP Readings from Last 3 Encounters:   03/08/21 (!) 156/71   03/03/21 (!) 157/74   01/05/21 130/70       NPO Status: Time of last liquid consumption: 2300                        Time of last solid consumption: 1900                        Date of last liquid consumption: 03/07/21                        Date of last solid food consumption: 03/07/21    BMI:   Wt Readings from Last 3 Encounters:   03/08/21 267 lb (121.1 kg)   03/03/21 267 lb (121.1 kg)   01/05/21 260 lb (117.9 kg)     Body mass index is 38.31 kg/m². CBC:   Lab Results   Component Value Date    WBC 9.1 03/03/2021    RBC 4.47 03/03/2021    HGB 14.5 03/03/2021    HCT 43.7 03/03/2021    MCV 97.8 03/03/2021    RDW 13.2 03/03/2021     03/03/2021       CMP:   Lab Results   Component Value Date     03/03/2021    K 4.3 03/03/2021    K 4.1 06/30/2020     03/03/2021    CO2 29 03/03/2021    BUN 36 03/03/2021    CREATININE 1.7 03/03/2021    GFRAA 48 03/03/2021    LABGLOM 40 03/03/2021    GLUCOSE 93 03/03/2021    PROT 7.8 03/03/2021    CALCIUM 9.8 03/03/2021    BILITOT 0.4 03/03/2021    ALKPHOS 112 03/03/2021    AST 25 03/03/2021    ALT 27 03/03/2021       POC Tests: No results for input(s): POCGLU, POCNA, POCK, POCCL, POCBUN, POCHEMO, POCHCT in the last 72 hours.     Coags:   Lab Results   Component Value Date    PROTIME 12.6 03/03/2021    INR 1.2 03/03/2021    APTT 31.9 05/07/2013       HCG (If Applicable): No results found for: PREGTESTUR, PREGSERUM, HCG, HCGQUANT     ABGs: No results found for: PHART, PO2ART, MFW0MXN, PEJ9IIC, BEART, Q9CELGLG     Type & Screen (If Applicable):  No results found for: LABABO, LABRH    Drug/Infectious Status (If Applicable):  No results found for: HIV, HEPCAB    COVID-19 Screening (If Applicable):   Lab Results   Component Value Date    COVID19 Not Detected 03/03/2021     EKG 6/5/2019  Narrative & Impression   Normal sinus rhythm with sinus arrhythmia rate 69  Right bundle branch block  Abnormal ECG     ECHO 10/20/2017  Findings      Left Ventricle   Normal LV segmental wall motion. Ejection fraction is visually estimated at 50%. Right Ventricle   Normal right ventricular size and function. Left Atrium   No thrombus in left atrial appendage. Normal LA appendage velocity >0.4m/s      Mitral Valve   Normal mitral valve structure and function. Tricuspid Valve   The tricuspid valve appears structurally normal.      Aortic Valve   Structurally normal aortic valve. Aorta   Mild aortic atherosclerosis      Summary   Normal LV segmental wall motion. Ejection fraction is visually estimated at 50%. No thrombus in left atrial appendage. Normal LA appendage velocity >0.4m/s   Normal mitral valve structure and function. Mild aortic atherosclerosis          Anesthesia Evaluation  Patient summary reviewed no history of anesthetic complications:   Airway: Mallampati: III  TM distance: <3 FB   Neck ROM: full  Mouth opening: > = 3 FB Dental:      Comment: Fair dentition. Discolored upper front teeth.     Pulmonary:   (+) shortness of breath: chronic and no interval change,  sleep apnea: on CPAP,                            ROS comment: Former smoker   Cardiovascular:  Exercise tolerance: poor (<4 METS),   (+) hypertension:, CAD:, dysrhythmias: atrial fibrillation and atrial flutter, CHF:, BREWER:, hyperlipidemia      ECG reviewed Echocardiogram reviewed         Beta Blocker:  Dose within 24 Hrs      ROS comment: Hx ablation of a flutter     Neuro/Psych:   (+) neuromuscular disease (right sciatica):,              ROS comment: Chronic pain  Lumbar facet arthropathy  Lower back pain with right sided sciatica    Rt. Foot drop secondary to fall from ladder GI/Hepatic/Renal:   (+) renal disease: CRI, morbid obesity          Endo/Other:    (+) : arthritis: OA., .                 Abdominal:           Vascular:   + PVD, aortic or cerebral, . The resting images are normal.     Gated SPECT left ventricular ejection fraction was calculated to   be 64%, with normal myocardial thickening and wall motion. Impression:    1. ECG during the infusion did not change. 2. The myocardial perfusion imaging was normal with attenuation   artifact. 3. Overall left ventricular systolic function was normal without   regional wall motion abnormalities. 4. Low risk general pharmacologic stress test.    Thank you for sending your patient to this [facility]. Electronically signed by Linda Weir DO on 2/20/20 at 12:57 PM                 Anesthesia Plan      general and regional     ASA 3     (R hand 18  Discussed with Pt. And his wife [employer] of Rt. Adductor canal nerve block and they agree to this.)  Induction: intravenous. BIS  MIPS: Postoperative opioids intended, Prophylactic antiemetics administered and Postoperative trial extubation. Plan discussed with attending. MARIA E Andino - LANI    3/8/2021    Pt seen, examined, chart reviewed, plan discussed.   Christie Sanabria  3/8/2021  7:13 AM

## 2021-03-08 NOTE — CONSULTS
Hospital Medicine  Consult History & Physical        Reason for consult:  Medical management s/p right total knee arthroplasty     Date of Service: Pt seen/examined in consultation on 3/8/2021    History Of Present Illness:    Mr. Rick Mayberry, a 72y.o. year old male  who  has a past medical history of Arthritis, Atrial fibrillation ([facility] Bulging lumbar disc, CAD (coronary artery disease), Chronic lower back pain, BREWER (dyspnea on exertion), Eczema, Hyperlipidemia, Hypertension, Kidney stones, Obesity, and Sleep apnea. Patient was admitted on 3/8/21 for postoperative management s/p elective left TKA. On my exam he is up to the chair and has no complaints. He states he just had his right knee done in June and had an uncomplicated recovery. He sees his PCP regularly. He sees Dr. Gaston Brandon and Dr. Mario Rodríguez for management of his atrial fibrillation.        Past Medical History:        Diagnosis Date    Arthritis     Atrial fibrillation ([facility]     Bulging lumbar disc     L4-L5    CAD (coronary artery disease) 07/07/2016    ct scan heart    Chronic lower back pain     BREWER (dyspnea on exertion) 3/26/2013    Eczema     Hyperlipidemia     Hypertension     Kidney stones     Obesity     weight 250#    Sleep apnea     c pap  stting 2       Past Surgical History:        Procedure Laterality Date    ABLATION OF DYSRHYTHMIC FOCUS  05/2013    heart ablation dr Darylene Settle  08/10/2016    CARDIOVERSION  3-    Dr. Tory Christiansen  10/20/2017    Dr. Yamil Lyon ECHOCARDIOGRAM TRANSESOPHAGEAL  5/7/2013         KNEE SURGERY Left 1969    repair left knee ligaments    LITHOTRIPSY  1986    LUMBAR [address]  11/30/2016    [facility] [place]    TONSILLECTOMY      TOTAL KNEE ARTHROPLASTY Right 6/1/2020    RIGHT KNEE TOTAL ARTHROPLASTY -- SHRUTHI performed by My Eid MD at [address] Left 3/8/2021    LEFT KNEE TOTAL ARTHROPLASTY -- SHRUTHI performed by Kaleigh Benites MD at [facility] TRANSESOPHAGEAL ECHOCARDIOGRAM  3-    Dr. Svitlana De León       Medications Prior to Admission:    Prior to Admission medications    Medication Sig Start Date End Date Taking? Authorizing Provider   oxyCODONE-acetaminophen (PERCOCET) 5-325 MG per tablet Take 1 tablet by mouth every 6 hours as needed for Pain for up to 7 days. Intended supply: 7 days. Take lowest dose possible to manage pain 3/8/21 3/15/21 Yes Ely Watkins PA-C   metoprolol succinate (TOPROL XL) 100 MG extended release tablet TAKE ONE TABLET BY MOUTH EVERY DAY 1/25/21  Yes Fransico Wood MD   dofetilide [facility]) 125 MCG capsule Take 1 capsule by mouth BID along with 250 mg dose to equal a 375 dose BID 10/14/20  Yes Jewell Gonzales MD   dofetilide (TIKOSYN) 250 MCG capsule Take 1 capsule by mouth BID along with 125 mg dose to equal a 375 mg dose BID 10/14/20  Yes Jewell Gonzales MD   atorvastatin (LIPITOR) 20 MG tablet TAKE ONE TABLET BY MOUTH EVERY DAY 2/5/21   Fransico Wood MD   chlorthalidone (HYGROTON) 25 MG tablet TAKE ONE TABLET BY MOUTH EVERY DAY 11/9/20   Fransico Wood MD   traMADol (ULTRAM) 50 MG tablet Take 50 mg by mouth 2 times daily. Historical Provider, MD   losartan (COZAAR) 50 MG tablet Take 50 mg by mouth daily    Historical Provider, MD   apixaban (ELIQUIS) 5 MG TABS tablet Take 1 tablet by mouth daily TAKE ONE TABLET BY MOUTH TWO TIMES A DAY 6/22/20   Ambika Mooney DO   ELDERBERRY PO Take by mouth STOP PREOP MED    Historical Provider, MD   Misc Natural Products (TART CHERRY ADVANCED PO) Take by mouth STOP PREOP MED    Historical Provider, MD   gabapentin (NEURONTIN) 100 MG capsule Take 100 mg by mouth 2 times daily.     Historical Provider, MD   magnesium oxide (MAG-OX) 400 (240 Mg) MG tablet Take 1 tablet by mouth 2 times daily  Patient taking differently: Take 400 mg by mouth daily  10/23/17   Mariely Sherwood DO   Multiple Vitamins-Minerals (THERAPEUTIC MULTIVITAMIN-MINERALS) tablet Take 1 tablet by mouth daily. Historical Provider, MD   Coenzyme Q10 (COQ10 PO) Take 1 tablet by mouth daily. Historical Provider, MD   Alpha-Lipoic Acid 300 MG CAPS Take 300 mg by mouth daily On hold    Historical Provider, MD   ascorbic acid (VITAMIN C) 500 MG tablet Take 500 mg by mouth daily. Historical Provider, MD   Vitamin D (CHOLECALCIFEROL) 1000 UNITS CAPS capsule Take 1,000 Units by mouth daily. Historical Provider, MD   Cyanocobalamin (VITAMIN B 12 PO) Take 500 mcg by mouth daily. Historical Provider, MD       Allergies:  Adhesive tape    Social History:    RESIDENCE: Home with wife. TOBACCO:   reports that he quit smoking about 14 years ago. His smoking use included cigarettes. He has a 20.00 pack-year smoking history. He has never used smokeless tobacco.  ETOH:   reports current alcohol use of about 3.0 standard drinks of alcohol per week. Family History:         Problem Relation Age of Onset    Cancer Mother     Diabetes Mother    [facility] Cancer Father     Diabetes Sister     Kidney Disease Sister     Early Death Neg Hx        REVIEW OF SYSTEMS:   Pertinent positives as noted in the HPI. All other systems reviewed and negative. PHYSICAL EXAM:  BP (!) 148/76   Pulse 63   Temp 97.2 °F (36.2 °C) (Temporal)   Resp 12   Ht 5' 10\" (1.778 m)   Wt 267 lb (121.1 kg)   SpO2 96%   BMI 38.31 kg/m²   General appearance: Obese elderly male in no apparent distress, appears stated age and cooperative. HEENT: Normal cephalic, atraumatic without obvious deformity. Pupils equal, round, and reactive to light. Extra ocular muscles intact. Conjunctivae/corneas clear. Neck: Supple, with full range of motion. No jugular venous distention. Trachea midline. Respiratory:  Clear to auscultation bilaterally. No apparent distress. Cardiovascular:  Regular rate and rhythm. S1, S2 without murmurs, rubs, or gallops.    PV: Brisk capillary refill of the BLE.  +2 pedal and radial pulses bilaterally. No clubbing, cyanosis, edema of bilateral lower extremities. Abdomen: Soft, non-tender, obese. +BS  Musculoskeletal: No obvious deformities or erythematous or edematous joints. Strong dorsiflexion and plantar flexion bilaterally. Skin: Normal skin color. No rashes or lesions. Ace bandage to LLE with sanguineous strikethrough at the lateral left knee. Neurologic:  Neurovascularly intact without any focal sensory/motor deficits. Cranial nerves: II-XII intact, grossly non-focal.  Psychiatric: Alert and oriented, thought content appropriate, normal insight    Labs:   ESR:  Lab Results   Component Value Date    SEDRATE 47 (H) 06/30/2020     CRP:   Lab Results   Component Value Date    CRP 3.3 (H) 06/30/2020     D Dimer:   Lab Results   Component Value Date    DDIMER 737 06/30/2020     Thyroid Studies:   Lab Results   Component Value Date    TSH 2.340 06/30/2020         ASSESSMENT/PLAN:  OA of left knee s/p TKA. Pain control per attending. PT/OT, WBAT. Neurovasc checks. Monitor H&H. PCD's for now. HTN resume home meds, monitor  Afib anticoagulated on Eliquis which is being held postop, resume when cleared by ortho. Resume home Tikosyn and metoprolol XL  AURELIANO Cpap at night  CAD Cardiac diet  HLD Check lipids, resume home lipitor  CKD Baseline creatinine around 1.5. Check BMP in am.   Obesity Check A1c. Counseled on importance of healthy diet and exercise in order to lose weight. Thanks for allowing us to participate in the care of Jen Grajeda. We will continue to follow along. Please do not hesitate to contact us if needed.    +++++++++++++++++++++++++++++++++++++++++++++++++  ANTHONY Tucker/ Chato Wagner , [place]  +++++++++++++++++++++++++++++++++++++++++++++++++  NOTE: This report was transcribed using voice recognition software.  Every effort was made to ensure accuracy; however, inadvertent computerized transcription errors may be present.

## 2021-03-08 NOTE — ANESTHESIA POSTPROCEDURE EVALUATION
Department of Anesthesiology  Postprocedure Note    Patient: Deedee Solorzano  MRN: [id number]  [place]: 1950  Date of evaluation: 3/8/2021  Time:  10:30 AM     Procedure Summary     Date: 03/08/21 Room / Location: [place] [address] / [facility]    Anesthesia Start: 0730 Anesthesia Stop:     Procedure: LEFT KNEE TOTAL ARTHROPLASTY -- SHRUTHI (Left ) Diagnosis: (OSTEOARTHRITIS)    Surgeons: Jie Palacio MD Responsible Provider: Trae Ramsey MD    Anesthesia Type: general, regional ASA Status: 3          Anesthesia Type: general, regional    Rakesh Phase I: Rakesh Score: 8    Rakesh Phase II:      Last vitals: Reviewed and per EMR flowsheets.        Anesthesia Post Evaluation    Patient location during evaluation: PACU  Patient participation: complete - patient participated  Level of consciousness: awake  Pain score: 3  Airway patency: patent  Nausea & Vomiting: no nausea and no vomiting  Complications: no  Cardiovascular status: blood pressure returned to baseline  Respiratory status: acceptable  Hydration status: euvolemic

## 2021-03-09 VITALS
OXYGEN SATURATION: 94 % | HEIGHT: 70 IN | DIASTOLIC BLOOD PRESSURE: 63 MMHG | SYSTOLIC BLOOD PRESSURE: 132 MMHG | WEIGHT: 267 LBS | BODY MASS INDEX: 38.22 KG/M2 | HEART RATE: 91 BPM | RESPIRATION RATE: 18 BRPM | TEMPERATURE: 97.1 F

## 2021-03-09 PROBLEM — M17.12 LOCALIZED OSTEOARTHRITIS OF LEFT KNEE: Status: ACTIVE | Noted: 2021-03-09

## 2021-03-09 LAB
ANION GAP SERPL CALCULATED.3IONS-SCNC: 9 MMOL/L (ref 7–16)
BUN BLDV-MCNC: 26 MG/DL (ref 8–23)
CALCIUM SERPL-MCNC: 9.2 MG/DL (ref 8.6–10.2)
CHLORIDE BLD-SCNC: 95 MMOL/L (ref 98–107)
CHOLESTEROL, TOTAL: 108 MG/DL (ref 0–199)
CO2: 27 MMOL/L (ref 22–29)
CREAT SERPL-MCNC: 1.3 MG/DL (ref 0.7–1.2)
GFR AFRICAN AMERICAN: >60
GFR NON-AFRICAN AMERICAN: 55 ML/MIN/1.73
GLUCOSE BLD-MCNC: 129 MG/DL (ref 74–99)
HBA1C MFR BLD: 5.2 % (ref 4–5.6)
HCT VFR BLD CALC: 39.1 % (ref 37–54)
HDLC SERPL-MCNC: 43 MG/DL
HEMOGLOBIN: 12.9 G/DL (ref 12.5–16.5)
LDL CHOLESTEROL CALCULATED: 33 MG/DL (ref 0–99)
MCH RBC QN AUTO: 32.3 PG (ref 26–35)
MCHC RBC AUTO-ENTMCNC: 33 % (ref 32–34.5)
MCV RBC AUTO: 98 FL (ref 80–99.9)
PDW BLD-RTO: 13.4 FL (ref 11.5–15)
PLATELET # BLD: 172 E9/L (ref 130–450)
PMV BLD AUTO: 11 FL (ref 7–12)
POTASSIUM SERPL-SCNC: 4.3 MMOL/L (ref 3.5–5)
RBC # BLD: 3.99 E12/L (ref 3.8–5.8)
SODIUM BLD-SCNC: 131 MMOL/L (ref 132–146)
TRIGL SERPL-MCNC: 161 MG/DL (ref 0–149)
VLDLC SERPL CALC-MCNC: 32 MG/DL
WBC # BLD: 11.6 E9/L (ref 4.5–11.5)

## 2021-03-09 PROCEDURE — 80061 LIPID PANEL: CPT

## 2021-03-09 PROCEDURE — 6370000000 HC RX 637 (ALT 250 FOR IP): Performed by: STUDENT IN AN ORGANIZED HEALTH CARE EDUCATION/TRAINING PROGRAM

## 2021-03-09 PROCEDURE — 6370000000 HC RX 637 (ALT 250 FOR IP): Performed by: NURSE PRACTITIONER

## 2021-03-09 PROCEDURE — 97530 THERAPEUTIC ACTIVITIES: CPT

## 2021-03-09 PROCEDURE — 97535 SELF CARE MNGMENT TRAINING: CPT

## 2021-03-09 PROCEDURE — 80048 BASIC METABOLIC PNL TOTAL CA: CPT

## 2021-03-09 PROCEDURE — 97162 PT EVAL MOD COMPLEX 30 MIN: CPT

## 2021-03-09 PROCEDURE — 2580000003 HC RX 258: Performed by: STUDENT IN AN ORGANIZED HEALTH CARE EDUCATION/TRAINING PROGRAM

## 2021-03-09 PROCEDURE — 36415 COLL VENOUS BLD VENIPUNCTURE: CPT

## 2021-03-09 PROCEDURE — 6360000002 HC RX W HCPCS: Performed by: STUDENT IN AN ORGANIZED HEALTH CARE EDUCATION/TRAINING PROGRAM

## 2021-03-09 PROCEDURE — 85027 COMPLETE CBC AUTOMATED: CPT

## 2021-03-09 PROCEDURE — 83036 HEMOGLOBIN GLYCOSYLATED A1C: CPT

## 2021-03-09 RX ADMIN — OXYCODONE HYDROCHLORIDE 10 MG: 10 TABLET ORAL at 14:54

## 2021-03-09 RX ADMIN — METOPROLOL SUCCINATE 100 MG: 100 TABLET, EXTENDED RELEASE ORAL at 05:36

## 2021-03-09 RX ADMIN — OXYCODONE HYDROCHLORIDE 10 MG: 10 TABLET ORAL at 11:09

## 2021-03-09 RX ADMIN — OXYCODONE HYDROCHLORIDE 10 MG: 10 TABLET ORAL at 02:23

## 2021-03-09 RX ADMIN — DOFETILIDE 375 MCG: 0.12 CAPSULE ORAL at 17:16

## 2021-03-09 RX ADMIN — DOFETILIDE 375 MCG: 0.12 CAPSULE ORAL at 05:36

## 2021-03-09 RX ADMIN — OXYCODONE HYDROCHLORIDE 10 MG: 10 TABLET ORAL at 07:07

## 2021-03-09 RX ADMIN — GABAPENTIN 100 MG: 100 CAPSULE ORAL at 10:00

## 2021-03-09 RX ADMIN — MAGNESIUM GLUCONATE 500 MG ORAL TABLET 400 MG: 500 TABLET ORAL at 10:00

## 2021-03-09 RX ADMIN — CHLORTHALIDONE 25 MG: 25 TABLET ORAL at 09:59

## 2021-03-09 RX ADMIN — LOSARTAN POTASSIUM 50 MG: 50 TABLET, FILM COATED ORAL at 11:48

## 2021-03-09 RX ADMIN — Medication 10 ML: at 10:01

## 2021-03-09 RX ADMIN — ENOXAPARIN SODIUM 40 MG: 40 INJECTION SUBCUTANEOUS at 10:02

## 2021-03-09 RX ADMIN — MORPHINE SULFATE 4 MG: 4 INJECTION, SOLUTION INTRAMUSCULAR; INTRAVENOUS at 05:37

## 2021-03-09 ASSESSMENT — PAIN DESCRIPTION - ONSET
ONSET: GRADUAL
ONSET: GRADUAL

## 2021-03-09 ASSESSMENT — PAIN SCALES - GENERAL
PAINLEVEL_OUTOF10: 9
PAINLEVEL_OUTOF10: 8
PAINLEVEL_OUTOF10: 8
PAINLEVEL_OUTOF10: 4

## 2021-03-09 ASSESSMENT — PAIN DESCRIPTION - PROGRESSION: CLINICAL_PROGRESSION: NOT CHANGED

## 2021-03-09 ASSESSMENT — PAIN DESCRIPTION - LOCATION: LOCATION: KNEE

## 2021-03-09 NOTE — CONSULTS
Hospital Medicine  Consult History & Physical        Reason for consult:  Medical management s/p right total knee arthroplasty       History Of Present Illness:    Mr. Damaso Figueroa, a 72y.o. year old male  who  has a past medical history of Arthritis, Atrial fibrillation ([address] Bulging lumbar disc, CAD (coronary artery disease), Chronic lower back pain, BREWER (dyspnea on exertion), Eczema, Hyperlipidemia, Hypertension, Kidney stones, Obesity, and Sleep apnea. Patient was admitted on 3/8/21 for postoperative management s/p elective left TKA. On my exam he is up to the chair and has no complaints. He states he just had his right knee done in June and had an uncomplicated recovery. He sees his PCP regularly. He sees Dr. John Tomlin and Dr. Niranjan Guerrero for management of his atrial fibrillation.        Past Medical History:        Diagnosis Date    Arthritis     Atrial fibrillation ([address]     Bulging lumbar disc     L4-L5    CAD (coronary artery disease) 07/07/2016    ct scan heart    Chronic lower back pain     BERWER (dyspnea on exertion) 3/26/2013    Eczema     Hyperlipidemia     Hypertension     Kidney stones     Obesity     weight 250#    Sleep apnea     c pap  stting 2       Past Surgical History:        Procedure Laterality Date    ABLATION OF DYSRHYTHMIC FOCUS  05/2013    heart ablation dr Fay Fish  08/10/2016    CARDIOVERSION  3-    Dr. Beulah Hartman  10/20/2017    Dr. Jeff Fuller ECHOCARDIOGRAM TRANSESOPHAGEAL  5/7/2013         KNEE SURGERY Left 1969    repair left knee ligaments    LITHOTRIPSY  1986    LUMBAR [address]  11/30/2016    [facility] [place]    TONSILLECTOMY      TOTAL KNEE ARTHROPLASTY Right 6/1/2020    RIGHT KNEE TOTAL ARTHROPLASTY -- SHRUTHI performed by Kali Cristobal MD at [address] [place] [address] Left 3/8/2021    LEFT KNEE TOTAL ARTHROPLASTY -- SHRUTHI performed by Kali Cristobal MD at [place] [address] cyanosis, edema of bilateral lower extremities. Abdomen: Soft, non-tender, obese. +BS  Musculoskeletal: No obvious deformities or erythematous or edematous joints. Strong dorsiflexion and plantar flexion bilaterally. Skin: Normal skin color. No rashes or lesions. Ace bandage to LLE with sanguineous strikethrough at the lateral left knee. Neurologic:  Neurovascularly intact without any focal sensory/motor deficits. Cranial nerves: II-XII intact, grossly non-focal.  Psychiatric: Alert and oriented, thought content appropriate, normal insight    Labs:   ESR:  Lab Results   Component Value Date    SEDRATE 47 (H) 06/30/2020     CRP:   Lab Results   Component Value Date    CRP 3.3 (H) 06/30/2020     D Dimer:   Lab Results   Component Value Date    DDIMER 737 06/30/2020     Thyroid Studies:   Lab Results   Component Value Date    TSH 2.340 06/30/2020         ASSESSMENT/PLAN:  OA of left knee s/p TKA. Pain control per attending. PT/OT, WBAT. Neurovasc checks. Monitor H&H. PCD's for now. HTN resume home meds, monitor  Afib anticoagulated on Eliquis which is being held postop, resume when cleared by ortho. Resume home Tikosyn and metoprolol XL  AURELIANO Cpap at night  CAD Cardiac diet  HLD Check lipids, resume home lipitor  CKD Baseline creatinine around 1.5. Check BMP in am.   Obesity Check A1c. Counseled on importance of healthy diet and exercise in order to lose weight. Thank you for allowing us to participate in the care of Yamilet Adkins. We will continue to follow along. Please do not hesitate to contact us if needed. NOTE: This report was transcribed using voice recognition software. Every effort was made to ensure accuracy; however, inadvertent computerized transcription errors may be present.

## 2021-03-09 NOTE — PROGRESS NOTES
Physical Therapy      Name: Maulik Storey  :   MRN: [id number]    Referring Provider:  Susi Figueroa DO    Date of Service: 3/9/2021    Evaluating PT:  Aracely Reza, PT   Room #:  4315/6577-D  Diagnosis: Osteoarthritis of left knee, unspecified osteoarthritis type [M17.12]     PMHx/PSHx:   has a past medical history of Arthritis, Atrial fibrillation ([address] Bulging lumbar disc, CAD (coronary artery disease), Chronic lower back pain, BREWER (dyspnea on exertion), Eczema, Hyperlipidemia, Hypertension, Kidney stones, Obesity, and Sleep apnea. has a past surgical history that includes ECHO Transesophageal (2013); Tonsillectomy; transesophageal echocardiogram (3-); Cardioversion (3-); Colonoscopy; Lithotripsy (); Atrial ablation surgery (08/10/2016); knee surgery (Left, ); ablation of dysrhythmic focus (2013); Lumbar disc surgery (2016); Cardioversion (10/20/2017); Total knee arthroplasty (Right, 2020); and Total knee arthroplasty (Left, 3/8/2021). Procedure/Surgery:  3/8/21 L TKA   Precautions:  Falls,WBAT L knee. ,   Equipment Needs:  Wheeled Walker      SUBJECTIVE:    Pt lives with wife in a one story home with 2+1 step(s) to enter and one rail(s); bed/bath on main floor with recreation room in basement full flight with HR  Equipment owned: Disha Martinez,      OBJECTIVE:   Initial Evaluation  Date: 3/9/21 Treatment  3/9/21 PM Short Term/ Long Term   Goals   AM-PAC 6 Clicks  67/83    Was pt agreeable to Eval/treatment? yes yes    Does pt have pain? Yes LLE     Bed Mobility  Rolling: Ind  Supine to sit: Ind  Sit to supine: NT  Scooting: Ind NT sitting EOB pre and post session. Rolling: Ind  Supine to sit:  Ind  Sit to supine: Ind  Scooting: Ind   Transfers Sit to stand: Min to fww  Stand to sit: Min  Stand pivot: Min with fww Sit to stand: SBA to fww  Stand to sit: SBA  Stand pivot: SBA with fww  Car transfer sequencing discussed with patient and wife.  Yaron Safer to stand: Mod Ind  Stand to sit: Mod Ind  Stand pivot: Mod Ind   Ambulation    40 feet with fww Min slow rate. 70 feet with fww SBA slow rate. Rested prior to steps. Gait completed back to bedside 70 feet  feet with fww Mod Ind   Stair negotiation: ascended and descended  NT 2 steps Min A cues for sequencing. 4 steps with 0 rail Mod Ind   ROM BUE:  See OT eval  BLE:  Wfl. LLE 80 flexion LLE 80 flexion observed seated. Strength BUE: See OT eval   RLE:  4+/5 foot drop present chronic  LLE:   4-/5  4+/5   Balance Sitting EOB:  Ind  Dynamic Standing:  Min with fww  Sitting EOB:  Ind  Dynamic Standing: Mod Ind     Patient is Alert & Oriented x person, place, time and situation and follows directions   Sensation:  Pt denies numbness and tingling to extremities  Edema:  LLE in post op wraps. Vitals:  Blood Pressure at rest - Blood Pressure post session -   Heart Rate at rest - Heart Rate post session -   SPO2 at rest 96% SPO2 post session -%     Therapeutic Exercises:  AROM for BLE. Patient education  Patient educated on role of Physical Therapy, risks of immobility, safety and plan of care and  importance of mobility while in hospital      Patient response to education:   Pt verbalized understanding Pt demonstrated skill Pt requires further education in this area   yes yes      ASSESSMENT:    Comments:  Patient was seen this date for PT treatment. Patient was agreeable to intervention. Results of the functional assessment are noted above. Upon entering the room patient was found sitting EOB. Gait then completed with fww for support. Steps also completed with cues for sequencing. Discussed car sequencing with his wife and patient. At end of session, patient sitting edge of bed with  call light and phone within reach,  all lines and tubes intact, nursing notified. Patient stated he is interested in leaving this evening.    This patient can benefit from the continuation of skilled PT to maximize functional level and return to PLOF. Treatment:  Patient practiced and was instructed in the following treatment:    · Bed mobility training - pt given verbal and tactile cues to facilitate proper sequencing and safety during rolling and supine>sit as well as provided with physical assistance to complete task    · Assistive device training - pt educated on using [employer] during gait, [employer] approximation/negotiation, and hand placement during sit<>stand to [employer]  · STS and transfer training - educated on hand/foot placement, safety, and sequencing during STS and pivot transfers using assistive device  · Gait training - verbal cues for [employer] approximation/negotiation, upright posture, and safety during 90 and 180 degree turns during gait   · Stair negotiation training. Pt's/ family goals   1. Go home when ready. Patient and or family understand(s) diagnosis, prognosis, and plan of care. yes    PLAN OF CARE:    PT care will be provided in accordance with the objectives noted above. Exercises and functional mobility practice will be used as well as appropriate assistive devices or modalities to obtain goals. Patient and family education will also be administered as needed. Current Treatment Recommendations     [x] Strengthening     [x] ROM   [x] Balance Training   [x] Endurance Training   [x] Transfer Training   [x] Gait Training   [x] Stair Training   [] Positioning   [] Safety and Education Training   [] Patient/Caregiver Education   [] HEP  [] Other     Frequency of treatments: 2-5x/week x 1-2 weeks. Time in  1450  Time out  1515    Total Treatment Time  25 minutes     Evaluation Time includes thorough review of current medical information, gathering information on past medical history/social history and prior level of function, completion of standardized testing/informal observation of tasks, assessment of data and education on plan of care and goals.     CPT codes:  [] Low Complexity PT evaluation [id number]  [] Moderate Complexity PT evaluation 88528  [] High Complexity PT evaluation [id number]  [] PT Re-evaluation [id number]  [] Gait training 88414 - minutes  [] Manual therapy 01847 - minutes  [x] Therapeutic activities 49084 25 minutes  [] Therapeutic exercises 91548 - minutes  [] Neuromuscular reeducation 35065 - minutes       [place] Camacho, [address] [facility]

## 2021-03-09 NOTE — CARE COORDINATION
Met with the pt's wife to discuss transition of care. The pt lives with his wife in a one floor home. He has a [employer], BSC at home. He does not have a preference for HHC, but is agreeable to [facility]. Referral made to [facility], [address] [place] [address] E-fax [phone]. Clinicals faxed. Will need HHC orders and  Jevon Mejia RN  The Plan for Transition of Care is related to the following treatment goals: To return to [facility]    The Patient was provided with a choice of provider and agrees   with the discharge plan. [x] Yes [] No    Freedom of choice list was provided with basic dialogue that supports the patient's individualized plan of care/goals, treatment preferences and shares the quality data associated with the providers.  [x] Yes [] No

## 2021-03-09 NOTE — DISCHARGE INSTR - COC
Continuity of Care Form    Patient Name: Damaso Figueroa   :  [birth year]  MRN:  [id number]    [facility] date:  3/8/2021  Discharge date:  ***    Code Status Order: Full Code   Advance Directives:   Advance Care Flowsheet Documentation     Date/Time Healthcare Directive Type of Healthcare Directive Copy in [address] Agent's Name Healthcare Agent's Phone Number    21 1135  No, patient does not have an advance directive for healthcare treatment -- -- -- -- --          Admitting Physician:  Kali Cristobal MD  PCP: Reji Whittaker MD    Discharging Nurse: [facility] Unit/Room#: 9303/2375-K  Discharging Unit Phone Number: ***    Emergency Contact:   Extended Emergency Contact Information  Primary Emergency Contact: Betty Fernandes  Address: [address] Phone: [phone]  Mobile Phone: [phone]  Relation: Spouse  Secondary Emergency Contact: Ely Cuevas  Address: [facility] Phone: [phone]  Relation: Child    Past Surgical History:  Past Surgical History:   Procedure Laterality Date    ABLATION OF DYSRHYTHMIC FOCUS  2013    heart ablation dr Fay Fish  08/10/2016    CARDIOVERSION  3-    Dr. Beulah Hartman  10/20/2017    Dr. Jeff Fuller ECHOCARDIOGRAM TRANSESOPHAGEAL  2013         KNEE SURGERY Left 1969    repair left knee ligaments    LITHOTRIPSY  1986   [address]  2016    [facility] [place]    TONSILLECTOMY      TOTAL KNEE ARTHROPLASTY Right 2020    RIGHT KNEE TOTAL ARTHROPLASTY -- SHRUTHI performed by Kali Cristobal MD at [facility] Left 3/8/2021    LEFT KNEE TOTAL ARTHROPLASTY -- SHRUTHI performed by Kali Cristobal MD at [facility] TRANSESOPHAGEAL ECHOCARDIOGRAM  3-    Dr. Cecilia Trimble       Immunization History:   Immunization History   Administered Date(s) score (0-10 scale): Pain Level: 8  Last Weight:   Wt Readings from Last 1 Encounters:   21 267 lb (121.1 kg)     Mental Status:  {IP PT MENTAL STATUS:}    IV Access:  { ALMA IV ACCESS:984698374}    Nursing Mobility/ADLs:  Walking   {CHP DME STYS:258221135}  Transfer  {CHP DME JIFE:035643514}  Bathing  {CHP DME BMNJ:603870801}  Dressing  {CHP DME LZBQ:454608182}  Toileting  {CHP DME PLQH:036261998}  Feeding  {CHP DME YJNQ:883232968}  Med Admin  {CHP DME ZTQT:673181678}  Med Delivery   { ALMA MED Delivery:395634056}    Wound Care Documentation and Therapy:        Elimination:  Continence:   · Bowel: {YES / BL:02144}  · Bladder: {YES / RN:74839}  Urinary Catheter: {Urinary Catheter:501566365}   Colostomy/Ileostomy/Ileal Conduit: {YES / EM:63905}       Date of Last BM: ***    Intake/Output Summary (Last 24 hours) at 3/9/2021 1646  Last data filed at 3/9/2021 0543  Gross per 24 hour   Intake 250 ml   Output 650 ml   Net -400 ml     I/O last 3 completed shifts: In: 250 [P.O.:240;  I.V.:10]  Out: 650 [Urine:650]    Safety Concerns:     { ALMA Safety Concerns:090262263}    Impairments/Disabilities:      { ALMA Impairments/Disabilities:970132834}    Nutrition Therapy:  Current Nutrition Therapy:   [address] ALMA Diet List:985015852}    Routes of Feeding: {[facility] DME Other Feedings:329662274}  Liquids: {Slp liquid thickness:71478}  Daily Fluid Restriction: {CHP DME Yes amt example:365121465}  Last Modified Barium Swallow with Video (Video Swallowing Test): {Done Not Done JLMY:850445679}    Treatments at the Time of Hospital Discharge:   Respiratory Treatments: ***  Oxygen Therapy:  {Therapy; copd oxygen:51835}  Ventilator:    { CC Vent IJGU:809532506}    Rehab Therapies: {THERAPEUTIC INTERVENTION:1292513725}  Weight Bearing Status/Restrictions: Vineet PADILLA Weight Bearin}  Other Medical Equipment (for information only, NOT a DME order):  {EQUIPMENT:673636779}  Other Treatments: ***    Patient's personal belongings (please select all that are sent with patient):  {CHP DME Belongings:321835896}    RN SIGNATURE:  {Esignature:459082366}    CASE MANAGEMENT/SOCIAL WORK SECTION    Inpatient Status Date: ***    Readmission Risk Assessment Score:  Readmission Risk              Risk of Unplanned Readmission:        12           Discharging to Facility/ Agency   · Name:   · Address:  · Phone:  · Fax:    Dialysis Facility (if applicable)   · Name:  · Address:  · Dialysis Schedule:  · Phone:  · Fax:    / signature: {Esignature:879625996}    PHYSICIAN SECTION    Prognosis: {Prognosis:0065369405}    Condition at Discharge: [address] [place] [address] Patient Condition:513229860}    Rehab Potential (if transferring to Rehab): {Prognosis:4536755351}    Recommended Labs or Other Treatments After Discharge: ***    Physician Certification: I certify the above information and transfer of Ivana Cohen  is necessary for the continuing treatment of the diagnosis listed and that he requires {Admit to Appropriate Level of Care:18751} for {GREATER/LESS:176975984} 30 days.      Update Admission H&P: {CHP DME Changes in REQHO:898124651}    PHYSICIAN SIGNATURE:  {Esignature:051175842}

## 2021-03-09 NOTE — PROGRESS NOTES
Occupational Therapy  OT BEDSIDE TREATMENT NOTE      Date:3/9/2021  Patient Name: Yuliet Diaz  MRN: [id number]  : 1950  Room: [address] [place], [place] [address]     Evaluating OT: Katherine Canada. PHUONG Pelletier/CAROL #[id number]     Referring physician: Victoria Isidro DO  AM-PAC Daily Activity Raw Score: 18/24  Recommended Adaptive Equipment: shower seat      Diagnosis: OA L knee   Surgery: L TKA   Pertinent Medical History:   Past Medical History        Past Medical History:   Diagnosis Date    Arthritis      Atrial fibrillation (HCC)      Bulging lumbar disc       L4-L5    CAD (coronary artery disease) 2016     ct scan heart    Chronic lower back pain      BREWER (dyspnea on exertion) 3/26/2013    Eczema      Hyperlipidemia      Hypertension      Kidney stones      Obesity       weight 250#    Sleep apnea       c pap  stting 2            Precautions:  Falls, WBAT to LLE, R foot drop     Home Living: Pt lives with wife in a one story home with 2+1 step(s) to enter and one rail(s); bed/bath on main floor with recreation room in basement full flight with HR   Bathroom setup: tub shower with HR and elevated BSC   Equipment owned: spc and ww  Prior Level of Function: mod   Independent with ADLs ,  Independent with IADLs; using AE prn for ambulation.    Driving: yes                           Medication Management self  Occupation: retired  Leisure:enjoys music and outdoors     Pain Level: 8/10 pain R knee  Cognition: A&O: 3/3; Follows multi step directions              Memory:  good              Sequencing:  Good              Problem solving:  Good-              Judgement/safety:  Good-                Functional Assessment:    Initial Eval Status  Date: 3/8/21 Treatment Status  Date: 3/9/21 Short Term Goals=LTG  Treatment frequency: PRN 2-4 x/week   Feeding Independent  Ind      Grooming Setup sitting  SBA  To brush teeth standing at sink  Mod I with ww standing    UB Dressing SBA Set up  To don/doff gown seated EOB  Independent   LB Dressing Max A  SBA  To don pants seated EOB and standing to pull over hips. Pt owns LB AE.  Mod I with AE prn    Bathing Mod A  Min A  simulated  Mod I with LHS and shower seat    Toileting Mod A / setup for urinal   Mod A  Per last tx Mod I to elevated commode with HR   Bed Mobility  Supine to sit: min A    Sit to supine: n/t SBA- supine>sit  Educated pt on technique to increase independence.    Supine to sit: mod I    Sit to supine: mod I    Functional Transfers Sit to stand: min A with ww  Stand to sit:  Min A   Stand pivot: min A with ww SBA- sit<->stand  Cuing for hand placement   Mod I with ww   Functional Mobility n/t SBA  Home distance using w/w  Mod I with ww   Balance Sitting:     Static:  good    Dynamic:SBA  Standing: min A  Sitting:     Static:  Ind    Dynamic: Ind  Standing: SBA       Activity Tolerance Good- 93% RA with HR 69  Good- Good with ADL completion   Visual/  Perceptual      intact           Safety Good- cues for walker safety and knee precautions  Good-                good       Comments: Upon arrival pt supine in bed. Pt educated on techniques to increase independence and safety during ADL's, bed mobility, and functional transfers. Discussed home set up with pt, giving suggestions to increase safety at discharge. At end of session pt left seated EOB, call light within reach. · Pt has made good progress towards set goals.      · Continue with current plan of care    Treatment Time In: 11:30            Treatment Time Out: 11:55             Treatment Charges: Mins Units   Ther Ex  52152     Manual Therapy [address] [facility]     Thera Activities 87960 10 1   ADL/Home Mgt 82253 15 1   Neuro Re-ed 98324     Group Therapy      Orthotic manage/training  20903     Non-Billable Time     Total Timed Treatment 25 2       Negar Bass

## 2021-03-09 NOTE — PROGRESS NOTES
Physical Therapy  Physical Therapy Initial Assessment     Name: Yuliet Diaz  : [birth year]  MRN: [id number]    Referring Provider:  Victoria Isidro DO    Date of Service: 3/9/2021    Evaluating PT:  Migue Zhu PT   Room #:  0761/1466-Y  Diagnosis: Osteoarthritis of left knee, unspecified osteoarthritis type [M17.12]     PMHx/PSHx:   has a past medical history of Arthritis, Atrial fibrillation ([address] Bulging lumbar disc, CAD (coronary artery disease), Chronic lower back pain, BREWER (dyspnea on exertion), Eczema, Hyperlipidemia, Hypertension, Kidney stones, Obesity, and Sleep apnea. has a past surgical history that includes ECHO Transesophageal (2013); Tonsillectomy; transesophageal echocardiogram (3-); Cardioversion (3-); Colonoscopy; Lithotripsy (); Atrial ablation surgery (08/10/2016); knee surgery (Left, ); ablation of dysrhythmic focus (2013); Lumbar disc surgery (2016); Cardioversion (10/20/2017); Total knee arthroplasty (Right, 2020); and Total knee arthroplasty (Left, 3/8/2021). Procedure/Surgery:  3/8/21 L TKA   Precautions:  Falls,WBAT L knee. ,   Equipment Needs:  Wheeled Walker      SUBJECTIVE:    Pt lives with wife in a one story home with 2+1 step(s) to enter and one rail(s); bed/bath on main floor with recreation room in basement full flight with HR  Equipment owned: [employer],      OBJECTIVE:   Initial Evaluation  Date: 3/9/21 Treatment Short Term/ Long Term   Goals   AM-PAC 6 Clicks      Was pt agreeable to Eval/treatment? yes     Does pt have pain? Yes LLE     Bed Mobility  Rolling: Ind  Supine to sit: Ind  Sit to supine: NT  Scooting: Ind  Rolling: Ind  Supine to sit: Ind  Sit to supine: Ind  Scooting: Ind   Transfers Sit to stand: Min to fww  Stand to sit: Min  Stand pivot: Min with fww  Sit to stand: Mod Ind  Stand to sit: Mod Ind  Stand pivot: Mod Ind   Ambulation    40 feet with fww Min slow rate.    150 feet with fww Mod Ind Stair negotiation: ascended and descended  NT  4 steps with 0 rail Mod Ind   ROM BUE:  See OT eval  BLE:  Wfl. LLE 80 flexion     Strength BUE: See OT eval   RLE:  4+/5 foot drop present chronic  LLE:   4-/5  4+/5   Balance Sitting EOB:  Ind  Dynamic Standing:  Min with fww  Sitting EOB:  Ind  Dynamic Standing: Mod Ind     Patient is Alert & Oriented x person, place, time and situation and follows directions   Sensation:  Pt denies numbness and tingling to extremities  Edema:  LLE in post op wraps. Vitals:  Blood Pressure at rest - Blood Pressure post session -   Heart Rate at rest - Heart Rate post session -   SPO2 at rest 96% SPO2 post session -%     Therapeutic Exercises:  AROM for BLE. Patient education  Patient educated on role of Physical Therapy, risks of immobility, safety and plan of care and  importance of mobility while in hospital      Patient response to education:   Pt verbalized understanding Pt demonstrated skill Pt requires further education in this area   yes yes      ASSESSMENT:    Comments:  Patient was seen this date for PT evaluation. Patient was agreeable to intervention. Results of the functional assessment are noted above. Upon entering the room patient was found supine in bed. Sat EOB x 10 minutes to increase dynamic sitting balance and to complete AROM for LLE. Gait then completed with fww for support. At end of session, patient in chair with  call light and phone within reach,  all lines and tubes intact, nursing notified. This patient can benefit from the continuation of skilled PT  to maximize functional level and return to PLOF.      Treatment:  Patient practiced and was instructed in the following treatment:    · Bed mobility training - pt given verbal and tactile cues to facilitate proper sequencing and safety during rolling and supine>sit as well as provided with physical assistance to complete task    · Assistive device training - pt educated on using [employer] during gait, [employer] approximation/negotiation, and hand placement during sit<>stand to [employer]  · STS and transfer training - educated on hand/foot placement, safety, and sequencing during STS and pivot transfers using assistive device  · Gait training - verbal cues for [employer] approximation/negotiation, upright posture, and safety during 90 and 180 degree turns during gait     Pt's/ family goals   1. Go home when ready. Patient and or family understand(s) diagnosis, prognosis, and plan of care. yes    PLAN OF CARE:    PT care will be provided in accordance with the objectives noted above. Exercises and functional mobility practice will be used as well as appropriate assistive devices or modalities to obtain goals. Patient and family education will also be administered as needed. Current Treatment Recommendations     [x] Strengthening     [x] ROM   [x] Balance Training   [x] Endurance Training   [x] Transfer Training   [x] Gait Training   [x] Stair Training   [] Positioning   [] Safety and Education Training   [] Patient/Caregiver Education   [] HEP  [] Other     Frequency of treatments: 2-5x/week x 1-2 weeks. Time in  0915  Time out  0940    Total Treatment Time  25 minutes     Evaluation Time includes thorough review of current medical information, gathering information on past medical history/social history and prior level of function, completion of standardized testing/informal observation of tasks, assessment of data and education on plan of care and goals.     CPT codes:  [] Low Complexity PT evaluation 78069  [x] Moderate Complexity PT evaluation 98489  [] High Complexity PT evaluation 96706  [] PT Re-evaluation 27934  [] Gait training 80412 - minutes  [] Manual therapy 20330 - minutes  [x] Therapeutic activities 13543 15 minutes  [] Therapeutic exercises 79968 - minutes  [] Neuromuscular reeducation 27263 - minutes       Tania Lechuga, [address] [facility]

## 2021-03-09 NOTE — PLAN OF CARE
Problem: Falls - Risk of:  Goal: Will remain free from falls  Description: Will remain free from falls  Outcome: Met This Shift  Goal: Absence of physical injury  Description: Absence of physical injury  Outcome: Met This Shift     Problem: Pain:  Goal: Pain level will decrease  Description: Pain level will decrease  Outcome: Ongoing  Goal: Control of acute pain  Description: Control of acute pain  Outcome: Met This Shift

## 2021-03-09 NOTE — PROGRESS NOTES
Department of Orthopedic Surgery  Resident Progress Note    Patient seen and examined. Pain controlled. No new complaints. Denies chest pain, shortness of breath, dizziness/lightheadedness. -BM, +flatulence. Denies numbness, tingling, paraesthesias.      VITALS:  /63   Pulse 91   Temp 97.1 °F (36.2 °C) (Temporal)   Resp 18   Ht 5' 10\" (1.778 m)   Wt 267 lb (121.1 kg)   SpO2 94%   BMI 38.31 kg/m²     General: alert and oriented    MUSCULOSKELETAL:   left lower extremity:  · Dressing with drainage seeping through anteromedial portion  · Compartments soft and compressible  · +PF/DF/EHL  · +2/4 DP & PT pulses, Brisk Cap refill, Toes warm and perfused  · Distal sensation grossly intact to Peroneals, Sural, Saphenous, and tibial nrs    CBC:   Lab Results   Component Value Date    WBC 11.6 03/09/2021    HGB 12.9 03/09/2021    HCT 39.1 03/09/2021     03/09/2021     PT/INR:    Lab Results   Component Value Date    PROTIME 12.6 03/03/2021    INR 1.2 03/03/2021       ASSESSMENT  · S/P L TKA 3/8    PLAN    LLE WBAT  24hr ABX coverage   DVT prophylaxis- lovenox while inpatient, transition to ASA outpatient, early mobilization   PT/OT  Pain control- IV & PO  Monitor H&H-12.9, asymptomatic  D/C: pending PT recs  D/W Dr. Madeleine Virk

## 2021-03-15 DIAGNOSIS — Z96.652 H/O TOTAL KNEE REPLACEMENT, LEFT: ICD-10-CM

## 2021-03-15 RX ORDER — OXYCODONE HYDROCHLORIDE AND ACETAMINOPHEN 5; 325 MG/1; MG/1
1 TABLET ORAL EVERY 8 HOURS PRN
Qty: 21 TABLET | Refills: 0 | Status: SHIPPED
Start: 2021-03-15 | End: 2021-03-22 | Stop reason: ALTCHOICE

## 2021-03-15 NOTE — TELEPHONE ENCOUNTER
Received from pt requesting refill of pain medication. Date of Procedure: 3/8/2021  Procedure(s):  LEFT KNEE TOTAL ARTHROPLASTY -- SHRUTHI    Order pended and routed for decision and signature.

## 2021-03-22 ENCOUNTER — HOSPITAL ENCOUNTER (OUTPATIENT)
Dept: GENERAL RADIOLOGY | Age: 71
Discharge: HOME | End: 2021-03-24
Payer: MEDICARE

## 2021-03-22 ENCOUNTER — OFFICE VISIT (OUTPATIENT)
Dept: ORTHOPEDIC SURGERY | Age: 71
End: 2021-03-22
Payer: MEDICARE

## 2021-03-22 VITALS — HEIGHT: 69 IN | BODY MASS INDEX: 39.55 KG/M2 | WEIGHT: 267 LBS

## 2021-03-22 DIAGNOSIS — Z96.652 H/O TOTAL KNEE REPLACEMENT, LEFT: Primary | ICD-10-CM

## 2021-03-22 DIAGNOSIS — Z96.653 H/O TOTAL KNEE REPLACEMENT, BILATERAL: ICD-10-CM

## 2021-03-22 PROCEDURE — 99024 POSTOP FOLLOW-UP VISIT: CPT | Performed by: PHYSICIAN ASSISTANT

## 2021-03-22 PROCEDURE — 99212 OFFICE O/P EST SF 10 MIN: CPT

## 2021-03-22 PROCEDURE — 73560 X-RAY EXAM OF KNEE 1 OR 2: CPT

## 2021-03-22 RX ORDER — ACETAMINOPHEN 500 MG
1000 TABLET ORAL 3 TIMES DAILY PRN
Qty: 180 TABLET | Refills: 1 | Status: SHIPPED | OUTPATIENT
Start: 2021-03-22

## 2021-03-22 RX ORDER — OXYCODONE HYDROCHLORIDE AND ACETAMINOPHEN 5; 325 MG/1; MG/1
1 TABLET ORAL EVERY 8 HOURS PRN
Qty: 21 TABLET | Refills: 0 | Status: SHIPPED
Start: 2021-03-22 | End: 2021-03-31 | Stop reason: SDUPTHER

## 2021-03-22 NOTE — PROGRESS NOTES
without signs of infection  Incisions well approximated without signs of redness, warmth or drainage- staples intact  Mild edema noted about the knee  Compartments supple throughout thigh and leg  Calf supple and nontender  Demonstrates active knee flexion/extension, ankle plantar/dorsiflexion/great toe extension. States sensation intact to touch in sural/deep peroneal/superficial peroneal/saphenous/posterior tibial nerve distributions to foot/ankle. Palpable dorsalis pedis and posterior tibialis pulses, cap refill brisk in toes, foot warm/perfused. AROM L knee 5-75  nontender about incision line           Ht 5' 9\" (1.753 m)   Wt 267 lb (121.1 kg)   BMI 39.43 kg/m²     XR:   2 views of L knee demonstrating L TKA well aligned without displacement or loosening. No acute fractures or dislocations or any other osseus abnormality identified. Assessment:   Diagnosis Orders   1. H/O total knee replacement, left  Amb External Referral To Physical Therapy    oxyCODONE-acetaminophen (PERCOCET) 5-325 MG per tablet    acetaminophen (TYLENOL) 500 MG tablet       Plan:   Reviewed x-rays with patient today in office    Remove staples from surgical incision line. Steri strips were placed. Patient tolerated procedure well with minimal pain. No Soaking or submerging incision in water until skin is fully healed.  WB:  Weight bearing as tolerated - use assistive devices if needed   External referral for outpatient PT given to patient today. Continue HEP and remaining home PT sessions.  DVT: Continue with chronic Eliquis    Acetaminophen sent to pharmacy today. Refill of Percocet. This will probably be the last refill of Percocet. Use sparingly. Continue Tylenol and RICE. Controlled Substance Monitoring:    Acute and Chronic Pain Monitoring:   RX Monitoring 3/22/2021   Attestation -   Periodic Controlled Substance Monitoring No signs of potential drug abuse or diversion identified.      Discussed that the combination of opioids, benzodiazepines, sleep aids, alcohol, and/or illicit drugs will cause increased drowsiness, sedation, and dizziness, and may lead to respiratory depression and even death. Instructed the patient not to operate heavy machinery, including driving, while taking any of the above medications. Discussed the importance of obtaining these medications from only one provider as well as taking these medications only as prescribed. Patient verbalizes understanding. Follow up in 4 weeks with XR of the L knee. Electronically signed by Jaqueline Ren PA-C on 3/22/2021 at 11:58 AM  Note: This report was completed using DigitalMR voiced recognition software. Every effort has been made to ensure accuracy; however, inadvertent computerized transcription errors may be present.

## 2021-03-22 NOTE — PATIENT INSTRUCTIONS
Future Appointments   Date Time Provider Shannon Singh   5/5/2021  9:15 AM Ashley Perdue MD  [facility]

## 2021-03-22 NOTE — PROGRESS NOTES
Patient here for a 2 week postop check left TKA DOS 03/08/2021 with . Patient is requesting a refill on his pain medication if possible. Staples removed from left knee. Patient tolerate removal well. Area cleaned and steri-strips applied, then ace wrap over top for knee swelling.           Electronically signed by Tamiko Hancock MA on 3/22/2021 at 11:07 AM

## 2021-03-24 ENCOUNTER — TELEPHONE (OUTPATIENT)
Dept: ADMINISTRATIVE | Age: 71
End: 2021-03-24

## 2021-03-31 DIAGNOSIS — Z96.652 H/O TOTAL KNEE REPLACEMENT, LEFT: ICD-10-CM

## 2021-03-31 RX ORDER — OXYCODONE HYDROCHLORIDE AND ACETAMINOPHEN 5; 325 MG/1; MG/1
1 TABLET ORAL EVERY 12 HOURS PRN
Qty: 14 TABLET | Refills: 0 | Status: SHIPPED | OUTPATIENT
Start: 2021-03-31 | End: 2021-04-07

## 2021-03-31 NOTE — TELEPHONE ENCOUNTER
Jen Grajeda is 3 weeks from the following Surgery:    DATE OF [place] -- SHRUTHI    OARRS report reviewed  Last RX filled on 3/22/2021  Plan for wean: weaned to BID- this should be last narcotic refill post operatively    Controlled Substance Monitoring:    Acute and Chronic Pain Monitoring:   RX Monitoring 3/31/2021   Attestation -   Periodic Controlled Substance Monitoring No signs of potential drug abuse or diversion identified.         Electronically signed by Chasity Pitt PA-C on 3/31/2021 at 1:00 PM

## 2021-03-31 NOTE — TELEPHONE ENCOUNTER
Pt left VM requesting refill of pain medication as he has started out patient PT. DATE OF PROCEDURE: 3/8/21  PROCEDURE: LEFT KNEE TOTAL ARTHROPLASTY -- SHRUTHI    Order pended and routed for decision and signature.

## 2021-03-31 NOTE — TELEPHONE ENCOUNTER
Call placed to pt, spoke with his wife. Notified that pain med refill was sent but that this would be last one. She verbalized understanding, questions answered.

## 2021-04-13 ENCOUNTER — TELEPHONE (OUTPATIENT)
Dept: CARDIOLOGY CLINIC | Age: 71
End: 2021-04-13

## 2021-04-13 RX ORDER — DOFETILIDE 0.12 MG/1
CAPSULE ORAL
Qty: 180 CAPSULE | Refills: 3 | Status: SHIPPED
Start: 2021-04-13 | End: 2022-04-13 | Stop reason: SDUPTHER

## 2021-04-13 RX ORDER — DOFETILIDE 0.25 MG/1
CAPSULE ORAL
Qty: 180 CAPSULE | Refills: 3 | Status: SHIPPED
Start: 2021-04-13 | End: 2022-04-13 | Stop reason: SDUPTHER

## 2021-04-13 NOTE — TELEPHONE ENCOUNTER
No interaction seen but can increase CrCl. Would recommend to check BMP in 1 week and the frequently after started. Thanks.

## 2021-04-13 NOTE — TELEPHONE ENCOUNTER
Requesting Tikosyn refill - CrCl calculated off the following information:    Tikosyn dosage: 375 mcg bid    Age: 72   Ht: 1.753 m  Wt: 121.1 kg  Cr: 1.3 mg/dl (based off labs on 3/2021)  CrCl: 53.66 mL/min    Last QTc: 500 msec (based on last EKG on 11/2020)    Approved to order

## 2021-04-15 DIAGNOSIS — I48.3 TYPICAL ATRIAL FLUTTER (HCC): Primary | ICD-10-CM

## 2021-04-15 NOTE — TELEPHONE ENCOUNTER
Called and spoke to the pharmacist at [facility]. He said the patient has been on the medication for a while. I also called and spoke to Mrs Ernesto Guzman and I informed her that Dr Mady Soares wanted Vikki Tuttle to have a repeat BMP done. She scheduled an office visit with Dr Kylah Vizcarra and will have BMP done them. Scheduled 5/6/2021 @ 1:15.

## 2021-05-05 ENCOUNTER — HOSPITAL ENCOUNTER (OUTPATIENT)
Dept: GENERAL RADIOLOGY | Age: 71
Discharge: HOME | End: 2021-05-07
Payer: MEDICARE

## 2021-05-05 ENCOUNTER — OFFICE VISIT (OUTPATIENT)
Dept: ORTHOPEDIC SURGERY | Age: 71
End: 2021-05-05
Payer: MEDICARE

## 2021-05-05 VITALS — TEMPERATURE: 97.5 F

## 2021-05-05 DIAGNOSIS — M19.011 PRIMARY OSTEOARTHRITIS OF BOTH SHOULDERS: ICD-10-CM

## 2021-05-05 DIAGNOSIS — Z96.652 H/O TOTAL KNEE REPLACEMENT, LEFT: ICD-10-CM

## 2021-05-05 DIAGNOSIS — M19.011 LOCALIZED OSTEOARTHRITIS OF RIGHT SHOULDER: ICD-10-CM

## 2021-05-05 DIAGNOSIS — M19.012 PRIMARY OSTEOARTHRITIS OF BOTH SHOULDERS: Primary | ICD-10-CM

## 2021-05-05 DIAGNOSIS — M19.011 PRIMARY OSTEOARTHRITIS OF BOTH SHOULDERS: Primary | ICD-10-CM

## 2021-05-05 DIAGNOSIS — M19.012 PRIMARY OSTEOARTHRITIS OF BOTH SHOULDERS: ICD-10-CM

## 2021-05-05 PROCEDURE — 1036F TOBACCO NON-USER: CPT | Performed by: ORTHOPAEDIC SURGERY

## 2021-05-05 PROCEDURE — 20610 DRAIN/INJ JOINT/BURSA W/O US: CPT | Performed by: ORTHOPAEDIC SURGERY

## 2021-05-05 PROCEDURE — 3017F COLORECTAL CA SCREEN DOC REV: CPT | Performed by: ORTHOPAEDIC SURGERY

## 2021-05-05 PROCEDURE — 99213 OFFICE O/P EST LOW 20 MIN: CPT | Performed by: ORTHOPAEDIC SURGERY

## 2021-05-05 PROCEDURE — G8427 DOCREV CUR MEDS BY ELIG CLIN: HCPCS | Performed by: ORTHOPAEDIC SURGERY

## 2021-05-05 PROCEDURE — 1123F ACP DISCUSS/DSCN MKR DOCD: CPT | Performed by: ORTHOPAEDIC SURGERY

## 2021-05-05 PROCEDURE — 99212 OFFICE O/P EST SF 10 MIN: CPT

## 2021-05-05 PROCEDURE — 73560 X-RAY EXAM OF KNEE 1 OR 2: CPT

## 2021-05-05 PROCEDURE — 4040F PNEUMOC VAC/ADMIN/RCVD: CPT | Performed by: ORTHOPAEDIC SURGERY

## 2021-05-05 PROCEDURE — 73030 X-RAY EXAM OF SHOULDER: CPT

## 2021-05-05 PROCEDURE — G8417 CALC BMI ABV UP PARAM F/U: HCPCS | Performed by: ORTHOPAEDIC SURGERY

## 2021-05-05 RX ORDER — BUPIVACAINE HYDROCHLORIDE 2.5 MG/ML
3 INJECTION, SOLUTION EPIDURAL; INFILTRATION; INTRACAUDAL ONCE
Status: COMPLETED | OUTPATIENT
Start: 2021-05-05 | End: 2021-05-05

## 2021-05-05 RX ORDER — LIDOCAINE HYDROCHLORIDE 10 MG/ML
3 INJECTION, SOLUTION INFILTRATION; PERINEURAL ONCE
Status: COMPLETED | OUTPATIENT
Start: 2021-05-05 | End: 2021-05-05

## 2021-05-05 RX ORDER — TRIAMCINOLONE ACETONIDE 40 MG/ML
40 INJECTION, SUSPENSION INTRA-ARTICULAR; INTRAMUSCULAR ONCE
Status: COMPLETED | OUTPATIENT
Start: 2021-05-05 | End: 2021-05-05

## 2021-05-05 RX ADMIN — TRIAMCINOLONE ACETONIDE 40 MG: 40 INJECTION, SUSPENSION INTRA-ARTICULAR; INTRAMUSCULAR at 13:38

## 2021-05-05 RX ADMIN — BUPIVACAINE HYDROCHLORIDE 7.5 MG: 2.5 INJECTION, SOLUTION EPIDURAL; INFILTRATION; INTRACAUDAL at 13:37

## 2021-05-05 RX ADMIN — LIDOCAINE HYDROCHLORIDE 3 ML: 10 INJECTION, SOLUTION INFILTRATION; PERINEURAL at 13:38

## 2021-05-05 NOTE — PROGRESS NOTES
Hypertension     Kidney stones     Obesity     weight 250#    Sleep apnea     c pap  stting 2     Medications:  Current Outpatient Medications on File Prior to Visit   Medication Sig Dispense Refill    dofetilide (TIKOSYN) 250 MCG capsule TAKE 1 CAPSULE BY MOUTH 2 TIMES DAILY ALONG WITH 125MCG DOSE TO EQUAL 375MCG DOSE 2 TIMES DAILY 180 capsule 3    dofetilide (TIKOSYN) 125 MCG capsule TAKE ONE CAPSULE BY MOUTH TWO TIMES A DAY. TAKE ALONG WITH 250MCG TO EQUAL 375MCG TWICE DAILY 180 capsule 3    acetaminophen (TYLENOL) 500 MG tablet Take 2 tablets by mouth 3 times daily as needed for Pain 180 tablet 1    atorvastatin (LIPITOR) 20 MG tablet TAKE ONE TABLET BY MOUTH EVERY DAY 30 tablet 5    metoprolol succinate (TOPROL XL) 100 MG extended release tablet TAKE ONE TABLET BY MOUTH EVERY DAY 30 tablet 5    chlorthalidone (HYGROTON) 25 MG tablet TAKE ONE TABLET BY MOUTH EVERY DAY 90 tablet 3    traMADol (ULTRAM) 50 MG tablet Take 50 mg by mouth 2 times daily.  losartan (COZAAR) 50 MG tablet Take 50 mg by mouth daily      apixaban (ELIQUIS) 5 MG TABS tablet Take 1 tablet by mouth daily TAKE ONE TABLET BY MOUTH TWO TIMES A  tablet 3    Misc Natural Products (TART CHERRY ADVANCED PO) Take by mouth STOP PREOP MED      gabapentin (NEURONTIN) 100 MG capsule Take 100 mg by mouth as needed.  magnesium oxide (MAG-OX) 400 (240 Mg) MG tablet Take 1 tablet by mouth 2 times daily (Patient taking differently: Take 400 mg by mouth daily ) 60 tablet 0    Multiple Vitamins-Minerals (THERAPEUTIC MULTIVITAMIN-MINERALS) tablet Take 1 tablet by mouth daily.  Coenzyme Q10 (COQ10 PO) Take 1 tablet by mouth daily.  Alpha-Lipoic Acid 300 MG CAPS Take 300 mg by mouth daily On hold      ascorbic acid (VITAMIN C) 500 MG tablet Take 500 mg by mouth daily.  Vitamin D (CHOLECALCIFEROL) 1000 UNITS CAPS capsule Take 1,000 Units by mouth daily.       Cyanocobalamin (VITAMIN B 12 PO) Take 500 mcg by mouth daily. No current facility-administered medications on file prior to visit. Allergies   Allergen Reactions    Adhesive Tape Rash     bandaids     ROS:   Constitutional: Negative for fever, activity change and appetite change. HENT: Negative for epistaxis. Eyes: Negative for diploplia, blurred vision. Respiratory: Negative for cough, chest tightness, shortness of breath and wheezing. Cardiovascular: pertinent positives in HPI  Gastrointestinal: Negative for abdominal pain and blood in stool. All other review of systems are negative      PHYSICAL EXAM:   Vitals:    21 1334   BP: 132/84   Pulse: 54   Resp: 18   Weight: 265 lb 3.2 oz (120.3 kg)   Height: 5' 9\" (1.753 m)      Constitutional: Well-developed, no acute distress  Eyes: conjunctivae normal, no xanthelasma   Ears, Nose, Throat: oral mucosa moist, no cyanosis   CV: no JVD. Regular rate and rhythm. Normal S1S2 and no S3. No murmurs, rubs, or gallops.  PMI is nondisplaced  Lungs: clear to auscultation bilaterally, normal respiratory effort without used of accessory muscles  Abdomen: soft, non-tender, bowel sounds present, no masses or hepatomegaly   Musculoskeletal: no digital clubbing, no edema   Skin: warm, no rashes     EC21: Sinus bradycardia, NAD, RBBB, LAE, QTc: 470 ms - see scanned cardiology    Data:   Lab Results   Component Value Date    WBC 11.6 (H) 2021    HGB 12.9 2021    HCT 39.1 2021    MCV 98.0 2021     2021     Lab Results   Component Value Date    CREATININE 1.3 (H) 2021     Lab Results   Component Value Date    INR 1.2 2021    INR 1.1 2020    INR 1.3 2016    PROTIME 12.6 (H) 2021    PROTIME 12.9 (H) 2020    PROTIME 14.0 (H) 2016     Lab Results   Component Value Date    TSH 2.340 2020     Lab Results   Component Value Date    ALT 27 2021    AST 25 2021    ALKPHOS 112 2021    BILITOT 0.4 2021       BUSHRA motion abnormalities. 4. Low risk general pharmacologic stress test.     Thank you for sending your patient to this [facility]. Electronically signed by Janelle Wright DO on 2/20/20 at 12:57 PM      Last Resulted: 02/20/20 09:00          24-hour Holter monitor 1/3/19: revealed sinus rhythm from  bpm.   There was no evidence of AF, AFl, SVT, VT, or AV block. No symptoms were reported. Impression:    1. Atrial fibrillation and flutter  - [id number] risk score of 3 based on HTN and systolic dysfunction, age greater than 72, he is on Eliquis he denies any bleeding problems  - Status post CTI ablation in May 9949, procedure complicated by airway compromise, anatomical variants c/w Eustachian ridge and pouch, extended procedure time and multiple lines required to achieve bidirectional block. He had no documented recurrence in over 2 year after procedure.     - Post RFA with subsequent development of persistent atrial fibrillation and atypical flutter. He was highly symptomatic despite apparent rate control, at least at rest.   - Underwent redo flutter ablation and PVI (RFA) on 8/10/16. He was weaned off his sotalol and was started back on a low-dose Toprol-XL for his cardiomyopathy.   - On October 20, 2017 he underwent a successful BUSHRA/DCCV with Tikosyn drug loading for persistent symptomatic atrial flutter. - 24-hour Holter monitor in January 2019 revealed sinus rhythm from  bpm.  There was no evidence of AF, AFl, SVT, VT, or AV block. No symptoms were reported  - Currently on Tikosyn 375 mcg every 12 hours.   - Presents in sinus rhythm with stable QTc.  - CrCl by IBW of 53.66 mL/min based off Cr of 1.3 on 3/9/21  - Re-education on importance of well controlled HTN (goal BP < 130/80), adequate weight control (goal BMI of < 27), physical activity consisting of moderate cardiopulmonary exercise up to a goal of 250 min/wk, daily compliance with CPAP in treating sleep apnea, smoking cessation and limited ETOH intake. 2. Chronic HFrEF  - Recovered LV EF.  -Probably tachycardia-mediated  - Last LV EF by BUSHRA was 50% in 10/2017.  - On Toprol XL and Cozaar. 3.Hypertension    - Well controlled. - On Toprol XL, Cholrthalidone and Cozaar. 4. Chronic kidney disease   - Stage III  Lab Results   Component Value Date     03/09/2021    K 4.3 03/09/2021    K 4.1 06/30/2020    CL 95 03/09/2021    CO2 27 03/09/2021    BUN 26 03/09/2021    CREATININE 1.3 03/09/2021    GLUCOSE 129 03/09/2021    CALCIUM 9.2 03/09/2021      5. Obstructive Sleep Apnea    - Compliant with his CPAP        6. Obesity  Body mass index is 39.16 kg/m².      7. RBBB  - Chronic      8. Chronic back pain  - With foot drop  - History of back surgery  - Status post injections.     Recommendations:      1. Continue Tikosyn 375 mcg every 12 hours. 2. Continue Toprol  mg daily. 3. Obtain EKG, BMP and magnesium every 3 to 6 months. Scripts given to the patient. 4. Life style modifications as outlined above. 5. Follow up in 6 months. Encouraged the patient to call the office for any questions or concerns. Thank you for allowing me the opportunity to participate in the care of your patient. I have spent a total of 40 minutes with the patient and the family reviewing the above stated recommendations. And a total of >50% of that time involved face-to-face time providing counseling and/or coordination of care with the other providers, preparation for the clinic visit, reviewing records/tests, counseling/education of the patient, ordering medications/tests/procedures, coordinating care, and documenting clinical information in the EHR.      Kristy Sibley MD  Cardiac Electrophysiology  [address]  [facility]  1:38 PM  5/6/2021

## 2021-05-05 NOTE — PROGRESS NOTES
Humberto Molina is a 72 y.o. male who presents for follow up left TKA     SURGEON: Dr. Allison Mcintyre MD  Date of Injury/Surgery: 3-8-21  Date last seen in office: 4-20-21    Symptoms: Left knee \"feeling better. \"    New complaints: Left shoulder pain that has intensified over the past few months            Weightbearing  Full   Requesting Ultram script      Participating in therapy (location if yes)?  yes,    Hetal therapy      Order/Referral Needed: no
components in good position with no subsidence    X-ray of the right shoulder shows severe osteoarthritis of the right shoulder with osteophyte formation and bone-on-bone arthritis    Temp 97.5 °F (36.4 °C) (Oral)     ASSESSMENT:     Diagnosis Orders   1. Primary osteoarthritis of both shoulders  XR SHOULDER RIGHT (MIN 2 VIEWS)    ME ARTHROCENTESIS ASPIR&/INJ MAJOR JT/BURSA W/O US   2. Localized osteoarthritis of right shoulder         Discussion: Talked the patient in detail about his right shoulder. Explained the injection has a risk of infection but may help him with mobilization. He does have chronic arthritis which could need a shoulder arthroplasty in the future. He understands this and would like to get the injection. As far as his knee he is to continue working on strengthening and range of motion we will see him back in about 3 months. He is agreeable with the plan. Procedure Note  Skin prepped with alcohol.  21ga. Needle used to inject 3cc 1% Lidocaine, 3cc 0.25% Marcaine, and 1cc Kenalog into the right shoulder through posterior portal.  Patient tolerated well and band aid applied. Walked out of the office with no issues. Plan:   Activity as tolerated with the left knee and right shoulder    Call with any issues with injection site right shoulder    Otherwise follow-up in 3 months call sooner with any questions concerns or need for reevaluation

## 2021-05-06 ENCOUNTER — OFFICE VISIT (OUTPATIENT)
Dept: NON INVASIVE DIAGNOSTICS | Age: 71
End: 2021-05-06
Payer: MEDICARE

## 2021-05-06 VITALS
BODY MASS INDEX: 39.28 KG/M2 | RESPIRATION RATE: 18 BRPM | HEART RATE: 54 BPM | HEIGHT: 69 IN | DIASTOLIC BLOOD PRESSURE: 84 MMHG | SYSTOLIC BLOOD PRESSURE: 132 MMHG | WEIGHT: 265.2 LBS

## 2021-05-06 DIAGNOSIS — Z79.899 VISIT FOR MONITORING TIKOSYN THERAPY: ICD-10-CM

## 2021-05-06 DIAGNOSIS — Z51.81 VISIT FOR MONITORING TIKOSYN THERAPY: ICD-10-CM

## 2021-05-06 DIAGNOSIS — I48.0 PAROXYSMAL ATRIAL FIBRILLATION (HCC): Primary | ICD-10-CM

## 2021-05-06 PROCEDURE — 4040F PNEUMOC VAC/ADMIN/RCVD: CPT | Performed by: INTERNAL MEDICINE

## 2021-05-06 PROCEDURE — 1036F TOBACCO NON-USER: CPT | Performed by: INTERNAL MEDICINE

## 2021-05-06 PROCEDURE — 93000 ELECTROCARDIOGRAM COMPLETE: CPT | Performed by: INTERNAL MEDICINE

## 2021-05-06 PROCEDURE — G8417 CALC BMI ABV UP PARAM F/U: HCPCS | Performed by: INTERNAL MEDICINE

## 2021-05-06 PROCEDURE — G8427 DOCREV CUR MEDS BY ELIG CLIN: HCPCS | Performed by: INTERNAL MEDICINE

## 2021-05-06 PROCEDURE — 3017F COLORECTAL CA SCREEN DOC REV: CPT | Performed by: INTERNAL MEDICINE

## 2021-05-06 PROCEDURE — 1123F ACP DISCUSS/DSCN MKR DOCD: CPT | Performed by: INTERNAL MEDICINE

## 2021-05-06 PROCEDURE — 99215 OFFICE O/P EST HI 40 MIN: CPT | Performed by: INTERNAL MEDICINE

## 2021-06-16 ENCOUNTER — HOSPITAL ENCOUNTER (OUTPATIENT)
Dept: CT IMAGING | Age: 71
Discharge: HOME | End: 2021-06-18
Payer: MEDICARE

## 2021-06-16 ENCOUNTER — HOSPITAL ENCOUNTER (OUTPATIENT)
Dept: GENERAL RADIOLOGY | Age: 71
Discharge: HOME | End: 2021-06-18
Payer: MEDICARE

## 2021-06-16 ENCOUNTER — HOSPITAL ENCOUNTER (OUTPATIENT)
Age: 71
Discharge: HOME | End: 2021-06-18
Payer: MEDICARE

## 2021-06-16 DIAGNOSIS — N32.0 BLADDER NECK OBSTRUCTION: ICD-10-CM

## 2021-06-16 DIAGNOSIS — N40.1 BENIGN PROSTATIC HYPERPLASIA WITH LOWER URINARY TRACT SYMPTOMS, SYMPTOM DETAILS UNSPECIFIED: ICD-10-CM

## 2021-06-16 DIAGNOSIS — N40.1 BENIGN PROSTATIC HYPERPLASIA WITH URINARY OBSTRUCTION AND OTHER LOWER URINARY TRACT SYMPTOMS: ICD-10-CM

## 2021-06-16 DIAGNOSIS — R31.29 OTHER MICROSCOPIC HEMATURIA: ICD-10-CM

## 2021-06-16 DIAGNOSIS — N28.1 CYST OF KIDNEY, ACQUIRED: ICD-10-CM

## 2021-06-16 DIAGNOSIS — N28.1 ACQUIRED CYST OF KIDNEY: ICD-10-CM

## 2021-06-16 DIAGNOSIS — N13.8 BENIGN PROSTATIC HYPERPLASIA WITH URINARY OBSTRUCTION AND OTHER LOWER URINARY TRACT SYMPTOMS: ICD-10-CM

## 2021-06-16 PROCEDURE — 6360000004 HC RX CONTRAST MEDICATION: Performed by: RADIOLOGY

## 2021-06-16 PROCEDURE — 2580000003 HC RX 258: Performed by: RADIOLOGY

## 2021-06-16 PROCEDURE — 74018 RADEX ABDOMEN 1 VIEW: CPT

## 2021-06-16 PROCEDURE — 74178 CT ABD&PLV WO CNTR FLWD CNTR: CPT

## 2021-06-16 RX ORDER — SODIUM CHLORIDE 0.9 % (FLUSH) 0.9 %
5-40 SYRINGE (ML) INJECTION 2 TIMES DAILY
Status: DISCONTINUED | OUTPATIENT
Start: 2021-06-16 | End: 2021-06-19 | Stop reason: HOSPADM

## 2021-06-16 RX ADMIN — IOHEXOL 50 ML: 240 INJECTION, SOLUTION INTRATHECAL; INTRAVASCULAR; INTRAVENOUS; ORAL at 13:45

## 2021-06-16 RX ADMIN — IOPAMIDOL 75 ML: 755 INJECTION, SOLUTION INTRAVENOUS at 13:45

## 2021-06-16 RX ADMIN — Medication 10 ML: at 13:45

## 2021-06-24 RX ORDER — APIXABAN 5 MG/1
TABLET, FILM COATED ORAL
Qty: 180 TABLET | Refills: 3 | Status: SHIPPED
Start: 2021-06-24 | End: 2022-06-16

## 2021-06-30 RX ORDER — METOPROLOL SUCCINATE 100 MG/1
100 TABLET, EXTENDED RELEASE ORAL DAILY
Qty: 90 TABLET | Refills: 3 | Status: SHIPPED
Start: 2021-06-30 | End: 2021-09-23 | Stop reason: HOSPADM

## 2021-06-30 RX ORDER — METOPROLOL SUCCINATE 100 MG/1
TABLET, EXTENDED RELEASE ORAL
Qty: 30 TABLET | Refills: 0 | Status: SHIPPED
Start: 2021-06-30 | End: 2021-09-23 | Stop reason: HOSPADM

## 2021-08-06 ENCOUNTER — TELEPHONE (OUTPATIENT)
Dept: ORTHOPEDIC SURGERY | Age: 71
End: 2021-08-06

## 2021-08-06 DIAGNOSIS — Z96.652 H/O TOTAL KNEE REPLACEMENT, LEFT: Primary | ICD-10-CM

## 2021-08-11 ENCOUNTER — HOSPITAL ENCOUNTER (OUTPATIENT)
Dept: GENERAL RADIOLOGY | Age: 71
Discharge: HOME | End: 2021-08-13
Payer: MEDICARE

## 2021-08-11 ENCOUNTER — OFFICE VISIT (OUTPATIENT)
Dept: ORTHOPEDIC SURGERY | Age: 71
End: 2021-08-11
Payer: MEDICARE

## 2021-08-11 VITALS — TEMPERATURE: 98.7 F

## 2021-08-11 DIAGNOSIS — M19.011 LOCALIZED OSTEOARTHRITIS OF RIGHT SHOULDER: Primary | ICD-10-CM

## 2021-08-11 DIAGNOSIS — Z96.652 H/O TOTAL KNEE REPLACEMENT, LEFT: ICD-10-CM

## 2021-08-11 PROCEDURE — 2500000003 HC RX 250 WO HCPCS

## 2021-08-11 PROCEDURE — 3017F COLORECTAL CA SCREEN DOC REV: CPT | Performed by: ORTHOPAEDIC SURGERY

## 2021-08-11 PROCEDURE — 99213 OFFICE O/P EST LOW 20 MIN: CPT | Performed by: ORTHOPAEDIC SURGERY

## 2021-08-11 PROCEDURE — G8417 CALC BMI ABV UP PARAM F/U: HCPCS | Performed by: ORTHOPAEDIC SURGERY

## 2021-08-11 PROCEDURE — 99212 OFFICE O/P EST SF 10 MIN: CPT | Performed by: ORTHOPAEDIC SURGERY

## 2021-08-11 PROCEDURE — 1123F ACP DISCUSS/DSCN MKR DOCD: CPT | Performed by: ORTHOPAEDIC SURGERY

## 2021-08-11 PROCEDURE — 4040F PNEUMOC VAC/ADMIN/RCVD: CPT | Performed by: ORTHOPAEDIC SURGERY

## 2021-08-11 PROCEDURE — 6360000002 HC RX W HCPCS

## 2021-08-11 PROCEDURE — G8427 DOCREV CUR MEDS BY ELIG CLIN: HCPCS | Performed by: ORTHOPAEDIC SURGERY

## 2021-08-11 PROCEDURE — 73560 X-RAY EXAM OF KNEE 1 OR 2: CPT

## 2021-08-11 PROCEDURE — 20610 DRAIN/INJ JOINT/BURSA W/O US: CPT | Performed by: ORTHOPAEDIC SURGERY

## 2021-08-11 PROCEDURE — 1036F TOBACCO NON-USER: CPT | Performed by: ORTHOPAEDIC SURGERY

## 2021-08-11 RX ORDER — GABAPENTIN 100 MG/1
100 CAPSULE ORAL 2 TIMES DAILY
Qty: 60 CAPSULE | Refills: 0 | Status: SHIPPED | OUTPATIENT
Start: 2021-08-11 | End: 2021-09-10

## 2021-08-11 RX ORDER — TRIAMCINOLONE ACETONIDE 40 MG/ML
40 INJECTION, SUSPENSION INTRA-ARTICULAR; INTRAMUSCULAR ONCE
Status: COMPLETED | OUTPATIENT
Start: 2021-08-11 | End: 2021-08-11

## 2021-08-11 RX ORDER — LIDOCAINE HYDROCHLORIDE 10 MG/ML
3 INJECTION, SOLUTION INFILTRATION; PERINEURAL ONCE
Status: COMPLETED | OUTPATIENT
Start: 2021-08-11 | End: 2021-08-11

## 2021-08-11 RX ORDER — BUPIVACAINE HYDROCHLORIDE 2.5 MG/ML
3 INJECTION, SOLUTION EPIDURAL; INFILTRATION; INTRACAUDAL ONCE
Status: COMPLETED | OUTPATIENT
Start: 2021-08-11 | End: 2021-08-11

## 2021-08-11 RX ADMIN — LIDOCAINE HYDROCHLORIDE 3 ML: 10 INJECTION, SOLUTION INFILTRATION; PERINEURAL at 11:32

## 2021-08-11 RX ADMIN — TRIAMCINOLONE ACETONIDE 40 MG: 40 INJECTION, SUSPENSION INTRA-ARTICULAR; INTRAMUSCULAR at 11:32

## 2021-08-11 RX ADMIN — BUPIVACAINE HYDROCHLORIDE 7.5 MG: 2.5 INJECTION, SOLUTION EPIDURAL; INFILTRATION; INTRACAUDAL at 11:31

## 2021-08-11 NOTE — PROGRESS NOTES
Chief Complaint   Patient presents with    Arthritis     wants injection right shoulder    Follow Up After Procedure     left knee surgery f/u       SUBJECTIVE: Patient is here today to follow-up around 6 months out from a left total knee arthroplasty. Significant relief for about 2 and half months from his last right shoulder injection and he is requesting another one today. He continues to do well with his left knee is very happy with. He has good range of motion from 0-120 degrees with therapy. He denies any falls or traumas. He has known chronic bilateral shoulder arthritis. Review of Systems -   General ROS: negative for - chills, fatigue, fever or night sweats  Respiratory ROS: no cough, shortness of breath, or wheezing  Cardiovascular ROS: no chest pain or dyspnea on exertion  Gastrointestinal ROS: no abdominal pain, change in bowel habits, or black or bloody stools  Genitourinary: no hematuria, dysuria, or incontinence   Musculoskeletal ROS:see above  Neurological ROS: no TIA or stroke symptoms       OBJECTIVE:   Alert and oriented X 3, no acute distress, respirations easy and unlabored with no audible wheezes, skin warm and dry, speech and dress appropriate for noted age, affect euthymic.     Extremity:    Left knee  Incision clean dry intact and well-healed  Compartments soft compressible  Calf soft nontender  Range of motion is from 0 to 120 degrees with no crepitus and good patellar tracking  5-5 strength in his lower extremity  Sensations intact distally  Capillary refill is less than 3 seconds  2/4 dorsalis pedis and posterior tibial pulses    Right shoulder  Skin is intact  Compartments soft compressible  Right upper extremity is neurovascular tact distally  Crepitus on range of motion of the right shoulder  Flexion to 110 degrees, external rotation to 20 degrees  Internal rotation past the side  2/4 radial pulse  Sensations intact distally right upper extremity      XR: 8/11/21     X-ray left knee shows components in good position with no subsidence alignment appears appropriate. Temp 98.7 °F (37.1 °C) (Oral)     ASSESSMENT:     Diagnosis Orders   1. Localized osteoarthritis of right shoulder  PA ARTHROCENTESIS ASPIR&/INJ MAJOR JT/BURSA W/O US       Discussion: Talked again about doing another injection. Explained the risk of cartilage degradation infection. He understands like to go for the injection. We will have him back in 3 months. The point time he can or shoulder injection if he would like. As far as his knee we will need to see him in about 6 to 8 months for that for follow-up. He understands and agrees to go for the injection. Procedure Note  Skin prepped with alcohol.  21ga. Needle used to inject 3cc 1% Lidocaine, 3cc 0.25% Marcaine, and 1cc Kenalog into the right shoulder through posterior portal.  Patient tolerated well and band aid applied. Walked out of the office with no issues. Plan: Activity as tolerated with the left knee and right shoulder    Call with any issues with injection site right shoulder    Otherwise follow-up in 3 months call sooner with any questions concerns or need for reevaluation    Patient has had Neurontin in the past which is helped with some of the burning pain in his arms and his legs, will affect is a controlled substance check disorders which she had been on for some reason about. Therefore I gave him a prescription for 1 month of Neurontin.

## 2021-08-22 ENCOUNTER — APPOINTMENT (OUTPATIENT)
Dept: GENERAL RADIOLOGY | Age: 71
DRG: 309 | End: 2021-08-22
Payer: MEDICARE

## 2021-08-22 ENCOUNTER — HOSPITAL ENCOUNTER (INPATIENT)
Age: 71
LOS: 1 days | Discharge: HOME | DRG: 309 | End: 2021-08-22
Attending: EMERGENCY MEDICINE | Admitting: FAMILY MEDICINE
Payer: MEDICARE

## 2021-08-22 VITALS
SYSTOLIC BLOOD PRESSURE: 122 MMHG | RESPIRATION RATE: 15 BRPM | OXYGEN SATURATION: 97 % | DIASTOLIC BLOOD PRESSURE: 62 MMHG | HEART RATE: 53 BPM | TEMPERATURE: 98.1 F

## 2021-08-22 DIAGNOSIS — Z79.01 CHRONIC ANTICOAGULATION: ICD-10-CM

## 2021-08-22 DIAGNOSIS — I48.92 ATRIAL FLUTTER WITH RAPID VENTRICULAR RESPONSE (HCC): Primary | ICD-10-CM

## 2021-08-22 DIAGNOSIS — Z01.89 ENCOUNTER FOR CARDIOVERSION PROCEDURE: ICD-10-CM

## 2021-08-22 PROBLEM — I48.91 ATRIAL FIBRILLATION WITH RVR (HCC): Status: ACTIVE | Noted: 2021-08-22

## 2021-08-22 LAB
ALBUMIN SERPL-MCNC: 4.3 G/DL (ref 3.5–5.2)
ALP BLD-CCNC: 125 U/L (ref 40–129)
ALT SERPL-CCNC: 23 U/L (ref 0–40)
ANION GAP SERPL CALCULATED.3IONS-SCNC: 10 MMOL/L (ref 7–16)
AST SERPL-CCNC: 22 U/L (ref 0–39)
BASOPHILS ABSOLUTE: 0.1 E9/L (ref 0–0.2)
BASOPHILS RELATIVE PERCENT: 1.1 % (ref 0–2)
BILIRUB SERPL-MCNC: 0.4 MG/DL (ref 0–1.2)
BUN BLDV-MCNC: 36 MG/DL (ref 6–23)
CALCIUM SERPL-MCNC: 10.1 MG/DL (ref 8.6–10.2)
CHLORIDE BLD-SCNC: 101 MMOL/L (ref 98–107)
CO2: 29 MMOL/L (ref 22–29)
CREAT SERPL-MCNC: 1.2 MG/DL (ref 0.7–1.2)
EKG ATRIAL RATE: 49 BPM
EKG ATRIAL RATE: 93 BPM
EKG P AXIS: -93 DEGREES
EKG P AXIS: 59 DEGREES
EKG P-R INTERVAL: 172 MS
EKG P-R INTERVAL: 180 MS
EKG Q-T INTERVAL: 416 MS
EKG Q-T INTERVAL: 464 MS
EKG QRS DURATION: 134 MS
EKG QRS DURATION: 138 MS
EKG QTC CALCULATION (BAZETT): 419 MS
EKG QTC CALCULATION (BAZETT): 517 MS
EKG R AXIS: 36 DEGREES
EKG R AXIS: 9 DEGREES
EKG T AXIS: 41 DEGREES
EKG T AXIS: 70 DEGREES
EKG VENTRICULAR RATE: 49 BPM
EKG VENTRICULAR RATE: 93 BPM
EOSINOPHILS ABSOLUTE: 0.32 E9/L (ref 0.05–0.5)
EOSINOPHILS RELATIVE PERCENT: 3.4 % (ref 0–6)
GFR AFRICAN AMERICAN: >60
GFR NON-AFRICAN AMERICAN: 60 ML/MIN/1.73
GLUCOSE BLD-MCNC: 175 MG/DL (ref 74–99)
HCT VFR BLD CALC: 43.6 % (ref 37–54)
HEMOGLOBIN: 14.9 G/DL (ref 12.5–16.5)
IMMATURE GRANULOCYTES #: 0.07 E9/L
IMMATURE GRANULOCYTES %: 0.8 % (ref 0–5)
LYMPHOCYTES ABSOLUTE: 2.84 E9/L (ref 1.5–4)
LYMPHOCYTES RELATIVE PERCENT: 30.5 % (ref 20–42)
MCH RBC QN AUTO: 33.6 PG (ref 26–35)
MCHC RBC AUTO-ENTMCNC: 34.2 % (ref 32–34.5)
MCV RBC AUTO: 98.4 FL (ref 80–99.9)
MONOCYTES ABSOLUTE: 0.68 E9/L (ref 0.1–0.95)
MONOCYTES RELATIVE PERCENT: 7.3 % (ref 2–12)
NEUTROPHILS ABSOLUTE: 5.3 E9/L (ref 1.8–7.3)
NEUTROPHILS RELATIVE PERCENT: 56.9 % (ref 43–80)
PDW BLD-RTO: 13.7 FL (ref 11.5–15)
PLATELET # BLD: 174 E9/L (ref 130–450)
PMV BLD AUTO: 11.1 FL (ref 7–12)
POTASSIUM SERPL-SCNC: 3.7 MMOL/L (ref 3.5–5)
RBC # BLD: 4.43 E12/L (ref 3.8–5.8)
SODIUM BLD-SCNC: 140 MMOL/L (ref 132–146)
TOTAL PROTEIN: 7.7 G/DL (ref 6.4–8.3)
TROPONIN, HIGH SENSITIVITY: 44 NG/L (ref 0–11)
WBC # BLD: 9.3 E9/L (ref 4.5–11.5)

## 2021-08-22 PROCEDURE — 1200000000 HC SEMI PRIVATE

## 2021-08-22 PROCEDURE — 92960 CARDIOVERSION ELECTRIC EXT: CPT

## 2021-08-22 PROCEDURE — 71045 X-RAY EXAM CHEST 1 VIEW: CPT

## 2021-08-22 PROCEDURE — 93010 ELECTROCARDIOGRAM REPORT: CPT | Performed by: INTERNAL MEDICINE

## 2021-08-22 PROCEDURE — 2500000003 HC RX 250 WO HCPCS: Performed by: EMERGENCY MEDICINE

## 2021-08-22 PROCEDURE — 99283 EMERGENCY DEPT VISIT LOW MDM: CPT

## 2021-08-22 PROCEDURE — 99223 1ST HOSP IP/OBS HIGH 75: CPT | Performed by: INTERNAL MEDICINE

## 2021-08-22 PROCEDURE — 84484 ASSAY OF TROPONIN QUANT: CPT

## 2021-08-22 PROCEDURE — 5A2204Z RESTORATION OF CARDIAC RHYTHM, SINGLE: ICD-10-PCS | Performed by: HOSPITALIST

## 2021-08-22 PROCEDURE — 85025 COMPLETE CBC W/AUTO DIFF WBC: CPT

## 2021-08-22 PROCEDURE — 93005 ELECTROCARDIOGRAM TRACING: CPT | Performed by: EMERGENCY MEDICINE

## 2021-08-22 PROCEDURE — 80053 COMPREHEN METABOLIC PANEL: CPT

## 2021-08-22 PROCEDURE — 2580000003 HC RX 258: Performed by: EMERGENCY MEDICINE

## 2021-08-22 PROCEDURE — 96374 THER/PROPH/DIAG INJ IV PUSH: CPT

## 2021-08-22 RX ORDER — SODIUM CHLORIDE 9 MG/ML
INJECTION, SOLUTION INTRAVENOUS CONTINUOUS
Status: DISCONTINUED | OUTPATIENT
Start: 2021-08-22 | End: 2021-08-22 | Stop reason: HOSPADM

## 2021-08-22 RX ORDER — ETOMIDATE 2 MG/ML
10 INJECTION INTRAVENOUS ONCE
Status: DISCONTINUED | OUTPATIENT
Start: 2021-08-22 | End: 2021-08-22

## 2021-08-22 RX ORDER — TRAMADOL HYDROCHLORIDE 50 MG/1
50 TABLET ORAL 2 TIMES DAILY
Status: DISCONTINUED | OUTPATIENT
Start: 2021-08-22 | End: 2021-08-22 | Stop reason: HOSPADM

## 2021-08-22 RX ORDER — ATORVASTATIN CALCIUM 20 MG/1
1 TABLET, FILM COATED ORAL NIGHTLY
Status: DISCONTINUED | OUTPATIENT
Start: 2021-08-22 | End: 2021-08-22 | Stop reason: HOSPADM

## 2021-08-22 RX ORDER — METOPROLOL SUCCINATE 100 MG/1
1 TABLET, EXTENDED RELEASE ORAL DAILY
Status: DISCONTINUED | OUTPATIENT
Start: 2021-08-22 | End: 2021-08-22 | Stop reason: HOSPADM

## 2021-08-22 RX ORDER — LANOLIN ALCOHOL/MO/W.PET/CERES
400 CREAM (GRAM) TOPICAL 2 TIMES DAILY
Status: DISCONTINUED | OUTPATIENT
Start: 2021-08-22 | End: 2021-08-22 | Stop reason: HOSPADM

## 2021-08-22 RX ORDER — ETOMIDATE 2 MG/ML
14 INJECTION INTRAVENOUS ONCE
Status: COMPLETED | OUTPATIENT
Start: 2021-08-22 | End: 2021-08-22

## 2021-08-22 RX ORDER — ASCORBIC ACID 500 MG
500 TABLET ORAL DAILY
Status: DISCONTINUED | OUTPATIENT
Start: 2021-08-22 | End: 2021-08-22 | Stop reason: HOSPADM

## 2021-08-22 RX ORDER — LOSARTAN POTASSIUM 25 MG/1
50 TABLET ORAL DAILY
Status: DISCONTINUED | OUTPATIENT
Start: 2021-08-22 | End: 2021-08-22 | Stop reason: HOSPADM

## 2021-08-22 RX ORDER — CHLORTHALIDONE 25 MG/1
1 TABLET ORAL DAILY
Status: DISCONTINUED | OUTPATIENT
Start: 2021-08-22 | End: 2021-08-22 | Stop reason: HOSPADM

## 2021-08-22 RX ORDER — GABAPENTIN 100 MG/1
100 CAPSULE ORAL 2 TIMES DAILY
Status: DISCONTINUED | OUTPATIENT
Start: 2021-08-22 | End: 2021-08-22 | Stop reason: HOSPADM

## 2021-08-22 RX ADMIN — DILTIAZEM HYDROCHLORIDE 25 MG: 5 INJECTION INTRAVENOUS at 05:10

## 2021-08-22 RX ADMIN — ETOMIDATE 14 MG: 2 INJECTION, SOLUTION INTRAVENOUS at 08:35

## 2021-08-22 ASSESSMENT — PAIN SCALES - GENERAL: PAINLEVEL_OUTOF10: 0

## 2021-08-22 NOTE — CONSULTS
[address] [facility] and [address] [facility] Electrophysiology  Consultation Report  PATIENT: Ella Carranza  MEDICAL RECORD NUMBER: [id number]  DATE OF SERVICE:  8/22/2021  ATTENDING ELECTROPHYSIOLOGIST: Claude Collier MD  PRIMARY ELECTROPHYSIOLOGIST: Claude Collier MD  REFERRING PHYSICIAN: No ref. provider found and Sharyn Jovel DO  CHIEF COMPLAINT: Palpitations    HPI: This is a 80 y.o. male with a history of   Patient Active Problem List   Diagnosis    Typical atrial flutter ([address]    Essential hypertension    Eczema    Former smoker    Midline low back pain with right-sided sciatica    Persistent atrial fibrillation    Chronic systolic heart failure (HCC)    CKD (chronic kidney disease)    Status post catheter ablation of atrial fibrillation    Status post catheter ablation of atrial flutter    Right bundle-branch block    Tachyarrhythmia    Atrial fibrillation [facility]    Wrist pain, chronic, right    Chronic pain syndrome    Primary osteoarthritis involving multiple joints    Right foot drop    Lumbar facet arthropathy    COVID-19    NATALI (acute kidney injury) ([facility] [address]    HLD (hyperlipidemia)    Redness and swelling of knee    Obesity (BMI 30-39. 9)    CAD (coronary artery disease)    H/O total knee replacement, bilateral    Osteoarthritis of left knee    Localized osteoarthritis of left knee    Atrial fibrillation with RVR ([facility] [address]   who presents to the hospital complaining of palpitations. The patient has history of  Cardiac electrophysiology service is consulted for persistent atrial fibrillation and flutter, HFrecEF, HTN, CKD, AURELIANO, obesity , RBBB and chronic back pain. The patient presents to the hospital complaining of palpitations started 3.00 to 3.30 AM this morning while he was laying in bed.  He checked his pulse and noted to have HR of 140 bpm. He took his normal dose of Tikosyn., Toprol XL and Eliquis and decided to come to ED this morning due to persistent elevated HR. In ED he was noted to be in atrial flutter with HR of 93 bpm.  He was given IV Cardizem bolus. Cardiac electrophysiology service was consulted for atrial flutter. The patient denies any chest pain, dyspnea, dizziness, syncope, orthopnea or paroxysmal nocturnal dyspnea. He states that he did not eat since last night and only have sips of water. Risk, benefits and alternatives to DC-cardioversion were discussed. He defers to be admitted to the hospital and agrees to proceed with DC-cardioversion. Discussed with Dr. Ivana Ortiz who will et up DC-cardioversion in ED.      Patient Active Problem List    Diagnosis Date Noted    Former smoker 06/03/2013     Priority: Medium    Typical atrial flutter ([facility] [address] 04/05/2013     Priority: Medium     Overview Note:     S/p atrial flutter ablation 5/07/2013      Essential hypertension 04/05/2013     Priority: Medium    Eczema 06/03/2013     Priority: Low    Atrial fibrillation with RVR ([facility] [address] 08/22/2021    Localized osteoarthritis of left knee 03/09/2021    H/O total knee replacement, bilateral 03/08/2021    Osteoarthritis of left knee 03/08/2021    HLD (hyperlipidemia) 07/01/2020    Redness and swelling of knee 07/01/2020    Obesity (BMI 30-39.9) 07/01/2020    CAD (coronary artery disease) 07/01/2020    NATALI (acute kidney injury) ([address] 06/30/2020    COVID-19     Wrist pain, chronic, right 02/12/2019    Chronic pain syndrome 02/12/2019    Primary osteoarthritis involving multiple joints 02/12/2019    Right foot drop 02/12/2019    Lumbar facet arthropathy 02/12/2019    Tachyarrhythmia 12/07/2018    Atrial fibrillation ([address] 12/07/2018    Right bundle-branch block 07/05/2018    Status post catheter ablation of atrial fibrillation 10/21/2017    Status post catheter ablation of atrial flutter 10/21/2017    CKD (chronic kidney disease)     Chronic systolic heart failure (HCC)     Persistent atrial fibrillation     Midline low back pain with right-sided sciatica 2016       Past Medical History:   Diagnosis Date    Arthritis     Atrial fibrillation ([address]     Bulging lumbar disc     L4-L5    CAD (coronary artery disease) 2016    ct scan heart    Chronic lower back pain     BREWER (dyspnea on exertion) 3/26/2013    Eczema     Hyperlipidemia     Hypertension     Kidney stones     Obesity     weight 250#    Sleep apnea     c pap  stting 2       Family History   Problem Relation Age of Onset    Cancer Mother     Diabetes Mother     Cancer Father     Diabetes Sister     Kidney Disease Sister     Early Death Neg Hx        Social History     Tobacco Use    Smoking status: Former Smoker     Packs/day: 1.00     Years: 20.00     Pack years: 20.00     Types: Cigarettes     Quit date: 2007     Years since quittin.6    Smokeless tobacco: Never Used    Tobacco comment: 1.5 PACKS EACH DAY stop    Substance Use Topics    Alcohol use:  Yes     Alcohol/week: 3.0 standard drinks     Types: 3 Shots of liquor per week     Comment: 1 x week       Current Facility-Administered Medications   Medication Dose Route Frequency Provider Last Rate Last Admin    ascorbic acid (VITAMIN C) tablet 500 mg  500 mg Oral Daily Prachi Gonzales MD        atorvastatin (LIPITOR) tablet 20 mg  1 tablet Oral Nightly Prachi Gonzales MD        chlorthalidone (HYGROTON) tablet 25 mg  1 tablet Oral Daily Prachi Gonzales MD        [Held by provider] dofetilide (TIKOSYN) capsule 375 mcg  375 mcg Oral 2 times per day Prachi Gonzales MD        apixaban (ELIQUIS) tablet 5 mg  5 mg Oral BID Prachi Gonzales MD        gabapentin (NEURONTIN) capsule 100 mg  100 mg Oral BID Prachi Gonzales MD        losartan (COZAAR) tablet 50 mg  50 mg Oral Daily Prachi Gonzales MD        magnesium oxide (MAG-OX) tablet 400 mg  400 mg Oral BID Prachi Gonzales MD        metoprolol succinate (TOPROL XL) extended release tablet 100 mg  1 tablet Oral Daily Haile Serge aCrdona MD        traMADol Birder Mo) tablet 50 mg  50 mg Oral BID Abraham Kayser, MD        Vitamin D (CHOLECALCIFEROL) capsule 1,000 Units  1,000 Units Oral Daily Abraham Kayser, MD        0.9 % sodium chloride infusion   Intravenous Continuous Abraham Kayser, MD        perflutren lipid microspheres (DEFINITY) injection 1.65 mg  1.5 mL Intravenous ONCE PRN Abraham Kayser, MD        etomidate (AMIDATE) injection 10 mg  10 mg Intravenous Once Jordon Mcintyre DO         Current Outpatient Medications   Medication Sig Dispense Refill    gabapentin (NEURONTIN) 100 MG capsule Take 1 capsule by mouth 2 times daily for 30 days. 60 capsule 0    metoprolol succinate (TOPROL XL) 100 MG extended release tablet TAKE ONE TABLET BY MOUTH EVERY DAY 30 tablet 0    metoprolol succinate (TOPROL XL) 100 MG extended release tablet Take 1 tablet by mouth daily 90 tablet 3    ELIQUIS 5 MG TABS tablet TAKE ONE TABLET BY MOUTH TWO TIMES A  tablet 3    dofetilide (TIKOSYN) 250 MCG capsule TAKE 1 CAPSULE BY MOUTH 2 TIMES DAILY ALONG WITH 125MCG DOSE TO EQUAL 375MCG DOSE 2 TIMES DAILY 180 capsule 3    dofetilide (TIKOSYN) 125 MCG capsule TAKE ONE CAPSULE BY MOUTH TWO TIMES A DAY. TAKE ALONG WITH 250MCG TO EQUAL 375MCG TWICE DAILY 180 capsule 3    acetaminophen (TYLENOL) 500 MG tablet Take 2 tablets by mouth 3 times daily as needed for Pain 180 tablet 1    atorvastatin (LIPITOR) 20 MG tablet TAKE ONE TABLET BY MOUTH EVERY DAY 30 tablet 5    chlorthalidone (HYGROTON) 25 MG tablet TAKE ONE TABLET BY MOUTH EVERY DAY 90 tablet 3    traMADol (ULTRAM) 50 MG tablet Take 50 mg by mouth 2 times daily.  losartan (COZAAR) 50 MG tablet Take 50 mg by mouth daily      Misc Natural Products (TART CHERRY ADVANCED PO) Take by mouth STOP PREOP MED      gabapentin (NEURONTIN) 100 MG capsule Take 100 mg by mouth as needed.        magnesium oxide (MAG-OX) 400 (240 Mg) MG tablet Take 1 tablet by mouth 2 times daily (Patient taking differently: Take 400 mg by mouth daily ) 60 tablet 0    Multiple Vitamins-Minerals (THERAPEUTIC MULTIVITAMIN-MINERALS) tablet Take 1 tablet by mouth daily.  Coenzyme Q10 (COQ10 PO) Take 1 tablet by mouth daily.  Alpha-Lipoic Acid 300 MG CAPS Take 300 mg by mouth daily On hold      ascorbic acid (VITAMIN C) 500 MG tablet Take 500 mg by mouth daily.  Vitamin D (CHOLECALCIFEROL) 1000 UNITS CAPS capsule Take 1,000 Units by mouth daily.  Cyanocobalamin (VITAMIN B 12 PO) Take 500 mcg by mouth daily. Allergies   Allergen Reactions    Adhesive Tape Rash     bandaids     ROS:   Constitutional: Negative for fever, activity change and appetite change. HENT: Negative for epistaxis. Eyes: Negative for diploplia, blurred vision. Respiratory: Negative for cough, chest tightness, shortness of breath and wheezing. Cardiovascular: pertinent positives in HPI  Gastrointestinal: Negative for abdominal pain and blood in stool. All other review of systems are negative     PHYSICAL EXAM:   Vitals:    08/22/21 0836 08/22/21 0843 08/22/21 0846 08/22/21 0852   BP: 139/71 (!) 151/78 111/66 122/62   Pulse: 87 (!) 48 54 53   Resp: 21 18 18 15   Temp:       TempSrc:       SpO2: 98% 97% 99% 97%      Constitutional: Well-developed, no acute distress  Eyes: conjunctivae normal, no xanthelasma   Ears, Nose, Throat: oral mucosa moist, no cyanosis   CV: no JVD. IRIR. No murmurs, rubs, or gallops.  PMI is nondisplaced  Lungs: clear to auscultation bilaterally, normal respiratory effort without used of accessory muscles  Abdomen: soft, non-tender, bowel sounds present, no masses or hepatomegaly   Musculoskeletal: no digital clubbing, no edema   Skin: warm, no rashes     I have personally reviewed the laboratory, cardiac diagnostic and radiographic testing as outlined below:    Data:    Recent Labs     08/22/21  0512   WBC 9.3   HGB 14.9   HCT 43.6        Recent Labs     08/22/21  0512   NA 140   K 3.7      CO2 29   BUN 36*   CREATININE 1.2   CALCIUM 10.1      Lab Results   Component Value Date    MG 1.8 09/21/2020    MG 1.8 09/21/2020     No results for input(s): TSH in the last 72 hours. No results for input(s): INR in the last 72 hours. CXR 8/22/21:   FINDINGS:   There is no cardiomegaly or pulmonary vascular congestion.  There is no acute   infiltrate or pleural effusion. Lucetta Record is no pneumothorax.           Impression   No acute abnormality identified. EKG 8/22/21: Typical atrial flutter 93 bpm, RBBB, QTc 517 ms. Please see scan in Cardiology. BUSHRA 10/20/17:  Findings      Left Ventricle   Normal LV segmental wall motion.   Ejection fraction is visually estimated at 50%.     Right Ventricle   Normal right ventricular size and function.      Left Atrium   No thrombus in left atrial appendage.   Normal LA appendage velocity >0.4m/s      Mitral Valve   Normal mitral valve structure and function.      Tricuspid Valve   The tricuspid valve appears structurally normal.      Aortic Valve   Structurally normal aortic valve.      Aorta   Mild aortic atherosclerosis      Conclusions      Summary   Normal LV segmental wall motion.   Ejection fraction is visually estimated at 50%.   No thrombus in left atrial appendage.   Normal LA appendage velocity >0.4m/s   Normal mitral valve structure and function.   Mild aortic atherosclerosis      Signature      ----------------------------------------------------------------   Electronically signed by Terrance Newby MD(Interpreting   physician) on 02/27/2018 05:33 PM   ----------------------------------------------------------------     Nuclear stress test 2/20/20:  FINDINGS: The overall quality of the study was fair.      Left ventricular cavity size was noted to be normal.     Rotational analog analysis demonstrated no patient motion or   abnormal extracardiac radioactivity, abnormal patient motion,   soft tissue breast attenuation and soft tissue diaphragmatic   attenuation. The gated SPECT stress imaging in the short, vertical long, and   horizontal long axis demonstrated normal homogeneous tracer   distribution throughout the myocardium. The resting images are normal.     Gated SPECT left ventricular ejection fraction was calculated to   be 64%, with normal myocardial thickening and wall motion. Impression:     1. ECG during the infusion did not change. 2. The myocardial perfusion imaging was normal with attenuation   artifact.     3. Overall left ventricular systolic function was normal without   regional wall motion abnormalities. 4. Low risk general pharmacologic stress test.     Thank you for sending your patient to this [facility]. Electronically signed by Gina Delong DO on 2/20/20 at 12:57 PM      Last Resulted: 02/20/20 09:00            24-hour Holter monitor 1/3/19: revealed sinus rhythm from  bpm.   There was no evidence of AF, AFl, SVT, VT, or AV block. No symptoms were reported.       I have independently reviewed all of the ECGs and rhythm strips per above     Assessment/Plan: This is a 80 y.o. male with a history of   Patient Active Problem List   Diagnosis    Typical atrial flutter ([facility] [address]    Essential hypertension    Eczema    Former smoker    Midline low back pain with right-sided sciatica    Persistent atrial fibrillation    Chronic systolic heart failure (HCC)    CKD (chronic kidney disease)    Status post catheter ablation of atrial fibrillation    Status post catheter ablation of atrial flutter    Right bundle-branch block    Tachyarrhythmia    Atrial fibrillation (HCC)    Wrist pain, chronic, right    Chronic pain syndrome    Primary osteoarthritis involving multiple joints    Right foot drop    Lumbar facet arthropathy    COVID-19    NATALI (acute kidney injury) ([facility] [address]    HLD (hyperlipidemia)    Redness and swelling of knee    Obesity (BMI 30-39. 9)    CAD (coronary artery disease)    H/O total knee replacement, bilateral    Osteoarthritis of left knee    Localized osteoarthritis of left knee    Atrial fibrillation with RVR ([address]    who presents with atrial flutter. 1. Atrial fibrillation and flutter  - [id number] risk score of 3 based on HTN and systolic dysfunction, age greater than 72, he is on Eliquis he denies any bleeding problems. Denies missing Eliquis dose for the past 3 weeks. - Status post CTI ablation in May 6852, procedure complicated by airway compromise, anatomical variants c/w Eustachian ridge and pouch, extended procedure time and multiple lines required to achieve bidirectional block. He had no documented recurrence in over 2 year after procedure.     - Post RFA with subsequent development of persistent atrial fibrillation and atypical flutter. He was highly symptomatic despite apparent rate control, at least at rest.   - Underwent redo flutter ablation and PVI (RFA) on 8/10/16. He was weaned off his sotalol and was started back on a low-dose Toprol-XL for his cardiomyopathy.   - On October 20, 2017 he underwent a successful BUSHRA/DCCV with Tikosyn drug loading for persistent symptomatic atrial flutter. - 24-hour Holter monitor in January 2019 revealed sinus rhythm from  bpm.  There was no evidence of AF, AFl, SVT, VT, or AV block. No symptoms were reported  - Currently on Tikosyn 375 mcg every 12 hours. - Presents, 8/22/21, with atrial flutter with RVR. - Options of DC-cardioversion discussed. The patient verbalizes understanding and agrees to proceed with the procedure. - Consider redo ablation versus new AAD therapy if AF/AFL recurs in future.   - CrCl by IBW of 57.31 mL/min based off Cr of 1.2 on 8/22/21  - Re-education on importance of well controlled HTN (goal BP < 130/80), adequate weight control (goal BMI of < 27), physical activity consisting of moderate cardiopulmonary exercise up to a goal of 250 min/wk, daily compliance with CPAP in treating sleep apnea, smoking cessation and limited ETOH intake. 2. Chronic HFrEF  - Recovered LV EF.  - Probably tachycardia-mediated  - Last LV EF by BUSHRA was 50% in 10/2017.  - On Toprol XL and Cozaar.  - Euvolemic and compensated     3. Hypertension    - Well controlled. - On Toprol XL, Cholrthalidone and Cozaar.     4. Chronic kidney disease   - Stage III  CrCl cannot be calculated (Unknown ideal weight. ). 5. Obstructive Sleep Apnea    - Compliant with his CPAP        6. Obesity  There is no height or weight on file to calculate BMI.      7. RBBB  - Chronic      8. Chronic back pain  - With foot drop  - History of back surgery  - Status post injections.     Recommendations:      1. DC-cardioversion in ED. 2. Continue Tikosyn 375 mcg every 12 hours. 3. Continue Toprol  mg daily. 4. Continue Eliquis 5 mg bid. 5. Consider redo ablation versus new AAD therapy if AF/AFL recurs in future. 6. OK to discharge home per Ep perspectives after DC-cardioversion. 7. Follow up in 1 week for EKG post procedure and 6 to 8 week with provider. 8. Discussed with Dr. Lotus Lerma. I have spent a total of 70 minutes with the patient and the family reviewing the above stated recommendations. And a total of >50% of that time involved face-to-face time providing counseling and or coordination of care with the other providers, reviewing records/tests, counseling/education of the patient, ordering medications/tests/procedures, coordinating care, and documenting clinical information in the EHR. Thank you for allowing me to participate in your patient's care. Please call me if there are any questions or concerns.       Genny Juarez MD  Cardiac Electrophysiology  [facility]  [facility] [place]: [place] [facility]  8:59 AM  8/22/2021

## 2021-08-22 NOTE — ED PROVIDER NOTES
08/22/21  9:06 AM    Electrophysiology has seen the patient in the department. Patient took his medicines at 4:00 but last other oral intake was last night for dinner. Patient requested cardioversion in the emergency department as he did not want to be admitted to the hospital.  Electrophysiology has requested we speak to the patient regarding cardioversion and a successful patient be discharged home    Vitals:    08/22/21 0852   BP: 122/62   Pulse: 53   Resp: 15   Temp:    SpO2: 97%         Oxygen Saturation Interpretation: Normal    The cardiac monitor revealed A flutter 2:1 block with a heart rate in the 90s as interpreted by me. The cardiac monitor was ordered secondary to the patient's heart rate and to monitor the patient for dysrhythmia. CPT 75678    The patients available past medical records and past encounters were reviewed. -------------------------------- Conscious Sedation Procedure Note -----------------------------  Patient Name: Kt Mejia   Medical Record Number: [id number]  Date: 8/22/21   Time: 8:23 AM EDT   Room/Bed: 11/11    Indication: procedural pain management and cardioversion  Consent: I have discussed with the patient and/or the patient representative the indication, alternatives, and the possible risks and/or complications of the planned procedure and the anesthesia methods. The patient and/or patient representative appear to understand and agree to proceed. Physician Involvement: The attending physician was present and supervising this procedure. 8/22/21     Time: 5941 (pre-procedure start time)  Pre-Sedation Documentation and Exam: I have personally completed a history, physical exam & review of systems for this patient (see notes). Vital signs have been reviewed (see flow sheet for vitals). I have reviewed the patient's history and review of systems.   Airway Assessment: dentition not prohibitive, normal neck range of motion, Mallampati Class III - (soft palate & base of uvula are visible)  Prior History of Anesthesia Complications: none  ASA Classification: Class 3 - A patient with severe systemic disease that limits activity but is not incapacitating  Sedation/ Anesthesia Plan: intravenous sedation  Medications Used: See MAR  Monitoring and Safety: The patient was placed on a cardiac monitor and vital signs, pulse oximetry and level of consciousness were continuously evaluated throughout the procedure. The patient was closely monitored until recovery from the medications was complete and the patient had returned to baseline status. Respiratory therapy was on standby at all times during the procedure.    ----------------------------------- Post Conscious Sedation Note -----------------------------------    8/22/21     Time: 7204 (post-procedure stop time)  Post-Sedation Vital Signs: Vital signs were reviewed and were stable after the procedure (see flow sheet for vitals)       Post-Sedation Exam: Lungs: clear and Cardiovascular: regular rate and rhythm       Complications: none    Electronically Signed by: Can Smyth DO       PROCEDURE  8/22/21       Time: 5404    CARDIOVERSION  Risks, benefits and alternatives (for applicable procedures below) described. Performed By: EM Attending Physician and EM Resident. Indication: atrial flutter. Informed consent: Written consent obtained. The patient was counseled regarding the procedure in person, it's indications, risks, potential complications and alternatives and any questions were answered. Consent was obtained. .  Procedure:  Vagal maneuvers were not attempted. Prior to the Procedure, anesthsia given: yes; conscious sedation sheet completed. .  Cardioversion was performed under my order and direction, and was attempted by synchronous mode, 1 times with 200 Joules. The resultant rhythm was:  conversion to normal sinus rhythm. Complications: None. Patient tolerated the procedure well.   Cardiology Consult Placed: Yes - EP      EKG @ 3551:  This EKG is signed and interpreted by me. Rate: 49  Rhythm: Sinus  Interpretation: Sinus rhythm sinus bradycardia and sinus arrhythmia, right axis, right bundle branch block, IL is 180, QRS is 138, QTc is 419. Comparison: No longer in a flutter with compared to EKG from 8/22/2021, this is stable compared historical EKG. from 5/6/2021    Time: 0905  Re-evaluation. Patients symptoms are improving  Repeat physical examination is significantly improved -patient states he feels well status post cardioversion. Is awake alert talking. Denies complaints    Spoke with Dr. Emily Wilson (Neurology). Discussed case. They will provide consultation, state that the patient is okay to discharge home and will see this patient in follow-up. Will have patient maintain current medication regiment as he has been well controlled on this until the current breakthrough event  Internal medicine was notified of EP's recommendations for discharge home now that he is cardioverted and patient status post cardioversion.     --------------------------------- IMPRESSION AND DISPOSITION ---------------------------------    IMPRESSION  1. Atrial flutter with rapid ventricular response ([address]    2. Chronic anticoagulation    3.  Encounter for cardioversion procedure        DISPOSITION  Disposition: Discharge to home  Patient condition is stable       Juan R Ortiz DO  08/22/21 7341

## 2021-08-22 NOTE — DISCHARGE INSTR - COC
Continuity of Care Form    Patient Name: Abdias Blocker   :  [birth year]  MRN:  [id number]    [facility] date:  2021  Discharge date:  ***    Code Status Order: Prior   Advance Directives:     Admitting Physician:  No admitting provider for patient encounter.   PCP: Dilshad Schultz DO    Discharging Nurse: [facility] Unit/Room#:   Discharging Unit Phone Number: ***    Emergency Contact:   Extended Emergency Contact Information  Primary Emergency Contact: Betty Fernandes  Address: [address] Phone: [phone]  Mobile Phone: [phone]  Relation: Spouse  Secondary Emergency Contact: AlejandrooneiljenyEly  Address: [facility] Phone: [phone]  Relation: Child    Past Surgical History:  Past Surgical History:   Procedure Laterality Date    ABLATION OF DYSRHYTHMIC FOCUS  2013    heart ablation dr José Miguel Newberry  08/10/2016    CARDIOVERSION  9-    Dr. Beatriz Colon  10/20/2017    Dr. Wm Romero ECHOCARDIOGRAM TRANSESOPHAGEAL  2013         KNEE SURGERY Left     repair left knee ligaments    LITHOTRIPSY  1986    LUMBAR [address]  2016    [facility] [place]    TONSILLECTOMY      TOTAL KNEE ARTHROPLASTY Right 2020    RIGHT KNEE TOTAL ARTHROPLASTY -- SHRUTHI performed by Lennie Rose MD at [address] [place] [address] Left 3/8/2021    LEFT KNEE TOTAL ARTHROPLASTY -- SHRUTHI performed by Lennie Rose MD at [facility] TRANSESOPHAGEAL ECHOCARDIOGRAM  3-    Dr. Meg White       Immunization History:   Immunization History   Administered Date(s) Administered    Influenza, Quadv, IM, PF (6 mo and older Fluzone, Flulaval, Fluarix, and 3 yrs and older Afluria) 10/23/2017       Active Problems:  Patient Active Problem List   Diagnosis Code    Typical atrial flutter ([address] I48.3    Essential hypertension I10    Eczema L30.9    Former smoker Z87.891    Midline low back pain with right-sided sciatica M54.41    Persistent atrial fibrillation I48.19    Chronic systolic heart failure (HCC) I50.22    CKD (chronic kidney disease) N18.9    Status post catheter ablation of atrial fibrillation Z98.890    Status post catheter ablation of atrial flutter Z98.890    Right bundle-branch block I45.10    Tachyarrhythmia R00.0    Atrial fibrillation (HCC) I48.91    Wrist pain, chronic, right M25.531, G89.29    Chronic pain syndrome G89.4    Primary osteoarthritis involving multiple joints M89.49    Right foot drop M21.371    Lumbar facet arthropathy M47.816    COVID-19 U07.1    NATALI (acute kidney injury) (HCC) N17.9    HLD (hyperlipidemia) E78.5    Redness and swelling of knee M25.469, R23.8    Obesity (BMI 30-39. 9) E66.9    CAD (coronary artery disease) I25.10    H/O total knee replacement, bilateral Z96.653    Osteoarthritis of left knee M17.12    Localized osteoarthritis of left knee M17.12    Atrial fibrillation with RVR ([facility]) I48.91       Isolation/Infection:   Isolation          No Isolation        Patient Infection Status     Infection Onset Added Last Indicated Last Indicated By Review Planned Expiration Resolved Resolved By    None active    Resolved    COVID-19 Rule Out 03/03/21 03/03/21 03/03/21 Covid-19 Ambulatory (Ordered)   03/04/21 Rule-Out Test Resulted    COVID-19 Rule Out 05/27/20 05/27/20 05/27/20 Covid-19 Ambulatory (Ordered)   05/28/20 Rule-Out Test Resulted          Nurse Assessment:  Last Vital Signs: /66   Pulse 54   Temp 98.1 °F (36.7 °C) (Temporal)   Resp 18   SpO2 99%     Last documented pain score (0-10 scale):    Last Weight:   Wt Readings from Last 1 Encounters:   05/06/21 265 lb 3.2 oz (120.3 kg)     Mental Status:  {IP PT MENTAL STATUS:20030}    IV Access:  {[facility] IV ACCESS:591703267}    Nursing Mobility/ADLs:  Walking   {[employer] JTHV:249064828}  Transfer  {[employer] KCPV:642785362}  Bathing  {[employer] BRMI:552728882}  Dressing  {CH[facility] D[employer] FQKA:559209701}  Toileting  {CH[employer] D[employer] FZGT:329032050}  Feeding  {[facility] IUXO:988581470}  Med Admin  {CHP D[employer] EBXF:172492202}  Med Delivery   { [facility] MED Delivery:845461941}    Wound Care Documentation and Therapy:        Elimination:  Continence:   · Bowel: {YES / TK:16629}  · Bladder: {YES / YM:76071}  Urinary Catheter: {Urinary Catheter:306025500}   Colostomy/Ileostomy/Ileal Conduit: {YES / AJ:25169}       Date of Last BM: ***  No intake or output data in the 24 hours ending 21 0849  No intake/output data recorded.     Safety Concerns:     [employer] Safety Concerns:143430459}    Impairments/Disabilities:      [employer] Impairments/Disabilities:903336465}    Nutrition Therapy:  Current Nutrition Therapy:   [employer] Diet List:825287964}    Routes of Feeding: {CH[employer] D[employer] Other Feedings:778199664}  Liquids: {Slp liquid thickness:94109}  Daily Fluid Restriction: {CHP DME Yes amt example:438957993}  Last Modified Barium Swallow with Video (Video Swallowing Test): {Done Not Done SFET:239744012}    Treatments at the Time of Hospital Discharge:   Respiratory Treatments: ***  Oxygen Therapy:  {Therapy; copd oxygen:51791}  Ventilator:    { CC Vent ZFEQ:426044170}    Rehab Therapies: {THERAPEUTIC INTERVENTION:0220832926}  Weight Bearing Status/Restrictions: [address] Weight Bearin}  Other Medical Equipment (for information only, NOT a DME order):  {EQUIPMENT:792231035}  Other Treatments: ***    Patient's personal belongings (please select all that are sent with patient):  {P DME Belongings:144621639}    RN SIGNATURE:  {Esignature:092934732}    CASE MANAGEMENT/SOCIAL WORK SECTION    Inpatient Status Date: ***    Readmission Risk Assessment Score:  Readmission Risk              Risk of Unplanned Readmission:  13           Discharging to Facility/ Agency   · Name:   · Address:  · Phone:  · Fax:    Dialysis Facility (if applicable)   · Name:  · Address:  · Dialysis Schedule:  · Phone:  · Fax:    / signature: {Esignature:418336000}    PHYSICIAN SECTION    Prognosis: {Prognosis:8229016983}    Condition at Discharge: [address] Patient Condition:214054466}    Rehab Potential (if transferring to Rehab): {Prognosis:5953175043}    Recommended Labs or Other Treatments After Discharge: ***    Physician Certification: I certify the above information and transfer of Baylee Duty  is necessary for the continuing treatment of the diagnosis listed and that he requires {Admit to Appropriate Level of Care:33121} for {GREATER/LESS:274819384} 30 days.      Update Admission H&P: {CHP DME Changes in GVQV:492838776}    PHYSICIAN SIGNATURE:  {Esignature:926616864}

## 2021-08-22 NOTE — ED NOTES
Bed: 11  Expected date:   Expected time:   Means of arrival:   Comments:  triage     Alisha Tate RN  08/22/21 3423

## 2021-08-22 NOTE — ED PROVIDER NOTES
sister; Kidney Disease in his sister. The patients home medications have been reviewed. Allergies: Adhesive tape    ---------------------------------------------------PHYSICAL EXAM--------------------------------------    Constitutional/General: Alert and oriented x3, mild distress  Head: Normocephalic and atraumatic  Eyes: PERRL, EOMI  Mouth: Oropharynx clear, handling secretions, no trismus  Neck: Supple, full ROM, non tender to palpation in the midline, no stridor, no crepitus, no meningeal signs  Pulmonary: Lungs clear to auscultation bilaterally, no wheezes, rales, or rhonchi. Not in respiratory distress  Cardiovascular: Tachycardic. No murmurs, gallops, or rubs. 2+ distal pulses  Chest: no chest wall tenderness  Abdomen: Soft. Non tender. Non distended. +BS. No rebound, guarding, or rigidity. No pulsatile masses appreciated. Musculoskeletal: Moves all extremities x 4. Warm and well perfused, no clubbing, cyanosis, or edema. Capillary refill <3 seconds  Skin: warm and dry. No rashes. Neurologic: GCS 15, CN 2-12 grossly intact, no focal deficits, symmetric strength 5/5 in the upper and lower extremities bilaterally  Psych: Normal Affect    -------------------------------------------------- RESULTS -------------------------------------------------  I have personally reviewed all laboratory and imaging results for this patient. Results are listed below.      LABS:  Results for orders placed or performed during the hospital encounter of 08/22/21   CBC auto differential   Result Value Ref Range    WBC 9.3 4.5 - 11.5 E9/L    RBC 4.43 3.80 - 5.80 E12/L    Hemoglobin 14.9 12.5 - 16.5 g/dL    Hematocrit 43.6 37.0 - 54.0 %    MCV 98.4 80.0 - 99.9 fL    MCH 33.6 26.0 - 35.0 pg    MCHC 34.2 32.0 - 34.5 %    RDW 13.7 11.5 - 15.0 fL    Platelets 502 235 - 189 E9/L    MPV 11.1 7.0 - 12.0 fL    Neutrophils % 56.9 43.0 - 80.0 %    Immature Granulocytes % 0.8 0.0 - 5.0 %    Lymphocytes % 30.5 20.0 - 42.0 % and vital signs as below have been reviewed by myself. BP (!) 116/59   Pulse 94   Temp 98.1 °F (36.7 °C) (Temporal)   Resp 21   SpO2 94%   Oxygen Saturation Interpretation: Normal    The patients available past medical records and past encounters were reviewed. ------------------------------ ED COURSE/MEDICAL DECISION MAKING----------------------  Medications   diltiazem (CARDIZEM) 25 mg in dextrose 5% 30 mL IVPB (ER Load) (0 mg Intravenous Stopped 8/22/21 0524)             Medical Decision Making:   Patient presenting here because of tachycardia. Patient reporting similar symptoms in the past.  He does have history of atrial fibrillation he does have a history of coronary disease he is on Eliquis at home. Patient reporting no complaints of chest pain or shortness of breath. Patient medicated with Cardizem heart rate has improved. Patient heart rate below 100 repeat EKG noted I do see P waves on EKG. Patient will be admitted to monitored bed. Did review patient's chest x-ray heart is enlarged I do not appreciate any signs of infiltrate mediastinum appears to be within normal limits  Re-Evaluations:             Re-evaluation. Patients symptoms show no change  Patient to be tachycardic and medicated with Cardizem. Heart rate has come down below 100. Patient reporting no chest pain no difficulty breathing. Patient made aware of findings and plan. Consultations:             Internal medicine    Critical Care: This patient's ED course included: a personal history and physicial eaxmination    This patient has been closely monitored during their ED course. Counseling: The emergency provider has spoken with the patient and discussed todays results, in addition to providing specific details for the plan of care and counseling regarding the diagnosis and prognosis.   Questions are answered at this time and they are agreeable with the plan.       --------------------------------- IMPRESSION AND DISPOSITION ---------------------------------    IMPRESSION  1. Atrial fibrillation with rapid ventricular response (HCC)        DISPOSITION  Disposition: Admit to telemetry  Patient condition is stable        NOTE: This report was transcribed using voice recognition software.  Every effort was made to ensure accuracy; however, inadvertent computerized transcription errors may be present          Gian Schultz MD  08/22/21 0843

## 2021-08-23 ENCOUNTER — TELEPHONE (OUTPATIENT)
Dept: ORTHOPEDIC SURGERY | Age: 71
End: 2021-08-23

## 2021-08-23 DIAGNOSIS — Z96.653 H/O TOTAL KNEE REPLACEMENT, BILATERAL: ICD-10-CM

## 2021-08-23 DIAGNOSIS — Z96.652 H/O TOTAL KNEE REPLACEMENT, LEFT: Primary | ICD-10-CM

## 2021-08-23 RX ORDER — ATORVASTATIN CALCIUM 20 MG/1
TABLET, FILM COATED ORAL
Qty: 30 TABLET | Refills: 5 | Status: SHIPPED
Start: 2021-08-23 | End: 2022-02-22

## 2021-08-23 RX ORDER — AMOXICILLIN 500 MG/1
4 TABLET, FILM COATED ORAL ONCE
Qty: 4 TABLET | Refills: 2 | Status: SHIPPED | OUTPATIENT
Start: 2021-08-23 | End: 2021-08-23

## 2021-08-23 NOTE — TELEPHONE ENCOUNTER
Orders signed. Please see attached. Thank you.   Electronically signed by Alexa Nails PA-C on 8/23/2021 at 3:01 PM

## 2021-08-23 NOTE — TELEPHONE ENCOUNTER
Call returned to Stan Michele at Dr. Dmitriy Moreira office. Advised refill sent to pharmacy.     Future Appointments   Date Time Provider Shannon Singh   11/17/2021 10:00 AM Gail Levin MD [facility]

## 2021-08-23 NOTE — TELEPHONE ENCOUNTER
Dr. Tony Clemons office called to advise patient has upcoming dental procedure and requesting prophylactic antibiotic. Date of Procedure: 3/8/2021   Procedure(s):  LEFT KNEE TOTAL ARTHROPLASTY -- SHRUTHI  Surgeon(s): Evaristo Huang MD    Date of Procedure: 6/1/2020  Surgeon(s):  Evaristo Huang MD  Procedure:  Right Knee Arthroplasty  Surgeon:  Evaristo Huang MD    Allergies   Allergen Reactions    Adhesive Tape Rash     bandaids     Order pended and routed at this time.

## 2021-08-24 ENCOUNTER — TELEPHONE (OUTPATIENT)
Dept: NON INVASIVE DIAGNOSTICS | Age: 71
End: 2021-08-24

## 2021-08-24 NOTE — TELEPHONE ENCOUNTER
----- Message from Jenae Gandara MD sent at 8/22/2021 10:27 AM EDT -----  EKG in 1 week. Follow up in 6 to 8 week.  Thanks.   ----- Message -----  From: Andrés Ayala MD  Sent: 8/22/2021   9:47 AM EDT  To: Jenae Gandara MD

## 2021-08-30 ENCOUNTER — NURSE ONLY (OUTPATIENT)
Dept: NON INVASIVE DIAGNOSTICS | Age: 71
End: 2021-08-30
Payer: MEDICARE

## 2021-08-30 ENCOUNTER — TELEPHONE (OUTPATIENT)
Dept: NON INVASIVE DIAGNOSTICS | Age: 71
End: 2021-08-30

## 2021-08-30 DIAGNOSIS — I48.0 PAROXYSMAL ATRIAL FIBRILLATION (HCC): Primary | ICD-10-CM

## 2021-08-30 PROCEDURE — 93000 ELECTROCARDIOGRAM COMPLETE: CPT | Performed by: INTERNAL MEDICINE

## 2021-08-30 NOTE — PROGRESS NOTES
Patient was seen in the Office today to have EKG per Dr. Hendrickson . Pt tolerated EKG. Pt wants to know if he can take Zinc 30 MG and Quercetin 500 MG?     Electronically signed by Kun Scott MA on 8/30/2021 at 11:43 AM

## 2021-09-03 ENCOUNTER — TELEPHONE (OUTPATIENT)
Dept: NON INVASIVE DIAGNOSTICS | Age: 71
End: 2021-09-03

## 2021-09-03 NOTE — TELEPHONE ENCOUNTER
Received a call from the patient's wife asking if the patient can take Quercetin 500mg daily and Zinc 30mg daily along with his Tikosyn. I asked to her also call the pharmacist but she would like you advice also.

## 2021-09-07 NOTE — TELEPHONE ENCOUNTER
Left a message for the patient to call the office back, so that we can review Dr. Santosh Montgomery recommendations. Awaiting call back.

## 2021-09-07 NOTE — TELEPHONE ENCOUNTER
Patient notified of recommendations as per Dr. Emma Sanchez. The patient;s wife verbalized understanding.

## 2021-09-22 ENCOUNTER — TELEPHONE (OUTPATIENT)
Dept: NON INVASIVE DIAGNOSTICS | Age: 71
End: 2021-09-22

## 2021-09-22 ENCOUNTER — NURSE ONLY (OUTPATIENT)
Dept: NON INVASIVE DIAGNOSTICS | Age: 71
End: 2021-09-22
Payer: MEDICARE

## 2021-09-22 DIAGNOSIS — Z01.818 PRE-OP TESTING: Primary | ICD-10-CM

## 2021-09-22 DIAGNOSIS — Z51.81 VISIT FOR MONITORING TIKOSYN THERAPY: ICD-10-CM

## 2021-09-22 DIAGNOSIS — I48.0 PAROXYSMAL ATRIAL FIBRILLATION (HCC): Primary | ICD-10-CM

## 2021-09-22 DIAGNOSIS — Z79.899 VISIT FOR MONITORING TIKOSYN THERAPY: ICD-10-CM

## 2021-09-22 PROCEDURE — 93000 ELECTROCARDIOGRAM COMPLETE: CPT | Performed by: INTERNAL MEDICINE

## 2021-09-22 NOTE — TELEPHONE ENCOUNTER
----- Message from Candido Da Silva MD sent at 9/22/2021 12:08 PM EDT -----  Sure. Thanks.  ----- Message -----  From: Blank Ender  Sent: 9/22/2021   9:47 AM EDT  To: Candido Da Silva MD    Patient called in stating he believes he is back in AF, rate is controlled at 84 at rest. Please advise if patient should come in for EKG.  Thank you

## 2021-09-23 ENCOUNTER — TELEPHONE (OUTPATIENT)
Dept: NON INVASIVE DIAGNOSTICS | Age: 71
End: 2021-09-23

## 2021-09-23 ENCOUNTER — HOSPITAL ENCOUNTER (OUTPATIENT)
Dept: CARDIAC CATH/INVASIVE PROCEDURES | Age: 71
Discharge: HOME | End: 2021-09-23
Payer: MEDICARE

## 2021-09-23 ENCOUNTER — ANESTHESIA (OUTPATIENT)
Dept: CARDIAC CATH/INVASIVE PROCEDURES | Age: 71
End: 2021-09-23

## 2021-09-23 ENCOUNTER — ANESTHESIA EVENT (OUTPATIENT)
Dept: CARDIAC CATH/INVASIVE PROCEDURES | Age: 71
End: 2021-09-23

## 2021-09-23 VITALS
OXYGEN SATURATION: 96 % | HEIGHT: 69 IN | TEMPERATURE: 97.7 F | HEART RATE: 56 BPM | SYSTOLIC BLOOD PRESSURE: 90 MMHG | WEIGHT: 265 LBS | RESPIRATION RATE: 20 BRPM | DIASTOLIC BLOOD PRESSURE: 60 MMHG | BODY MASS INDEX: 39.25 KG/M2

## 2021-09-23 VITALS — DIASTOLIC BLOOD PRESSURE: 65 MMHG | OXYGEN SATURATION: 96 % | SYSTOLIC BLOOD PRESSURE: 126 MMHG

## 2021-09-23 DIAGNOSIS — I48.3 TYPICAL ATRIAL FLUTTER (HCC): Primary | ICD-10-CM

## 2021-09-23 LAB
ANION GAP SERPL CALCULATED.3IONS-SCNC: 9 MMOL/L (ref 7–16)
BASOPHILS ABSOLUTE: 0.08 E9/L (ref 0–0.2)
BASOPHILS RELATIVE PERCENT: 0.8 % (ref 0–2)
BUN BLDV-MCNC: 25 MG/DL (ref 6–23)
CALCIUM SERPL-MCNC: 9.6 MG/DL (ref 8.6–10.2)
CHLORIDE BLD-SCNC: 99 MMOL/L (ref 98–107)
CO2: 26 MMOL/L (ref 22–29)
CREAT SERPL-MCNC: 1.1 MG/DL (ref 0.7–1.2)
EKG ATRIAL RATE: 202 BPM
EKG ATRIAL RATE: 61 BPM
EKG P AXIS: 62 DEGREES
EKG P AXIS: 73 DEGREES
EKG P-R INTERVAL: 184 MS
EKG Q-T INTERVAL: 422 MS
EKG Q-T INTERVAL: 462 MS
EKG QRS DURATION: 130 MS
EKG QRS DURATION: 130 MS
EKG QTC CALCULATION (BAZETT): 465 MS
EKG QTC CALCULATION (BAZETT): 513 MS
EKG R AXIS: -2 DEGREES
EKG R AXIS: 14 DEGREES
EKG T AXIS: 2 DEGREES
EKG T AXIS: 33 DEGREES
EKG VENTRICULAR RATE: 61 BPM
EKG VENTRICULAR RATE: 89 BPM
EOSINOPHILS ABSOLUTE: 0.26 E9/L (ref 0.05–0.5)
EOSINOPHILS RELATIVE PERCENT: 2.6 % (ref 0–6)
GFR AFRICAN AMERICAN: >60
GFR NON-AFRICAN AMERICAN: >60 ML/MIN/1.73
GLUCOSE BLD-MCNC: 106 MG/DL (ref 74–99)
HCT VFR BLD CALC: 46.2 % (ref 37–54)
HEMOGLOBIN: 15.7 G/DL (ref 12.5–16.5)
IMMATURE GRANULOCYTES #: 0.05 E9/L
IMMATURE GRANULOCYTES %: 0.5 % (ref 0–5)
LYMPHOCYTES ABSOLUTE: 2.45 E9/L (ref 1.5–4)
LYMPHOCYTES RELATIVE PERCENT: 24.9 % (ref 20–42)
MAGNESIUM: 1.9 MG/DL (ref 1.6–2.6)
MCH RBC QN AUTO: 33.1 PG (ref 26–35)
MCHC RBC AUTO-ENTMCNC: 34 % (ref 32–34.5)
MCV RBC AUTO: 97.5 FL (ref 80–99.9)
MONOCYTES ABSOLUTE: 0.78 E9/L (ref 0.1–0.95)
MONOCYTES RELATIVE PERCENT: 7.9 % (ref 2–12)
NEUTROPHILS ABSOLUTE: 6.23 E9/L (ref 1.8–7.3)
NEUTROPHILS RELATIVE PERCENT: 63.3 % (ref 43–80)
PDW BLD-RTO: 13.2 FL (ref 11.5–15)
PLATELET # BLD: 183 E9/L (ref 130–450)
PMV BLD AUTO: 10.5 FL (ref 7–12)
POTASSIUM SERPL-SCNC: 5.6 MMOL/L (ref 3.5–5)
RBC # BLD: 4.74 E12/L (ref 3.8–5.8)
SARS-COV-2, NAAT: NOT DETECTED
SODIUM BLD-SCNC: 134 MMOL/L (ref 132–146)
WBC # BLD: 9.9 E9/L (ref 4.5–11.5)

## 2021-09-23 PROCEDURE — 85025 COMPLETE CBC W/AUTO DIFF WBC: CPT

## 2021-09-23 PROCEDURE — 93005 ELECTROCARDIOGRAM TRACING: CPT | Performed by: INTERNAL MEDICINE

## 2021-09-23 PROCEDURE — 2580000003 HC RX 258

## 2021-09-23 PROCEDURE — 6360000002 HC RX W HCPCS

## 2021-09-23 PROCEDURE — 80048 BASIC METABOLIC PNL TOTAL CA: CPT

## 2021-09-23 PROCEDURE — 92960 CARDIOVERSION ELECTRIC EXT: CPT | Performed by: INTERNAL MEDICINE

## 2021-09-23 PROCEDURE — 2709999900 HC NON-CHARGEABLE SUPPLY

## 2021-09-23 PROCEDURE — 36415 COLL VENOUS BLD VENIPUNCTURE: CPT

## 2021-09-23 PROCEDURE — 87635 SARS-COV-2 COVID-19 AMP PRB: CPT

## 2021-09-23 PROCEDURE — 83735 ASSAY OF MAGNESIUM: CPT

## 2021-09-23 PROCEDURE — 92960 CARDIOVERSION ELECTRIC EXT: CPT

## 2021-09-23 PROCEDURE — 3700000001 HC ADD 15 MINUTES (ANESTHESIA)

## 2021-09-23 PROCEDURE — 3700000000 HC ANESTHESIA ATTENDED CARE

## 2021-09-23 RX ORDER — METOPROLOL SUCCINATE 50 MG/1
50 TABLET, EXTENDED RELEASE ORAL 2 TIMES DAILY
Qty: 30 TABLET | Refills: 3 | Status: SHIPPED | OUTPATIENT
Start: 2021-09-23 | End: 2021-10-01 | Stop reason: DRUGHIGH

## 2021-09-23 RX ORDER — PROPOFOL 10 MG/ML
INJECTION, EMULSION INTRAVENOUS PRN
Status: DISCONTINUED | OUTPATIENT
Start: 2021-09-23 | End: 2021-09-23 | Stop reason: SDUPTHER

## 2021-09-23 RX ORDER — METOPROLOL SUCCINATE 25 MG/1
25 TABLET, EXTENDED RELEASE ORAL DAILY
Qty: 30 TABLET | Refills: 3 | Status: SHIPPED | OUTPATIENT
Start: 2021-09-23 | End: 2021-10-01 | Stop reason: DRUGHIGH

## 2021-09-23 RX ORDER — SODIUM CHLORIDE 9 MG/ML
INJECTION, SOLUTION INTRAVENOUS CONTINUOUS PRN
Status: DISCONTINUED | OUTPATIENT
Start: 2021-09-23 | End: 2021-09-23 | Stop reason: SDUPTHER

## 2021-09-23 RX ADMIN — PROPOFOL 100 MG: 10 INJECTION, EMULSION INTRAVENOUS at 12:22

## 2021-09-23 RX ADMIN — SODIUM CHLORIDE: 9 INJECTION, SOLUTION INTRAVENOUS at 12:11

## 2021-09-23 ASSESSMENT — LIFESTYLE VARIABLES: SMOKING_STATUS: 0

## 2021-09-23 NOTE — H&P
Cardiac Electrophysiology Admission Note    Henrique Bartlett  [birth year]  Date of Service: 9/23/2021  PCP: Nichol Bravo DO  Electrophysiologist: Byron Payan MD    Reason for Admission persistent atrial flutter    SUBJECTIVE: Henrique Bartlett is a 81 yo male who I am admitting for DCCV. He noted that he was out of rhythm thus Dr Shelby Zendejas recommended DCCV. He has not missed any Eliquis doses.     The patient has history of  Cardiac electrophysiology service is consulted for persistent atrial fibrillation and flutter, HFrecEF, HTN, CKD, AURELIANO, obesity , RBBB and chronic back pain  Patient Active Problem List    Diagnosis Date Noted    Former smoker 06/03/2013     Priority: Medium    Typical atrial flutter ([address] 04/05/2013     Priority: Medium     Overview Note:     S/p atrial flutter ablation 5/07/2013      Essential hypertension 04/05/2013     Priority: Medium    Eczema 06/03/2013     Priority: Low    Atrial fibrillation with RVR ([facility] 08/22/2021    Localized osteoarthritis of left knee 03/09/2021    H/O total knee replacement, bilateral 03/08/2021    Osteoarthritis of left knee 03/08/2021    HLD (hyperlipidemia) 07/01/2020    Redness and swelling of knee 07/01/2020    Obesity (BMI 30-39.9) 07/01/2020    CAD (coronary artery disease) 07/01/2020    NATALI (acute kidney injury) ([address] 06/30/2020    COVID-19     Wrist pain, chronic, right 02/12/2019    Chronic pain syndrome 02/12/2019    Primary osteoarthritis involving multiple joints 02/12/2019    Right foot drop 02/12/2019    Lumbar facet arthropathy 02/12/2019    Tachyarrhythmia 12/07/2018    Atrial fibrillation ([address] 12/07/2018    Right bundle-branch block 07/05/2018    Status post catheter ablation of atrial fibrillation 10/21/2017    Status post catheter ablation of atrial flutter 10/21/2017    Atrial flutter with rapid ventricular response (HCC)     CKD (chronic kidney disease)     Chronic systolic heart failure ([address]     Medication Sig Dispense Refill    metoprolol succinate (TOPROL XL) 50 MG extended release tablet Take 1 tablet by mouth 2 times daily 30 tablet 3    metoprolol succinate (TOPROL XL) 25 MG extended release tablet Take 1 tablet by mouth daily 30 tablet 3    atorvastatin (LIPITOR) 20 MG tablet TAKE ONE TABLET BY MOUTH EVERY DAY 30 tablet 5    ELIQUIS 5 MG TABS tablet TAKE ONE TABLET BY MOUTH TWO TIMES A  tablet 3    dofetilide (TIKOSYN) 250 MCG capsule TAKE 1 CAPSULE BY MOUTH 2 TIMES DAILY ALONG WITH 125MCG DOSE TO EQUAL 375MCG DOSE 2 TIMES DAILY 180 capsule 3    dofetilide (TIKOSYN) 125 MCG capsule TAKE ONE CAPSULE BY MOUTH TWO TIMES A DAY. TAKE ALONG WITH 250MCG TO EQUAL 375MCG TWICE DAILY 180 capsule 3    chlorthalidone (HYGROTON) 25 MG tablet TAKE ONE TABLET BY MOUTH EVERY DAY 90 tablet 3    losartan (COZAAR) 50 MG tablet Take 50 mg by mouth daily      Misc Natural Products (TART CHERRY ADVANCED PO) Take by mouth STOP PREOP MED      gabapentin (NEURONTIN) 100 MG capsule Take 100 mg by mouth as needed.  magnesium oxide (MAG-OX) 400 (240 Mg) MG tablet Take 1 tablet by mouth 2 times daily (Patient taking differently: Take 400 mg by mouth daily ) 60 tablet 0    Multiple Vitamins-Minerals (THERAPEUTIC MULTIVITAMIN-MINERALS) tablet Take 1 tablet by mouth daily.  Coenzyme Q10 (COQ10 PO) Take 1 tablet by mouth daily.  Alpha-Lipoic Acid 300 MG CAPS Take 300 mg by mouth daily On hold      ascorbic acid (VITAMIN C) 500 MG tablet Take 500 mg by mouth daily.  Vitamin D (CHOLECALCIFEROL) 1000 UNITS CAPS capsule Take 1,000 Units by mouth daily.  Cyanocobalamin (VITAMIN B 12 PO) Take 500 mcg by mouth daily.  gabapentin (NEURONTIN) 100 MG capsule Take 1 capsule by mouth 2 times daily for 30 days.  60 capsule 0    acetaminophen (TYLENOL) 500 MG tablet Take 2 tablets by mouth 3 times daily as needed for Pain 180 tablet 1    traMADol (ULTRAM) 50 MG tablet Take 50 mg by mouth 2 times daily. No current facility-administered medications for this encounter. Allergies   Allergen Reactions    Adhesive Tape Rash     bandaids       ROS:   Constitutional: Negative for fever, activity change and appetite change. HENT: Negative for epistaxis, difficulty swallowing. Eyes: Negative for blurred vision or double vision. Respiratory: Negative for cough, chest tightness, shortness of breath and wheezing. Cardiovascular: Negative for chest pain, palpitations and leg swelling. Gastrointestinal: Negative for abdominal pain, heartburn, or blood in stool. Genitourinary: Negative for hematuria, burning or frequency. Musculoskeletal: Negative for myalgias, stiffness, or swelling. Skin: Negative for rash, pain, or lumps. Neurological: Negative for syncope, seizures, or headaches. Psychiatric/Behavioral: Negative for confusion and agitation. The patient is not nervous/anxious. PHYSICAL EXAM:  Vitals:    09/23/21 1150 09/23/21 1228   BP: 130/81 90/60   Pulse: 87 56   Resp: 18 20   Temp: 97.7 °F (36.5 °C)    TempSrc: Temporal    SpO2: 97% 96%   Weight: 265 lb (120.2 kg)    Height: 5' 9\" (1.753 m)      Constitutional: Oriented to person, place, and time. Well-developed and cooperative. Head: Normocephalic and atraumatic. Eyes: Conjunctivae are normal.   Neck:  no JVD present. Cardiovascular: S1 normal, S2 normal and intact distal pulses. A regular rhythm present. Pulmonary/Chest: Effort normal and breath sounds normal. No respiratory distress. Abdominal: Soft. Normal appearance and bowel sounds are normal.    Musculoskeletal: Normal range of motion present, no muscle weakness. Neurological: Alert and oriented to person, place, and time. No focal findings on my exam  Skin: Skin is warm and dry. No bruising, no ecchymosis and no rash noted. Extremity: No visible clubbing or cyanosis. No edema. Psychiatric: Normal mood and affect.  Thought content normal.     Pertinent Labs:  CBC:   Recent Labs     09/23/21  1155   WBC 9.9   HGB 15.7   HCT 46.2        BMP:No results for input(s): NA, K, CL, CO2, BUN, CREATININE, CALCIUM in the last 72 hours. Invalid input(s): GLU  ABGs: No results found for: PHART, PO2ART, GUS5KWT  INR: No results for input(s): INR in the last 72 hours. BNP: No results for input(s): BNP in the last 72 hours. TSH:   Lab Results   Component Value Date    TSH 2.340 06/30/2020      Cardiac Injury Profile: No results for input(s): CKTOTAL, CKMB, CKMBINDEX, TROPONINI in the last 72 hours. Lipid Profile:   Lab Results   Component Value Date    TRIG 161 03/09/2021    HDL 43 03/09/2021    LDLCALC 33 03/09/2021    CHOL 108 03/09/2021      Hemoglobin A1C: No components found for: HGBA1C   Xray:   No orders to display         Pertinent Cardiac Testing:       Telemetry9/23/2021: Atrial flutter      I have independently reviewed all of the ECGs and rhythm strips per above. I have personally reviewed the laboratory, cardiac diagnostic and radiographic testing as outlined above. I have reviewed previous records noted [place] [address] [facility] [place]. Impression:    1. Atrial fibrillation and flutter  - [id number] risk score of 3 based on HTN and systolic dysfunction, age greater than 72, he is on Eliquis he denies any bleeding problems. Denies missing Eliquis dose for the past 3 weeks. - Status post CTI ablation in May 3557, procedure complicated by airway compromise, anatomical variants c/w Eustachian ridge and pouch, extended procedure time and multiple lines required to achieve bidirectional block. He had no documented recurrence in over 2 year after procedure.     - Post RFA with subsequent development of persistent atrial fibrillation and atypical flutter.  He was highly symptomatic despite apparent rate control, at least at rest.   - Underwent redo flutter ablation and PVI (RFA) on 8/10/16. He was weaned off his sotalol and was started back on a low-dose Toprol-XL for his cardiomyopathy.   - On October 20, 2017 he underwent a successful BUSHRA/DCCV with Tikosyn drug loading for persistent symptomatic atrial flutter. - 24-hour Holter monitor in January 2019 revealed sinus rhythm from  bpm.  There was no evidence of AF, AFl, SVT, VT, or AV block.  No symptoms were reported  - Currently on Tikosyn 375 mcg every 12 hours. - Presents, 8/22/21, with atrial flutter with RVR. DCCV in ER  - Now in atrial flutter 9/23/21  - Options of DC-cardioversion discussed. The patient verbalizes understanding and agrees to proceed with the procedure    2. Chronic HFrEF  - Recovered LV EF.  - Probably tachycardia-mediated  - Last LV EF by BUSHRA was 50% in 10/2017. - On Toprol XL and Cozaar.  - Euvolemic and compensated     3. Hypertension    - Well controlled. - On Toprol XL, Cholrthalidone and Cozaar.     4. Chronic kidney disease   - Stage III     5. Obstructive Sleep Apnea    - Compliant with his CPAP        6. Obesity  There is no height or weight on file to calculate BMI.      7. RBBB  - Chronic      8. Chronic back pain  - With foot drop  - History of back surgery  - Status post injections.     Recommendations:    1. DCCV today      I have spent a total of 70 minutes with the patient and his/her family reviewing the above stated recommendations. A total of >50% of that time involved face-to-face time providing counseling and or coordination of care with the other providers. Thank you for allowing me to participate in your patient's care. [facility] Cardiac Electrophysiology  . [address] [facility]    NOTE: This report was transcribed using voice recognition software. Every effort was made to ensure accuracy; however, inadvertent computerized transcription errors may be present.

## 2021-09-23 NOTE — TELEPHONE ENCOUNTER
Spoke with patient's wife let her know patient needs to have BMP tomorrow due to K at 5.6.  She verbalized understanding and script sent to [phone].

## 2021-09-23 NOTE — TELEPHONE ENCOUNTER
----- Message from Aliyah Napier MD sent at 9/23/2021 12:44 PM EDT -----  Thanks.  ----- Message -----  From: Kenneth Cristobal MD  Sent: 9/23/2021  12:38 PM EDT  To: Aliyah Napier MD, [address], I just did his DCCV    I changed metoprolol to 75 mg bid from 100 mg daily  Needs repeat BMP tomorrow, K 5.6 today    Thanks

## 2021-09-23 NOTE — ANESTHESIA PRE PROCEDURE
Department of Anesthesiology  Preprocedure Note       Name:  Twila Sexton   Age:  80 y.o.  :  1950                                          MRN:  [id number]         Date:  2021      Surgeon: * Surgery not found *    Procedure:     Medications prior to admission:   Prior to Admission medications    Medication Sig Start Date End Date Taking? Authorizing Provider   atorvastatin (LIPITOR) 20 MG tablet TAKE ONE TABLET BY MOUTH EVERY DAY 21  Yes Ricardo Orozco MD   metoprolol succinate (TOPROL XL) 100 MG extended release tablet TAKE ONE TABLET BY MOUTH EVERY DAY 21  Yes Marisela Tracy MD   metoprolol succinate (TOPROL XL) 100 MG extended release tablet Take 1 tablet by mouth daily 21  Yes Julia Benson MD   ELIQUIS 5 MG TABS tablet TAKE ONE TABLET BY MOUTH TWO TIMES A DAY 21  Yes Julia Benson MD   dofetilide (TIKOSYN) 250 MCG capsule TAKE 1 CAPSULE BY MOUTH 2 TIMES DAILY ALONG WITH 125MCG DOSE TO EQUAL 375MCG DOSE 2 TIMES DAILY 21  Yes Julia Benson MD   dofetilide (TIKOSYN) 125 MCG capsule TAKE ONE CAPSULE BY MOUTH TWO TIMES A DAY. TAKE ALONG WITH 250MCG TO EQUAL 375MCG TWICE DAILY 21  Yes Julia Benson MD   chlorthalidone (HYGROTON) 25 MG tablet TAKE ONE TABLET BY MOUTH EVERY DAY 20  Yes Kofi Luo MD   losartan (COZAAR) 50 MG tablet Take 50 mg by mouth daily   Yes Historical Provider, MD   Misc Natural Products (TART CHERRY ADVANCED PO) Take by mouth STOP PREOP MED   Yes Historical Provider, MD   gabapentin (NEURONTIN) 100 MG capsule Take 100 mg by mouth as needed. Yes Historical Provider, MD   magnesium oxide (MAG-OX) 400 (240 Mg) MG tablet Take 1 tablet by mouth 2 times daily  Patient taking differently: Take 400 mg by mouth daily  10/23/17  Yes Rosa Hill, DO   Multiple Vitamins-Minerals (THERAPEUTIC MULTIVITAMIN-MINERALS) tablet Take 1 tablet by mouth daily.    Yes Historical Provider, MD   Coenzyme Q10 (COQ10 PO) Take 1 tablet by mouth daily. Yes Historical Provider, MD   Alpha-Lipoic Acid 300 MG CAPS Take 300 mg by mouth daily On hold   Yes Historical Provider, MD   ascorbic acid (VITAMIN C) 500 MG tablet Take 500 mg by mouth daily. Yes Historical Provider, MD   Vitamin D (CHOLECALCIFEROL) 1000 UNITS CAPS capsule Take 1,000 Units by mouth daily. Yes Historical Provider, MD   Cyanocobalamin (VITAMIN B 12 PO) Take 500 mcg by mouth daily. Yes Historical Provider, MD   gabapentin (NEURONTIN) 100 MG capsule Take 1 capsule by mouth 2 times daily for 30 days. 8/11/21 9/10/21  Jerome Reyse MD   acetaminophen (TYLENOL) 500 MG tablet Take 2 tablets by mouth 3 times daily as needed for Pain 3/22/21   Leila Modi PA-C   traMADol (ULTRAM) 50 MG tablet Take 50 mg by mouth 2 times daily. Historical Provider, MD       Current medications:    Current Outpatient Medications   Medication Sig Dispense Refill    atorvastatin (LIPITOR) 20 MG tablet TAKE ONE TABLET BY MOUTH EVERY DAY 30 tablet 5    metoprolol succinate (TOPROL XL) 100 MG extended release tablet TAKE ONE TABLET BY MOUTH EVERY DAY 30 tablet 0    metoprolol succinate (TOPROL XL) 100 MG extended release tablet Take 1 tablet by mouth daily 90 tablet 3    ELIQUIS 5 MG TABS tablet TAKE ONE TABLET BY MOUTH TWO TIMES A  tablet 3    dofetilide (TIKOSYN) 250 MCG capsule TAKE 1 CAPSULE BY MOUTH 2 TIMES DAILY ALONG WITH 125MCG DOSE TO EQUAL 375MCG DOSE 2 TIMES DAILY 180 capsule 3    dofetilide (TIKOSYN) 125 MCG capsule TAKE ONE CAPSULE BY MOUTH TWO TIMES A DAY. TAKE ALONG WITH 250MCG TO EQUAL 375MCG TWICE DAILY 180 capsule 3    chlorthalidone (HYGROTON) 25 MG tablet TAKE ONE TABLET BY MOUTH EVERY DAY 90 tablet 3    losartan (COZAAR) 50 MG tablet Take 50 mg by mouth daily      Misc Natural Products (TART CHERRY ADVANCED PO) Take by mouth STOP PREOP MED      gabapentin (NEURONTIN) 100 MG capsule Take 100 mg by mouth as needed.        magnesium oxide (MAG-OX) 400 (240 Mg) MG tablet Take 1 tablet by mouth 2 times daily (Patient taking differently: Take 400 mg by mouth daily ) 60 tablet 0    Multiple Vitamins-Minerals (THERAPEUTIC MULTIVITAMIN-MINERALS) tablet Take 1 tablet by mouth daily.  Coenzyme Q10 (COQ10 PO) Take 1 tablet by mouth daily.  Alpha-Lipoic Acid 300 MG CAPS Take 300 mg by mouth daily On hold      ascorbic acid (VITAMIN C) 500 MG tablet Take 500 mg by mouth daily.  Vitamin D (CHOLECALCIFEROL) 1000 UNITS CAPS capsule Take 1,000 Units by mouth daily.  Cyanocobalamin (VITAMIN B 12 PO) Take 500 mcg by mouth daily.  gabapentin (NEURONTIN) 100 MG capsule Take 1 capsule by mouth 2 times daily for 30 days. 60 capsule 0    acetaminophen (TYLENOL) 500 MG tablet Take 2 tablets by mouth 3 times daily as needed for Pain 180 tablet 1    traMADol (ULTRAM) 50 MG tablet Take 50 mg by mouth 2 times daily. No current facility-administered medications for this encounter. Allergies:     Allergies   Allergen Reactions    Adhesive Tape Rash     bandaids       Problem List:    Patient Active Problem List   Diagnosis Code    Typical atrial flutter ([facility]) I48.3    Essential hypertension I10    Eczema L30.9    Former smoker Z87.891    Midline low back pain with right-sided sciatica M54.41    Persistent atrial fibrillation I48.19    Chronic systolic heart failure ([facility]) I50.22    CKD (chronic kidney disease) N18.9    Atrial flutter with rapid ventricular response ([facility]) I48.92    Status post catheter ablation of atrial fibrillation Z98.890    Status post catheter ablation of atrial flutter Z98.890    Right bundle-branch block I45.10    Tachyarrhythmia R00.0    Atrial fibrillation ([facility]) I48.91    Wrist pain, chronic, right M25.531, G89.29    Chronic pain syndrome G89.4    Primary osteoarthritis involving multiple joints M89.49    Right foot drop M21.371    Lumbar facet arthropathy M47.816    COVID-19 U07.1    NATALI (acute kidney injury) ([facility] N17.9    HLD (hyperlipidemia) E78.5    Redness and swelling of knee M25.469, R23.8    Obesity (BMI 30-39. 9) E66.9    CAD (coronary artery disease) I25.10    H/O total knee replacement, bilateral Z96.653    Osteoarthritis of left knee M17.12    Localized osteoarthritis of left knee M17.12    Atrial fibrillation with RVR (HCC) I48.91       Past Medical History:        Diagnosis Date    Arthritis     Atrial fibrillation (HCC)     Bulging lumbar disc     L4-L5    CAD (coronary artery disease) 2016    ct scan heart    Chronic lower back pain     BREWER (dyspnea on exertion) 3/26/2013    Eczema     Hyperlipidemia     Hypertension     Kidney stones     Obesity     weight 250#    Sleep apnea     c pap  stting 2       Past Surgical History:        Procedure Laterality Date    ABLATION OF DYSRHYTHMIC FOCUS  2013    heart ablation dr Shanta Love  08/10/2016    CARDIOVERSION  3-    Dr. Gracy Plunkett  10/20/2017    Dr. Judi Pantoja ECHOCARDIOGRAM TRANSESOPHAGEAL  2013         KNEE SURGERY Left 1969    repair left knee ligaments    LITHOTRIPSY  1986    LUMBAR [address]  2016    [facility] [place]    TONSILLECTOMY      TOTAL KNEE ARTHROPLASTY Right 2020    RIGHT KNEE TOTAL ARTHROPLASTY -- SHRUTHI performed by Mason Kam MD at [address] [place] [address] Left 3/8/2021    LEFT KNEE TOTAL ARTHROPLASTY -- SHRUTHI performed by Mason Kam MD at [place] [address] TRANSESOPHAGEAL ECHOCARDIOGRAM  3-    Dr. Jennyfer Galeano       Social History:    Social History     Tobacco Use    Smoking status: Former Smoker     Packs/day: 1.00     Years: 20.00     Pack years: 20.00     Types: Cigarettes     Quit date: 2007     Years since quittin.7    Smokeless tobacco: Never Used    Tobacco comment: 1.5 PACKS EACH DAY stop    Substance Use Topics    Alcohol use:  Yes     Alcohol/week: 3.0 standard drinks     Types: 3 Shots of liquor per week     Comment: 1 x week                                Counseling given: Not Answered  Comment: 1.5 PACKS EACH DAY stop 2006      Vital Signs (Current):   Vitals:    09/23/21 1150   BP: 130/81   Pulse: 87   Resp: 18   Temp: 36.5 °C (97.7 °F)   TempSrc: Temporal   SpO2: 97%   Weight: 265 lb (120.2 kg)   Height: 5' 9\" (1.753 m)                                              BP Readings from Last 3 Encounters:   09/23/21 130/81   08/22/21 122/62   05/06/21 132/84       NPO Status:                                                                                 BMI:   Wt Readings from Last 3 Encounters:   09/23/21 265 lb (120.2 kg)   05/06/21 265 lb 3.2 oz (120.3 kg)   03/22/21 267 lb (121.1 kg)     Body mass index is 39.13 kg/m². CBC:   Lab Results   Component Value Date    WBC 9.3 08/22/2021    RBC 4.43 08/22/2021    HGB 14.9 08/22/2021    HCT 43.6 08/22/2021    MCV 98.4 08/22/2021    RDW 13.7 08/22/2021     08/22/2021       CMP:   Lab Results   Component Value Date     08/22/2021    K 3.7 08/22/2021    K 4.1 06/30/2020     08/22/2021    CO2 29 08/22/2021    BUN 36 08/22/2021    CREATININE 1.2 08/22/2021    GFRAA >60 08/22/2021    LABGLOM 60 08/22/2021    GLUCOSE 175 08/22/2021    PROT 7.7 08/22/2021    CALCIUM 10.1 08/22/2021    BILITOT 0.4 08/22/2021    ALKPHOS 125 08/22/2021    AST 22 08/22/2021    ALT 23 08/22/2021       POC Tests: No results for input(s): POCGLU, POCNA, POCK, POCCL, POCBUN, POCHEMO, POCHCT in the last 72 hours.     Coags:   Lab Results   Component Value Date    PROTIME 12.6 03/03/2021    INR 1.2 03/03/2021    APTT 31.9 05/07/2013       HCG (If Applicable): No results found for: PREGTESTUR, PREGSERUM, HCG, HCGQUANT     ABGs: No results found for: PHART, PO2ART, SUK0NQG, ZNO1ASQ, BEART, D5BEPJJI     Type & Screen (If Applicable):  No results found for: LABABO, LABRH    Drug/Infectious Status (If Applicable):  No results found for: HIV, HEPCAB    COVID-19 Screening (If Applicable):   Lab Results   Component Value Date    COVID19 Not Detected 03/03/2021     CXR 8/22/2021  FINDINGS:   There is no cardiomegaly or pulmonary vascular congestion.  There is no acute   infiltrate or pleural effusion. [place] Record is no pneumothorax.           Impression   No acute abnormality identified. EKG 9/23/2021  Ventricular Rate BPM 89    Atrial Rate     QRS Duration ms 130    Q-T Interval ms 422    QTc Calculation (Bazett) ms 513    P Axis degrees 73    R Axis degrees -2    T Axis degrees 33    [facility]ing [facility]      Narrative & Impression    Atrial flutter with variable AV block  Right bundle branch block  Inferior infarct , age undetermined  Abnormal ECG  When compared with ECG of 22-AUG-2021 08:46,  Significant changes have occurred     ECHO 10/20/2017   Findings      Left Ventricle   Normal LV segmental wall motion. Ejection fraction is visually estimated at 50%. Right Ventricle   Normal right ventricular size and function. Left Atrium   No thrombus in left atrial appendage. Normal LA appendage velocity >0.4m/s      Mitral Valve   Normal mitral valve structure and function. Tricuspid Valve   The tricuspid valve appears structurally normal.      Aortic Valve   Structurally normal aortic valve. Aorta   Mild aortic atherosclerosis      Conclusions      Summary   Normal LV segmental wall motion. Ejection fraction is visually estimated at 50%. No thrombus in left atrial appendage. Normal LA appendage velocity >0.4m/s   Normal mitral valve structure and function.    Mild aortic atherosclerosis      Anesthesia Evaluation  Patient summary reviewed and Nursing notes reviewed no history of anesthetic complications:   Airway: Mallampati: II  TM distance: >3 FB   Neck ROM: full  Mouth opening: > = 3 FB Dental:          Pulmonary:   (+) sleep apnea: on CPAP,  decreased breath sounds,      (-) not a current smoker                           Cardiovascular:    (+) hypertension:, CAD:, dysrhythmias: atrial fibrillation and atrial flutter, CHF:, hyperlipidemia    (-)  BREWER    ECG reviewed  Rhythm: irregular  Rate: normal  Echocardiogram reviewed         Beta Blocker:  Dose within 24 Hrs         Neuro/Psych:   (+) neuromuscular disease (Midline low back pain with right-sided sciatica, right foot drop):,             GI/Hepatic/Renal:   (+) renal disease: CRI, morbid obesity          Endo/Other:    (+) blood dyscrasia: anticoagulation therapy, arthritis: OA., .                 Abdominal:             Vascular: Other Findings:           Anesthesia Plan      MAC     ASA 3       Induction: intravenous. Anesthetic plan and risks discussed with patient. Plan discussed with CRNA and attending.                   Wanda Vizcarra RN   9/23/2021

## 2021-09-23 NOTE — OP NOTE
Operative Note  [address] [facility]  Procedure Report  PATIENT: Jaiden Baker  MEDICAL RECORD NUMBER: [id number]  DATE OF PROCEDURE:  9/23/2021  ATTENDING ELECTROPHYSIOLOGIST:  Stan Jefferson MD      PROCECURE:    1. Direct Current Electrical Cardioversion    INDICATION:  Persistent atrial flutter    PROCEDURE TIME: 15 mins    COMPICATIONS: None immediately apparent    ANESTHESIA: LMAC    DESCRIPTION OF PROCEDURE: The risks, benefits, and alternatives to the procedure were discussed with the patient, and informed consent was obtained. The patient was brought to the cardiovascular lab and sedated under the guidance of anesthesia. Once anesthesia was deemed adequate, a 200 J biphasic synchronous shock was applied. The patient was then allowed to wake in the usual fashion. Comment: The patient was therapeutically anticoagulated for a minimum of 3 consecutive weeks prior to cardioversion. SUMMARY:  1. Successful cardioversion of atrial flutter to sinus rhythm. RECOMMENDATIONS:  1. Discharge to home when fully awake and alert, if clinically stable. 2. Resume all pre-procedure medications, including anticoagulation. Increase metoprolol to 75 mg bid  3. Follow-up in the office in 1 month.     Stan Jefferson MD  Cardiac Electrophysiology  [facility]) [facility]  [facility] [place]: [facility] [facility]  12:27 PM  9/23/2021

## 2021-09-24 NOTE — ANESTHESIA POSTPROCEDURE EVALUATION
Department of Anesthesiology  Postprocedure Note    Patient: Destiny Esteban  MRN: [id number]  YOB: 1950  Date of evaluation: 9/24/2021  Time:  9:53 AM     Procedure Summary     Date: 09/23/21 Room / Location: [facility] CATH LAB    Anesthesia Start: 5536 Anesthesia Stop: 8465    Procedure: CARDIOVERSION WITH ANESTHESIA Diagnosis: Paroxysmal atrial fibrillation    Scheduled Providers:  Responsible Provider: Sara Garduno MD    Anesthesia Type: MAC ASA Status: 3          Anesthesia Type: MAC    Rakesh Phase I:      Rakesh Phase II:      Last vitals: Reviewed and per EMR flowsheets.        Anesthesia Post Evaluation    Patient location during evaluation: PACU  Patient participation: complete - patient participated  Level of consciousness: awake and alert  Airway patency: patent  Nausea & Vomiting: no nausea and no vomiting  Complications: no  Cardiovascular status: hemodynamically stable and blood pressure returned to baseline  Respiratory status: acceptable  Hydration status: euvolemic

## 2021-09-30 ENCOUNTER — TELEPHONE (OUTPATIENT)
Dept: NON INVASIVE DIAGNOSTICS | Age: 71
End: 2021-09-30

## 2021-09-30 DIAGNOSIS — I48.19 PERSISTENT ATRIAL FIBRILLATION (HCC): Primary | ICD-10-CM

## 2021-09-30 NOTE — TELEPHONE ENCOUNTER
Patient called and states he went back into AF today. Per CK stop tikosyn and come in for an EKG on Monday. He can increase metoprolol to 100mg bid if HR goes above 100. Jose Faulkner verbalized understanding. Per CK, Amio wll be started after the ekg next week.

## 2021-10-01 ENCOUNTER — NURSE ONLY (OUTPATIENT)
Dept: NON INVASIVE DIAGNOSTICS | Age: 71
End: 2021-10-01
Payer: MEDICARE

## 2021-10-01 ENCOUNTER — TELEPHONE (OUTPATIENT)
Dept: NON INVASIVE DIAGNOSTICS | Age: 71
End: 2021-10-01

## 2021-10-01 DIAGNOSIS — Z01.818 PRE-OP TESTING: Primary | ICD-10-CM

## 2021-10-01 DIAGNOSIS — I48.19 PERSISTENT ATRIAL FIBRILLATION (HCC): Primary | ICD-10-CM

## 2021-10-01 PROCEDURE — 93000 ELECTROCARDIOGRAM COMPLETE: CPT | Performed by: INTERNAL MEDICINE

## 2021-10-01 RX ORDER — METOPROLOL SUCCINATE 100 MG/1
100 TABLET, EXTENDED RELEASE ORAL 2 TIMES DAILY
Qty: 60 TABLET | Refills: 5 | Status: SHIPPED
Start: 2021-10-01 | End: 2022-03-24

## 2021-10-01 RX ORDER — METOPROLOL SUCCINATE 100 MG/1
100 TABLET, EXTENDED RELEASE ORAL 2 TIMES DAILY
COMMUNITY
End: 2021-10-01 | Stop reason: SDUPTHER

## 2021-10-01 NOTE — TELEPHONE ENCOUNTER
----- Message from Eugene Perry MD sent at 10/1/2021  2:30 PM EDT -----  Please ask him to increase Toprol XL from 75 mg bid to 100 mg bid.  Thanks.  ----- Message -----  From: Angela Fields MA  Sent: 10/1/2021   2:24 PM EDT  To: Eugene Perry MD

## 2021-10-01 NOTE — PROGRESS NOTES
Patient was in today for EKG per Dr Kevin Brandt. Patient states he feels like he is in flutter. Patient states he took 100 mg of metoprolol today.     Debbie Brain, [address] [place]

## 2021-10-02 ENCOUNTER — APPOINTMENT (OUTPATIENT)
Dept: GENERAL RADIOLOGY | Age: 71
End: 2021-10-02
Payer: MEDICARE

## 2021-10-02 ENCOUNTER — HOSPITAL ENCOUNTER (EMERGENCY)
Age: 71
Discharge: HOME | End: 2021-10-02
Attending: EMERGENCY MEDICINE
Payer: MEDICARE

## 2021-10-02 VITALS
BODY MASS INDEX: 39.13 KG/M2 | WEIGHT: 265 LBS | DIASTOLIC BLOOD PRESSURE: 80 MMHG | SYSTOLIC BLOOD PRESSURE: 133 MMHG | RESPIRATION RATE: 18 BRPM | HEART RATE: 80 BPM | OXYGEN SATURATION: 96 %

## 2021-10-02 DIAGNOSIS — I48.3 TYPICAL ATRIAL FLUTTER (HCC): Primary | ICD-10-CM

## 2021-10-02 DIAGNOSIS — Z01.89 ENCOUNTER FOR CARDIOVERSION PROCEDURE: ICD-10-CM

## 2021-10-02 LAB
ALBUMIN SERPL-MCNC: 3.7 G/DL (ref 3.5–5.2)
ALP BLD-CCNC: 111 U/L (ref 40–129)
ALT SERPL-CCNC: 26 U/L (ref 0–40)
ANION GAP SERPL CALCULATED.3IONS-SCNC: 12 MMOL/L (ref 7–16)
APTT: 23.4 SEC (ref 24.5–35.1)
AST SERPL-CCNC: 42 U/L (ref 0–39)
BASOPHILS ABSOLUTE: 0.07 E9/L (ref 0–0.2)
BASOPHILS RELATIVE PERCENT: 0.7 % (ref 0–2)
BILIRUB SERPL-MCNC: 0.4 MG/DL (ref 0–1.2)
BUN BLDV-MCNC: 29 MG/DL (ref 6–23)
CALCIUM SERPL-MCNC: 9.7 MG/DL (ref 8.6–10.2)
CHLORIDE BLD-SCNC: 102 MMOL/L (ref 98–107)
CO2: 22 MMOL/L (ref 22–29)
CREAT SERPL-MCNC: 1.3 MG/DL (ref 0.7–1.2)
EKG ATRIAL RATE: 202 BPM
EKG P AXIS: -108 DEGREES
EKG Q-T INTERVAL: 372 MS
EKG QRS DURATION: 132 MS
EKG QTC CALCULATION (BAZETT): 491 MS
EKG R AXIS: 9 DEGREES
EKG T AXIS: 28 DEGREES
EKG VENTRICULAR RATE: 105 BPM
EOSINOPHILS ABSOLUTE: 0.34 E9/L (ref 0.05–0.5)
EOSINOPHILS RELATIVE PERCENT: 3.4 % (ref 0–6)
GFR AFRICAN AMERICAN: >60
GFR NON-AFRICAN AMERICAN: 55 ML/MIN/1.73
GLUCOSE BLD-MCNC: 135 MG/DL (ref 74–99)
HCT VFR BLD CALC: 45.5 % (ref 37–54)
HEMOGLOBIN: 15.6 G/DL (ref 12.5–16.5)
IMMATURE GRANULOCYTES #: 0.07 E9/L
IMMATURE GRANULOCYTES %: 0.7 % (ref 0–5)
INR BLD: 1.2
LYMPHOCYTES ABSOLUTE: 2.8 E9/L (ref 1.5–4)
LYMPHOCYTES RELATIVE PERCENT: 28.1 % (ref 20–42)
MCH RBC QN AUTO: 33.4 PG (ref 26–35)
MCHC RBC AUTO-ENTMCNC: 34.3 % (ref 32–34.5)
MCV RBC AUTO: 97.4 FL (ref 80–99.9)
MONOCYTES ABSOLUTE: 1.15 E9/L (ref 0.1–0.95)
MONOCYTES RELATIVE PERCENT: 11.6 % (ref 2–12)
NEUTROPHILS ABSOLUTE: 5.52 E9/L (ref 1.8–7.3)
NEUTROPHILS RELATIVE PERCENT: 55.5 % (ref 43–80)
PDW BLD-RTO: 13.1 FL (ref 11.5–15)
PLATELET # BLD: 217 E9/L (ref 130–450)
PMV BLD AUTO: 10.9 FL (ref 7–12)
POTASSIUM SERPL-SCNC: 4.9 MMOL/L (ref 3.5–5)
PROTHROMBIN TIME: 13.7 SEC (ref 9.3–12.4)
RBC # BLD: 4.67 E12/L (ref 3.8–5.8)
REASON FOR REJECTION: NORMAL
REJECTED TEST: NORMAL
SODIUM BLD-SCNC: 136 MMOL/L (ref 132–146)
TOTAL PROTEIN: 7.4 G/DL (ref 6.4–8.3)
WBC # BLD: 10 E9/L (ref 4.5–11.5)

## 2021-10-02 PROCEDURE — 92960 CARDIOVERSION ELECTRIC EXT: CPT

## 2021-10-02 PROCEDURE — 80053 COMPREHEN METABOLIC PANEL: CPT

## 2021-10-02 PROCEDURE — 96374 THER/PROPH/DIAG INJ IV PUSH: CPT

## 2021-10-02 PROCEDURE — 93005 ELECTROCARDIOGRAM TRACING: CPT | Performed by: EMERGENCY MEDICINE

## 2021-10-02 PROCEDURE — 85610 PROTHROMBIN TIME: CPT

## 2021-10-02 PROCEDURE — 71045 X-RAY EXAM CHEST 1 VIEW: CPT

## 2021-10-02 PROCEDURE — 36415 COLL VENOUS BLD VENIPUNCTURE: CPT

## 2021-10-02 PROCEDURE — 2500000003 HC RX 250 WO HCPCS: Performed by: STUDENT IN AN ORGANIZED HEALTH CARE EDUCATION/TRAINING PROGRAM

## 2021-10-02 PROCEDURE — 93005 ELECTROCARDIOGRAM TRACING: CPT | Performed by: STUDENT IN AN ORGANIZED HEALTH CARE EDUCATION/TRAINING PROGRAM

## 2021-10-02 PROCEDURE — 6360000002 HC RX W HCPCS: Performed by: STUDENT IN AN ORGANIZED HEALTH CARE EDUCATION/TRAINING PROGRAM

## 2021-10-02 PROCEDURE — 85025 COMPLETE CBC W/AUTO DIFF WBC: CPT

## 2021-10-02 PROCEDURE — 96375 TX/PRO/DX INJ NEW DRUG ADDON: CPT

## 2021-10-02 PROCEDURE — 85730 THROMBOPLASTIN TIME PARTIAL: CPT

## 2021-10-02 PROCEDURE — 93010 ELECTROCARDIOGRAM REPORT: CPT | Performed by: INTERNAL MEDICINE

## 2021-10-02 PROCEDURE — 99285 EMERGENCY DEPT VISIT HI MDM: CPT

## 2021-10-02 RX ORDER — FENTANYL CITRATE 50 UG/ML
INJECTION, SOLUTION INTRAMUSCULAR; INTRAVENOUS
Status: DISCONTINUED
Start: 2021-10-02 | End: 2021-10-02 | Stop reason: HOSPADM

## 2021-10-02 RX ORDER — ADENOSINE 3 MG/ML
12 INJECTION, SOLUTION INTRAVENOUS ONCE
Status: DISCONTINUED | OUTPATIENT
Start: 2021-10-02 | End: 2021-10-02 | Stop reason: HOSPADM

## 2021-10-02 RX ORDER — ADENOSINE 3 MG/ML
6 INJECTION, SOLUTION INTRAVENOUS ONCE
Status: COMPLETED | OUTPATIENT
Start: 2021-10-02 | End: 2021-10-02

## 2021-10-02 RX ORDER — ETOMIDATE 2 MG/ML
15 INJECTION INTRAVENOUS ONCE
Status: COMPLETED | OUTPATIENT
Start: 2021-10-02 | End: 2021-10-02

## 2021-10-02 RX ADMIN — ADENOSINE 6 MG: 3 INJECTION INTRAVENOUS at 08:49

## 2021-10-02 RX ADMIN — ETOMIDATE 15 MG: 20 INJECTION, SOLUTION INTRAVENOUS at 10:20

## 2021-10-02 ASSESSMENT — ENCOUNTER SYMPTOMS
VOMITING: 0
SINUS PRESSURE: 0
COUGH: 0
BACK PAIN: 0
DIARRHEA: 0
EYE PAIN: 0
SORE THROAT: 0
ABDOMINAL PAIN: 0
SHORTNESS OF BREATH: 1
NAUSEA: 0
EYE REDNESS: 0
WHEEZING: 0

## 2021-10-02 ASSESSMENT — PAIN SCALES - GENERAL
PAINLEVEL_OUTOF10: 0
PAINLEVEL_OUTOF10: 0

## 2021-10-02 NOTE — ED PROVIDER NOTES
[address]      Pt Name: Brandi Blair  MRN: [id number]  [place] 1950  Date of evaluation: 10/2/2021      CHIEF COMPLAINT       Chief Complaint   Patient presents with    Tachycardia        HPI  Brandi Blair is a 80 y.o. male history of a flutter on Tikosyn and Eliquis presents with complaints of palpitations for 2 days. Gradual onset, progressively worsening, constant since onset. States that he feels that he is not A. fib or a flutter today. States that he has had shortness of breath but no chest pain. Shortness of breath is worsened with exertion alleviated with rest.  Has not taken any other medications or measures alleviate his symptoms. He has had recent cardioversions last week at electrophysiology. Denies any recent fevers, chills, nausea, vomiting, chest pain, abdominal pain, flank pain, dysuria, hematuria, diarrhea, constipation, new rashes or sores. Except as noted above the remainder of the review of systems was reviewed and negative. Review of Systems   Constitutional: Negative for chills and fever. HENT: Negative for ear pain, sinus pressure and sore throat. Eyes: Negative for pain, redness and visual disturbance. Respiratory: Positive for shortness of breath. Negative for cough and wheezing. Cardiovascular: Positive for palpitations. Negative for chest pain. Gastrointestinal: Negative for abdominal pain, diarrhea, nausea and vomiting. Genitourinary: Negative for dysuria and frequency. Musculoskeletal: Negative for arthralgias and back pain. Skin: Negative for rash and wound. Neurological: Negative for dizziness, weakness and headaches. Hematological: Does not bruise/bleed easily. Psychiatric/Behavioral: Negative for agitation. All other systems reviewed and are negative. Physical Exam  Vitals and nursing note reviewed.    Constitutional:       General: He is not in acute distress. Appearance: Normal appearance. He is not ill-appearing or diaphoretic. HENT:      Head: Normocephalic and atraumatic. Nose: Nose normal. No congestion or rhinorrhea. Mouth/Throat:      Pharynx: No oropharyngeal exudate or posterior oropharyngeal erythema. Eyes:      Extraocular Movements: Extraocular movements intact. Pupils: Pupils are equal, round, and reactive to light. Cardiovascular:      Rate and Rhythm: Normal rate and regular rhythm. Pulses: Normal pulses. Heart sounds: Normal heart sounds. No murmur heard. No friction rub. No gallop. Pulmonary:      Effort: Pulmonary effort is normal. No tachypnea or accessory muscle usage. Breath sounds: Normal breath sounds. No decreased breath sounds, wheezing, rhonchi or rales. Chest:      Chest wall: No tenderness. Abdominal:      General: Bowel sounds are normal.      Palpations: Abdomen is soft. Tenderness: There is no abdominal tenderness. There is no right CVA tenderness, left CVA tenderness or rebound. Negative signs include Leslie's sign, Rovsing's sign and McBurney's sign. Musculoskeletal:         General: Normal range of motion. Cervical back: Normal range of motion. Right lower leg: No tenderness. No edema. Left lower leg: No tenderness. No edema. Skin:     General: Skin is warm and dry. Capillary Refill: Capillary refill takes less than 2 seconds. Coloration: Skin is not cyanotic. Neurological:      General: No focal deficit present. Mental Status: He is alert and oriented to person, place, and time. Psychiatric:         Mood and Affect: Mood normal.          Procedures   Conscious Sedation Procedure Note    Indication: cardioversion    Consent: I have discussed with the patient and/or the patient representative the indication, alternatives, and the possible risks and/or complications of the planned procedure and the anesthesia methods.  The patient and/or patient representative appear to understand and agree to proceed. Physician Involvement: The attending physician was present and supervising this procedure. Pre-Sedation Documentation and Exam:  Time: 21 [phone]  I have personally completed a history, physical exam & review of systems for this patient (see notes). I have reviewed the patient's history and review of systems. Airway Assessment: normal    Prior History of Anesthesia Complications: none    Class 2    Sedation/ Anesthesia Plan: intravenous sedation    Medications Used: etomidate intravenously    Monitoring and Safety: The patient was placed on a cardiac monitor and vital signs, pulse oximetry and level of consciousness were continuously evaluated throughout the procedure. The patient was closely monitored until recovery from the medications was complete and the patient had returned to baseline status. Respiratory therapy was on standby at all times during the procedure. (The following sections must be completed)  Post-Sedation Vital Signs: Vital signs were reviewed and were stable after the procedure (see flow sheet for vitals)            Post-Sedation Exam:   Time: 1045. Cardiovascular: regular rate and rhythm           Complications: none      Cardioversion Procedure Note    Indication: aflutter    Consent: The patient provided verbal consent for this procedure. Pre-Medication: etomidate intravenously    Procedure: The patient was placed in the supine position and the chest area was exposed. The cardiac pacing pads were applied in the standard manner and configuration. Patient cardioverted at [facility]. Patient was in normal sinus rhythm after shock. The patient tolerated the procedure well.     Complications: None      MDM  Number of Diagnoses or Management Options  Encounter for cardioversion procedure  Typical atrial flutter [facility])  Diagnosis management comments: 80year old male history of a flutter on Tikosyn presents with complaints of palpatations and fatigue. Vitals significant for tachycardia. On physcial exam patient is in no acute distress. Speaking in full sentences. Lungs clear to auscultation. Diagnostic labs and imaging interpreted and reviewed. Patient is in aflutter vs SVT. Patient was given Adenosine 6 mg with partial relief. Patient's new heart rate is now 150's. It is 2:1 aflutter. Electrophysiology was consulted. Recommend patient received 360J and cardioverted. Patient states he takes his Eliquis daily and rarely if ever misses a dose. Patient was consciously sedated and cardioverted. Patient no back in noral sinus rhythm. Patient discharged home with follow up with his EP for ablation in the future. Amount and/or Complexity of Data Reviewed  Clinical lab tests: reviewed  Tests in the radiology section of CPT®: reviewed         ED Course as of Oct 02 2135   Sat Oct 02, 2021   0845 EKG read interpreted by me. Heart rate 202. SVT. Normal axis deviation. No QTC prolongation. No ST elevations or depressions. Change compared to previous EKG. [JV]   [id number] Patient given adenosine 6 mg. Heart rate from 202 now to 126. Looks sinus on the monitor. Repeat EKG will be performed. If patient remains in sinus tachycardia patient will give bolus of fluid and occult EP will be made. If patient reverts to A. fib with RVR or a flutter patient will be given Cardizem. [JV]   [id number] Spoke with Dr. Herlinda Quiroz a lot. Recommends patient be cardioverted in the emergency room and he has a follow-up appointment for his ablation this week. He recommends patient get 360 J.     [JV]   1029 Patient was cardioverted. [JV]   1216 Patient sitting in bed comfortably. Patient be discharged home with follow-up with electrophysiology as an outpatient for his ablation. Patient from of all the results evaluation he is agreeable plan for discharge.     [JV]      ED Course User Index  [JV] Kely Recinos MD --------------------------------------------- PAST HISTORY ---------------------------------------------  Past Medical History:  has a past medical history of Arthritis, Atrial fibrillation ([facility] [address] Bulging lumbar disc, CAD (coronary artery disease), Chronic lower back pain, BREWER (dyspnea on exertion), Eczema, H/O atrial flutter, Hyperlipidemia, Hypertension, Kidney stones, Obesity, and Sleep apnea. Past Surgical History:  has a past surgical history that includes ECHO Transesophageal (05/07/2013); Tonsillectomy; transesophageal echocardiogram (03/24/2016); Cardioversion (03/24/2016); Colonoscopy; Lithotripsy (1986); Atrial ablation surgery (08/10/2016); knee surgery (Left, 1969); ablation of dysrhythmic focus (05/2013); Lumbar disc surgery (11/30/2016); Cardioversion (10/20/2017); Total knee arthroplasty (Right, 06/01/2020); Total knee arthroplasty (Left, 03/08/2021); and Cardioversion (09/23/2021). Social History:  reports that he quit smoking about 14 years ago. His smoking use included cigarettes. He has a 20.00 pack-year smoking history. He has never used smokeless tobacco. He reports current alcohol use of about 3.0 standard drinks of alcohol per week. He reports that he does not use drugs. Family History: family history includes Cancer in his father and mother; Diabetes in his mother and sister; Kidney Disease in his sister. The patients home medications have been reviewed.     Allergies: Adhesive tape    -------------------------------------------------- RESULTS -------------------------------------------------  Labs:  Results for orders placed or performed during the hospital encounter of 10/02/21   CBC Auto Differential   Result Value Ref Range    WBC 10.0 4.5 - 11.5 E9/L    RBC 4.67 3.80 - 5.80 E12/L    Hemoglobin 15.6 12.5 - 16.5 g/dL    Hematocrit 45.5 37.0 - 54.0 %    MCV 97.4 80.0 - 99.9 fL    MCH 33.4 26.0 - 35.0 pg    MCHC 34.3 32.0 - 34.5 %    RDW 13.1 11.5 - 15.0 fL    Platelets 217 130 - 450 E9/L    MPV 10.9 7.0 - 12.0 fL    Neutrophils % 55.5 43.0 - 80.0 %    Immature Granulocytes % 0.7 0.0 - 5.0 %    Lymphocytes % 28.1 20.0 - 42.0 %    Monocytes % 11.6 2.0 - 12.0 %    Eosinophils % 3.4 0.0 - 6.0 %    Basophils % 0.7 0.0 - 2.0 %    Neutrophils Absolute 5.52 1.80 - 7.30 E9/L    Immature Granulocytes # 0.07 E9/L    Lymphocytes Absolute 2.80 1.50 - 4.00 E9/L    Monocytes Absolute 1.15 (H) 0.10 - 0.95 E9/L    Eosinophils Absolute 0.34 0.05 - 0.50 E9/L    Basophils Absolute 0.07 0.00 - 0.20 E9/L   Comprehensive Metabolic Panel   Result Value Ref Range    Sodium 136 132 - 146 mmol/L    Potassium 4.9 3.5 - 5.0 mmol/L    Chloride 102 98 - 107 mmol/L    CO2 22 22 - 29 mmol/L    Anion Gap 12 7 - 16 mmol/L    Glucose 135 (H) 74 - 99 mg/dL    BUN 29 (H) 6 - 23 mg/dL    CREATININE 1.3 (H) 0.7 - 1.2 mg/dL    GFR Non-African American 55 >=60 mL/min/1.73    GFR African American >60     Calcium 9.7 8.6 - 10.2 mg/dL    Total Protein 7.4 6.4 - 8.3 g/dL    Albumin 3.7 3.5 - 5.2 g/dL    Total Bilirubin 0.4 0.0 - 1.2 mg/dL    Alkaline Phosphatase 111 40 - 129 U/L    ALT 26 0 - 40 U/L    AST 42 (H) 0 - 39 U/L   Protime-INR   Result Value Ref Range    Protime 13.7 (H) 9.3 - 12.4 sec    INR 1.2    APTT   Result Value Ref Range    aPTT 23.4 (L) 24.5 - 35.1 sec   SPECIMEN REJECTION   Result Value Ref Range    Rejected Test trp5     Reason for Rejection see below    EKG 12 Lead   Result Value Ref Range    Ventricular Rate 105 BPM    Atrial Rate 202 BPM    QRS Duration 132 ms    Q-T Interval 372 ms    QTc Calculation (Bazett) 491 ms    P Axis -108 degrees    R Axis 9 degrees    T Axis 28 degrees     EKG read interpreted by me. Heart rate 202. Atrial flutter with 2 1 block. Normal axis deviation. QTc prolonged. Change compared to previous EKG. Radiology:  XR CHEST PORTABLE   Final Result   No acute process.              ------------------------- NURSING NOTES AND VITALS REVIEWED ---------------------------  Date / Time Roomed:  10/2/2021  8:32 AM  ED Bed Assignment:  12/12    The nursing notes within the ED encounter and vital signs as below have been reviewed. /80   Pulse 80   Resp 18   Wt 265 lb (120.2 kg)   SpO2 96%   BMI 39.13 kg/m²   Oxygen Saturation Interpretation: Normal      ------------------------------------------ PROGRESS NOTES ------------------------------------------  9:29 PM EDT  I have spoken with the patient and discussed todays results, in addition to providing specific details for the plan of care and counseling regarding the diagnosis and prognosis. Their questions are answered at this time and they are agreeable with the plan. I discussed at length with them reasons for immediate return here for re evaluation. They will followup with their  electrophysiologist  by calling their office on Monday.      --------------------------------- ADDITIONAL PROVIDER NOTES ---------------------------------  At this time the patient is without objective evidence of an acute process requiring hospitalization or inpatient management. They have remained hemodynamically stable throughout their entire ED visit and are stable for discharge with outpatient follow-up. The plan has been discussed in detail and they are aware of the specific conditions for emergent return, as well as the importance of follow-up. Discharge Medication List as of 10/2/2021 11:16 AM      Please note that the withdrawal or failure to initiate urgent interventions for this patient would likely result in a life threatening deterioration or permanent disability. Accordingly this patient received 30 minutes of critical care time, excluding separately billable procedures. Diagnosis:  1. Typical atrial flutter ([address]    2. Encounter for cardioversion procedure        Disposition:  Patient's disposition: Discharge to home  Patient's condition is stable.        Nicole Ferris, MD  Resident  10/02/21 [id number]       Judith Painting MD  10/03/21 [id number]

## 2021-10-02 NOTE — ED NOTES
DC instructions provided with FU. Pt verbalizes understanding. NAD noted, VSS, ambulating independently at time of dispo.       Rona Hallman, RN  10/02/21 [id number]

## 2021-10-08 LAB
EKG ATRIAL RATE: 202 BPM
EKG ATRIAL RATE: 80 BPM
EKG P AXIS: 66 DEGREES
EKG P-R INTERVAL: 172 MS
EKG Q-T INTERVAL: 218 MS
EKG Q-T INTERVAL: 422 MS
EKG QRS DURATION: 116 MS
EKG QRS DURATION: 132 MS
EKG QTC CALCULATION (BAZETT): 399 MS
EKG QTC CALCULATION (BAZETT): 486 MS
EKG R AXIS: 1 DEGREES
EKG R AXIS: 18 DEGREES
EKG T AXIS: 17 DEGREES
EKG T AXIS: 8 DEGREES
EKG VENTRICULAR RATE: 202 BPM
EKG VENTRICULAR RATE: 80 BPM

## 2021-10-08 PROCEDURE — 93010 ELECTROCARDIOGRAM REPORT: CPT | Performed by: INTERNAL MEDICINE

## 2021-10-11 ENCOUNTER — HOSPITAL ENCOUNTER (OUTPATIENT)
Dept: CT IMAGING | Age: 71
Discharge: HOME | End: 2021-10-13
Payer: MEDICARE

## 2021-10-11 DIAGNOSIS — I48.19 PERSISTENT ATRIAL FIBRILLATION (HCC): ICD-10-CM

## 2021-10-11 PROCEDURE — 6360000004 HC RX CONTRAST MEDICATION: Performed by: STUDENT IN AN ORGANIZED HEALTH CARE EDUCATION/TRAINING PROGRAM

## 2021-10-11 PROCEDURE — 2580000003 HC RX 258: Performed by: STUDENT IN AN ORGANIZED HEALTH CARE EDUCATION/TRAINING PROGRAM

## 2021-10-11 PROCEDURE — 75572 CT HRT W/3D IMAGE: CPT

## 2021-10-11 RX ORDER — SODIUM CHLORIDE 0.9 % (FLUSH) 0.9 %
10 SYRINGE (ML) INJECTION
Status: COMPLETED | OUTPATIENT
Start: 2021-10-11 | End: 2021-10-11

## 2021-10-11 RX ADMIN — Medication 10 ML: at 10:26

## 2021-10-11 RX ADMIN — IOPAMIDOL 100 ML: 755 INJECTION, SOLUTION INTRAVENOUS at 10:26

## 2021-10-15 ENCOUNTER — NURSE ONLY (OUTPATIENT)
Dept: PRIMARY CARE CLINIC | Age: 71
End: 2021-10-15

## 2021-10-15 DIAGNOSIS — Z01.818 PRE-OP TESTING: ICD-10-CM

## 2021-10-18 LAB — SARS-COV-2, PCR: NOT DETECTED

## 2021-10-19 ENCOUNTER — TELEPHONE (OUTPATIENT)
Dept: CARDIAC CATH/INVASIVE PROCEDURES | Age: 71
End: 2021-10-19

## 2021-10-19 ENCOUNTER — TELEPHONE (OUTPATIENT)
Dept: NON INVASIVE DIAGNOSTICS | Age: 71
End: 2021-10-19

## 2021-10-19 NOTE — TELEPHONE ENCOUNTER
Reminded patient;s wife  of scheduled procedure on 10/20   Instructions given and COVID questionnaire completed.

## 2021-10-20 ENCOUNTER — HOSPITAL ENCOUNTER (OUTPATIENT)
Dept: NON INVASIVE DIAGNOSTICS | Age: 71
Setting detail: OBSERVATION
Discharge: HOME | End: 2021-10-20
Attending: INTERNAL MEDICINE | Admitting: INTERNAL MEDICINE
Payer: MEDICARE

## 2021-10-20 ENCOUNTER — ANESTHESIA (OUTPATIENT)
Dept: NON INVASIVE DIAGNOSTICS | Age: 71
End: 2021-10-20

## 2021-10-20 ENCOUNTER — ANESTHESIA EVENT (OUTPATIENT)
Dept: NON INVASIVE DIAGNOSTICS | Age: 71
End: 2021-10-20

## 2021-10-20 VITALS
SYSTOLIC BLOOD PRESSURE: 124 MMHG | WEIGHT: 265 LBS | RESPIRATION RATE: 18 BRPM | TEMPERATURE: 97.4 F | HEART RATE: 55 BPM | OXYGEN SATURATION: 93 % | DIASTOLIC BLOOD PRESSURE: 58 MMHG | BODY MASS INDEX: 37.94 KG/M2 | HEIGHT: 70 IN

## 2021-10-20 VITALS — OXYGEN SATURATION: 96 % | SYSTOLIC BLOOD PRESSURE: 140 MMHG | TEMPERATURE: 97 F | DIASTOLIC BLOOD PRESSURE: 69 MMHG

## 2021-10-20 DIAGNOSIS — Z86.79 STATUS POST ABLATION OF ATRIAL FIBRILLATION: ICD-10-CM

## 2021-10-20 DIAGNOSIS — Z98.890 STATUS POST ABLATION OF ATRIAL FIBRILLATION: ICD-10-CM

## 2021-10-20 LAB
ABO/RH: NORMAL
ACTIVATED CLOTTING TIME: 165 SECONDS (ref 99–130)
ACTIVATED CLOTTING TIME: 183 SECONDS (ref 99–130)
ACTIVATED CLOTTING TIME: 195 SECONDS (ref 99–130)
ACTIVATED CLOTTING TIME: 199 SECONDS (ref 99–130)
ACTIVATED CLOTTING TIME: 223 SECONDS (ref 99–130)
ALBUMIN SERPL-MCNC: 3.9 G/DL (ref 3.5–5.2)
ALP BLD-CCNC: 107 U/L (ref 40–129)
ALT SERPL-CCNC: 29 U/L (ref 0–40)
ANION GAP SERPL CALCULATED.3IONS-SCNC: 13 MMOL/L (ref 7–16)
ANTIBODY SCREEN: NORMAL
APTT: 28.1 SEC (ref 24.5–35.1)
AST SERPL-CCNC: 24 U/L (ref 0–39)
BASOPHILS ABSOLUTE: 0.11 E9/L (ref 0–0.2)
BASOPHILS RELATIVE PERCENT: 1.3 % (ref 0–2)
BILIRUB SERPL-MCNC: 0.3 MG/DL (ref 0–1.2)
BILIRUBIN DIRECT: <0.2 MG/DL (ref 0–0.3)
BILIRUBIN, INDIRECT: NORMAL MG/DL (ref 0–1)
BUN BLDV-MCNC: 29 MG/DL (ref 6–23)
CALCIUM SERPL-MCNC: 9.4 MG/DL (ref 8.6–10.2)
CHLORIDE BLD-SCNC: 103 MMOL/L (ref 98–107)
CO2: 24 MMOL/L (ref 22–29)
CREAT SERPL-MCNC: 1.3 MG/DL (ref 0.7–1.2)
EOSINOPHILS ABSOLUTE: 0.31 E9/L (ref 0.05–0.5)
EOSINOPHILS RELATIVE PERCENT: 3.6 % (ref 0–6)
GFR AFRICAN AMERICAN: >60
GFR NON-AFRICAN AMERICAN: 55 ML/MIN/1.73
GLUCOSE BLD-MCNC: 113 MG/DL (ref 74–99)
HCT VFR BLD CALC: 40 % (ref 37–54)
HEMOGLOBIN: 13.4 G/DL (ref 12.5–16.5)
IMMATURE GRANULOCYTES #: 0.1 E9/L
IMMATURE GRANULOCYTES %: 1.2 % (ref 0–5)
INR BLD: 1.1
LYMPHOCYTES ABSOLUTE: 2.79 E9/L (ref 1.5–4)
LYMPHOCYTES RELATIVE PERCENT: 32.4 % (ref 20–42)
MAGNESIUM: 2 MG/DL (ref 1.6–2.6)
MCH RBC QN AUTO: 33 PG (ref 26–35)
MCHC RBC AUTO-ENTMCNC: 33.5 % (ref 32–34.5)
MCV RBC AUTO: 98.5 FL (ref 80–99.9)
MONOCYTES ABSOLUTE: 0.76 E9/L (ref 0.1–0.95)
MONOCYTES RELATIVE PERCENT: 8.8 % (ref 2–12)
NEUTROPHILS ABSOLUTE: 4.55 E9/L (ref 1.8–7.3)
NEUTROPHILS RELATIVE PERCENT: 52.7 % (ref 43–80)
PDW BLD-RTO: 13 FL (ref 11.5–15)
PLATELET # BLD: 263 E9/L (ref 130–450)
PMV BLD AUTO: 10.6 FL (ref 7–12)
POTASSIUM SERPL-SCNC: 4.3 MMOL/L (ref 3.5–5)
PROTHROMBIN TIME: 12.1 SEC (ref 9.3–12.4)
RBC # BLD: 4.06 E12/L (ref 3.8–5.8)
SODIUM BLD-SCNC: 140 MMOL/L (ref 132–146)
TOTAL PROTEIN: 7.8 G/DL (ref 6.4–8.3)
TSH SERPL DL<=0.05 MIU/L-ACNC: 4.64 UIU/ML (ref 0.27–4.2)
WBC # BLD: 8.6 E9/L (ref 4.5–11.5)

## 2021-10-20 PROCEDURE — 93312 ECHO TRANSESOPHAGEAL: CPT

## 2021-10-20 PROCEDURE — 93662 INTRACARDIAC ECG (ICE): CPT | Performed by: INTERNAL MEDICINE

## 2021-10-20 PROCEDURE — G0378 HOSPITAL OBSERVATION PER HR: HCPCS

## 2021-10-20 PROCEDURE — 6360000002 HC RX W HCPCS: Performed by: NURSE ANESTHETIST, CERTIFIED REGISTERED

## 2021-10-20 PROCEDURE — 2500000003 HC RX 250 WO HCPCS

## 2021-10-20 PROCEDURE — 3700000001 HC ADD 15 MINUTES (ANESTHESIA)

## 2021-10-20 PROCEDURE — 93321 DOPPLER ECHO F-UP/LMTD STD: CPT

## 2021-10-20 PROCEDURE — 86900 BLOOD TYPING SEROLOGIC ABO: CPT

## 2021-10-20 PROCEDURE — 93325 DOPPLER ECHO COLOR FLOW MAPG: CPT | Performed by: INTERNAL MEDICINE

## 2021-10-20 PROCEDURE — 3700000000 HC ANESTHESIA ATTENDED CARE

## 2021-10-20 PROCEDURE — 85610 PROTHROMBIN TIME: CPT

## 2021-10-20 PROCEDURE — C1732 CATH, EP, DIAG/ABL, 3D/VECT: HCPCS

## 2021-10-20 PROCEDURE — 2580000003 HC RX 258: Performed by: NURSE ANESTHETIST, CERTIFIED REGISTERED

## 2021-10-20 PROCEDURE — 85025 COMPLETE CBC W/AUTO DIFF WBC: CPT

## 2021-10-20 PROCEDURE — 93613 INTRACARDIAC EPHYS 3D MAPG: CPT | Performed by: INTERNAL MEDICINE

## 2021-10-20 PROCEDURE — 80076 HEPATIC FUNCTION PANEL: CPT

## 2021-10-20 PROCEDURE — 93462 L HRT CATH TRNSPTL PUNCTURE: CPT | Performed by: INTERNAL MEDICINE

## 2021-10-20 PROCEDURE — 86850 RBC ANTIBODY SCREEN: CPT

## 2021-10-20 PROCEDURE — 83735 ASSAY OF MAGNESIUM: CPT

## 2021-10-20 PROCEDURE — 6360000002 HC RX W HCPCS

## 2021-10-20 PROCEDURE — 99220 PR INITIAL OBSERVATION CARE/DAY 70 MINUTES: CPT | Performed by: INTERNAL MEDICINE

## 2021-10-20 PROCEDURE — 93325 DOPPLER ECHO COLOR FLOW MAPG: CPT

## 2021-10-20 PROCEDURE — 85730 THROMBOPLASTIN TIME PARTIAL: CPT

## 2021-10-20 PROCEDURE — 84443 ASSAY THYROID STIM HORMONE: CPT

## 2021-10-20 PROCEDURE — C1893 INTRO/SHEATH, FIXED,NON-PEEL: HCPCS

## 2021-10-20 PROCEDURE — 93320 DOPPLER ECHO COMPLETE: CPT | Performed by: INTERNAL MEDICINE

## 2021-10-20 PROCEDURE — 2700000000 HC OXYGEN THERAPY PER DAY

## 2021-10-20 PROCEDURE — 7100000000 HC PACU RECOVERY - FIRST 15 MIN

## 2021-10-20 PROCEDURE — 2500000003 HC RX 250 WO HCPCS: Performed by: NURSE ANESTHETIST, CERTIFIED REGISTERED

## 2021-10-20 PROCEDURE — 93623 PRGRMD STIMJ&PACG IV RX NFS: CPT | Performed by: INTERNAL MEDICINE

## 2021-10-20 PROCEDURE — 93005 ELECTROCARDIOGRAM TRACING: CPT | Performed by: INTERNAL MEDICINE

## 2021-10-20 PROCEDURE — 86901 BLOOD TYPING SEROLOGIC RH(D): CPT

## 2021-10-20 PROCEDURE — 36415 COLL VENOUS BLD VENIPUNCTURE: CPT

## 2021-10-20 PROCEDURE — 93312 ECHO TRANSESOPHAGEAL: CPT | Performed by: INTERNAL MEDICINE

## 2021-10-20 PROCEDURE — C1759 CATH, INTRA ECHOCARDIOGRAPHY: HCPCS

## 2021-10-20 PROCEDURE — 2709999900 HC NON-CHARGEABLE SUPPLY

## 2021-10-20 PROCEDURE — 2580000003 HC RX 258: Performed by: INTERNAL MEDICINE

## 2021-10-20 PROCEDURE — 80048 BASIC METABOLIC PNL TOTAL CA: CPT

## 2021-10-20 PROCEDURE — 93653 COMPRE EP EVAL TX SVT: CPT | Performed by: INTERNAL MEDICINE

## 2021-10-20 PROCEDURE — 85347 COAGULATION TIME ACTIVATED: CPT

## 2021-10-20 PROCEDURE — 7100000001 HC PACU RECOVERY - ADDTL 15 MIN

## 2021-10-20 PROCEDURE — 93621 COMP EP EVL L PAC&REC C SINS: CPT | Performed by: INTERNAL MEDICINE

## 2021-10-20 PROCEDURE — G0379 DIRECT REFER HOSPITAL OBSERV: HCPCS

## 2021-10-20 PROCEDURE — C1769 GUIDE WIRE: HCPCS

## 2021-10-20 PROCEDURE — C1730 CATH, EP, 19 OR FEW ELECT: HCPCS

## 2021-10-20 PROCEDURE — C1894 INTRO/SHEATH, NON-LASER: HCPCS

## 2021-10-20 RX ORDER — MORPHINE SULFATE 2 MG/ML
1 INJECTION, SOLUTION INTRAMUSCULAR; INTRAVENOUS EVERY 5 MIN PRN
Status: DISCONTINUED | OUTPATIENT
Start: 2021-10-20 | End: 2021-10-20 | Stop reason: HOSPADM

## 2021-10-20 RX ORDER — HEPARIN SODIUM 1000 [USP'U]/ML
INJECTION, SOLUTION INTRAVENOUS; SUBCUTANEOUS PRN
Status: DISCONTINUED | OUTPATIENT
Start: 2021-10-20 | End: 2021-10-20 | Stop reason: SDUPTHER

## 2021-10-20 RX ORDER — ONDANSETRON 2 MG/ML
INJECTION INTRAMUSCULAR; INTRAVENOUS PRN
Status: DISCONTINUED | OUTPATIENT
Start: 2021-10-20 | End: 2021-10-20 | Stop reason: SDUPTHER

## 2021-10-20 RX ORDER — SODIUM CHLORIDE 9 MG/ML
INJECTION, SOLUTION INTRAVENOUS CONTINUOUS PRN
Status: DISCONTINUED | OUTPATIENT
Start: 2021-10-20 | End: 2021-10-20 | Stop reason: SDUPTHER

## 2021-10-20 RX ORDER — DEXAMETHASONE SODIUM PHOSPHATE 10 MG/ML
INJECTION, EMULSION INTRAMUSCULAR; INTRAVENOUS PRN
Status: DISCONTINUED | OUTPATIENT
Start: 2021-10-20 | End: 2021-10-20 | Stop reason: SDUPTHER

## 2021-10-20 RX ORDER — SUCCINYLCHOLINE CHLORIDE 20 MG/ML
INJECTION INTRAMUSCULAR; INTRAVENOUS PRN
Status: DISCONTINUED | OUTPATIENT
Start: 2021-10-20 | End: 2021-10-20 | Stop reason: SDUPTHER

## 2021-10-20 RX ORDER — OXYCODONE HYDROCHLORIDE AND ACETAMINOPHEN 5; 325 MG/1; MG/1
1 TABLET ORAL PRN
Status: DISCONTINUED | OUTPATIENT
Start: 2021-10-20 | End: 2021-10-20 | Stop reason: HOSPADM

## 2021-10-20 RX ORDER — SODIUM CHLORIDE 0.9 % (FLUSH) 0.9 %
5-40 SYRINGE (ML) INJECTION EVERY 12 HOURS SCHEDULED
Status: DISCONTINUED | OUTPATIENT
Start: 2021-10-20 | End: 2021-10-20 | Stop reason: HOSPADM

## 2021-10-20 RX ORDER — ONDANSETRON 2 MG/ML
4 INJECTION INTRAMUSCULAR; INTRAVENOUS
Status: DISCONTINUED | OUTPATIENT
Start: 2021-10-20 | End: 2021-10-20 | Stop reason: HOSPADM

## 2021-10-20 RX ORDER — SODIUM CHLORIDE 9 MG/ML
INJECTION, SOLUTION INTRAVENOUS ONCE
Status: COMPLETED | OUTPATIENT
Start: 2021-10-20 | End: 2021-10-20

## 2021-10-20 RX ORDER — FENTANYL CITRATE 50 UG/ML
INJECTION, SOLUTION INTRAMUSCULAR; INTRAVENOUS PRN
Status: DISCONTINUED | OUTPATIENT
Start: 2021-10-20 | End: 2021-10-20 | Stop reason: SDUPTHER

## 2021-10-20 RX ORDER — PROPOFOL 10 MG/ML
INJECTION, EMULSION INTRAVENOUS PRN
Status: DISCONTINUED | OUTPATIENT
Start: 2021-10-20 | End: 2021-10-20 | Stop reason: SDUPTHER

## 2021-10-20 RX ORDER — OXYCODONE HYDROCHLORIDE AND ACETAMINOPHEN 5; 325 MG/1; MG/1
2 TABLET ORAL PRN
Status: DISCONTINUED | OUTPATIENT
Start: 2021-10-20 | End: 2021-10-20 | Stop reason: HOSPADM

## 2021-10-20 RX ORDER — MEPERIDINE HYDROCHLORIDE 25 MG/ML
12.5 INJECTION INTRAMUSCULAR; INTRAVENOUS; SUBCUTANEOUS
Status: DISCONTINUED | OUTPATIENT
Start: 2021-10-20 | End: 2021-10-20 | Stop reason: HOSPADM

## 2021-10-20 RX ORDER — SODIUM CHLORIDE 9 MG/ML
25 INJECTION, SOLUTION INTRAVENOUS PRN
Status: DISCONTINUED | OUTPATIENT
Start: 2021-10-20 | End: 2021-10-20 | Stop reason: HOSPADM

## 2021-10-20 RX ORDER — SODIUM CHLORIDE 0.9 % (FLUSH) 0.9 %
5-40 SYRINGE (ML) INJECTION PRN
Status: DISCONTINUED | OUTPATIENT
Start: 2021-10-20 | End: 2021-10-20 | Stop reason: HOSPADM

## 2021-10-20 RX ORDER — MORPHINE SULFATE 2 MG/ML
2 INJECTION, SOLUTION INTRAMUSCULAR; INTRAVENOUS EVERY 5 MIN PRN
Status: DISCONTINUED | OUTPATIENT
Start: 2021-10-20 | End: 2021-10-20 | Stop reason: HOSPADM

## 2021-10-20 RX ORDER — EPHEDRINE SULFATE 50 MG/ML
INJECTION, SOLUTION INTRAVENOUS PRN
Status: DISCONTINUED | OUTPATIENT
Start: 2021-10-20 | End: 2021-10-20 | Stop reason: SDUPTHER

## 2021-10-20 RX ORDER — MIDAZOLAM HYDROCHLORIDE 1 MG/ML
INJECTION INTRAMUSCULAR; INTRAVENOUS PRN
Status: DISCONTINUED | OUTPATIENT
Start: 2021-10-20 | End: 2021-10-20 | Stop reason: SDUPTHER

## 2021-10-20 RX ORDER — ACETAMINOPHEN 325 MG/1
650 TABLET ORAL EVERY 4 HOURS PRN
Status: DISCONTINUED | OUTPATIENT
Start: 2021-10-20 | End: 2021-10-20 | Stop reason: HOSPADM

## 2021-10-20 RX ORDER — LIDOCAINE HYDROCHLORIDE 20 MG/ML
INJECTION, SOLUTION INFILTRATION; PERINEURAL PRN
Status: DISCONTINUED | OUTPATIENT
Start: 2021-10-20 | End: 2021-10-20 | Stop reason: SDUPTHER

## 2021-10-20 RX ADMIN — SODIUM CHLORIDE: 9 INJECTION, SOLUTION INTRAVENOUS at 07:03

## 2021-10-20 RX ADMIN — EPHEDRINE SULFATE 10 MG: 50 INJECTION INTRAMUSCULAR; INTRAVENOUS; SUBCUTANEOUS at 08:07

## 2021-10-20 RX ADMIN — MIDAZOLAM 2 MG: 1 INJECTION INTRAMUSCULAR; INTRAVENOUS at 07:50

## 2021-10-20 RX ADMIN — SODIUM CHLORIDE: 9 INJECTION, SOLUTION INTRAVENOUS at 07:43

## 2021-10-20 RX ADMIN — EPHEDRINE SULFATE 10 MG: 50 INJECTION INTRAMUSCULAR; INTRAVENOUS; SUBCUTANEOUS at 08:32

## 2021-10-20 RX ADMIN — HEPARIN SODIUM 10000 UNITS: 1000 INJECTION INTRAVENOUS; SUBCUTANEOUS at 09:15

## 2021-10-20 RX ADMIN — SODIUM CHLORIDE: 9 INJECTION, SOLUTION INTRAVENOUS at 08:48

## 2021-10-20 RX ADMIN — EPHEDRINE SULFATE 20 MG: 50 INJECTION INTRAMUSCULAR; INTRAVENOUS; SUBCUTANEOUS at 08:19

## 2021-10-20 RX ADMIN — PROPOFOL 150 MG: 10 INJECTION, EMULSION INTRAVENOUS at 07:59

## 2021-10-20 RX ADMIN — SUCCINYLCHOLINE CHLORIDE 140 MG: 20 INJECTION, SOLUTION INTRAMUSCULAR; INTRAVENOUS at 07:59

## 2021-10-20 RX ADMIN — DEXAMETHASONE SODIUM PHOSPHATE 10 MG: 10 INJECTION, EMULSION INTRAMUSCULAR; INTRAVENOUS at 08:12

## 2021-10-20 RX ADMIN — PROPOFOL 50 MG: 10 INJECTION, EMULSION INTRAVENOUS at 08:10

## 2021-10-20 RX ADMIN — FENTANYL CITRATE 50 MCG: 50 INJECTION, SOLUTION INTRAMUSCULAR; INTRAVENOUS at 09:57

## 2021-10-20 RX ADMIN — HEPARIN SODIUM 5000 UNITS: 1000 INJECTION INTRAVENOUS; SUBCUTANEOUS at 09:50

## 2021-10-20 RX ADMIN — EPHEDRINE SULFATE 10 MG: 50 INJECTION INTRAMUSCULAR; INTRAVENOUS; SUBCUTANEOUS at 08:09

## 2021-10-20 RX ADMIN — LIDOCAINE HYDROCHLORIDE 100 MG: 20 INJECTION, SOLUTION INFILTRATION; PERINEURAL at 07:59

## 2021-10-20 RX ADMIN — FENTANYL CITRATE 50 MCG: 50 INJECTION, SOLUTION INTRAMUSCULAR; INTRAVENOUS at 08:10

## 2021-10-20 RX ADMIN — FENTANYL CITRATE 50 MCG: 50 INJECTION, SOLUTION INTRAMUSCULAR; INTRAVENOUS at 07:59

## 2021-10-20 RX ADMIN — EPHEDRINE SULFATE 20 MG: 50 INJECTION INTRAMUSCULAR; INTRAVENOUS; SUBCUTANEOUS at 08:36

## 2021-10-20 RX ADMIN — ONDANSETRON 4 MG: 2 INJECTION, SOLUTION INTRAMUSCULAR; INTRAVENOUS at 11:42

## 2021-10-20 ASSESSMENT — PULMONARY FUNCTION TESTS
PIF_VALUE: 30
PIF_VALUE: 27
PIF_VALUE: 28
PIF_VALUE: 30
PIF_VALUE: 30
PIF_VALUE: 22
PIF_VALUE: 27
PIF_VALUE: 22
PIF_VALUE: 26
PIF_VALUE: 26
PIF_VALUE: 1
PIF_VALUE: 0
PIF_VALUE: 29
PIF_VALUE: 25
PIF_VALUE: 27
PIF_VALUE: 28
PIF_VALUE: 0
PIF_VALUE: 1
PIF_VALUE: 31
PIF_VALUE: 8
PIF_VALUE: 29
PIF_VALUE: 28
PIF_VALUE: 27
PIF_VALUE: 26
PIF_VALUE: 27
PIF_VALUE: 26
PIF_VALUE: 28
PIF_VALUE: 28
PIF_VALUE: 29
PIF_VALUE: 27
PIF_VALUE: 26
PIF_VALUE: 27
PIF_VALUE: 28
PIF_VALUE: 29
PIF_VALUE: 8
PIF_VALUE: 26
PIF_VALUE: 29
PIF_VALUE: 27
PIF_VALUE: 27
PIF_VALUE: 0
PIF_VALUE: 12
PIF_VALUE: 0
PIF_VALUE: 27
PIF_VALUE: 30
PIF_VALUE: 29
PIF_VALUE: 27
PIF_VALUE: 26
PIF_VALUE: 30
PIF_VALUE: 27
PIF_VALUE: 26
PIF_VALUE: 7
PIF_VALUE: 27
PIF_VALUE: 26
PIF_VALUE: 28
PIF_VALUE: 27
PIF_VALUE: 26
PIF_VALUE: 28
PIF_VALUE: 27
PIF_VALUE: 26
PIF_VALUE: 28
PIF_VALUE: 28
PIF_VALUE: 30
PIF_VALUE: 27
PIF_VALUE: 28
PIF_VALUE: 28
PIF_VALUE: 30
PIF_VALUE: 28
PIF_VALUE: 42
PIF_VALUE: 0
PIF_VALUE: 24
PIF_VALUE: 11
PIF_VALUE: 31
PIF_VALUE: 28
PIF_VALUE: 27
PIF_VALUE: 30
PIF_VALUE: 26
PIF_VALUE: 29
PIF_VALUE: 33
PIF_VALUE: 29
PIF_VALUE: 28
PIF_VALUE: 27
PIF_VALUE: 29
PIF_VALUE: 27
PIF_VALUE: 30
PIF_VALUE: 26
PIF_VALUE: 0
PIF_VALUE: 0
PIF_VALUE: 28
PIF_VALUE: 1
PIF_VALUE: 27
PIF_VALUE: 29
PIF_VALUE: 28
PIF_VALUE: 27
PIF_VALUE: 29
PIF_VALUE: 2
PIF_VALUE: 28
PIF_VALUE: 22
PIF_VALUE: 30
PIF_VALUE: 28
PIF_VALUE: 29
PIF_VALUE: 28
PIF_VALUE: 29
PIF_VALUE: 27
PIF_VALUE: 29
PIF_VALUE: 28
PIF_VALUE: 26
PIF_VALUE: 27
PIF_VALUE: 26
PIF_VALUE: 28
PIF_VALUE: 27
PIF_VALUE: 28
PIF_VALUE: 26
PIF_VALUE: 29
PIF_VALUE: 29
PIF_VALUE: 30
PIF_VALUE: 27
PIF_VALUE: 30
PIF_VALUE: 29
PIF_VALUE: 27
PIF_VALUE: 1
PIF_VALUE: 27
PIF_VALUE: 23
PIF_VALUE: 28
PIF_VALUE: 26
PIF_VALUE: 30
PIF_VALUE: 23
PIF_VALUE: 0
PIF_VALUE: 30
PIF_VALUE: 29
PIF_VALUE: 29
PIF_VALUE: 26
PIF_VALUE: 26
PIF_VALUE: 29
PIF_VALUE: 0
PIF_VALUE: 26
PIF_VALUE: 28
PIF_VALUE: 1
PIF_VALUE: 0
PIF_VALUE: 26
PIF_VALUE: 30
PIF_VALUE: 31
PIF_VALUE: 26
PIF_VALUE: 0
PIF_VALUE: 1
PIF_VALUE: 27
PIF_VALUE: 27
PIF_VALUE: 1
PIF_VALUE: 26
PIF_VALUE: 27
PIF_VALUE: 27
PIF_VALUE: 30
PIF_VALUE: 27
PIF_VALUE: 29
PIF_VALUE: 0
PIF_VALUE: 29
PIF_VALUE: 27
PIF_VALUE: 29
PIF_VALUE: 26
PIF_VALUE: 0
PIF_VALUE: 27
PIF_VALUE: 30
PIF_VALUE: 29
PIF_VALUE: 29
PIF_VALUE: 27
PIF_VALUE: 28
PIF_VALUE: 27
PIF_VALUE: 28
PIF_VALUE: 27
PIF_VALUE: 26
PIF_VALUE: 29
PIF_VALUE: 26
PIF_VALUE: 28
PIF_VALUE: 28
PIF_VALUE: 27
PIF_VALUE: 0
PIF_VALUE: 28
PIF_VALUE: 30
PIF_VALUE: 27
PIF_VALUE: 28
PIF_VALUE: 29
PIF_VALUE: 29
PIF_VALUE: 26
PIF_VALUE: 29
PIF_VALUE: 28
PIF_VALUE: 29
PIF_VALUE: 28
PIF_VALUE: 28
PIF_VALUE: 31
PIF_VALUE: 27
PIF_VALUE: 29
PIF_VALUE: 25
PIF_VALUE: 31
PIF_VALUE: 28
PIF_VALUE: 29
PIF_VALUE: 12
PIF_VALUE: 27
PIF_VALUE: 28
PIF_VALUE: 27
PIF_VALUE: 28
PIF_VALUE: 27
PIF_VALUE: 25
PIF_VALUE: 29
PIF_VALUE: 28
PIF_VALUE: 27
PIF_VALUE: 29
PIF_VALUE: 27
PIF_VALUE: 28
PIF_VALUE: 27
PIF_VALUE: 28
PIF_VALUE: 26
PIF_VALUE: 28
PIF_VALUE: 26
PIF_VALUE: 29
PIF_VALUE: 26
PIF_VALUE: 0
PIF_VALUE: 27
PIF_VALUE: 28
PIF_VALUE: 29
PIF_VALUE: 30
PIF_VALUE: 1
PIF_VALUE: 30
PIF_VALUE: 30
PIF_VALUE: 26
PIF_VALUE: 27
PIF_VALUE: 29
PIF_VALUE: 30
PIF_VALUE: 26
PIF_VALUE: 29
PIF_VALUE: 28
PIF_VALUE: 26
PIF_VALUE: 28
PIF_VALUE: 28
PIF_VALUE: 27
PIF_VALUE: 27
PIF_VALUE: 0
PIF_VALUE: 26
PIF_VALUE: 29
PIF_VALUE: 28

## 2021-10-20 ASSESSMENT — LIFESTYLE VARIABLES: SMOKING_STATUS: 0

## 2021-10-20 NOTE — H&P
[address] and Vascular [facility] Electrophysiology  History and Physical Examination  PATIENT: Shona Staley  MEDICAL RECORD NUMBER: [id number]  DATE OF SERVICE:  10/20/2021  ATTENDING ELECTROPHYSIOLOGIST: Eugene Perry MD  PRIMARY ELECTROPHYSIOLOGIST: Eugene Perry MD  REFERRING PHYSICIAN: Thom Martinez DO and Catia Menendez DO  CHIEF COMPLAINT: Recurrent atrial flutter    HPI: This is a 80 y.o. male with a history of   Patient Active Problem List   Diagnosis    Typical atrial flutter ([address]    Essential hypertension    Eczema    Former smoker    Midline low back pain with right-sided sciatica    Persistent atrial fibrillation    Chronic systolic heart failure (HCC)    CKD (chronic kidney disease)    Atrial flutter with rapid ventricular response (HCC)    Status post catheter ablation of atrial fibrillation    Status post catheter ablation of atrial flutter    Right bundle-branch block    Tachyarrhythmia    Atrial fibrillation ([facility] [address]    Wrist pain, chronic, right    Chronic pain syndrome    Primary osteoarthritis involving multiple joints    Right foot drop    Lumbar facet arthropathy    COVID-19    NATALI (acute kidney injury) ([facility] [address]    HLD (hyperlipidemia)    Redness and swelling of knee    Obesity (BMI 30-39. 9)    CAD (coronary artery disease)    H/O total knee replacement, bilateral    Osteoarthritis of left knee    Localized osteoarthritis of left knee    Atrial fibrillation with RVR (HCC)    Abnormal ECG   who presents to the hospital for elective AF/AFL ablation. The patient has history of persistent atrial fibrillation and flutter, HFrecEF, HTN, CKD, AURELIANO, obesity , RBBB and chronic back pain. The patient presented to the hospital on 8/22/21, 9/23/21 and 10/2/21 complaining of palpitations and found to be in appeared to be typical atrial flutter. He subsequently underwent DC-cardioversion's and last one was on 10/2/21.  Options post catheter ablation of atrial flutter 10/21/2017    Atrial flutter with rapid ventricular response (HCC)     CKD (chronic kidney disease)     Chronic systolic heart failure (HCC)     Persistent atrial fibrillation     Midline low back pain with right-sided sciatica 2016       Past Medical History:   Diagnosis Date    Arthritis     Atrial fibrillation ([address]     Bulging lumbar disc     L4-L5    CAD (coronary artery disease) 2016    ct scan heart    Chronic lower back pain     BREWER (dyspnea on exertion) 2013    Eczema     H/O atrial flutter 2021    Hyperlipidemia     Hypertension     Kidney stones     Obesity     weight 250#    Sleep apnea     c pap  stting 2       Family History   Problem Relation Age of Onset    Cancer Mother     Diabetes Mother     Cancer Father     Diabetes Sister     Kidney Disease Sister     Early Death Neg Hx        Social History     Tobacco Use    Smoking status: Former Smoker     Packs/day: 1.00     Years: 20.00     Pack years: 20.00     Types: Cigarettes     Quit date: 2007     Years since quittin.8    Smokeless tobacco: Never Used    Tobacco comment: 1.5 PACKS EACH DAY stop    Substance Use Topics    Alcohol use: Yes     Alcohol/week: 3.0 standard drinks     Types: 3 Shots of liquor per week     Comment: 1 x week       Current Outpatient Medications   Medication Sig Dispense Refill    metoprolol succinate (TOPROL XL) 100 MG extended release tablet Take 1 tablet by mouth 2 times daily 60 tablet 5    atorvastatin (LIPITOR) 20 MG tablet TAKE ONE TABLET BY MOUTH EVERY DAY 30 tablet 5    ELIQUIS 5 MG TABS tablet TAKE ONE TABLET BY MOUTH TWO TIMES A  tablet 3    dofetilide (TIKOSYN) 250 MCG capsule TAKE 1 CAPSULE BY MOUTH 2 TIMES DAILY ALONG WITH 125MCG DOSE TO EQUAL 375MCG DOSE 2 TIMES DAILY 180 capsule 3    dofetilide (TIKOSYN) 125 MCG capsule TAKE ONE CAPSULE BY MOUTH TWO TIMES A DAY.   TAKE ALONG WITH 250MCG TO EQUAL 375MCG TWICE DAILY 180 capsule 3    acetaminophen (TYLENOL) 500 MG tablet Take 2 tablets by mouth 3 times daily as needed for Pain 180 tablet 1    chlorthalidone (HYGROTON) 25 MG tablet TAKE ONE TABLET BY MOUTH EVERY DAY 90 tablet 3    traMADol (ULTRAM) 50 MG tablet Take 50 mg by mouth 2 times daily.  losartan (COZAAR) 50 MG tablet Take 50 mg by mouth daily      Misc Natural Products (TART CHERRY ADVANCED PO) Take by mouth STOP PREOP MED      gabapentin (NEURONTIN) 100 MG capsule Take 100 mg by mouth as needed.  magnesium oxide (MAG-OX) 400 (240 Mg) MG tablet Take 1 tablet by mouth 2 times daily (Patient taking differently: Take 400 mg by mouth daily ) 60 tablet 0    Multiple Vitamins-Minerals (THERAPEUTIC MULTIVITAMIN-MINERALS) tablet Take 1 tablet by mouth daily.  Coenzyme Q10 (COQ10 PO) Take 1 tablet by mouth daily.  Alpha-Lipoic Acid 300 MG CAPS Take 300 mg by mouth daily On hold      ascorbic acid (VITAMIN C) 500 MG tablet Take 500 mg by mouth daily.  Vitamin D (CHOLECALCIFEROL) 1000 UNITS CAPS capsule Take 1,000 Units by mouth daily.  Cyanocobalamin (VITAMIN B 12 PO) Take 500 mcg by mouth daily.  gabapentin (NEURONTIN) 100 MG capsule Take 1 capsule by mouth 2 times daily for 30 days. 60 capsule 0     No current facility-administered medications for this encounter. Allergies   Allergen Reactions    Adhesive Tape Rash     bandaids     ROS:   Constitutional: Negative for fever, activity change and appetite change. HENT: Negative for epistaxis. Eyes: Negative for diploplia, blurred vision. Respiratory: Negative for cough, chest tightness, shortness of breath and wheezing. Cardiovascular: pertinent positives in HPI  Gastrointestinal: Negative for abdominal pain and blood in stool.    All other review of systems are negative     PHYSICAL EXAM:   Vitals:    10/20/21 0644   BP: (!) 97/51   Pulse: (!) 49   Resp: 18   Temp: 98 °F (36.7 °C)   TempSrc: Temporal SpO2: 96%   Weight: 265 lb (120.2 kg)   Height: 5' 9.5\" (1.765 m)      Constitutional: Well-developed, no acute distress  Eyes: conjunctivae normal, no xanthelasma   Ears, Nose, Throat: oral mucosa moist, no cyanosis   CV: no JVD. RRR. Normal S1S2. No murmurs, rubs, or gallops. PMI is nondisplaced  Lungs: clear to auscultation bilaterally, normal respiratory effort without used of accessory muscles  Abdomen: soft, non-tender, bowel sounds present, no masses or hepatomegaly   Musculoskeletal: no digital clubbing, no edema   Skin: warm, no rashes     I have personally reviewed the laboratory, cardiac diagnostic and radiographic testing as outlined below:    Data:    No results for input(s): WBC, HGB, HCT, PLT in the last 72 hours. No results for input(s): NA, K, CL, CO2, BUN, CREATININE, CALCIUM in the last 72 hours. Invalid input(s): GLU, MAGNESIUM   Lab Results   Component Value Date    MG 1.9 09/23/2021     No results for input(s): TSH in the last 72 hours. No results for input(s): INR in the last 72 hours. CXR 8/22/21:   FINDINGS:   There is no cardiomegaly or pulmonary vascular congestion.  There is no acute   infiltrate or pleural effusion. Cottie Sayres is no pneumothorax.           Impression   No acute abnormality identified. EKG 8/22/21: Typical atrial flutter 93 bpm, RBBB, QTc 517 ms. Please see scan in Cardiology. BUSHRA 10/20/17:  Findings      Left Ventricle   Normal LV segmental wall motion.   Ejection fraction is visually estimated at 50%.       Right Ventricle   Normal right ventricular size and function.      Left Atrium   No thrombus in left atrial appendage.   Normal LA appendage velocity >0.4m/s      Mitral Valve   Normal mitral valve structure and function.      Tricuspid Valve   The tricuspid valve appears structurally normal.      Aortic Valve   Structurally normal aortic valve.      Aorta   Mild aortic atherosclerosis      Conclusions      Summary   Normal LV segmental wall motion.   Ejection fraction is visually estimated at 50%.   No thrombus in left atrial appendage.   Normal LA appendage velocity >0.4m/s   Normal mitral valve structure and function.   Mild aortic atherosclerosis      Signature      ----------------------------------------------------------------   Electronically signed by Deirdre Costa MD(Interpreting   physician) on 02/27/2018 05:33 PM   ----------------------------------------------------------------     Nuclear stress test 2/20/20:  FINDINGS: The overall quality of the study was fair. Left ventricular cavity size was noted to be normal.     Rotational analog analysis demonstrated no patient motion or   abnormal extracardiac radioactivity, abnormal patient motion,   soft tissue breast attenuation and soft tissue diaphragmatic   attenuation. The gated SPECT stress imaging in the short, vertical long, and   horizontal long axis demonstrated normal homogeneous tracer   distribution throughout the myocardium. The resting images are normal.     Gated SPECT left ventricular ejection fraction was calculated to   be 64%, with normal myocardial thickening and wall motion. Impression:     1. ECG during the infusion did not change. 2. The myocardial perfusion imaging was normal with attenuation   artifact.     3. Overall left ventricular systolic function was normal without   regional wall motion abnormalities. 4. Low risk general pharmacologic stress test.     Thank you for sending your patient to this [employer]. Electronically signed by King Joyce DO on 2/20/20 at 12:57 PM      Last Resulted: 02/20/20 09:00            24-hour Holter monitor 1/3/19: revealed sinus rhythm from  bpm.   There was no evidence of AF, AFl, SVT, VT, or AV block. No symptoms were reported.       I have independently reviewed all of the ECGs and rhythm strips per above     Assessment/Plan:  This is a 80 y.o. male with a history of Patient Active Problem List   Diagnosis    Typical atrial flutter ([facility] [address]    Essential hypertension    Eczema    Former smoker    Midline low back pain with right-sided sciatica    Persistent atrial fibrillation    Chronic systolic heart failure (HCC)    CKD (chronic kidney disease)    Atrial flutter with rapid ventricular response (HCC)    Status post catheter ablation of atrial fibrillation    Status post catheter ablation of atrial flutter    Right bundle-branch block    Tachyarrhythmia    Atrial fibrillation (HCC)    Wrist pain, chronic, right    Chronic pain syndrome    Primary osteoarthritis involving multiple joints    Right foot drop    Lumbar facet arthropathy    COVID-19    NATALI (acute kidney injury) ([facility] [address] [facility]    HLD (hyperlipidemia)    Redness and swelling of knee    Obesity (BMI 30-39. 9)    CAD (coronary artery disease)    H/O total knee replacement, bilateral    Osteoarthritis of left knee    Localized osteoarthritis of left knee    Atrial fibrillation with RVR (HCC)    Abnormal ECG    who presents with atrial flutter. 1. Atrial fibrillation and flutter  - [id number] risk score of 3 based on HTN and systolic dysfunction, age greater than 72, he is on Eliquis he denies any bleeding problems. Denies missing Eliquis dose for the past 3 weeks. - Status post CTI ablation in May 8715, procedure complicated by airway compromise, anatomical variants c/w Eustachian ridge and pouch, extended procedure time and multiple lines required to achieve bidirectional block. He had no documented recurrence in over 2 year after procedure.     - Post RFA with subsequent development of persistent atrial fibrillation and atypical flutter. He was highly symptomatic despite apparent rate control, at least at rest.   - Underwent redo flutter ablation and PVI (RFA) on 8/10/16.  He was weaned off his sotalol and was started back on a low-dose Toprol-XL for his cardiomyopathy.   - On October 20, 2017 he underwent a successful BUSHRA/DCCV with Tikosyn drug loading for persistent symptomatic atrial flutter. - 24-hour Holter monitor in January 2019 revealed sinus rhythm from  bpm.  There was no evidence of AF, AFl, SVT, VT, or AV block. No symptoms were reported  - Currently on Tikosyn 375 mcg every 12 hours. - Presented on 8/22/21,  9/23/21 and 10/2/21 with appeared to be typical atrial flutter and underwent DC-cardioversion's. - Options of redo ablation versus Amiodarone therapy. He opted for redo AF/AFL ablation. A detailed discussion was had regarding the risks, benefits, and alternatives to the atrial fibrillation ablation procedure. The procedure was described in detail. I quoted the patient an overall 4-6% risk of major complications which include but are not limited to: bleeding, infection, blood clot, vascular injury requiring surgical repair, phrenic nerve injury, pulmonary vein stenosis, valvular injury, damage to the cardiac conduction system requiring pacemaker implantation, cardiac perforation and tamponade, heart attack, stroke, atrioesophgeal fistula, and death. The patient understands these risks and wishes to proceed with the procedure. - CrCl by IBW of 57.31 mL/min based off Cr of 1.2 on 8/22/21  - Re-education on importance of well controlled HTN (goal BP < 130/80), adequate weight control (goal BMI of < 27), physical activity consisting of moderate cardiopulmonary exercise up to a goal of 250 min/wk, daily compliance with CPAP in treating sleep apnea, smoking cessation and limited ETOH intake. 2. Chronic HFrEF  - Recovered LV EF.  - Probably tachycardia-mediated  - Last LV EF by BUSHRA was 50% in 10/2017.  - On Toprol XL and Cozaar.  - Euvolemic and compensated     3. Hypertension    - Well controlled. - On Toprol XL, Cholrthalidone and Cozaar.     4. Chronic kidney disease   - Stage III  Estimated Creatinine Clearance: 68 mL/min (A) (based on SCr of 1.3 mg/dL (H)).     5. Obstructive Sleep Apnea    - Compliant with his CPAP        6. Obesity  Body mass index is 38.57 kg/m².      7. RBBB  - Chronic      8. Chronic back pain  - With foot drop  - History of back surgery  - Status post injections.     Recommendations:      1. Will proceed with redo AF/AFL ablation  2. Follow up after the procedure. Encouraged the patient to call the office for any questions or concerns. Thank you for allowing me to participate in your patient's care. Please call me if there are any questions or concerns.       Marce Valencia MD  Cardiac Electrophysiology  [address]  The Heart and Vascular [place]: [place] [facility]  7:07 AM  10/20/2021

## 2021-10-20 NOTE — OP NOTE
[address]  [place] [address] Electrophysiology  Procedure Report  Patient: Yaneli Chiang  Medical Record Number: [id number]  Date of Procedure:  10/20/2021  Operating Electrophysiologist: Wendy Ramires MD  Primary Electrophysiologist: Wendy Ramires MD  Referring Physician: Isha Saenz DO     Procedure(s) Performed:  1. Electrophysiology study with induction + atrial tachycardia ablation (06952)  2. Transseptal catheterization (27223)  3. Intracardiac Echocardiography (56837-12)   4. 3-dimensional intracardiac mapping (19028)   5. Vascular ultrasound for venous access (03046-73)  6. Infusion followed by induction (38468-42-30)  7. Transesophageal Echocardiography (52403-33) w/Color Doppler (57458-74) and pulsed/continuous wave Doppler (65722-33)    INDICATION FOR PROCEDURE:  1. Symptomatic, drug-refractory atrial tachycardia/atrial flutter    BRIEF CLINICAL HISTORY:   The patient is a 80y.o.-year-old male with symptomatic, drug-refractory atrial tachycardia/atrial flutter post RF CTI ablation in 2013 and redo RF CTI and PVI in 2016 who presents today for elective EP study with possible RF ablation    PROCEDURE PERFORMED BY: Wendy Ramires MD    ASSISTANT: N/A    ANESTHESIOLOGIST/CRNA: Antonietta Elizabeth CRNA    MEDICATIONS:  1. Heparin 78338 units IV. 2. Isoproterenol 8.2 mcg IV. ESTIMATED BLOOD LOSS: Approximately <100 mL including ACTs    COMPLICATIONS:  None immediately apparent    FLUOROSCOPY TIME: 32.8 minutes. DESCRIPTION OF PROCEDURE: DESCRIPTION OF PROCEDURE: The patient came to the cardiac electrophysiology laboratory in a fasting and nonsedated state. Informed consent was obtained in advance. The patient was moved to the laboratory table and prepped and draped in the usual sterile fashion for electrophysiology study and catheter ablation. The patient was endotracheally intubated and placed under general anesthesia.  Transesophageal echocardiography was performed to exclude the presence of left atrial appendage thrombus. The skin and fascia overlying the right and left femoral veins were infiltrated with 2% lidocaine using a 25-gauge needle. We utilized vascular ultrasound to document venous patency and to allow for direct visualization of vascular needle entry with permanent recording using the ultrasound system. Using the modified Seldinger technique, the right femoral vein was accessed x 2, and the left femoral vein x 2, respectively using an 18-gauge Cook needle and J-tip guidewires. One 8-Fr sheath and one 7-Fr sheath were introduced to the right femoral vein, and one 7-Fr and one 9-Fr sidearm sheath were introduced into the left femoral vein. Through the 9-Fr and 7-Fr, a 9-Fr Snapflowtar intracardiac echocardiography catheter and a Bard 6-Fr dynamic decapolar catheter were introduced and advanced to the right atrium under fluoroscopic guidance. The Bard catheter was advanced to the coronary sinus. The 8-Fr sheath was exchanged over  wire for a [place] VersaCross RF transseptal sheath and dilator apparatus which were advanced over a [place] wire to the SVC. A [place] VersaCross RF transseptal wire was pulled back into the sheath. The apparatus was withdrawn under fluoroscopic guidance to the fossa ovalis. Using intracardiac echocargraphy, tenting of the interatrial septum by the sheath and the dilator was confirmed. The [place] VersaCross RF transseptal wire was then advanced to engage the septum. A radiofrequency energy was delivered and left atrial access was confirmed with visualization of microbubbles in the LA under ICE visualization. Using fluoroscopy, and intracardiac echography, the [place] sheath was easily crossed over the wire into the left atrium. The [place] VersaCross RF transseptal sheath was retained in the left atrium and the dilator and the wire were removed.  A 10,000 unit bolus of heparin was given at this time. Continuous peripheral IV heparin was started at 1,000 units/hr and an ACT was checked intermittently and additional boluses were given and adjustments to the heparin infusion were made in order to maintain an ACT between 300 and 350 seconds. Next, 3D intracardiac mapping was performed using the [employer] system using a 7-Fr Bosense Pentaray catheter. Left atrial and pulmonary vein geometry were created. All PVs found to be isolated. Comprehensive EP study was performed and the Flowonix decapolar coronary sinus catheter was left in coronary sinus. Right and left atrial pacing recording, his bundle recording, and A-V node function testing were then performed. The Pentaray was then advanced into the right ventricle for right ventricular pacing and recording. Burst atrial pacing from the proximal coronary sinus was performed down to 220 msec and atrial tachycardia with the cycle length of 330 ms was induced. Mapping of the tachycardia localized the earliest atrial activation to the anterior portion of the coronary sinus region. The tachycardia was terminated during the mapping in the proximal coronary sinus area. Rapid atrial pacing at the proximal coronary sinus was performed and induced atrial fibrillation 3 times required 200 J DC-cardioversion's to convert the rhythm back to normal sinus. Atrial programmed extrastimuli was performed and atrial tachycardia was finally re induced. Detail mapping along Tricuspid annulus was performed using a Biosense bidirectional, F-J curve, STSF Thermocool ablation catheter. The earliest atrial activation wasnoted to be at the tricuspid annulus area between 3 to 4 o'clock. The tachycardia terminated during first radiofrequency lesion was delivered. Three additional RF lesions were delivered around the area for reassurance.  Transisthmus conduction time was checked and noted to be at 164 ms from proximal CS to lateral wall and 184 ms from lateral wall to to proximal CS confirmed bidirectional trans isthmus block. A total of 4 RF lesions were created throughout the study. Settings were a power output of 20 to 35 bae, and duration of 40-60 sec. At the end of ablation, isoproterenol up to maximum of 2 mcg/min was infused. Burst atrial pacing from the coronary sinus was performed down to 220 msec and no arrhythmia was induced up to 30 minutes following the last ablation lesion.     FINAL ELECTROPHYSIOLOGIC DATA: The sinus cycle length was 1000 ms, the AH interval was 62 ms, and the HV interval was 54 ms. The AV Wenckebach cycle length was 380 ms, the AV node ERP was 290 ms at 600 ms.  AERP was 240 ms at 600 ms. The VA Wenckebach cycle length was 490 ms.  VERP was 260 ms at 600 ms. No inducible SVT during on and off isuprel infusion.      Catheters were removed under flourosopic guidance and final ICE imaging was performed which showed no evidence of significant pericardial effusion or intra-cardiac thrombus.  ACT was checked and noted to be <180 seconds. Sheaths were removed and the manual pressures were held for hemostasis. The patient was hemodynamically stable and under the care of the anesthesiologist and will be brought back to the recovery area.     SUMMARY: 1. Successful RF ablation of atrial tachycardia originated from tricuspid annulus. 2. All pulmonary veins remained in electrical isolation. 3. Cavotricuspid isthmus line remained in bidirectional block.     RECOMMENDATIONS:   1. Four hours bedrest after sheath removal and discharged home if clinically stable. 2. Continue previous home medications including anticoagulation.   3. Follow up in office in 1 week for EKG and then in 4 weeks with provider     Haydee Goss MD  Cardiac Electrophysiology  [facility]  The Heart and Vascular [place]: [place] [facility]  7:52 AM  10/20/2021

## 2021-10-20 NOTE — ANESTHESIA PRE PROCEDURE
Department of Anesthesiology  Preprocedure Note       Name:  Mindi Sanchez   Age:  80 y.o.  :  1950                                          MRN:  [id number]         Date:  10/20/2021      Surgeon: * Surgery not found *    Procedure: Afib ablation/BUSHRA    Medications prior to admission:   Prior to Admission medications    Medication Sig Start Date End Date Taking? Authorizing Provider   metoprolol succinate (TOPROL XL) 100 MG extended release tablet Take 1 tablet by mouth 2 times daily 10/1/21   Heidi Aquino MD   atorvastatin (LIPITOR) 20 MG tablet TAKE ONE TABLET BY MOUTH EVERY DAY 21   Nikhil Orozco MD   gabapentin (NEURONTIN) 100 MG capsule Take 1 capsule by mouth 2 times daily for 30 days. 8/11/21 9/10/21  Corrie Dance, MD   ELIQUIS 5 MG TABS tablet TAKE ONE TABLET BY MOUTH TWO TIMES A DAY 21   Heidi Aquino MD   dofetilide (TIKOSYN) 250 MCG capsule TAKE 1 CAPSULE BY MOUTH 2 TIMES DAILY ALONG WITH 125MCG DOSE TO EQUAL 375MCG DOSE 2 TIMES DAILY 21   Heidi Aquino MD   dofetilide (TIKOSYN) 125 MCG capsule TAKE ONE CAPSULE BY MOUTH TWO TIMES A DAY. TAKE ALONG WITH 250MCG TO EQUAL 375MCG TWICE DAILY 21   Heidi Aquino MD   acetaminophen (TYLENOL) 500 MG tablet Take 2 tablets by mouth 3 times daily as needed for Pain 3/22/21   Kari Schroeder PA-C   chlorthalidone (HYGROTON) 25 MG tablet TAKE ONE TABLET BY MOUTH EVERY DAY 20   Taylor Jason MD   traMADol (ULTRAM) 50 MG tablet Take 50 mg by mouth 2 times daily. Historical Provider, MD   losartan (COZAAR) 50 MG tablet Take 50 mg by mouth daily    Historical Provider, MD   Misc Natural Products (TART CHERRY ADVANCED PO) Take by mouth STOP PREOP MED    Historical Provider, MD   gabapentin (NEURONTIN) 100 MG capsule Take 100 mg by mouth as needed.      Historical Provider, MD   magnesium oxide (MAG-OX) 400 (240 Mg) MG tablet Take 1 tablet by mouth 2 times daily  Patient taking differently: Take 400 mg by mouth daily  10/23/17   Danny Arechiga, DO   Multiple Vitamins-Minerals (THERAPEUTIC MULTIVITAMIN-MINERALS) tablet Take 1 tablet by mouth daily. Historical Provider, MD   Coenzyme Q10 (COQ10 PO) Take 1 tablet by mouth daily. Historical Provider, MD   Alpha-Lipoic Acid 300 MG CAPS Take 300 mg by mouth daily On hold    Historical Provider, MD   ascorbic acid (VITAMIN C) 500 MG tablet Take 500 mg by mouth daily. Historical Provider, MD   Vitamin D (CHOLECALCIFEROL) 1000 UNITS CAPS capsule Take 1,000 Units by mouth daily. Historical Provider, MD   Cyanocobalamin (VITAMIN B 12 PO) Take 500 mcg by mouth daily. Historical Provider, MD       Current medications:    Current Outpatient Medications   Medication Sig Dispense Refill    metoprolol succinate (TOPROL XL) 100 MG extended release tablet Take 1 tablet by mouth 2 times daily 60 tablet 5    atorvastatin (LIPITOR) 20 MG tablet TAKE ONE TABLET BY MOUTH EVERY DAY 30 tablet 5    gabapentin (NEURONTIN) 100 MG capsule Take 1 capsule by mouth 2 times daily for 30 days. 60 capsule 0    ELIQUIS 5 MG TABS tablet TAKE ONE TABLET BY MOUTH TWO TIMES A  tablet 3    dofetilide (TIKOSYN) 250 MCG capsule TAKE 1 CAPSULE BY MOUTH 2 TIMES DAILY ALONG WITH 125MCG DOSE TO EQUAL 375MCG DOSE 2 TIMES DAILY 180 capsule 3    dofetilide (TIKOSYN) 125 MCG capsule TAKE ONE CAPSULE BY MOUTH TWO TIMES A DAY. TAKE ALONG WITH 250MCG TO EQUAL 375MCG TWICE DAILY 180 capsule 3    acetaminophen (TYLENOL) 500 MG tablet Take 2 tablets by mouth 3 times daily as needed for Pain 180 tablet 1    chlorthalidone (HYGROTON) 25 MG tablet TAKE ONE TABLET BY MOUTH EVERY DAY 90 tablet 3    traMADol (ULTRAM) 50 MG tablet Take 50 mg by mouth 2 times daily.       losartan (COZAAR) 50 MG tablet Take 50 mg by mouth daily      Misc Natural Products (TART CHERRY ADVANCED PO) Take by mouth STOP PREOP MED      gabapentin (NEURONTIN) 100 MG capsule Take 100 mg by mouth as needed.  magnesium oxide (MAG-OX) 400 (240 Mg) MG tablet Take 1 tablet by mouth 2 times daily (Patient taking differently: Take 400 mg by mouth daily ) 60 tablet 0    Multiple Vitamins-Minerals (THERAPEUTIC MULTIVITAMIN-MINERALS) tablet Take 1 tablet by mouth daily.  Coenzyme Q10 (COQ10 PO) Take 1 tablet by mouth daily.  Alpha-Lipoic Acid 300 MG CAPS Take 300 mg by mouth daily On hold      ascorbic acid (VITAMIN C) 500 MG tablet Take 500 mg by mouth daily.  Vitamin D (CHOLECALCIFEROL) 1000 UNITS CAPS capsule Take 1,000 Units by mouth daily.  Cyanocobalamin (VITAMIN B 12 PO) Take 500 mcg by mouth daily. No current facility-administered medications for this encounter. Allergies: Allergies   Allergen Reactions    Adhesive Tape Rash     bandaids       Problem List:    Patient Active Problem List   Diagnosis Code    Typical atrial flutter ([facility]) I48.3    Essential hypertension I10    Eczema L30.9    Former smoker Z87.891    Midline low back pain with right-sided sciatica M54.41    Persistent atrial fibrillation I48.19    Chronic systolic heart failure ([facility]) I50.22    CKD (chronic kidney disease) N18.9    Atrial flutter with rapid ventricular response ([facility]) I48.92    Status post catheter ablation of atrial fibrillation Z98.890    Status post catheter ablation of atrial flutter Z98.890    Right bundle-branch block I45.10    Tachyarrhythmia R00.0    Atrial fibrillation ([facility]) I48.91    Wrist pain, chronic, right M25.531, G89.29    Chronic pain syndrome G89.4    Primary osteoarthritis involving multiple joints M89.49    Right foot drop M21.371    Lumbar facet arthropathy M47.816    COVID-19 U07.1    NATALI (acute kidney injury) ([facility]) N17.9    HLD (hyperlipidemia) E78.5    Redness and swelling of knee M25.469, R23.8    Obesity (BMI 30-39. 9) E66.9    CAD (coronary artery disease) I25.10    H/O total knee replacement, bilateral Z96.653    Osteoarthritis of left knee M17.12    Localized osteoarthritis of left knee M17.12    Atrial fibrillation with RVR (HCC) I48.91    Abnormal ECG R94.31       Past Medical History:        Diagnosis Date    Arthritis     Atrial fibrillation (HCC)     Bulging lumbar disc     L4-L5    CAD (coronary artery disease) 2016    ct scan heart    Chronic lower back pain     BREWER (dyspnea on exertion) 2013    Eczema     H/O atrial flutter 2021    Hyperlipidemia     Hypertension     Kidney stones     Obesity     weight 250#    Sleep apnea     c pap  stting 2       Past Surgical History:        Procedure Laterality Date    ABLATION OF DYSRHYTHMIC FOCUS  2013    heart ablation dr Michaelle Chan  08/10/2016    CARDIOVERSION  2016    Dr. Perez Orellana  10/20/2017    Dr. Delfina Comer  2021    Successful cardioversion by Dr. Mary Lal ECHOCARDIOGRAM TRANSESOPHAGEAL  2013         KNEE SURGERY Left 1969    repair left knee ligaments    LITHOTRIPSY  1986    LUMBAR [address]  2016    [facility] [place]    TONSILLECTOMY      TOTAL KNEE ARTHROPLASTY Right 2020    RIGHT KNEE TOTAL ARTHROPLASTY -- SHRUTHI performed by Danny Blood MD at [address] [place] [address] Rd Left 2021    LEFT KNEE TOTAL ARTHROPLASTY -- SHRUTHI performed by Danny Blood MD at [facility] TRANSESOPHAGEAL ECHOCARDIOGRAM  2016    Dr. Estella Bird       Social History:    Social History     Tobacco Use    Smoking status: Former Smoker     Packs/day: 1.00     Years: 20.00     Pack years: 20.00     Types: Cigarettes     Quit date: 2007     Years since quittin.8    Smokeless tobacco: Never Used    Tobacco comment: 1.5 PACKS EACH DAY stop 2006   Substance Use Topics    Alcohol use:  Yes     Alcohol/week: 3.0 standard drinks     Types: 3 Shots of liquor per week     Comment: 1 x week                                Counseling given: Not Answered  Comment: 1.5 PACKS EACH DAY stop 2006      Vital Signs (Current): There were no vitals filed for this visit. BP Readings from Last 3 Encounters:   10/02/21 133/80   09/23/21 90/60   09/23/21 126/65       NPO Status:  > 8hrs                                                                               BMI:   Wt Readings from Last 3 Encounters:   10/02/21 265 lb (120.2 kg)   09/23/21 265 lb (120.2 kg)   05/06/21 265 lb 3.2 oz (120.3 kg)     There is no height or weight on file to calculate BMI.    CBC:   Lab Results   Component Value Date    WBC 10.0 10/02/2021    RBC 4.67 10/02/2021    HGB 15.6 10/02/2021    HCT 45.5 10/02/2021    MCV 97.4 10/02/2021    RDW 13.1 10/02/2021     10/02/2021       CMP:   Lab Results   Component Value Date     10/02/2021    K 4.9 10/02/2021    K 4.1 06/30/2020     10/02/2021    CO2 22 10/02/2021    BUN 29 10/02/2021    CREATININE 1.3 10/02/2021    GFRAA >60 10/02/2021    LABGLOM 55 10/02/2021    GLUCOSE 135 10/02/2021    PROT 7.4 10/02/2021    CALCIUM 9.7 10/02/2021    BILITOT 0.4 10/02/2021    ALKPHOS 111 10/02/2021    AST 42 10/02/2021    ALT 26 10/02/2021       POC Tests: No results for input(s): POCGLU, POCNA, POCK, POCCL, POCBUN, POCHEMO, POCHCT in the last 72 hours.     Coags:   Lab Results   Component Value Date    PROTIME 13.7 10/02/2021    INR 1.2 10/02/2021    APTT 23.4 10/02/2021       HCG (If Applicable): No results found for: PREGTESTUR, PREGSERUM, HCG, HCGQUANT     ABGs: No results found for: PHART, PO2ART, LTQ7DJX, LUN4YBO, BEART, U9XBRAJN     Type & Screen (If Applicable):  No results found for: LABABO, LABRH    Drug/Infectious Status (If Applicable):  No results found for: HIV, HEPCAB    COVID-19 Screening (If Applicable):   Lab Results   Component Value Date    COVID19 Not Detected 10/16/2021     CXR 8/22/2021  FINDINGS:   There is no cardiomegaly or pulmonary vascular congestion. Bowen Marcano is no acute   infiltrate or pleural effusion. Torito Daniel is no pneumothorax.           Impression   No acute abnormality identified. EKG 9/23/2021  Ventricular Rate BPM 89    Atrial Rate     QRS Duration ms 130    Q-T Interval ms 422    QTc Calculation (Bazett) ms 513    P Axis degrees 73    R Axis degrees -2    T Axis degrees 33    Resulting Agency  [facility]      Narrative & Impression    Atrial flutter with variable AV block  Right bundle branch block  Inferior infarct , age undetermined  Abnormal ECG  When compared with ECG of 22-AUG-2021 08:46,  Significant changes have occurred     ECHO 10/20/2017   Findings      Left Ventricle   Normal LV segmental wall motion. Ejection fraction is visually estimated at 50%. Right Ventricle   Normal right ventricular size and function. Left Atrium   No thrombus in left atrial appendage. Normal LA appendage velocity >0.4m/s      Mitral Valve   Normal mitral valve structure and function. Tricuspid Valve   The tricuspid valve appears structurally normal.      Aortic Valve   Structurally normal aortic valve. Aorta   Mild aortic atherosclerosis      Conclusions      Summary   Normal LV segmental wall motion. Ejection fraction is visually estimated at 50%. No thrombus in left atrial appendage. Normal LA appendage velocity >0.4m/s   Normal mitral valve structure and function.    Mild aortic atherosclerosis      Anesthesia Evaluation  Patient summary reviewed and Nursing notes reviewed no history of anesthetic complications:   Airway: Mallampati: II  TM distance: >3 FB   Neck ROM: full  Mouth opening: > = 3 FB Dental:          Pulmonary:   (+) sleep apnea: on CPAP,  decreased breath sounds,      (-) not a current smoker                           Cardiovascular:    (+) hypertension:, CAD:, dysrhythmias: atrial fibrillation and atrial flutter, CHF:, hyperlipidemia    (-)  BREWER    ECG reviewed  Rhythm: irregular  Rate: normal  Echocardiogram

## 2021-10-21 ENCOUNTER — TELEPHONE (OUTPATIENT)
Dept: CARDIOLOGY | Age: 71
End: 2021-10-21

## 2021-10-21 LAB
EKG ATRIAL RATE: 66 BPM
EKG P AXIS: 66 DEGREES
EKG P-R INTERVAL: 182 MS
EKG Q-T INTERVAL: 454 MS
EKG QRS DURATION: 142 MS
EKG QTC CALCULATION (BAZETT): 475 MS
EKG R AXIS: 4 DEGREES
EKG T AXIS: 31 DEGREES
EKG VENTRICULAR RATE: 66 BPM

## 2021-10-21 PROCEDURE — 93010 ELECTROCARDIOGRAM REPORT: CPT | Performed by: INTERNAL MEDICINE

## 2021-10-21 NOTE — TELEPHONE ENCOUNTER
I called Sanchez Hernandez to see how he is doing since his Ablation on 10/20/21. He states he's doing well and denies chest pain, SOB, palpitations, bleeding, drainage, or swelling from bilat groin incisions. I reminded him to call and schedule his follow up EKG and appt w/ Dr. Quin Whalen. He offers no complaints & was very thankful for the call.

## 2021-10-21 NOTE — ANESTHESIA POSTPROCEDURE EVALUATION
Department of Anesthesiology  Postprocedure Note    Patient: Booker Banerjee  MRN: [id number]  YOB: 1950  Date of evaluation: 10/21/2021  Time:  12:57 AM     Procedure Summary     Date: 10/20/21 Room / Location: [facility]ATH [facility]; [facility] ECHO    Anesthesia Start: 8181 Anesthesia Stop: 1224    Procedure: [facility] BUSHRA/AFIB ABLATION W/ ANES Diagnosis:       Paroxysmal atrial fibrillation      Status post ablation of atrial fibrillation    Scheduled Providers: MARIA E Mcgee - CRNA; Candice Saha DO Responsible Provider: Candice Saha DO    Anesthesia Type: general ASA Status: 3          Anesthesia Type: general    Rakesh Phase I: Rakesh Score: 9    Rakesh Phase II:      Last vitals: Reviewed and per EMR flowsheets.        Anesthesia Post Evaluation    Patient location during evaluation: PACU  Patient participation: complete - patient participated  Level of consciousness: awake and alert  Airway patency: patent  Nausea & Vomiting: no nausea and no vomiting  Complications: no  Cardiovascular status: blood pressure returned to baseline  Respiratory status: acceptable  Hydration status: euvolemic

## 2021-10-26 ENCOUNTER — TELEPHONE (OUTPATIENT)
Dept: CARDIOLOGY CLINIC | Age: 71
End: 2021-10-26

## 2021-10-26 NOTE — TELEPHONE ENCOUNTER
Pt's mother-in-law Eduardo Robert) is a current pt of [facility]. Pt's spouse Ally Garcia) is inquiring if pt and herself can est care as well. They are both previous [address] pt's. Please advise if accomodation can be made. Thank you.    *PE sent as well for spouse*

## 2021-10-28 ENCOUNTER — NURSE ONLY (OUTPATIENT)
Dept: NON INVASIVE DIAGNOSTICS | Age: 71
End: 2021-10-28
Payer: MEDICARE

## 2021-10-28 DIAGNOSIS — I48.19 PERSISTENT ATRIAL FIBRILLATION (HCC): Primary | ICD-10-CM

## 2021-10-28 PROCEDURE — 93000 ELECTROCARDIOGRAM COMPLETE: CPT | Performed by: INTERNAL MEDICINE

## 2021-10-28 NOTE — PROGRESS NOTES
Patient was in today for EKG per Dr Khan Sender. Patient offers no complaints. 1 week ekg.     Huber Meneses, [address] [place]

## 2021-11-12 ENCOUNTER — TELEPHONE (OUTPATIENT)
Dept: ORTHOPEDIC SURGERY | Age: 71
End: 2021-11-12

## 2021-11-12 DIAGNOSIS — Z96.652 H/O TOTAL KNEE REPLACEMENT, LEFT: Primary | ICD-10-CM

## 2021-11-22 ENCOUNTER — TELEPHONE (OUTPATIENT)
Dept: ADMINISTRATIVE | Age: 71
End: 2021-11-22

## 2021-11-22 DIAGNOSIS — Z96.653 H/O TOTAL KNEE REPLACEMENT, BILATERAL: Primary | ICD-10-CM

## 2021-11-22 RX ORDER — GABAPENTIN 100 MG/1
100 CAPSULE ORAL 2 TIMES DAILY
Qty: 60 CAPSULE | Refills: 0 | Status: CANCELLED | OUTPATIENT
Start: 2021-11-22 | End: 2021-12-22

## 2021-11-22 NOTE — TELEPHONE ENCOUNTER
Call returned to patient. Appointment scheduled at this time. Patient requesting refill of gabapentin. Order pended and routed at this time.      Future Appointments   Date Time Provider Shannon Friedi   12/15/2021 11:15 AM Verito Jane MD [facility]   1/12/2022 11:15 AM Karma Dockery MD ELECTRO PHYS [facility]   1/17/2022  2:45 PM Manuel Ragsdale MD [facility]       Date of Procedure: 3/8/2021  Pre-Op Diagnosis: Left knee severe tricompartmental osteoarthritis with subluxation  Post-Op Diagnosis: Same  Procedure(s):  LEFT KNEE TOTAL ARTHROPLASTY -- SHRUTHI  Surgeon(s):  Verito Jane MD

## 2021-11-22 NOTE — TELEPHONE ENCOUNTER
Will need to clarify with Dr. Lc Eaton as gabapentin does not show on patient's [place]AR[place] if refill appropriate since his documentation only was for a 1 month supply  Patient was written a 30 day supply in august without refills and prescription ended 9/10/21.     Electronically signed by Anne Funes PA-C on 11/22/2021 at 4:01 PM

## 2021-11-22 NOTE — TELEPHONE ENCOUNTER
Pt called and said that he missed his most recent appt with Dr Maria L Matta for Left TKA. He would like to reschedule that appt, and also speak with someone regarding a refill on his prescription.

## 2021-11-23 RX ORDER — METHOCARBAMOL 750 MG/1
750 TABLET, FILM COATED ORAL 3 TIMES DAILY
Qty: 90 TABLET | Refills: 0 | Status: SHIPPED | OUTPATIENT
Start: 2021-11-23 | End: 2021-12-23

## 2021-11-23 NOTE — TELEPHONE ENCOUNTER
Per TTS recommended trial of Robaxin. Cannot due NSAIDs due to Eliquis.  Cannot escribe from preservice started encounter  Medication sent in orders only encounter  Electronically signed by Romelia Thompson PA-C on 11/23/2021 at 3:14 PM

## 2021-12-15 ENCOUNTER — HOSPITAL ENCOUNTER (OUTPATIENT)
Dept: GENERAL RADIOLOGY | Age: 71
Discharge: HOME | End: 2021-12-17
Payer: MEDICARE

## 2021-12-15 ENCOUNTER — OFFICE VISIT (OUTPATIENT)
Dept: ORTHOPEDIC SURGERY | Age: 71
End: 2021-12-15
Payer: MEDICARE

## 2021-12-15 VITALS — TEMPERATURE: 98.5 F

## 2021-12-15 DIAGNOSIS — Z96.652 H/O TOTAL KNEE REPLACEMENT, LEFT: ICD-10-CM

## 2021-12-15 DIAGNOSIS — M19.011 PRIMARY OSTEOARTHRITIS OF RIGHT SHOULDER: ICD-10-CM

## 2021-12-15 DIAGNOSIS — Z96.652 H/O TOTAL KNEE REPLACEMENT, LEFT: Primary | ICD-10-CM

## 2021-12-15 PROCEDURE — 2500000003 HC RX 250 WO HCPCS

## 2021-12-15 PROCEDURE — 99213 OFFICE O/P EST LOW 20 MIN: CPT | Performed by: ORTHOPAEDIC SURGERY

## 2021-12-15 PROCEDURE — 1036F TOBACCO NON-USER: CPT | Performed by: ORTHOPAEDIC SURGERY

## 2021-12-15 PROCEDURE — 3017F COLORECTAL CA SCREEN DOC REV: CPT | Performed by: ORTHOPAEDIC SURGERY

## 2021-12-15 PROCEDURE — 4040F PNEUMOC VAC/ADMIN/RCVD: CPT | Performed by: ORTHOPAEDIC SURGERY

## 2021-12-15 PROCEDURE — 1123F ACP DISCUSS/DSCN MKR DOCD: CPT | Performed by: ORTHOPAEDIC SURGERY

## 2021-12-15 PROCEDURE — G8417 CALC BMI ABV UP PARAM F/U: HCPCS | Performed by: ORTHOPAEDIC SURGERY

## 2021-12-15 PROCEDURE — 99212 OFFICE O/P EST SF 10 MIN: CPT | Performed by: ORTHOPAEDIC SURGERY

## 2021-12-15 PROCEDURE — 6360000002 HC RX W HCPCS

## 2021-12-15 PROCEDURE — 73560 X-RAY EXAM OF KNEE 1 OR 2: CPT

## 2021-12-15 PROCEDURE — G8427 DOCREV CUR MEDS BY ELIG CLIN: HCPCS | Performed by: ORTHOPAEDIC SURGERY

## 2021-12-15 PROCEDURE — 20610 DRAIN/INJ JOINT/BURSA W/O US: CPT | Performed by: ORTHOPAEDIC SURGERY

## 2021-12-15 PROCEDURE — G8484 FLU IMMUNIZE NO ADMIN: HCPCS | Performed by: ORTHOPAEDIC SURGERY

## 2021-12-15 RX ORDER — TAMSULOSIN HYDROCHLORIDE 0.4 MG/1
CAPSULE ORAL
COMMUNITY
Start: 2021-09-24

## 2021-12-15 RX ORDER — LIDOCAINE HYDROCHLORIDE 10 MG/ML
3 INJECTION, SOLUTION INFILTRATION; PERINEURAL ONCE
Status: COMPLETED | OUTPATIENT
Start: 2021-12-15 | End: 2021-12-15

## 2021-12-15 RX ORDER — BUPIVACAINE HYDROCHLORIDE 2.5 MG/ML
3 INJECTION, SOLUTION EPIDURAL; INFILTRATION; INTRACAUDAL ONCE
Status: COMPLETED | OUTPATIENT
Start: 2021-12-15 | End: 2021-12-15

## 2021-12-15 RX ORDER — GABAPENTIN 100 MG/1
100 CAPSULE ORAL 2 TIMES DAILY PRN
Qty: 60 CAPSULE | Refills: 2 | Status: SHIPPED | OUTPATIENT
Start: 2021-12-15 | End: 2022-03-15

## 2021-12-15 RX ORDER — TRIAMCINOLONE ACETONIDE 40 MG/ML
40 INJECTION, SUSPENSION INTRA-ARTICULAR; INTRAMUSCULAR ONCE
Status: COMPLETED | OUTPATIENT
Start: 2021-12-15 | End: 2021-12-15

## 2021-12-15 RX ORDER — BACLOFEN 10 MG/1
TABLET ORAL
COMMUNITY
Start: 2021-08-03

## 2021-12-15 RX ADMIN — BUPIVACAINE HYDROCHLORIDE 7.5 MG: 2.5 INJECTION, SOLUTION EPIDURAL; INFILTRATION; INTRACAUDAL at 14:32

## 2021-12-15 RX ADMIN — TRIAMCINOLONE ACETONIDE 40 MG: 40 INJECTION, SUSPENSION INTRA-ARTICULAR; INTRAMUSCULAR at 14:33

## 2021-12-15 RX ADMIN — LIDOCAINE HYDROCHLORIDE 3 ML: 10 INJECTION, SOLUTION INFILTRATION; PERINEURAL at 14:33

## 2021-12-15 NOTE — PROGRESS NOTES
Chief Complaint   Patient presents with    Knee Pain     S/p left TKA    Shoulder Pain     Right shoulder, requesting CSI, patient states good relief. SUBJECTIVE: Patient for follow-up on total knee replacement which was done about 9 months ago. His Left knee is doing well. He has no complaints. Denies any falls or traumas. His right shoulder continues to give him issues. His last injection lasted about 2 months. He would like an injection in his right shoulder again today. Review of Systems -   General ROS: negative for - chills, fatigue, fever or night sweats  Respiratory ROS: no cough, shortness of breath, or wheezing  Cardiovascular ROS: no chest pain or dyspnea on exertion  Gastrointestinal ROS: no abdominal pain, change in bowel habits, or black or bloody stools  Genitourinary: no hematuria, dysuria, or incontinence   Musculoskeletal ROS:see above  Neurological ROS: no TIA or stroke symptoms       OBJECTIVE:   Alert and oriented X 3, no acute distress, respirations easy and unlabored with no audible wheezes, skin warm and dry, speech and dress appropriate for noted age, affect euthymic. Extremity:  Left knee  Incision clean dry intact and well-healed  Compartments soft compressible  Calf soft nontender  Range of motion is from 0 to 120 degrees with no crepitus and good patellar tracking  5-5 strength in his lower extremity  Sensations intact distally  Capillary refill is less than 3 seconds  2/4 dorsalis pedis and posterior tibial pulses    Right shoulder  Skin is intact  Compartments soft compressible  Right upper extremity is neurovascular tact distally  Crepitus on range of motion of the right shoulder  Flexion to 110 degrees, external rotation to 20 degrees  Internal rotation past the side  2/4 radial pulse  Sensations intact distally right upper extremity      XR: 12/15/21   X-ray left knee shows her components well aligned and well fixed.   There is no signs of loosening or subsidence. Temp 98.5 °F (36.9 °C) (Oral)     ASSESSMENT:     Diagnosis Orders   1. H/O total knee replacement, left     2. Primary osteoarthritis of right shoulder  IN ARTHROCENTESIS ASPIR&/INJ MAJOR JT/BURSA W/O US           Procedure Note  Skin prepped with alcohol.  21ga. Needle used to inject 3cc 1% Lidocaine, 3cc 0.25% Marcaine, and 1cc Kenalog into the right shoulder through a posterior portal.  Patient tolerated well and band aid applied. Walked out of the office with no issues.      PLAN:  Activity as tolerated    Follow-up in 3 months if another shoulder injection needed    Concerns any questions or concerns    ELECTRONICALLY signed by:    Christina Mccarthy MD  12/15/21

## 2021-12-27 RX ORDER — CHLORTHALIDONE 25 MG/1
TABLET ORAL
Qty: 90 TABLET | Refills: 3 | Status: SHIPPED | OUTPATIENT
Start: 2021-12-27

## 2022-02-10 ENCOUNTER — OFFICE VISIT (OUTPATIENT)
Dept: CARDIOLOGY CLINIC | Age: 72
End: 2022-02-10
Payer: MEDICARE

## 2022-02-10 VITALS
WEIGHT: 273 LBS | SYSTOLIC BLOOD PRESSURE: 140 MMHG | RESPIRATION RATE: 16 BRPM | DIASTOLIC BLOOD PRESSURE: 74 MMHG | HEART RATE: 60 BPM | HEIGHT: 69 IN | BODY MASS INDEX: 40.43 KG/M2

## 2022-02-10 DIAGNOSIS — I47.1 ATRIAL TACHYCARDIA (HCC): ICD-10-CM

## 2022-02-10 DIAGNOSIS — I48.3 TYPICAL ATRIAL FLUTTER (HCC): ICD-10-CM

## 2022-02-10 DIAGNOSIS — I48.19 PERSISTENT ATRIAL FIBRILLATION (HCC): ICD-10-CM

## 2022-02-10 DIAGNOSIS — I50.32 CHRONIC HEART FAILURE WITH PRESERVED EJECTION FRACTION (HFPEF) (HCC): Primary | ICD-10-CM

## 2022-02-10 DIAGNOSIS — I10 ESSENTIAL HYPERTENSION: ICD-10-CM

## 2022-02-10 DIAGNOSIS — G47.33 OSA (OBSTRUCTIVE SLEEP APNEA): ICD-10-CM

## 2022-02-10 DIAGNOSIS — I45.10 RIGHT BUNDLE-BRANCH BLOCK: ICD-10-CM

## 2022-02-10 PROCEDURE — 1036F TOBACCO NON-USER: CPT | Performed by: INTERNAL MEDICINE

## 2022-02-10 PROCEDURE — 3017F COLORECTAL CA SCREEN DOC REV: CPT | Performed by: INTERNAL MEDICINE

## 2022-02-10 PROCEDURE — 4040F PNEUMOC VAC/ADMIN/RCVD: CPT | Performed by: INTERNAL MEDICINE

## 2022-02-10 PROCEDURE — G8417 CALC BMI ABV UP PARAM F/U: HCPCS | Performed by: INTERNAL MEDICINE

## 2022-02-10 PROCEDURE — 99214 OFFICE O/P EST MOD 30 MIN: CPT | Performed by: INTERNAL MEDICINE

## 2022-02-10 PROCEDURE — 93000 ELECTROCARDIOGRAM COMPLETE: CPT | Performed by: INTERNAL MEDICINE

## 2022-02-10 PROCEDURE — G8427 DOCREV CUR MEDS BY ELIG CLIN: HCPCS | Performed by: INTERNAL MEDICINE

## 2022-02-10 PROCEDURE — 1123F ACP DISCUSS/DSCN MKR DOCD: CPT | Performed by: INTERNAL MEDICINE

## 2022-02-10 PROCEDURE — G8484 FLU IMMUNIZE NO ADMIN: HCPCS | Performed by: INTERNAL MEDICINE

## 2022-02-11 NOTE — PROGRESS NOTES
OUTPATIENT CARDIOLOGY FOLLOW-UP    Name: Anna Poole    Age: 80 y.o. Primary Care Physician: Eleonora Salgado DO    Date of Service: 2/10/2022    Chief Complaint: Follow-up for chronic HFpEF, atrial arrhythmia    Interim History:  No new cardiac complaints since last cardiology evaluation. He was previously followed by Dr. Mable De La Cruz. He also follows with EP. He denies recent chest pain, worsening SOB, palpitations, syncope, PND, or orthopnea. SR on EKG. S/p atrial tachycardia ablation in 10/2021. COVID-19 infection in 1/2022.     Review of Systems:   Cardiac: As per HPI  General: No fever, chills  Pulmonary: As per HPI  HEENT: No visual disturbances, difficult swallowing  GI: No nausea, vomiting  : No dysuria, hematuria  Endocrine: No thyroid disease or DM  Musculoskeletal: SHETH x 4, no focal motor deficits  Skin: Intact, no rashes  Neuro: No headache, seizures  Psych: Currently with no depression, anxiety    Past Medical History:  Past Medical History:   Diagnosis Date    Arthritis     Atrial fibrillation (HCC)     Bulging lumbar disc     L4-L5    CAD (coronary artery disease) 07/07/2016    ct scan heart    Chronic lower back pain     BREWER (dyspnea on exertion) 03/26/2013    Eczema     H/O atrial flutter 09/2021    Hyperlipidemia     Hypertension     Kidney stones     Obesity     weight 250#    Sleep apnea     c pap  stting 2       Past Surgical History:  Past Surgical History:   Procedure Laterality Date    ABLATION OF DYSRHYTHMIC FOCUS  05/2013    heart ablation dr Alley Santacruz DYSRHYTHMIC FOCUS  10/20/2021    Successful cryoballoon catheter ablation by Dr. Leander Boyce  08/10/2016   Real Gess  03/24/2016    Dr. Madonna Fernández  10/20/2017    Dr. Dorothy Ch  09/23/2021    Successful cardioversion by Dr. Dirk Bliss ECHOCARDIOGRAM TRANSESOPHAGEAL  05/07/2013         KNEE SURGERY Left 1969    repair left knee ligaments    LITHOTRIPSY  1986    LUMBAR [facility] SURGERY  11/30/2016    [facility] [place]    TONSILLECTOMY      TOTAL KNEE ARTHROPLASTY Right 06/01/2020    RIGHT KNEE TOTAL ARTHROPLASTY -- SHRUTHI performed by Kylie Grigsby MD at [place] [id number] Left 03/08/2021    LEFT KNEE TOTAL ARTHROPLASTY -- SHRUTHI performed by [facility]ylie Grigsby MD at [facility] TRANSESOPHAGEAL ECHOCARDIOGRAM  03/24/2016    Dr. Kristopher Shay       Family History:  Family History   Problem Relation Age of Onset    Cancer Mother     Diabetes Mother     Cancer Father     Diabetes Sister     Kidney Disease Sister     Early Death Neg Hx        Social History:  Social History     Socioeconomic History    Marital status:      Spouse name: Not on file    Number of children: Not on file    Years of education: Not on file    Highest education level: Not on file   Occupational History    Not on file   Tobacco Use    Smoking status: Former Smoker     Packs/day: 1.00     Years: 20.00     Pack years: 20.00     Types: Cigarettes     Quit date: 1/1/2007     Years since quitting: 15.1    Smokeless tobacco: Never Used    Tobacco comment: 1.5 PACKS EACH DAY stop 2006   Vaping Use    Vaping Use: Never used   Substance and Sexual Activity    Alcohol use: Yes     Alcohol/week: 3.0 standard drinks     Types: 3 Shots of liquor per week     Comment: 1 x week    Drug use: No    Sexual activity: Not on file   Other Topics Concern    Not on file   Social History Narrative    Not on file     Social Determinants of Health     Financial Resource Strain:     Difficulty of Paying Living Expenses: Not on file   Food Insecurity:     Worried About Running Out of Food in the Last Year: Not on file    Fernando of Food in the Last Year: Not on file   Transportation Needs:     Lack of Transportation (Medical): Not on file    Lack of Transportation (Non-Medical):  Not on file   Physical Activity:     Days of Exercise per Week: Not on file    Minutes of Exercise per Session: Not on file   Stress:     Feeling of Stress : Not on file   Social Connections:     Frequency of Communication with Friends and Family: Not on file    Frequency of Social Gatherings with Friends and Family: Not on file    Attends Orthodoxy Services: Not on file    Active Member of Clubs or Organizations: Not on file    Attends Club or Organization Meetings: Not on file    Marital Status: Not on file   Intimate Partner Violence:     Fear of Current or Ex-Partner: Not on file    Emotionally Abused: Not on file    Physically Abused: Not on file    Sexually Abused: Not on file   Housing Stability:     Unable to Pay for Housing in the Last Year: Not on file    Number of [place] in the Last Year: Not on file    Unstable Housing in the Last Year: Not on file       Allergies: Allergies   Allergen Reactions    Adhesive Tape Rash     bandaids       Current Medications:  Current Outpatient Medications   Medication Sig Dispense Refill    QUERCETIN PO Take 500 mg by mouth daily      chlorthalidone (HYGROTON) 25 MG tablet TAKE ONE TABLET BY MOUTH EVERY DAY 90 tablet 3    tamsulosin (FLOMAX) 0.4 MG capsule TAKE ONE CAPSULE BY MOUTH EVERY DAY AT BEDTIME      gabapentin (NEURONTIN) 100 MG capsule Take 1 capsule by mouth 2 times daily as needed (pain) for up to 90 days. 60 capsule 2    metoprolol succinate (TOPROL XL) 100 MG extended release tablet Take 1 tablet by mouth 2 times daily 60 tablet 5    atorvastatin (LIPITOR) 20 MG tablet TAKE ONE TABLET BY MOUTH EVERY DAY 30 tablet 5    ELIQUIS 5 MG TABS tablet TAKE ONE TABLET BY MOUTH TWO TIMES A  tablet 3    dofetilide (TIKOSYN) 250 MCG capsule TAKE 1 CAPSULE BY MOUTH 2 TIMES DAILY ALONG WITH 125MCG DOSE TO EQUAL 375MCG DOSE 2 TIMES DAILY 180 capsule 3    dofetilide (TIKOSYN) 125 MCG capsule TAKE ONE CAPSULE BY MOUTH TWO TIMES A DAY.   TAKE ALONG WITH 250MCG TO EQUAL 375MCG TWICE DAILY 180 capsule 3 bruits  Lungs: Clear to auscultation bilaterally. No wheezes, rales, or rhonchi. Cardiac: Regular rate and rhythm, +S1S2, no murmurs apparent  Abdomen: Soft, nontender, +bowel sounds  Extremities: Moves all extremities x 4, no lower extremity edema  Neurologic: No focal motor deficits apparent, normal mood and affect, alert and oriented x 3    Laboratory Tests:  Lab Results   Component Value Date    CREATININE 1.3 (H) 10/20/2021    BUN 29 (H) 10/20/2021     10/20/2021    K 4.3 10/20/2021     10/20/2021    CO2 24 10/20/2021     Lab Results   Component Value Date    MG 2.0 10/20/2021     Lab Results   Component Value Date    WBC 8.6 10/20/2021    HGB 13.4 10/20/2021    HCT 40.0 10/20/2021    MCV 98.5 10/20/2021     10/20/2021     Lab Results   Component Value Date    ALT 29 10/20/2021    AST 24 10/20/2021    ALKPHOS 107 10/20/2021    BILITOT 0.3 10/20/2021     Lab Results   Component Value Date    CKTOTAL 160 06/30/2020    CKMB 9.2 (H) 06/30/2020    TROPONINI 0.04 (H) 07/01/2020    TROPONINI 0.05 (H) 06/30/2020    TROPONINI 0.06 (H) 06/30/2020     No results for input(s): CKTOTAL, CKMB, CKMBINDEX, TROPHS in the last 72 hours.   Lab Results   Component Value Date    INR 1.1 10/20/2021    INR 1.2 10/02/2021    INR 1.2 03/03/2021    PROTIME 12.1 10/20/2021    PROTIME 13.7 (H) 10/02/2021    PROTIME 12.6 (H) 03/03/2021     Lab Results   Component Value Date    TSH 4.640 (H) 10/20/2021     Lab Results   Component Value Date    LABA1C 5.2 03/09/2021     No results found for: EAG  Lab Results   Component Value Date    CHOL 108 03/09/2021    CHOL 172 02/04/2020     Lab Results   Component Value Date    TRIG 161 (H) 03/09/2021    TRIG 177 (H) 02/04/2020     Lab Results   Component Value Date    HDL 43 03/09/2021    HDL 61 02/04/2020     Lab Results   Component Value Date    LDLCALC 33 03/09/2021    LDLCALC 76 02/04/2020     Lab Results   Component Value Date    LABVLDL 32 03/09/2021    LABVLDL 35 02/04/2020 No results found for: CHOLHDLRATIO  No results for input(s): PROBNP in the last 72 hours. Cardiac Tests:  EKG reviewed: SR, rate 60, RBBB,  msec    Exercise MPS: 03/27/2013.    7.3 minutes  Naga protocol without chest pain or ST changes.  There was no ventricular ectopy.   Baseline RBBB.  He developed atrial flutter with 1:1 conduction at peak exercise, HR approximately 200.   He had no symptoms.  BP response normal.   Perfusion normal, no ischemia, infarction or TID.  Gated wall motion normal  EF 49%.   Rate slowed to about 80 bpm after 5 mg IV metoprolol. Coronary CT angiogram 07/07/2016: Dilated left atrium.  Mild to moderate atherosclerotic calcifications of the coronary arteries mainly LAD. TTE: 2/1/2017: (Dr. Judith Reddy): Normal left ventricular systolic function. Ejection fraction is calculated at 55%. Normal right ventricular function (TAPSE 1.7 cm). There is doppler evidence of stage II diastolic dysfunction. Mildly dilated left atrium by volume index. Unable to estimate PASP due to incomplete tricuspid regurgitation envelope. Mild pulmonic regurgitation. BUSHRA: 10/20/2017: (Dr. Kinsey Patel): EF 50%, no thrombus in the left atrial appendage, mild aortic atherosclerosis  Intolerant of metoprolol-fatigue    Pharmacological MPS: 2/20/2020: No EKG changes, normal imaging, low risk pharmacological stress test, EF 64%. BUSHRA: 10/20/21 (Dr. Zoey Roca)   Normal left ventricle size and systolic function. Normal sized left atrium. There is no left atrial appendage thrombus. No evidence of patent foramen ovale during color flow doppler study. Mild mitral regurgitation is present. Mild aortic regurgitation is noted. Physiologic and/or trace tricuspid regurgitation. Mild atherosclerotic change of ascending aorta. A pericardial clear space is present suggesting a prominent pericardial   fat pad. ASSESSMENT / PLAN:  1.  Paroxysmal AF/flutter/AT: s/p CTI ablation (5/2013)/redo flutter ablation (8/2016) / s/p DCCV (10/2017) / Sp AT ablation in 10/2021. On Tikosyn, Toprol XL. Maintaining SR.   2. Chronic OAC with eliquis  3. Nonischemic Lexiscan MPS (2/2020). No recent chest pain  4. CKD -- most recent Cr 1.3  5. Chronic HFpEF  6. AURELIANO  7. Chronic RBBB  8. HTN -- SBP today 140's  9. Prior tobacco abuse -- 60 pack years; quit in 2007  11. BMI 39 --> 40.3  12. COVID-19 infection in 1/2022    - Continue current medications (including Toprol XL and eliquis)  - Monitor QTc on Tikosyn / follow-up with EP as scheduled  - Aggressive risk factor modifications  - Treatment of AURELIANO (CPAP machine recently recalled)  - Prior cardiac studies reviewed today  - The above was discussed with the patient and his wife today    Greater than 30 minutes was spent counseling the patient, reviewing the rationale for the above recommendations and reviewing the patient's current medication list, problem list and results of all previously ordered testing.     Miki Jones MD  [facility]) Cardiology

## 2022-02-22 RX ORDER — ATORVASTATIN CALCIUM 20 MG/1
TABLET, FILM COATED ORAL
Qty: 30 TABLET | Refills: 11 | Status: SHIPPED | OUTPATIENT
Start: 2022-02-22

## 2022-03-24 RX ORDER — METOPROLOL SUCCINATE 100 MG/1
TABLET, EXTENDED RELEASE ORAL
Qty: 180 TABLET | Refills: 1 | Status: SHIPPED
Start: 2022-03-24 | End: 2022-09-20

## 2022-04-13 DIAGNOSIS — I48.3 TYPICAL ATRIAL FLUTTER (HCC): Primary | ICD-10-CM

## 2022-04-13 RX ORDER — DOFETILIDE 0.12 MG/1
CAPSULE ORAL
Qty: 180 CAPSULE | Refills: 1 | Status: SHIPPED
Start: 2022-04-13 | End: 2022-10-13

## 2022-04-13 RX ORDER — DOFETILIDE 0.25 MG/1
CAPSULE ORAL
Qty: 180 CAPSULE | Refills: 1 | Status: SHIPPED
Start: 2022-04-13 | End: 2022-10-13

## 2022-04-13 NOTE — TELEPHONE ENCOUNTER
Requesting Tikosyn refill - CrCl calculated off the following information:    Tikosyn dosage: 375 mcg     Age: 71   Ht: 1.753 m  Wt: 123.8 kg  Cr: 1.3 mg/dl (based off labs on 10/2021)  CrCl: 52.90 mL/min    Last QTc: 472 msec (based on last EKG on 02/2022)

## 2022-04-13 NOTE — TELEPHONE ENCOUNTER
Spoke with patient's wife let her know he needs more recent labs, they will go within the next week to [address].

## 2022-06-16 RX ORDER — GABAPENTIN 100 MG/1
CAPSULE ORAL
Qty: 60 CAPSULE | Refills: 0 | OUTPATIENT
Start: 2022-06-16

## 2022-06-16 RX ORDER — APIXABAN 5 MG/1
TABLET, FILM COATED ORAL
Qty: 180 TABLET | Refills: 3 | Status: SHIPPED | OUTPATIENT
Start: 2022-06-16

## 2022-06-16 NOTE — TELEPHONE ENCOUNTER
Refill request from pharmacy interface. Date of Procedure: 3/8/2021  Procedure(s):  LEFT KNEE TOTAL ARTHROPLASTY -- SHRUTHI     Surgeon(s):  Lennie Rose MD    Medication pended and routed to providers for decision and signature. No future appointments.

## 2022-07-05 RX ORDER — GABAPENTIN 100 MG/1
CAPSULE ORAL
Qty: 60 CAPSULE | Refills: 0 | OUTPATIENT
Start: 2022-07-05

## 2022-07-05 NOTE — TELEPHONE ENCOUNTER
Spoke with patients wife let her know he needs labs drawn before refill, script for labs sent to [email or web address]. Also appointment scheduled with AGNES.

## 2022-07-05 NOTE — TELEPHONE ENCOUNTER
Pt's wife calling - states pt is going to need a refill on his Tikosyn in approximately 5 days and that he has not had an apt with EP for quite some time. Please return her call with an apt. Thank you.

## 2022-07-28 RX ORDER — GABAPENTIN 100 MG/1
CAPSULE ORAL
Qty: 60 CAPSULE | Refills: 0 | OUTPATIENT
Start: 2022-07-28

## 2022-09-20 RX ORDER — METOPROLOL SUCCINATE 100 MG/1
TABLET, EXTENDED RELEASE ORAL
Qty: 180 TABLET | Refills: 0 | Status: SHIPPED | OUTPATIENT
Start: 2022-09-20

## 2022-10-13 RX ORDER — DOFETILIDE 0.25 MG/1
CAPSULE ORAL
Qty: 60 CAPSULE | Refills: 0 | Status: SHIPPED | OUTPATIENT
Start: 2022-10-13

## 2022-10-13 RX ORDER — DOFETILIDE 0.12 MG/1
CAPSULE ORAL
Qty: 60 CAPSULE | Refills: 0 | Status: SHIPPED | OUTPATIENT
Start: 2022-10-13

## 2022-10-13 NOTE — TELEPHONE ENCOUNTER
Patient has an appointment with [facility] on November 11, sent a 30 day supply until he can come to get EKG and labs while at appointment.

## 2022-10-14 RX ORDER — DOFETILIDE 0.25 MG/1
CAPSULE ORAL
Qty: 60 CAPSULE | Refills: 0 | OUTPATIENT
Start: 2022-10-14

## 2022-10-19 DIAGNOSIS — I48.3 TYPICAL ATRIAL FLUTTER (HCC): ICD-10-CM

## 2022-10-19 LAB
BUN BLDV-MCNC: NORMAL MG/DL
CALCIUM SERPL-MCNC: NORMAL MG/DL
CHLORIDE BLD-SCNC: NORMAL MMOL/L
CO2: NORMAL
CREAT SERPL-MCNC: NORMAL MG/DL
GFR CALCULATED: NORMAL
GLUCOSE BLD-MCNC: NORMAL MG/DL
MAGNESIUM: 1.6 MG/DL
POTASSIUM SERPL-SCNC: NORMAL MMOL/L
SODIUM BLD-SCNC: NORMAL MMOL/L

## 2022-11-11 ENCOUNTER — OFFICE VISIT (OUTPATIENT)
Dept: NON INVASIVE DIAGNOSTICS | Age: 72
End: 2022-11-11
Payer: MEDICARE

## 2022-11-11 VITALS
BODY MASS INDEX: 40.73 KG/M2 | HEIGHT: 69 IN | RESPIRATION RATE: 16 BRPM | SYSTOLIC BLOOD PRESSURE: 126 MMHG | HEART RATE: 65 BPM | DIASTOLIC BLOOD PRESSURE: 70 MMHG | WEIGHT: 275 LBS

## 2022-11-11 DIAGNOSIS — I48.19 PERSISTENT ATRIAL FIBRILLATION (HCC): ICD-10-CM

## 2022-11-11 DIAGNOSIS — I48.3 TYPICAL ATRIAL FLUTTER (HCC): Primary | ICD-10-CM

## 2022-11-11 PROBLEM — N52.01 ERECTILE DYSFUNCTION DUE TO ARTERIAL INSUFFICIENCY: Status: ACTIVE | Noted: 2021-02-11

## 2022-11-11 PROBLEM — E55.9 VITAMIN D DEFICIENCY: Status: ACTIVE | Noted: 2021-02-16

## 2022-11-11 PROBLEM — I70.0 AORTIC ATHEROSCLEROSIS (HCC): Status: ACTIVE | Noted: 2021-02-23

## 2022-11-11 PROBLEM — M12.9 ARTHROPATHY: Status: ACTIVE | Noted: 2020-11-06

## 2022-11-11 PROBLEM — N40.1 BENIGN PROSTATIC HYPERPLASIA WITH URINARY FREQUENCY: Status: ACTIVE | Noted: 2021-02-11

## 2022-11-11 PROBLEM — R35.0 BENIGN PROSTATIC HYPERPLASIA WITH URINARY FREQUENCY: Status: ACTIVE | Noted: 2021-02-11

## 2022-11-11 PROCEDURE — 3074F SYST BP LT 130 MM HG: CPT | Performed by: INTERNAL MEDICINE

## 2022-11-11 PROCEDURE — 99215 OFFICE O/P EST HI 40 MIN: CPT | Performed by: INTERNAL MEDICINE

## 2022-11-11 PROCEDURE — 93000 ELECTROCARDIOGRAM COMPLETE: CPT | Performed by: INTERNAL MEDICINE

## 2022-11-11 PROCEDURE — 3078F DIAST BP <80 MM HG: CPT | Performed by: INTERNAL MEDICINE

## 2022-11-11 PROCEDURE — 1123F ACP DISCUSS/DSCN MKR DOCD: CPT | Performed by: INTERNAL MEDICINE

## 2022-11-11 RX ORDER — DOFETILIDE 0.25 MG/1
250 CAPSULE ORAL 2 TIMES DAILY
Qty: 180 CAPSULE | Refills: 1 | Status: SHIPPED | OUTPATIENT
Start: 2022-11-11

## 2022-11-11 RX ORDER — DOFETILIDE 0.12 MG/1
125 CAPSULE ORAL 2 TIMES DAILY
Qty: 180 CAPSULE | Refills: 1 | Status: SHIPPED | OUTPATIENT
Start: 2022-11-11

## 2022-11-11 NOTE — PROGRESS NOTES
[facility],2[address] Floor and [facility] Electrophysiology  Outpatient Progress Note   PATIENT: Gerry Lorenzo  MEDICAL RECORD NUMBER: [id number]  DATE OF SERVICE:  11/11/2022  ELECTROPHYSIOLOGIST: Cole Mendoza MD  REFERRING PHYSICIAN: No ref. provider found and Elsie Bonilla DO  CHIEF COMPLAINT: Recurrent atrial flutter    HPI: This is a 71 y.o. male with a history of   Patient Active Problem List   Diagnosis    Typical atrial flutter ([address]    Essential hypertension    Eczema    Former smoker    Midline low back pain with right-sided sciatica    Persistent atrial fibrillation    Chronic systolic heart failure (HCC)    CKD (chronic kidney disease)    Atrial flutter ([address]    Status post catheter ablation of atrial fibrillation    Status post catheter ablation of atrial flutter    Right bundle-branch block    Tachyarrhythmia    Atrial fibrillation (HCC)    Wrist pain, chronic, right    Chronic pain syndrome    Primary osteoarthritis involving multiple joints    Right foot drop    Lumbar facet arthropathy    COVID-19    NATALI (acute kidney injury) ([address]    HLD (hyperlipidemia)    Redness and swelling of knee    Obesity (BMI 30-39. 9)    CAD (coronary artery disease)    H/O total knee replacement, bilateral    Osteoarthritis of left knee    Localized osteoarthritis of left knee    Atrial fibrillation with RVR (HCC)    Abnormal ECG    Status post ablation of atrial fibrillation    Atrial tachycardia (HCC)    Aortic atherosclerosis (HCC)    Arthropathy    Benign prostatic hyperplasia with urinary frequency    Bicuspid aortic valve    Erectile dysfunction due to arterial insufficiency    Fall from ladder    Impaired fasting glucose    Sleep apnea with use of continuous positive airway pressure (CPAP)    Vitamin D deficiency   who presents to the cardiac electrophysiology clinic for follow up.  The patient underwent EP study and successful RF ablation of atrial tachycardia originated from 08/22/2021    Localized osteoarthritis of left knee 03/09/2021    H/O total knee replacement, bilateral 03/08/2021    Osteoarthritis of left knee 03/08/2021    HLD (hyperlipidemia) 07/01/2020    Redness and swelling of knee 07/01/2020    Obesity (BMI 30-39.9) 07/01/2020    CAD (coronary artery disease) 07/01/2020    NATALI (acute kidney injury) ([address] 06/30/2020    COVID-19     Wrist pain, chronic, right 02/12/2019    Chronic pain syndrome 02/12/2019    Primary osteoarthritis involving multiple joints 02/12/2019    Right foot drop 02/12/2019    Lumbar facet arthropathy 02/12/2019    Tachyarrhythmia 12/07/2018    Atrial fibrillation ([address] 12/07/2018    Right bundle-branch block 07/05/2018    Status post catheter ablation of atrial fibrillation 10/21/2017    Status post catheter ablation of atrial flutter 10/21/2017    Atrial flutter (HCC)     CKD (chronic kidney disease)     Chronic systolic heart failure (HCC)     Persistent atrial fibrillation     Midline low back pain with right-sided sciatica 01/12/2016       Past Medical History:   Diagnosis Date    Arthritis     Atrial fibrillation ([address]     Bulging lumbar disc     L4-L5    CAD (coronary artery disease) 07/07/2016    ct scan heart    Chronic lower back pain     BREWER (dyspnea on exertion) 03/26/2013    Eczema     H/O atrial flutter 09/2021    Hyperlipidemia     Hypertension     Kidney stones     Obesity     weight 250#    Sleep apnea     c pap  stting 2       Family History   Problem Relation Age of Onset    Cancer Mother     Diabetes Mother     Cancer Father     Diabetes Sister     Kidney Disease Sister     Early Death Neg Hx        Social History     Tobacco Use    Smoking status: Former     Packs/day: 1.00     Years: 20.00     Pack years: 20.00     Types: Cigarettes     Quit date: 1/1/2007     Years since quitting: 15.8    Smokeless tobacco: Never    Tobacco comments:     1.5 PACKS EACH DAY stop 2006   Substance Use Topics    Alcohol use:  Yes     Alcohol/week: 3.0 standard drinks     Types: 3 Shots of liquor per week     Comment: 1 x week       Current Outpatient Medications   Medication Sig Dispense Refill    dofetilide (TIKOSYN) 250 MCG capsule TAKE 1 CAPSULE BY MOUTH 2 TIMES DAILY ALONG WITH THE 125MCG DOSE TO EQUAL 375MCG DOSE 2 TIMES DAILY 60 capsule 0    dofetilide (TIKOSYN) 125 MCG capsule TAKE ONE CAPSULE BY MOUTH TWO TIMES A DAY. TAKE ALONG WITH 250MCG TO EQUAL 375MCG TWICE DAILY 60 capsule 0    metoprolol succinate (TOPROL XL) 100 MG extended release tablet TAKE ONE TABLET BY MOUTH TWO TIMES A  tablet 0    ELIQUIS 5 MG TABS tablet TAKE ONE TABLET BY MOUTH TWO TIMES A  tablet 3    atorvastatin (LIPITOR) 20 MG tablet TAKE ONE TABLET BY MOUTH EVERY DAY 30 tablet 11    QUERCETIN PO Take 500 mg by mouth daily      chlorthalidone (HYGROTON) 25 MG tablet TAKE ONE TABLET BY MOUTH EVERY DAY 90 tablet 3    tamsulosin (FLOMAX) 0.4 MG capsule TAKE ONE CAPSULE BY MOUTH EVERY DAY AT BEDTIME      baclofen (LIORESAL) 10 MG tablet Take 1/2-1 by mouth at bedtime and every 8 hours if needed for muscle spasm. No driving within 8 hours of a dose. No alcohol on this. acetaminophen (TYLENOL) 500 MG tablet Take 2 tablets by mouth 3 times daily as needed for Pain 180 tablet 1    losartan (COZAAR) 50 MG tablet Take 50 mg by mouth daily      Misc Natural Products (TART CHERRY ADVANCED PO) Take by mouth STOP PREOP MED      magnesium oxide (MAG-OX) 400 (240 Mg) MG tablet Take 1 tablet by mouth 2 times daily (Patient taking differently: Take 400 mg by mouth daily) 60 tablet 0    Multiple Vitamins-Minerals (THERAPEUTIC MULTIVITAMIN-MINERALS) tablet Take 1 tablet by mouth daily. Coenzyme Q10 (COQ10 PO) Take 1 tablet by mouth daily. Alpha-Lipoic Acid 300 MG CAPS Take 300 mg by mouth daily On hold      ascorbic acid (VITAMIN C) 500 MG tablet Take 500 mg by mouth daily. Vitamin D (CHOLECALCIFEROL) 1000 UNITS CAPS capsule Take 1,000 Units by mouth daily. Cyanocobalamin (VITAMIN B 12 PO) Take 500 mcg by mouth daily. gabapentin (NEURONTIN) 100 MG capsule Take 1 capsule by mouth 2 times daily as needed (pain) for up to 90 days. 60 capsule 2    gabapentin (NEURONTIN) 100 MG capsule Take 1 capsule by mouth 2 times daily for 30 days. (Patient not taking: Reported on 2/10/2022) 60 capsule 0     No current facility-administered medications for this visit. Allergies   Allergen Reactions    Adhesive Tape Rash     bandaids     ROS:   Constitutional: Negative for fever, activity change and appetite change. HENT: Negative for epistaxis. Eyes: Negative for diploplia, blurred vision. Respiratory: Negative for cough, chest tightness, shortness of breath and wheezing. Cardiovascular: pertinent positives in HPI  Gastrointestinal: Negative for abdominal pain and blood in stool. All other review of systems are negative     PHYSICAL EXAM:   Vitals:    11/11/22 1342   BP: 126/70   Site: Left Upper Arm   Position: Sitting   Cuff Size: Medium Adult   Pulse: 65   Resp: 16   Weight: 275 lb (124.7 kg)   Height: 5' 9\" (1.753 m)     Constitutional: Well-developed, no acute distress  Eyes: conjunctivae normal, no xanthelasma   Ears, Nose, Throat: oral mucosa moist, no cyanosis   CV: no JVD. RRR. Normal S1S2. No murmurs, rubs, or gallops. PMI is nondisplaced  Lungs: clear to auscultation bilaterally, normal respiratory effort without used of accessory muscles  Abdomen: soft, non-tender, bowel sounds present, no masses or hepatomegaly   Musculoskeletal: no digital clubbing, no edema   Skin: warm, no rashes     I have personally reviewed the laboratory, cardiac diagnostic and radiographic testing as outlined below:    Data:    No results for input(s): WBC, HGB, HCT, PLT in the last 72 hours. No results for input(s): NA, K, CL, CO2, BUN, CREATININE, GLU, CALCIUM in the last 72 hours.     Invalid input(s): MAGNESIUM   Lab Results   Component Value Date/Time    MG 1.6 10/18/2022 12:00 AM     No results for input(s): TSH in the last 72 hours. No results for input(s): INR in the last 72 hours. EKG 8/22/21: Typical atrial flutter 93 bpm, RBBB, QTc 517 ms. Please see scan in Cardiology. EKG 11/11/22: SR, RBBB, rate: 65bpm,  QTC: 472,  - see scanned cardiology    BUSHRA 10/20/21:  Findings      Left Ventricle   Normal left ventricle size and systolic function. Right Ventricle   Normal right ventricular size and function. Left Atrium   Normal sized left atrium. There is no left atrial appendage thrombus. No evidence of patent foramen ovale during color flow doppler study. Right Atrium   Normal right atrium size. Mitral Valve   Normal mitral valve structure and function. Mild mitral regurgitation is present. Tricuspid Valve   The tricuspid valve appears structurally normal.   Physiologic and/or trace tricuspid regurgitation. Aortic Valve   Structurally normal aortic valve. Mild aortic regurgitation is noted. Pulmonic Valve   Pulmonic valve is structurally normal.   No evidence of any pulmonic regurgitation. Pericardial Effusion   A pericardial clear space is present suggesting a prominent pericardial   fat pad. Aorta   Mild atherosclerotic change of ascending aorta. Conclusions      Summary   Normal left ventricle size and systolic function. Normal sized left atrium. There is no left atrial appendage thrombus. No evidence of patent foramen ovale during color flow doppler study. Mild mitral regurgitation is present. Mild aortic regurgitation is noted. Physiologic and/or trace tricuspid regurgitation. Mild atherosclerotic change of ascending aorta. A pericardial clear space is present suggesting a prominent pericardial   fat pad.       Signature      ----------------------------------------------------------------   Electronically signed by Lottie Phan MD(Interpreting   physician) on 10/20/2021 05:58 PM   ----------------------------------------------------------------       Nuclear stress test 2/20/20:  FINDINGS: The overall quality of the study was fair. Left ventricular cavity size was noted to be normal.     Rotational analog analysis demonstrated no patient motion or   abnormal extracardiac radioactivity, abnormal patient motion,   soft tissue breast attenuation and soft tissue diaphragmatic   attenuation. The gated SPECT stress imaging in the short, vertical long, and   horizontal long axis demonstrated normal homogeneous tracer   distribution throughout the myocardium. The resting images are normal.     Gated SPECT left ventricular ejection fraction was calculated to   be 64%, with normal myocardial thickening and wall motion. Impression:     1. ECG during the infusion did not change. 2. The myocardial perfusion imaging was normal with attenuation   artifact. 3. Overall left ventricular systolic function was normal without   regional wall motion abnormalities. 4. Low risk general pharmacologic stress test.     Thank you for sending your patient to this [facility]. Electronically signed by Gregorio Vieira DO on 2/20/20 at 12:57 PM      Last Resulted: 02/20/20 09:00            24-hour Holter monitor 1/3/19: revealed sinus rhythm from  bpm.   There was no evidence of AF, AFl, SVT, VT, or AV block. No symptoms were reported. I have independently reviewed all of the ECGs and rhythm strips per above     Assessment/Plan:  This is a 71 y.o. male with a history of   Patient Active Problem List   Diagnosis    Typical atrial flutter ([address]    Essential hypertension    Eczema    Former smoker    Midline low back pain with right-sided sciatica    Persistent atrial fibrillation    Chronic systolic heart failure (HCC)    CKD (chronic kidney disease)    Atrial flutter ([address]    Status post catheter ablation of atrial fibrillation    Status post catheter ablation of atrial flutter    Right bundle-branch block    Tachyarrhythmia    Atrial fibrillation (HCC)    Wrist pain, chronic, right    Chronic pain syndrome    Primary osteoarthritis involving multiple joints    Right foot drop    Lumbar facet arthropathy    COVID-19    NATALI (acute kidney injury) (HCC)    HLD (hyperlipidemia)    Redness and swelling of knee    Obesity (BMI 30-39. 9)    CAD (coronary artery disease)    H/O total knee replacement, bilateral    Osteoarthritis of left knee    Localized osteoarthritis of left knee    Atrial fibrillation with RVR (HCC)    Abnormal ECG    Status post ablation of atrial fibrillation    Atrial tachycardia (HCC)    Aortic atherosclerosis (HCC)    Arthropathy    Benign prostatic hyperplasia with urinary frequency    Bicuspid aortic valve    Erectile dysfunction due to arterial insufficiency    Fall from ladder    Impaired fasting glucose    Sleep apnea with use of continuous positive airway pressure (CPAP)    Vitamin D deficiency     1. Atrial fibrillation and flutter  - [id number] risk score of 3 based on HTN and systolic dysfunction, age greater than 72, he is on Eliquis he denies any bleeding problems. Denies missing Eliquis dose for the past 3 weeks. - Status post CTI ablation in May 6535, procedure complicated by airway compromise, anatomical variants c/w Eustachian ridge and pouch, extended procedure time and multiple lines required to achieve bidirectional block. He had no documented recurrence in over 2 year after procedure. - Post RFA with subsequent development of persistent atrial fibrillation and atypical flutter. He was highly symptomatic despite apparent rate control, at least at rest.   - Underwent redo flutter ablation and PVI (RFA) on 8/10/16.  He was weaned off his sotalol and was started back on a low-dose Toprol-XL for his cardiomyopathy.   - On October 20, 2017 he underwent a successful BUSHRA/DCCV with Tikosyn drug loading for persistent symptomatic atrial flutter. - 24-hour Holter monitor in January 2019 revealed sinus rhythm from  bpm.  There was no evidence of AF, AFl, SVT, VT, or AV block. No symptoms were reported  - Currently on Tikosyn 375 mcg every 12 hours. - Presented on 8/22/21,  9/23/21 and 10/2/21 with appeared to be typical atrial flutter and underwent DC-cardioversion's. - Underwent EP study and successful RF ablation of atrial tachycardia originated from tricuspid annulus on 10/2021. All pulmonary veins remained in electrical isolation. Cavotricuspid isthmus line remained in bidirectional block  - CrCl of 52.15 mL/min based on cr of 1.3 on 10/18/22.  - Discussed with him regarding discontinue Tikosyn. He currently defers. - Re-education on importance of well controlled HTN (goal BP < 130/80), adequate weight control (goal BMI of < 27), physical activity consisting of moderate cardiopulmonary exercise up to a goal of 250 min/wk, daily compliance with CPAP in treating sleep apnea, smoking cessation and limited ETOH intake. 2. Chronic HFrEF  - Recovered LV EF.  - Probably tachycardia-mediated  - Last LV EF by BUSHRA was 50% in 10/2017.  - On Toprol XL and Cozaar.  - Euvolemic and compensated     3. Hypertension    - Well controlled. - On Toprol XL, Cholrthalidone and Cozaar. 4. Chronic kidney disease   - Stage III  CrCl cannot be calculated (Patient's most recent lab result is older than the maximum 180 days allowed. ). 5. Obstructive sleep apnea    - Compliant with his CPAP. 6. Obesity  Body mass index is 40.61 kg/m². 7. RBBB  - Chronic      8. Chronic back pain  - With foot drop  - History of back surgery  - Status post injections. Recommendations:      1. Continue Tikosyn 375 mcg every 12 hours. Plan to wean off Tikosyn in the future. He currently defers. 2. Continue Toprol  mg bid. 3. Continue Eliquis 5 mg bid. 4. Obtain EKG, BMP and magnesium every 3 to 6 months. 5. Follow up in 6 months.  Encouraged the patient to call the office for any questions or concerns. Thank you for allowing me to participate in your patient's care. Please call me if there are any questions or concerns. I have spent a total of 40 minutes with the patient and the family reviewing the above stated recommendations. And a total of >50% of that time involved face-to-face time providing counseling and/or coordination of care with the other providers, preparation for the clinic visit, reviewing records/tests, counseling/education of the patient, ordering medications/tests/procedures, coordinating care, and documenting clinical information in the EHR.        Mukesh Gibbons MD  Cardiac Electrophysiology  [address]   [facility]  2:02 PM  11/11/2022

## 2022-12-27 RX ORDER — CHLORTHALIDONE 25 MG/1
TABLET ORAL
Qty: 90 TABLET | Refills: 3 | Status: SHIPPED | OUTPATIENT
Start: 2022-12-27

## 2023-01-09 ENCOUNTER — APPOINTMENT (OUTPATIENT)
Dept: CT IMAGING | Age: 73
End: 2023-01-09
Payer: MEDICARE

## 2023-01-09 ENCOUNTER — HOSPITAL ENCOUNTER (INPATIENT)
Age: 73
LOS: 4 days | Discharge: HOME | End: 2023-01-13
Attending: EMERGENCY MEDICINE | Admitting: FAMILY MEDICINE
Payer: MEDICARE

## 2023-01-09 DIAGNOSIS — R10.9 FLANK PAIN: ICD-10-CM

## 2023-01-09 DIAGNOSIS — N10 ACUTE PYELONEPHRITIS: Primary | ICD-10-CM

## 2023-01-09 LAB
ALBUMIN SERPL-MCNC: 3.3 G/DL (ref 3.5–5.2)
ALP BLD-CCNC: 218 U/L (ref 40–129)
ALT SERPL-CCNC: 22 U/L (ref 0–40)
ANION GAP SERPL CALCULATED.3IONS-SCNC: 18 MMOL/L (ref 7–16)
AST SERPL-CCNC: 14 U/L (ref 0–39)
ATYPICAL LYMPHOCYTE RELATIVE PERCENT: 4.3 % (ref 0–4)
BACTERIA: ABNORMAL /HPF
BASOPHILS ABSOLUTE: 0.26 E9/L (ref 0–0.2)
BASOPHILS RELATIVE PERCENT: 0.9 % (ref 0–2)
BILIRUB SERPL-MCNC: 0.6 MG/DL (ref 0–1.2)
BILIRUBIN URINE: NEGATIVE
BLOOD, URINE: ABNORMAL
BUN BLDV-MCNC: 83 MG/DL (ref 6–23)
CALCIUM SERPL-MCNC: 9 MG/DL (ref 8.6–10.2)
CHLORIDE BLD-SCNC: 92 MMOL/L (ref 98–107)
CLARITY: CLEAR
CO2: 18 MMOL/L (ref 22–29)
COLOR: YELLOW
CREAT SERPL-MCNC: 3.7 MG/DL (ref 0.7–1.2)
EKG ATRIAL RATE: 59 BPM
EKG P AXIS: 64 DEGREES
EKG P-R INTERVAL: 176 MS
EKG Q-T INTERVAL: 532 MS
EKG QRS DURATION: 140 MS
EKG QTC CALCULATION (BAZETT): 526 MS
EKG R AXIS: 26 DEGREES
EKG T AXIS: 59 DEGREES
EKG VENTRICULAR RATE: 59 BPM
EOSINOPHILS ABSOLUTE: 1.29 E9/L (ref 0.05–0.5)
EOSINOPHILS RELATIVE PERCENT: 4.4 % (ref 0–6)
GFR SERPL CREATININE-BSD FRML MDRD: 17 ML/MIN/1.73
GLUCOSE BLD-MCNC: 113 MG/DL (ref 74–99)
GLUCOSE URINE: NEGATIVE MG/DL
HCT VFR BLD CALC: 38.1 % (ref 37–54)
HEMOGLOBIN: 12.6 G/DL (ref 12.5–16.5)
KETONES, URINE: NEGATIVE MG/DL
LACTIC ACID, SEPSIS: 1.1 MMOL/L (ref 0.5–1.9)
LEUKOCYTE ESTERASE, URINE: ABNORMAL
LIPASE: 47 U/L (ref 13–60)
LYMPHOCYTES ABSOLUTE: 1.17 E9/L (ref 1.5–4)
LYMPHOCYTES RELATIVE PERCENT: 0 % (ref 20–42)
MAGNESIUM: 2.5 MG/DL (ref 1.6–2.6)
MCH RBC QN AUTO: 32.1 PG (ref 26–35)
MCHC RBC AUTO-ENTMCNC: 33.1 % (ref 32–34.5)
MCV RBC AUTO: 97.2 FL (ref 80–99.9)
MONOCYTES ABSOLUTE: 0.88 E9/L (ref 0.1–0.95)
MONOCYTES RELATIVE PERCENT: 2.6 % (ref 2–12)
NEUTROPHILS ABSOLUTE: 25.78 E9/L (ref 1.8–7.3)
NEUTROPHILS RELATIVE PERCENT: 87.8 % (ref 43–80)
NITRITE, URINE: POSITIVE
NUCLEATED RED BLOOD CELLS: 0 /100 WBC
PDW BLD-RTO: 13.4 FL (ref 11.5–15)
PH UA: 5.5 (ref 5–9)
PLATELET # BLD: 354 E9/L (ref 130–450)
PMV BLD AUTO: 10.4 FL (ref 7–12)
POTASSIUM REFLEX MAGNESIUM: 4.3 MMOL/L (ref 3.5–5)
PROTEIN UA: 30 MG/DL
RBC # BLD: 3.92 E12/L (ref 3.8–5.8)
RBC # BLD: NORMAL 10*6/UL
RBC UA: ABNORMAL /HPF (ref 0–2)
SODIUM BLD-SCNC: 128 MMOL/L (ref 132–146)
SPECIFIC GRAVITY UA: 1.01 (ref 1–1.03)
TOTAL PROTEIN: 7.7 G/DL (ref 6.4–8.3)
UROBILINOGEN, URINE: 0.2 E.U./DL
VACUOLATED NEUTROPHILS: ABNORMAL
WBC # BLD: 29.3 E9/L (ref 4.5–11.5)
WBC UA: >20 /HPF (ref 0–5)

## 2023-01-09 PROCEDURE — 93005 ELECTROCARDIOGRAM TRACING: CPT | Performed by: FAMILY MEDICINE

## 2023-01-09 PROCEDURE — 81001 URINALYSIS AUTO W/SCOPE: CPT

## 2023-01-09 PROCEDURE — 2580000003 HC RX 258: Performed by: FAMILY MEDICINE

## 2023-01-09 PROCEDURE — 80053 COMPREHEN METABOLIC PANEL: CPT

## 2023-01-09 PROCEDURE — 83605 ASSAY OF LACTIC ACID: CPT

## 2023-01-09 PROCEDURE — 2580000003 HC RX 258: Performed by: EMERGENCY MEDICINE

## 2023-01-09 PROCEDURE — 87040 BLOOD CULTURE FOR BACTERIA: CPT

## 2023-01-09 PROCEDURE — 99223 1ST HOSP IP/OBS HIGH 75: CPT | Performed by: NURSE PRACTITIONER

## 2023-01-09 PROCEDURE — 85025 COMPLETE CBC W/AUTO DIFF WBC: CPT

## 2023-01-09 PROCEDURE — 94660 CPAP INITIATION&MGMT: CPT

## 2023-01-09 PROCEDURE — 99285 EMERGENCY DEPT VISIT HI MDM: CPT

## 2023-01-09 PROCEDURE — 6360000002 HC RX W HCPCS: Performed by: EMERGENCY MEDICINE

## 2023-01-09 PROCEDURE — 87324 CLOSTRIDIUM AG IA: CPT

## 2023-01-09 PROCEDURE — 6370000000 HC RX 637 (ALT 250 FOR IP): Performed by: FAMILY MEDICINE

## 2023-01-09 PROCEDURE — 83735 ASSAY OF MAGNESIUM: CPT

## 2023-01-09 PROCEDURE — 6360000002 HC RX W HCPCS: Performed by: FAMILY MEDICINE

## 2023-01-09 PROCEDURE — 96374 THER/PROPH/DIAG INJ IV PUSH: CPT

## 2023-01-09 PROCEDURE — 87088 URINE BACTERIA CULTURE: CPT

## 2023-01-09 PROCEDURE — 96375 TX/PRO/DX INJ NEW DRUG ADDON: CPT

## 2023-01-09 PROCEDURE — 74176 CT ABD & PELVIS W/O CONTRAST: CPT

## 2023-01-09 PROCEDURE — 83690 ASSAY OF LIPASE: CPT

## 2023-01-09 PROCEDURE — 2060000000 HC ICU INTERMEDIATE R&B

## 2023-01-09 PROCEDURE — 93010 ELECTROCARDIOGRAM REPORT: CPT | Performed by: INTERNAL MEDICINE

## 2023-01-09 PROCEDURE — 87186 SC STD MICRODIL/AGAR DIL: CPT

## 2023-01-09 PROCEDURE — 87449 NOS EACH ORGANISM AG IA: CPT

## 2023-01-09 RX ORDER — HEPARIN SODIUM 10000 [USP'U]/ML
5000 INJECTION, SOLUTION INTRAVENOUS; SUBCUTANEOUS EVERY 8 HOURS SCHEDULED
Status: DISCONTINUED | OUTPATIENT
Start: 2023-01-09 | End: 2023-01-09

## 2023-01-09 RX ORDER — SODIUM CHLORIDE 0.9 % (FLUSH) 0.9 %
5-40 SYRINGE (ML) INJECTION PRN
Status: DISCONTINUED | OUTPATIENT
Start: 2023-01-09 | End: 2023-01-13 | Stop reason: HOSPADM

## 2023-01-09 RX ORDER — FAMOTIDINE 20 MG/1
20 TABLET, FILM COATED ORAL DAILY
Status: DISCONTINUED | OUTPATIENT
Start: 2023-01-09 | End: 2023-01-13 | Stop reason: HOSPADM

## 2023-01-09 RX ORDER — POLYETHYLENE GLYCOL 3350 17 G/17G
17 POWDER, FOR SOLUTION ORAL DAILY PRN
Status: DISCONTINUED | OUTPATIENT
Start: 2023-01-09 | End: 2023-01-13 | Stop reason: HOSPADM

## 2023-01-09 RX ORDER — ONDANSETRON 2 MG/ML
4 INJECTION INTRAMUSCULAR; INTRAVENOUS EVERY 6 HOURS PRN
Status: CANCELLED | OUTPATIENT
Start: 2023-01-09

## 2023-01-09 RX ORDER — KETOROLAC TROMETHAMINE 30 MG/ML
30 INJECTION, SOLUTION INTRAMUSCULAR; INTRAVENOUS ONCE
Status: COMPLETED | OUTPATIENT
Start: 2023-01-09 | End: 2023-01-09

## 2023-01-09 RX ORDER — IODINE/SODIUM IODIDE 2 %
TINCTURE TOPICAL PRN
Status: DISCONTINUED | OUTPATIENT
Start: 2023-01-09 | End: 2023-01-13 | Stop reason: HOSPADM

## 2023-01-09 RX ORDER — LANOLIN ALCOHOL/MO/W.PET/CERES
3 CREAM (GRAM) TOPICAL NIGHTLY PRN
Status: DISCONTINUED | OUTPATIENT
Start: 2023-01-09 | End: 2023-01-13 | Stop reason: HOSPADM

## 2023-01-09 RX ORDER — ONDANSETRON 4 MG/1
4 TABLET, ORALLY DISINTEGRATING ORAL EVERY 8 HOURS PRN
Status: CANCELLED | OUTPATIENT
Start: 2023-01-09

## 2023-01-09 RX ORDER — PROCHLORPERAZINE EDISYLATE 5 MG/ML
10 INJECTION INTRAMUSCULAR; INTRAVENOUS EVERY 6 HOURS PRN
Status: DISCONTINUED | OUTPATIENT
Start: 2023-01-09 | End: 2023-01-13 | Stop reason: HOSPADM

## 2023-01-09 RX ORDER — ACETAMINOPHEN 325 MG/1
650 TABLET ORAL EVERY 6 HOURS PRN
Status: DISCONTINUED | OUTPATIENT
Start: 2023-01-09 | End: 2023-01-13 | Stop reason: HOSPADM

## 2023-01-09 RX ORDER — ACETAMINOPHEN 650 MG/1
650 SUPPOSITORY RECTAL EVERY 6 HOURS PRN
Status: DISCONTINUED | OUTPATIENT
Start: 2023-01-09 | End: 2023-01-13 | Stop reason: HOSPADM

## 2023-01-09 RX ORDER — 0.9 % SODIUM CHLORIDE 0.9 %
1000 INTRAVENOUS SOLUTION INTRAVENOUS ONCE
Status: COMPLETED | OUTPATIENT
Start: 2023-01-09 | End: 2023-01-09

## 2023-01-09 RX ORDER — SODIUM CHLORIDE 0.9 % (FLUSH) 0.9 %
5-40 SYRINGE (ML) INJECTION EVERY 12 HOURS SCHEDULED
Status: DISCONTINUED | OUTPATIENT
Start: 2023-01-09 | End: 2023-01-13 | Stop reason: HOSPADM

## 2023-01-09 RX ORDER — SODIUM CHLORIDE 9 MG/ML
INJECTION, SOLUTION INTRAVENOUS PRN
Status: DISCONTINUED | OUTPATIENT
Start: 2023-01-09 | End: 2023-01-13 | Stop reason: HOSPADM

## 2023-01-09 RX ADMIN — SODIUM CHLORIDE 1000 ML: 9 INJECTION, SOLUTION INTRAVENOUS at 15:27

## 2023-01-09 RX ADMIN — FAMOTIDINE 20 MG: 20 TABLET, FILM COATED ORAL at 20:44

## 2023-01-09 RX ADMIN — WATER 2000 MG: 1 INJECTION INTRAMUSCULAR; INTRAVENOUS; SUBCUTANEOUS at 17:03

## 2023-01-09 RX ADMIN — APIXABAN 5 MG: 5 TABLET, FILM COATED ORAL at 20:45

## 2023-01-09 RX ADMIN — HYDROMORPHONE HYDROCHLORIDE 0.5 MG: 0.5 INJECTION, SOLUTION INTRAMUSCULAR; INTRAVENOUS; SUBCUTANEOUS at 21:54

## 2023-01-09 RX ADMIN — KETOROLAC TROMETHAMINE 30 MG: 30 INJECTION, SOLUTION INTRAMUSCULAR at 15:27

## 2023-01-09 RX ADMIN — SODIUM CHLORIDE, PRESERVATIVE FREE 10 ML: 5 INJECTION INTRAVENOUS at 20:45

## 2023-01-09 RX ADMIN — Medication 3 MG: at 21:53

## 2023-01-09 ASSESSMENT — PAIN DESCRIPTION - LOCATION: LOCATION: FLANK

## 2023-01-09 ASSESSMENT — PAIN DESCRIPTION - ORIENTATION: ORIENTATION: RIGHT

## 2023-01-09 ASSESSMENT — PAIN SCALES - GENERAL
PAINLEVEL_OUTOF10: 0
PAINLEVEL_OUTOF10: 8
PAINLEVEL_OUTOF10: 6

## 2023-01-09 ASSESSMENT — PAIN DESCRIPTION - DESCRIPTORS: DESCRIPTORS: ACHING

## 2023-01-09 ASSESSMENT — PAIN - FUNCTIONAL ASSESSMENT: PAIN_FUNCTIONAL_ASSESSMENT: 0-10

## 2023-01-09 NOTE — ED PROVIDER NOTES
HPI:  1/9/23,   Time: 2:21 PM PAIGE Fernandez is a 71 y.o. male presenting to the ED for patient having intermittent fevers, right-sided flank pain, urinary frequency, and loose stools. Patient was seen at an urgent care 10 days ago was diagnosed with a urinary tract infection. He was placed on antibiotics which he continues to take until today. He he has his last dose today. He is not sure what he is calm he states has been taking it twice per day. He states his symptoms have been persistent despite treatment. He was sent to the emergency department by urology for fear he may have a stone in his right ureter causing him to be septic. Patient does complain of right-sided flank pain intermittently as well. He has had no appetite for the past 24 hours. ROS:   Pertinent positives and negatives are stated within HPI, all other systems reviewed and are negative.  --------------------------------------------- PAST HISTORY ---------------------------------------------  Past Medical History:  has a past medical history of Arthritis, Atrial fibrillation ([address] Bulging lumbar disc, CAD (coronary artery disease), Chronic lower back pain, BREWER (dyspnea on exertion), Eczema, H/O atrial flutter, Hyperlipidemia, Hypertension, Kidney stones, Obesity, and Sleep apnea. Past Surgical History:  has a past surgical history that includes ECHO Transesophageal (05/07/2013); Tonsillectomy; transesophageal echocardiogram (03/24/2016); Cardioversion (03/24/2016); Colonoscopy; Lithotripsy (1986); Atrial ablation surgery (08/10/2016); knee surgery (Left, 1969); ablation of dysrhythmic focus (05/2013); Lumbar disc surgery (11/30/2016); Cardioversion (10/20/2017); Total knee arthroplasty (Right, 06/01/2020); Total knee arthroplasty (Left, 03/08/2021); Cardioversion (09/23/2021); and ablation of dysrhythmic focus (10/20/2021). Social History:  reports that he quit smoking about 16 years ago.  His smoking use included cigarettes. He has a 20.00 pack-year smoking history. He has never used smokeless tobacco. He reports current alcohol use of about 3.0 standard drinks per week. He reports that he does not use drugs. Family History: family history includes Cancer in his father and mother; Diabetes in his mother and sister; Kidney Disease in his sister. The patients home medications have been reviewed. Allergies: Adhesive tape    -------------------------------------------------- RESULTS -------------------------------------------------  All laboratory and radiology results have been personally reviewed by myself   LABS:  Results for orders placed or performed during the hospital encounter of 01/09/23   Lactate, Sepsis   Result Value Ref Range    Lactic Acid, Sepsis 1.1 0.5 - 1.9 mmol/L   CBC with Auto Differential   Result Value Ref Range    WBC 29.3 (H) 4.5 - 11.5 E9/L    RBC 3.92 3.80 - 5.80 E12/L    Hemoglobin 12.6 12.5 - 16.5 g/dL    Hematocrit 38.1 37.0 - 54.0 %    MCV 97.2 80.0 - 99.9 fL    MCH 32.1 26.0 - 35.0 pg    MCHC 33.1 32.0 - 34.5 %    RDW 13.4 11.5 - 15.0 fL    Platelets 959 338 - 652 E9/L    MPV 10.4 7.0 - 12.0 fL   Urinalysis   Result Value Ref Range    Color, UA Yellow Straw/Yellow    Clarity, UA Clear Clear    Glucose, Ur Negative Negative mg/dL    Bilirubin Urine Negative Negative    Ketones, Urine Negative Negative mg/dL    Specific Gravity, UA 1.015 1.005 - 1.030    Blood, Urine LARGE (A) Negative    pH, UA 5.5 5.0 - 9.0    Protein, UA 30 (A) Negative mg/dL    Urobilinogen, Urine 0.2 <2.0 E.U./dL    Nitrite, Urine POSITIVE (A) Negative    Leukocyte Esterase, Urine MODERATE (A) Negative       RADIOLOGY:  Interpreted by Radiologist.  CT ABDOMEN PELVIS WO CONTRAST Additional Contrast? None   Final Result   Multiple nonobstructing bilateral renal stones with stranding of the Ladi   renal fat and thickening of the pararenal fascia. Cholelithiasis.       Thickening of the bladder wall, may be due to suboptimal distension versus   cystitis.             ------------------------- NURSING NOTES AND VITALS REVIEWED ---------------------------   The nursing notes within the ED encounter and vital signs as below have been reviewed. /64   Pulse 68   Temp 99 °F (37.2 °C) (Oral)   Resp 16   Ht 5' 9.5\" (1.765 m)   Wt 265 lb (120.2 kg)   SpO2 95%   BMI 38.57 kg/m²   Oxygen Saturation Interpretation: Normal      ---------------------------------------------------PHYSICAL EXAM--------------------------------------      Constitutional/General: Alert and oriented x3, well appearing, non toxic in NAD  Head: NC/AT  Eyes: PERRL, EOMI  Nose:  Nares patent. No congestion or discharge noted. Ears:  TM's intact without erythema or perforation. External canal without swelling  Mouth: Oropharynx clear, handling secretions, no trismus  Neck: Supple, full ROM, no meningeal signs  Pulmonary: Lungs Clear to auscultation bilaterally. No rales or rhonchi or wheezes noted. No retractions. Cardiovascular:  Regular rate and rhythm, no murmurs, gallops, or rubs. 2+ symmetric distal pulses   Chest:  No tenderness, deformity or crepitus  Abdomen: Soft, non tender, non distended, normal bowel sounds  Back:  No tenderness to palpation on the cervical or thoracic or lumbar spine  Extremities: Moves all extremities x 4.  Warm and well perfused  Skin: warm and dry with body wide erythematous urticarial rash  Neurologic: GCS 15, no focal acute neurological deficit  Psych: Normal Affect      ------------------------------ ED COURSE/MEDICAL DECISION MAKING----------------------  Medications   0.9 % sodium chloride bolus (1,000 mLs IntraVENous New Bag 1/9/23 1527)   cefTRIAXone (ROCEPHIN) 2,000 mg in sterile water 20 mL IV syringe (has no administration in time range)   ketorolac (TORADOL) injection 30 mg (30 mg IntraVENous Given 1/9/23 1527)            Medical Decision Making:    Has what appears to be an acute urinary tract infection possibly a pyelonephritis. Outpatient failure of Macrodantin. He will require intravenous antibiotics and hospital admission.        --------------------------------- IMPRESSION AND DISPOSITION ---------------------------------    IMPRESSION  1.  Acute pyelonephritis        DISPOSITION  Disposition: Admit to med/surg floor  Patient condition is fair        Mei Sorto DO  01/09/23 6110

## 2023-01-09 NOTE — H&P
[facility]  Hospitalist Group   History and Physical      CHIEF COMPLAINT: Fevers, right flank pain, urinary frequency, loose stools. History of Present Illness:  71 y.o. male with a history of arthritis, atrial fibrillation, CAD, HLD, HTN, kidney stones, obesity, AURELIANO presents with complaints of intermittent fevers, right-sided flank pain, urinary frequency, and loose stools. Patient complaint is moderate severity, intermittent, worsened by nothing. Patient was seen 10 days ago in the urgent care was diagnosed with a UTI. He was prescribed Macrobid, which he is taking, however does not feel much better. Completion date is today. Patient states he is developed worsening diarrhea and blotchy rash all over. Had an episode of epistaxis in ED. Per family went into  today for follow-up and was sent into the ED for further evaluation and treatment. Patient denies SOB, CP, N/V. Patient did not receive COVID vaccination, however did have COVID-19 January/2022. Updated on flu vaccine and pneumonia vaccine. Na+ 128 BUN/Crt 83/3.7. AG 18 glucose 113. WBC 29.3. H/H 12.6/38.1. Blood cultures obtained and pending. CT A/P multiple nonobstructing bilateral renal stones with stranding of the perirenal fat and thickening of the pararenal fascia. Cholelithiasis. Thickening of the bladder wall may be due to suboptimal distention versus cystitis. UA + nitrates + leukocyte Estrace. large blood. >20 WBC. Many bacteria. Patient received ketorolac 30 mg IV x1/ceftriaxone 2 g IV x1/1L NSS bolus in ED course. Urology consulted. Decision to admit patient for further evaluation and treatment. Informant(s) for H&P: Patient and EMR    REVIEW OF SYSTEMS:  Admits to fevers, right flank pain, urinary frequency, loose stools. Denies cp, sob, n/v, ha, vision/hearing changes, wt changes, hot/cold flashes, other open skin lesions,  constipation, hematuria unless noted in HPI.  Complete ROS performed with the patient and is otherwise negative. PMH:  Past Medical History:   Diagnosis Date    Arthritis     Atrial fibrillation (HCC)     Bulging lumbar disc     L4-L5    CAD (coronary artery disease) 07/07/2016    ct scan heart    Chronic lower back pain     BREWER (dyspnea on exertion) 03/26/2013    Eczema     H/O atrial flutter 09/2021    Hyperlipidemia     Hypertension     Kidney stones     Obesity     weight 250#    Sleep apnea     c pap  stting 2       Surgical History:  Past Surgical History:   Procedure Laterality Date    ABLATION OF DYSRHYTHMIC FOCUS  05/2013    heart ablation dr Mckinley Nissen DYSRHYTHMIC FOCUS  10/20/2021    Successful cryoballoon catheter ablation by Dr. Veda Perez  08/10/2016    CARDIOVERSION  03/24/2016    Dr. Amrita Hernandez  10/20/2017    Dr. Servando Davila  09/23/2021    Successful cardioversion by Dr. Meghan Vela      ECHOCARDIOGRAM TRANSESOPHAGEAL  05/07/2013         KNEE SURGERY Left 1969    repair left knee ligaments    [facility]  11/30/2016    [facility] [place]    TONSILLECTOMY      TOTAL KNEE ARTHROPLASTY Right 06/01/2020    RIGHT KNEE TOTAL ARTHROPLASTY -- SHRUTHI performed by Desmond Bledsoe MD at [facility] Left 03/08/2021    LEFT KNEE TOTAL ARTHROPLASTY -- SHRUTHI performed by Desmond Bledsoe MD at [address] [place]    TRANSESOPHAGEAL ECHOCARDIOGRAM  03/24/2016    Dr. Gaming Ross       Medications Prior to Admission:    Prior to Admission medications    Medication Sig Start Date End Date Taking?  Authorizing Provider   chlorthalidone (HYGROTON) 25 MG tablet TAKE ONE TABLET BY MOUTH EVERY DAY 12/27/22   Sherie Rodríguez DO   dofetilide (TIKOSYN) 125 MCG capsule Take 1 capsule by mouth 2 times daily 11/11/22   Maksim Barnes MD   dofetilide (TIKOSYN) 250 MCG capsule Take 1 capsule by mouth 2 times daily TAKE 1 CAPSULE BY MOUTH 2 TIMES DAILY ALONG WITH THE 125MCG DOSE TO EQUAL 375MCG DOSE 2 TIMES DAILY 11/11/22   Ever Ziegler MD   metoprolol succinate (TOPROL XL) 100 MG extended release tablet TAKE ONE TABLET BY MOUTH TWO TIMES A DAY 9/20/22   Shawn Zhang, APRN - CNP   ELIQUIS 5 MG TABS tablet TAKE ONE TABLET BY MOUTH TWO TIMES A DAY 6/16/22   Laraine Bloch, MD   atorvastatin (LIPITOR) 20 MG tablet TAKE ONE TABLET BY MOUTH EVERY DAY 2/22/22   Larue Koyanagi, MD   QUERCETIN PO Take 500 mg by mouth daily    Historical Provider, MD   tamsulosin (FLOMAX) 0.4 MG capsule TAKE ONE CAPSULE BY MOUTH EVERY DAY AT BEDTIME 9/24/21   Historical Provider, MD   baclofen (LIORESAL) 10 MG tablet Take 1/2-1 by mouth at bedtime and every 8 hours if needed for muscle spasm. No driving within 8 hours of a dose. No alcohol on this. 8/3/21   Historical Provider, MD   gabapentin (NEURONTIN) 100 MG capsule Take 1 capsule by mouth 2 times daily as needed (pain) for up to 90 days. 12/15/21 3/15/22  Larry Gay MD   gabapentin (NEURONTIN) 100 MG capsule Take 1 capsule by mouth 2 times daily for 30 days. Patient not taking: Reported on 2/10/2022 8/11/21 9/10/21  Larry Gay MD   acetaminophen (TYLENOL) 500 MG tablet Take 2 tablets by mouth 3 times daily as needed for Pain 3/22/21   Sangita Gambino PA-C   losartan (COZAAR) 50 MG tablet Take 50 mg by mouth daily    Historical Provider, MD   Misc Natural Products (TART CHERRY ADVANCED PO) Take by mouth STOP PREOP MED    Historical Provider, MD   magnesium oxide (MAG-OX) 400 (240 Mg) MG tablet Take 1 tablet by mouth 2 times daily  Patient taking differently: Take 400 mg by mouth daily 10/23/17   Danny Tracey,    Multiple Vitamins-Minerals (THERAPEUTIC MULTIVITAMIN-MINERALS) tablet Take 1 tablet by mouth daily. Historical Provider, MD   Coenzyme Q10 (COQ10 PO) Take 1 tablet by mouth daily.     Historical Provider, MD   Alpha-Lipoic Acid 300 MG CAPS Take 300 mg by mouth daily On hold Historical Provider, MD   ascorbic acid (VITAMIN C) 500 MG tablet Take 500 mg by mouth daily. Historical Provider, MD   Vitamin D (CHOLECALCIFEROL) 1000 UNITS CAPS capsule Take 1,000 Units by mouth daily. Historical Provider, MD   Cyanocobalamin (VITAMIN B 12 PO) Take 500 mcg by mouth daily. Historical Provider, MD       Allergies:    Adhesive tape    Social History:    reports that he quit smoking about 16 years ago. His smoking use included cigarettes. He has a 20.00 pack-year smoking history. He has never used smokeless tobacco. He reports current alcohol use of about 3.0 standard drinks per week. He reports that he does not use drugs. Family History:   family history includes Cancer in his father and mother; Diabetes in his mother and sister; Kidney Disease in his sister. Reviewed and updated with patient    PHYSICAL EXAM:  Vitals:  /64   Pulse 68   Temp 99 °F (37.2 °C) (Oral)   Resp 16   Ht 5' 9.5\" (1.765 m)   Wt 265 lb (120.2 kg)   SpO2 95%   BMI 38.57 kg/m²     General Appearance: alert and oriented to person, place and time and in no acute distress  Skin: warm and dry.   Diffuse rash  Head: normocephalic and atraumatic  Eyes: pupils equal, round, and reactive to light, extraocular eye movements intact, conjunctivae normal  Neck: neck supple and non tender without mass   Pulmonary/Chest: clear to auscultation bilaterally- no wheezes, rales or rhonchi, normal air movement, no respiratory distress  Cardiovascular: normal rate, normal S1 and S2 and no RGM   abdomen: soft, non-tender, non-distended, normal bowel sounds,  Extremities: no cyanosis, no clubbing and+edema  Neurologic: no cranial nerve deficit and speech normal    LABS:  Recent Labs     01/09/23  1454   *   K 4.3   CL 92*   CO2 18*   BUN 83*   CREATININE 3.7*   GLUCOSE 113*   CALCIUM 9.0       Recent Labs     01/09/23  1454   WBC 29.3*   RBC 3.92   HGB 12.6   HCT 38.1   MCV 97.2   MCH 32.1   MCHC 33.1   RDW 13.4   PLT 354   MPV 10.4       No results for input(s): POCGLU in the last 72 hours. Radiology: CT ABDOMEN PELVIS WO CONTRAST Additional Contrast? None    Result Date: 1/9/2023  EXAMINATION: CT OF THE ABDOMEN AND PELVIS WITHOUT CONTRAST 1/9/2023 2:32 pm TECHNIQUE: CT of the abdomen and pelvis was performed without the administration of intravenous contrast. Multiplanar reformatted images are provided for review. Automated exposure control, iterative reconstruction, and/or weight based adjustment of the mA/kV was utilized to reduce the radiation dose to as low as reasonably achievable. COMPARISON: June 16, 2021 HISTORY: ORDERING SYSTEM PROVIDED HISTORY: right flank pain r/o stone TECHNOLOGIST PROVIDED HISTORY: Reason for exam:->right flank pain r/o stone Additional Contrast?->None Decision Support Exception - unselect if not a suspected or confirmed emergency medical condition->Emergency Medical Condition (MA) FINDINGS: Lower Chest: No infiltrates or pleural effusion. Atelectasis and patchy opacities in the left lung base. Organs: Fatty liver with no focal lesions. Gallbladder is present with multiple calcified stones. Spleen is not enlarged. Pancreas and adrenal glands appear unremarkable. There are multiple nonobstructing bilateral renal stones with stranding of Ladi renal fat and thickening of the pararenal fascia. GI/Bowel: There are no obstructing or constricting lesions. Appendix is normal. Pelvis: Bladder is suboptimally distended. There is no significant free fluid. Peritoneum/Retroperitoneum: No free air or significant adenopathy. Bones/Soft Tissues: Unremarkable. Multiple nonobstructing bilateral renal stones with stranding of the Ladi renal fat and thickening of the pararenal fascia. Cholelithiasis. Thickening of the bladder wall, may be due to suboptimal distension versus cystitis. EKG: None    ASSESSMENT:      Active Problems:    * No active hospital problems.  *  Resolved Problems:    * No resolved hospital problems. *      PLAN:    Complicated UTI-recent UTI in.  Prescribed Macrobid 12/31/2022. Would complete Macrobid today. Continues to feel poorly/symptoms ongoing. CT A/P multiple nonobstructing bilateral renal stones with stranding of the perirenal fat and thickening of the pararenal fascia. Received Ceftriaxone 2g IV in ED course. Continue Abx. Follow cultures. ID input appreciated. Leukocytosis-WBC 29.3 UA+ urine culture pending. Blood cultures pending. Received ceftriaxone 2 g IV x1/1 mL NSS bolus in ED course. Continue to follow cultures. Diarrhea- Stool R/O Cdiff  Nonobstructing bilateral renal stones-CT A/P multiple nonobstructing bilateral renal stones noted. Urology input appreciated. NATALI-BUN/Crt 83/3.7. Received 1L NSS bolus in ED course. Continue IVF hydration. Avoid nephrotoxic agents. Rash-likely 2/2 ABX. Atrial fibrillation-continue Tikosyn/Eliquis/Toprol-XL. Telemetry monitoring. Follows with [facility] cardiology. HTN-continue Toprol-XL. Holding Losartan. Follow adjust as needed  HLD-continue atorvastatin. Obesity-BMI 38.57.  weight loss/dietary changes/exercise. AURELIANO-does wear CPAP at home. We will continue. Code Status: Full  DVT prophylaxis: Eliquis    NOTE: This report was transcribed using voice recognition software. Every effort was made to ensure accuracy; however, inadvertent computerized transcription errors may be present. In Review of EMR,  evaluation, management and diagnosis. Care plan has been discussed with attending. Time spent   45 minutes. Electronically signed by Dana Adames - CNP on 1/9/2023 at 4:53 PM

## 2023-01-09 NOTE — ED NOTES
Department of Emergency Medicine    FIRST PROVIDER TRIAGE NOTE             Independent MLP           1/9/23  1:58 PM EST    Date of Encounter: 1/9/23   MRN: [id number]    Vitals:    01/09/23 1357   BP: 114/64   Pulse: 68   Resp: 16   Temp: 99 °F (37.2 °C)   TempSrc: Oral   SpO2: 95%   Weight: 265 lb (120.2 kg)   Height: 5' 9.5\" (1.765 m)        HPI: Zulma Cushing is a 71 y.o. male who presents to the ED for Flank Pain (Rt flank pain)   Hx kidney stones     ROS: Negative for cp or sob. Physical Exam:   Gen Appearance/Constitutional: alert  CV: regular rate     Initial Plan of Care: All treatment areas with department are currently occupied. Plan to order/Initiate the following while awaiting opening in ED: labs and imaging studies.     Initial Plan of Care: Initiate Treatment-Testing, Proceed toTreatment Area When Bed Available for ED Attending/MLP to Continue Care    Electronically signed by Janette Burgess PA-C   DD: 1/9/23       Janette Burgess PA-C  01/09/23 [id number]

## 2023-01-09 NOTE — CONSULTS
1/9/23   3:04 PM  Service: Urology  Group: [facility] ([facility]/Al[facility])    Riddhi Fernandez  [id number]     Chief Complaint/reason for consultation:  Kidney stone, right flank pain    History of Present Illness: The patient is a 71 y.o. male patient who presents with right lower back pain, dysuria, fever. He went to an urgent care  for lower urinary symptoms, fever and shaking. He was given Macrobid. He states he is almost done with it. He does not feel much better and he now has a raised rash over his entire body. His urine culture reportedly showed E Coli (?)  He has urinary frequency, urgency dysuria, nocturia. He has a hx of kidney stones, BPH, VALLADARES. PVR in the office today was 213. He was sent in to rule out septic stone.          Past Medical History:   Diagnosis Date    Arthritis     Atrial fibrillation (HCC)     Bulging lumbar disc     L4-L5    CAD (coronary artery disease) 07/07/2016    ct scan heart    Chronic lower back pain     BREWER (dyspnea on exertion) 03/26/2013    Eczema     H/O atrial flutter 09/2021    Hyperlipidemia     Hypertension     Kidney stones     Obesity     weight 250#    Sleep apnea     c pap  stting 2         Past Surgical History:   Procedure Laterality Date    ABLATION OF DYSRHYTHMIC FOCUS  05/2013    heart ablation dr Isabel Adams DYSRHYTHMIC FOCUS  10/20/2021    Successful cryoballoon catheter ablation by Dr. Gerard Cobos  08/10/2016    CARDIOVERSION  03/24/2016    Dr. Aria Staton  10/20/2017    Dr. Perri Caputo  09/23/2021    Successful cardioversion by Dr. Maribel Penaloza      ECHOCARDIOGRAM TRANSESOPHAGEAL  05/07/2013         KNEE SURGERY Left 1969    repair left knee ligaments    LITHOTRIPSY  [address]  11/30/2016    [facility] [place]    TONSILLECTOMY      TOTAL KNEE ARTHROPLASTY Right 06/01/2020    RIGHT KNEE TOTAL ARTHROPLASTY -- SHRUTHI performed by Horacio Greenberg MD at [facility] OR    TOTAL KNEE ARTHROPLASTY Left 03/08/2021    LEFT KNEE TOTAL ARTHROPLASTY -- SHRUTHI performed by Orquidea Mancilla MD at [address]    TRANSESOPHAGEAL ECHOCARDIOGRAM  03/24/2016    Dr. Elysia Mitchell       Medications Prior to Admission:    Not in a hospital admission. Allergies:    Adhesive tape    Social History:    reports that he quit smoking about 16 years ago. His smoking use included cigarettes. He has a 20.00 pack-year smoking history. He has never used smokeless tobacco. He reports current alcohol use of about 3.0 standard drinks per week. He reports that he does not use drugs. Family History:   Non-contributory to this Urological problem  family history includes Cancer in his father and mother; Diabetes in his mother and sister; Kidney Disease in his sister. Review of Systems:  Constitutional: Fever, chills. Respiratory: negative for cough and hemoptysis  Cardiovascular: negative for chest pain and dyspnea  Gastrointestinal: right lower back pain, no abdominal pain. Derm: negative for rash and skin lesion(s)  Neurological: negative for seizures and tremors  Musculoskeletal: negative   Endocrine: negative   Psychiatric: negative   : As above in the HPI, otherwise negative  All other reviews are negative    Physical Exam:     Vitals:  /64   Pulse 68   Temp 99 °F (37.2 °C) (Oral)   Resp 16   Ht 5' 9.5\" (1.765 m)   Wt 265 lb (120.2 kg)   SpO2 95%   BMI 38.57 kg/m²     General:  Awake, alert, oriented X 3. Well developed, well nourished, well groomed. No apparent distress. HEENT:  Normocephalic, atraumatic. Pupils equal, round. No scleral icterus. No conjunctival injection. Normal lips, teeth, and gums. No nasal discharge. Neck:  Supple, no masses. Heart:  RRR  Lungs:  No audible wheezing. Respirations symmetric and non-labored.   Abdomen:  soft, nontender, no masses, no organomegaly, no peritoneal signs  Extremities:  No clubbing, cyanosis, or edema  Skin:  Warm and dry, no open lesions or rashes  Neuro: There are no motor or sensory deficits in the 4 quadrant extremities   Rectal: deferred  Genitalia: deferred     Labs:     Pending    PSA: 1/6/23     1.93    EXAMINATION:   CT OF THE ABDOMEN AND PELVIS WITHOUT CONTRAST 1/9/2023 2:32 pm       TECHNIQUE:   CT of the abdomen and pelvis was performed without the administration of   intravenous contrast. Multiplanar reformatted images are provided for review. Automated exposure control, iterative reconstruction, and/or weight based   adjustment of the mA/kV was utilized to reduce the radiation dose to as low   as reasonably achievable. COMPARISON:   June 16, 2021       HISTORY:   ORDERING SYSTEM PROVIDED HISTORY: right flank pain r/o stone   TECHNOLOGIST PROVIDED HISTORY:   Reason for exam:->right flank pain r/o stone   Additional Contrast?->None   Decision Support Exception - unselect if not a suspected or confirmed   emergency medical condition->Emergency Medical Condition (MA)       FINDINGS:   Lower Chest: No infiltrates or pleural effusion. Atelectasis and patchy   opacities in the left lung base. Organs: Fatty liver with no focal lesions. Gallbladder is present with   multiple calcified stones. Spleen is not enlarged. Pancreas and adrenal   glands appear unremarkable. There are multiple nonobstructing bilateral   renal stones with stranding of Ladi renal fat and thickening of the pararenal   fascia. GI/Bowel: There are no obstructing or constricting lesions. Appendix is   normal.       Pelvis: Bladder is suboptimally distended. There is no significant free   fluid. Peritoneum/Retroperitoneum: No free air or significant adenopathy. Bones/Soft Tissues: Unremarkable. Impression   Multiple nonobstructing bilateral renal stones with stranding of the Ladi   renal fat and thickening of the pararenal fascia. Cholelithiasis.        Thickening of the bladder wall, may be due to suboptimal distension versus   cystitis.                  Assessment/plan:    Complicated UTI  Rash (on Macrobid currently)  Non obstructive renal stones    Awaiting labs   Discussed with the ER physician  Discussed with wife  Waiting for lab results     [facility]     Electronically signed by MARIA E Caraballo CNP on 1/9/2023 at 3:04 PM     Agree with above  Seen and examined  Agree with the plan and treatment    [facility], DO

## 2023-01-09 NOTE — ED NOTES
FORTINO faxed, spoke to [facility], confirmation received, pt to be transported momentarily.       Shashi Linn RN  01/09/23 [id number]

## 2023-01-10 PROBLEM — N20.0 RENAL CALCULI: Status: ACTIVE | Noted: 2023-01-10

## 2023-01-10 PROBLEM — N39.0 COMPLICATED UTI (URINARY TRACT INFECTION): Status: ACTIVE | Noted: 2023-01-10

## 2023-01-10 PROBLEM — R19.7 DIARRHEA: Status: ACTIVE | Noted: 2023-01-10

## 2023-01-10 PROBLEM — D72.829 LEUKOCYTOSIS: Status: ACTIVE | Noted: 2023-01-10

## 2023-01-10 PROBLEM — R21 RASH: Status: ACTIVE | Noted: 2023-01-10

## 2023-01-10 LAB
ALBUMIN SERPL-MCNC: 3.3 G/DL (ref 3.5–5.2)
ALP BLD-CCNC: 205 U/L (ref 40–129)
ALT SERPL-CCNC: 19 U/L (ref 0–40)
ANION GAP SERPL CALCULATED.3IONS-SCNC: 15 MMOL/L (ref 7–16)
AST SERPL-CCNC: 10 U/L (ref 0–39)
BASOPHILS ABSOLUTE: 0.05 E9/L (ref 0–0.2)
BASOPHILS RELATIVE PERCENT: 0.3 % (ref 0–2)
BILIRUB SERPL-MCNC: 0.3 MG/DL (ref 0–1.2)
BUN BLDV-MCNC: 83 MG/DL (ref 6–23)
C DIFF TOXIN/ANTIGEN: NORMAL
C-REACTIVE PROTEIN: 6.6 MG/DL (ref 0–0.4)
CALCIUM SERPL-MCNC: 8.9 MG/DL (ref 8.6–10.2)
CHLORIDE BLD-SCNC: 97 MMOL/L (ref 98–107)
CO2: 19 MMOL/L (ref 22–29)
CREAT SERPL-MCNC: 3.3 MG/DL (ref 0.7–1.2)
EOSINOPHILS ABSOLUTE: 1.15 E9/L (ref 0.05–0.5)
EOSINOPHILS RELATIVE PERCENT: 6.3 % (ref 0–6)
GFR SERPL CREATININE-BSD FRML MDRD: 19 ML/MIN/1.73
GLUCOSE BLD-MCNC: 85 MG/DL (ref 74–99)
HCT VFR BLD CALC: 38 % (ref 37–54)
HEMOGLOBIN: 12.8 G/DL (ref 12.5–16.5)
IMMATURE GRANULOCYTES #: 0.14 E9/L
IMMATURE GRANULOCYTES %: 0.8 % (ref 0–5)
LACTIC ACID: 0.9 MMOL/L (ref 0.5–2.2)
LYMPHOCYTES ABSOLUTE: 1.75 E9/L (ref 1.5–4)
LYMPHOCYTES RELATIVE PERCENT: 9.6 % (ref 20–42)
MCH RBC QN AUTO: 32.5 PG (ref 26–35)
MCHC RBC AUTO-ENTMCNC: 33.7 % (ref 32–34.5)
MCV RBC AUTO: 96.4 FL (ref 80–99.9)
MONOCYTES ABSOLUTE: 0.81 E9/L (ref 0.1–0.95)
MONOCYTES RELATIVE PERCENT: 4.4 % (ref 2–12)
NEUTROPHILS ABSOLUTE: 14.36 E9/L (ref 1.8–7.3)
NEUTROPHILS RELATIVE PERCENT: 78.6 % (ref 43–80)
PDW BLD-RTO: 13 FL (ref 11.5–15)
PLATELET # BLD: 334 E9/L (ref 130–450)
PMV BLD AUTO: 10.4 FL (ref 7–12)
POTASSIUM REFLEX MAGNESIUM: 4 MMOL/L (ref 3.5–5)
RBC # BLD: 3.94 E12/L (ref 3.8–5.8)
SODIUM BLD-SCNC: 131 MMOL/L (ref 132–146)
TOTAL PROTEIN: 7.6 G/DL (ref 6.4–8.3)
TSH SERPL DL<=0.05 MIU/L-ACNC: 1.7 UIU/ML (ref 0.27–4.2)
WBC # BLD: 18.3 E9/L (ref 4.5–11.5)

## 2023-01-10 PROCEDURE — 2580000003 HC RX 258: Performed by: FAMILY MEDICINE

## 2023-01-10 PROCEDURE — 36415 COLL VENOUS BLD VENIPUNCTURE: CPT

## 2023-01-10 PROCEDURE — 6360000002 HC RX W HCPCS: Performed by: SPECIALIST

## 2023-01-10 PROCEDURE — 2060000000 HC ICU INTERMEDIATE R&B

## 2023-01-10 PROCEDURE — 2580000003 HC RX 258: Performed by: SPECIALIST

## 2023-01-10 PROCEDURE — 6360000002 HC RX W HCPCS: Performed by: FAMILY MEDICINE

## 2023-01-10 PROCEDURE — 94660 CPAP INITIATION&MGMT: CPT

## 2023-01-10 PROCEDURE — 6370000000 HC RX 637 (ALT 250 FOR IP): Performed by: FAMILY MEDICINE

## 2023-01-10 PROCEDURE — 85025 COMPLETE CBC W/AUTO DIFF WBC: CPT

## 2023-01-10 PROCEDURE — APPSS60 APP SPLIT SHARED TIME 46-60 MINUTES: Performed by: CLINICAL NURSE SPECIALIST

## 2023-01-10 PROCEDURE — 86140 C-REACTIVE PROTEIN: CPT

## 2023-01-10 PROCEDURE — 6370000000 HC RX 637 (ALT 250 FOR IP): Performed by: INTERNAL MEDICINE

## 2023-01-10 PROCEDURE — 80053 COMPREHEN METABOLIC PANEL: CPT

## 2023-01-10 PROCEDURE — 99223 1ST HOSP IP/OBS HIGH 75: CPT | Performed by: INTERNAL MEDICINE

## 2023-01-10 PROCEDURE — 99232 SBSQ HOSP IP/OBS MODERATE 35: CPT | Performed by: NURSE PRACTITIONER

## 2023-01-10 PROCEDURE — 51798 US URINE CAPACITY MEASURE: CPT

## 2023-01-10 PROCEDURE — 83605 ASSAY OF LACTIC ACID: CPT

## 2023-01-10 PROCEDURE — 2580000003 HC RX 258: Performed by: NURSE PRACTITIONER

## 2023-01-10 PROCEDURE — 84443 ASSAY THYROID STIM HORMONE: CPT

## 2023-01-10 RX ORDER — ATORVASTATIN CALCIUM 20 MG/1
20 TABLET, FILM COATED ORAL NIGHTLY
Status: DISCONTINUED | OUTPATIENT
Start: 2023-01-10 | End: 2023-01-13 | Stop reason: HOSPADM

## 2023-01-10 RX ORDER — CETIRIZINE HYDROCHLORIDE 10 MG/1
5 TABLET ORAL DAILY
Status: DISCONTINUED | OUTPATIENT
Start: 2023-01-10 | End: 2023-01-13 | Stop reason: HOSPADM

## 2023-01-10 RX ORDER — METOPROLOL SUCCINATE 100 MG/1
100 TABLET, EXTENDED RELEASE ORAL DAILY
Status: DISCONTINUED | OUTPATIENT
Start: 2023-01-10 | End: 2023-01-13 | Stop reason: HOSPADM

## 2023-01-10 RX ORDER — SODIUM CHLORIDE 9 MG/ML
INJECTION, SOLUTION INTRAVENOUS CONTINUOUS
Status: DISCONTINUED | OUTPATIENT
Start: 2023-01-10 | End: 2023-01-12

## 2023-01-10 RX ADMIN — METOPROLOL SUCCINATE 100 MG: 100 TABLET, EXTENDED RELEASE ORAL at 10:51

## 2023-01-10 RX ADMIN — HYDROMORPHONE HYDROCHLORIDE 0.5 MG: 0.5 INJECTION, SOLUTION INTRAMUSCULAR; INTRAVENOUS; SUBCUTANEOUS at 12:42

## 2023-01-10 RX ADMIN — HYDROMORPHONE HYDROCHLORIDE 0.5 MG: 0.5 INJECTION, SOLUTION INTRAMUSCULAR; INTRAVENOUS; SUBCUTANEOUS at 07:07

## 2023-01-10 RX ADMIN — APIXABAN 5 MG: 5 TABLET, FILM COATED ORAL at 20:32

## 2023-01-10 RX ADMIN — SODIUM CHLORIDE: 9 INJECTION, SOLUTION INTRAVENOUS at 09:26

## 2023-01-10 RX ADMIN — APIXABAN 5 MG: 5 TABLET, FILM COATED ORAL at 08:57

## 2023-01-10 RX ADMIN — SODIUM CHLORIDE, PRESERVATIVE FREE 10 ML: 5 INJECTION INTRAVENOUS at 08:57

## 2023-01-10 RX ADMIN — SODIUM CHLORIDE: 9 INJECTION, SOLUTION INTRAVENOUS at 19:23

## 2023-01-10 RX ADMIN — WATER 2000 MG: 1 INJECTION INTRAMUSCULAR; INTRAVENOUS; SUBCUTANEOUS at 12:35

## 2023-01-10 RX ADMIN — CETIRIZINE HYDROCHLORIDE 5 MG: 10 TABLET, FILM COATED ORAL at 22:57

## 2023-01-10 RX ADMIN — HYDROMORPHONE HYDROCHLORIDE 0.5 MG: 0.5 INJECTION, SOLUTION INTRAMUSCULAR; INTRAVENOUS; SUBCUTANEOUS at 20:32

## 2023-01-10 RX ADMIN — ATORVASTATIN CALCIUM 20 MG: 20 TABLET, FILM COATED ORAL at 20:32

## 2023-01-10 RX ADMIN — SODIUM CHLORIDE, PRESERVATIVE FREE 10 ML: 5 INJECTION INTRAVENOUS at 20:33

## 2023-01-10 RX ADMIN — FAMOTIDINE 20 MG: 20 TABLET, FILM COATED ORAL at 08:57

## 2023-01-10 RX ADMIN — Medication 3 MG: at 20:32

## 2023-01-10 ASSESSMENT — PAIN DESCRIPTION - LOCATION
LOCATION: FLANK
LOCATION: BACK
LOCATION: FLANK

## 2023-01-10 ASSESSMENT — PAIN SCALES - GENERAL
PAINLEVEL_OUTOF10: 7
PAINLEVEL_OUTOF10: 3

## 2023-01-10 ASSESSMENT — PAIN DESCRIPTION - ORIENTATION
ORIENTATION: RIGHT
ORIENTATION: RIGHT;LEFT
ORIENTATION: RIGHT

## 2023-01-10 ASSESSMENT — PAIN DESCRIPTION - DESCRIPTORS
DESCRIPTORS: ACHING
DESCRIPTORS: ACHING;DISCOMFORT;STABBING
DESCRIPTORS: ACHING

## 2023-01-10 ASSESSMENT — PAIN DESCRIPTION - PAIN TYPE
TYPE: ACUTE PAIN
TYPE: ACUTE PAIN

## 2023-01-10 ASSESSMENT — PAIN - FUNCTIONAL ASSESSMENT
PAIN_FUNCTIONAL_ASSESSMENT: ACTIVITIES ARE NOT PREVENTED
PAIN_FUNCTIONAL_ASSESSMENT: ACTIVITIES ARE NOT PREVENTED
PAIN_FUNCTIONAL_ASSESSMENT: PREVENTS OR INTERFERES SOME ACTIVE ACTIVITIES AND ADLS

## 2023-01-10 NOTE — PROGRESS NOTES
[address] [place] [address] Hospitalist   Progress Note    Admitting Date and Time: 1/9/2023  1:59 PM  Admit Dx: Acute pyelonephritis [N10]    Subjective:    Pt feels about the same as yesterday. Questions regarding cardiac medications. Spoke to family on the phone. Kidney function slightly improved today we will start IVF hydration. Repeat EKG. Await cardiology/infectious disease/urology input. Per RN: Cardiac Medication no reordered. ROS: denies fever, chills, cp, sob, n/v, HA unless stated above.     metoprolol succinate  100 mg Oral Daily    atorvastatin  20 mg Oral Nightly    [START ON 1/11/2023] cefTRIAXone (ROCEPHIN) IV  2,000 mg IntraVENous Q24H    sodium chloride flush  5-40 mL IntraVENous 2 times per day    famotidine  20 mg Oral Daily    [Held by provider] apixaban  5 mg Oral BID     sodium chloride flush, 5-40 mL, PRN  sodium chloride, , PRN  acetaminophen, 650 mg, Q6H PRN   Or  acetaminophen, 650 mg, Q6H PRN  polyethylene glycol, 17 g, Daily PRN  melatonin, 3 mg, Nightly PRN  HYDROmorphone, 0.5 mg, Q3H PRN  prochlorperazine, 10 mg, Q6H PRN  Calamine, , PRN       Objective:    /64   Pulse 60   Temp 97 °F (36.1 °C) (Temporal)   Resp 18   Ht 5' 9.5\" (1.765 m)   Wt 270 lb 8 oz (122.7 kg)   SpO2 94%   BMI 39.37 kg/m²   General Appearance: alert and oriented to person, place and time and in no acute distress  Skin: warm and dry.   Rash improving  Head: normocephalic and atraumatic  Eyes: pupils equal, round, and reactive to light, extraocular eye movements intact, conjunctivae normal  Neck: neck supple and non tender without mass   Pulmonary/Chest: clear to auscultation bilaterally- no wheezes, rales or rhonchi, normal air movement, no respiratory distress  Cardiovascular: normal rate, normal S1 and S2 and no RGM  Abdomen: soft, non-tender, non-distended, normal bowel sounds  Extremities: no cyanosis, no clubbing and no edema  Neurologic: no cranial nerve deficit and speech normal      Recent Labs     01/09/23  1454 01/10/23  0652   * 131*   K 4.3 4.0   CL 92* 97*   CO2 18* 19*   BUN 83* 83*   CREATININE 3.7* 3.3*   GLUCOSE 113* 85   CALCIUM 9.0 8.9         Recent Labs     01/09/23  1454 01/10/23  0652   ALKPHOS 218* 205*   PROT 7.7 7.6   LABALBU 3.3* 3.3*   BILITOT 0.6 0.3   AST 14 10   ALT 22 19         Recent Labs     01/09/23  1454 01/10/23  0652   WBC 29.3* 18.3*   RBC 3.92 3.94   HGB 12.6 12.8   HCT 38.1 38.0   MCV 97.2 96.4   MCH 32.1 32.5   MCHC 33.1 33.7   RDW 13.4 13.0    334   MPV 10.4 10.4              Radiology:   CT ABDOMEN PELVIS WO CONTRAST Additional Contrast? None   Final Result   Multiple nonobstructing bilateral renal stones with stranding of the Ladi   renal fat and thickening of the pararenal fascia. Cholelithiasis. Thickening of the bladder wall, may be due to suboptimal distension versus   cystitis. Assessment:  Principal Problem:    Acute pyelonephritis  Resolved Problems:    * No resolved hospital problems. *      Plan:  Complicated UTI/Acute Pyelonephritis-recent UTI in.  Prescribed Macrobid 12/31/2022. Would complete Macrobid today. Continues to feel poorly/symptoms ongoing. CT A/P multiple nonobstructing bilateral renal stones with stranding of the perirenal fat and thickening of the pararenal fascia. Received Ceftriaxone 2g IV in ED course. Continue Abx. Follow cultures. ID input appreciated. Leukocytosis- Improving Recent Macrobid completed 10 day course. WBC 29.3>18.3 UA+ urine culture pending. Blood cultures pending. Received ceftriaxone 2 g IV x1/1 mL NSS bolus in ED course. Continue to follow cultures. ID input appreciated. Hyponatremia- Na+ 128>131. Received 1L NSS bolus in ED course. Continue IVF hydration follow. Diarrhea- Stool R/O Cdiff pending  Nonobstructing bilateral renal stones-CT A/P multiple nonobstructing bilateral renal stones noted. Urology input appreciated.   NATALI- improving BUN/Crt 83/3.7>83/3.3. Received 1L NSS bolus in ED course. Continue IVF hydration. Avoid nephrotoxic agents. Nephrology consultation with worsening renal function. Rash-likely 2/2 ABX. Calamine lotion. Monitor for worsening  Atrial fibrillation-continue Eliquis. Qtc Prolongation, Toprol-XL/Tikosyn held. Repeat EKG. Telemetry monitoring. Cardiology input appreciated. HTN-Toprol-XL held 2/2 hypotension and bradycardia. Holding Losartan. Follow adjust as needed  HLD-continue atorvastatin. Obesity-BMI 38.57.  weight loss/dietary changes/exercise. AURELIANO-does wear CPAP at home. We will continue. NOTE: This report was transcribed using voice recognition software. Every effort was made to ensure accuracy; however, inadvertent computerized transcription errors may be present. In Review of EMR,  evaluation, management and diagnosis. Care plan has been discussed with attending. Time spent 22  minutes. Electronically signed by Ermelinda Vega CNP on 1/10/2023 at 3:48 PM

## 2023-01-10 NOTE — CARE COORDINATION
CASE MANAGEMENT. ... Chart reviewed. Patient failed outpt treatment for uti. Is now admitted with pyelonephritis/uti-sepsis. Monitoring labs closely. On ivf. Per conversation with patient and his wife, urology saw and have no plans for surgical intervention at this time. Diet order was placed. ID following for atbs. On iv rocephin. Cardio on board for a fib and med management. Patient on eliquis and tikosyn pta. Mr Karen Rouse is independent. Uses cpap at  - from [facility] - brought in from home. Has no other dme. Has history with [facility] and [facility]- but cannot recall facility. Discharge plan is home. If HHC needed, is requesting [facility]. PCP is Dr Kwaku Paige. Will cont to follow for needs and assist accordingly.

## 2023-01-10 NOTE — H&P
[id number] [facility] Hospitalist   Progress Note    Admitting Date and Time: 1/9/2023  1:59 PM  Admit Dx: Acute pyelonephritis [N10]    Subjective:    Pt feels about the same as yesterday. Questions regarding cardiac medications. Spoke to family on the phone. Kidney function slightly improved today we will start IVF hydration. Repeat EKG. Await cardiology/infectious disease/urology input. Per RN: Cardiac Medication no reordered. ROS: denies fever, chills, cp, sob, n/v, HA unless stated above.     sodium chloride flush  5-40 mL IntraVENous 2 times per day    famotidine  20 mg Oral Daily    apixaban  5 mg Oral BID     sodium chloride flush, 5-40 mL, PRN  sodium chloride, , PRN  acetaminophen, 650 mg, Q6H PRN   Or  acetaminophen, 650 mg, Q6H PRN  polyethylene glycol, 17 g, Daily PRN  melatonin, 3 mg, Nightly PRN  HYDROmorphone, 0.5 mg, Q3H PRN  prochlorperazine, 10 mg, Q6H PRN  Calamine, , PRN         Objective:    BP (!) 147/66   Pulse 72   Temp 96.9 °F (36.1 °C) (Temporal)   Resp 18   Ht 5' 9.5\" (1.765 m)   Wt 270 lb 8 oz (122.7 kg)   SpO2 97%   BMI 39.37 kg/m²   General Appearance: alert and oriented to person, place and time and in no acute distress  Skin: warm and dry.   Rash improving  Head: normocephalic and atraumatic  Eyes: pupils equal, round, and reactive to light, extraocular eye movements intact, conjunctivae normal  Neck: neck supple and non tender without mass   Pulmonary/Chest: clear to auscultation bilaterally- no wheezes, rales or rhonchi, normal air movement, no respiratory distress  Cardiovascular: normal rate, normal S1 and S2 and no RGM  Abdomen: soft, non-tender, non-distended, normal bowel sounds  Extremities: no cyanosis, no clubbing and no edema  Neurologic: no cranial nerve deficit and speech normal      Recent Labs     01/09/23  1454 01/10/23  0652   * 131*   K 4.3 4.0   CL 92* 97*   CO2 18* 19*   BUN 83* 83*   CREATININE 3.7* 3.3*   GLUCOSE 113* 85   CALCIUM 9.0 8.9       Recent Labs     01/09/23  1454 01/10/23  0652   ALKPHOS 218* 205*   PROT 7.7 7.6   LABALBU 3.3* 3.3*   BILITOT 0.6 0.3   AST 14 10   ALT 22 19       Recent Labs     01/09/23  1454 01/10/23  0652   WBC 29.3* 18.3*   RBC 3.92 3.94   HGB 12.6 12.8   HCT 38.1 38.0   MCV 97.2 96.4   MCH 32.1 32.5   MCHC 33.1 33.7   RDW 13.4 13.0    334   MPV 10.4 10.4            Radiology:   CT ABDOMEN PELVIS WO CONTRAST Additional Contrast? None   Final Result   Multiple nonobstructing bilateral renal stones with stranding of the Ladi   renal fat and thickening of the pararenal fascia. Cholelithiasis. Thickening of the bladder wall, may be due to suboptimal distension versus   cystitis. Assessment:  Principal Problem:    Acute pyelonephritis  Resolved Problems:    * No resolved hospital problems. *      Plan:  Complicated UTI/Acute Pyelonephritis-recent UTI in.  Prescribed Macrobid 12/31/2022. Would complete Macrobid today. Continues to feel poorly/symptoms ongoing. CT A/P multiple nonobstructing bilateral renal stones with stranding of the perirenal fat and thickening of the pararenal fascia. Received Ceftriaxone 2g IV in ED course. Continue Abx. Follow cultures. ID input appreciated. Leukocytosis- Improving Recent Macrobid completed 10 day course. WBC 29.3>18.3 UA+ urine culture pending. Blood cultures pending. Received ceftriaxone 2 g IV x1/1 mL NSS bolus in ED course. Continue to follow cultures. ID input appreciated. Hyponatremia- Na+ 128>131. Received 1L NSS bolus in ED course. Continue IVF hydration follow. Diarrhea- Stool R/O Cdiff pending  Nonobstructing bilateral renal stones-CT A/P multiple nonobstructing bilateral renal stones noted. Urology input appreciated. NATALI- improving BUN/Crt 83/3.7>83/3.3. Received 1L NSS bolus in ED course. Continue IVF hydration. Avoid nephrotoxic agents. Nephrology consultation with worsening renal function. Rash-likely 2/2 ABX. Calamine lotion. Monitor for worsening  Atrial fibrillation-continue Eliquis. Qtc Prolongation, Toprol-XL/Tikosyn held. Repeat EKG. Telemetry monitoring. Cardiology input appreciated. HTN-Toprol-XL held 2/2 hypotension and bradycardia. Holding Losartan. Follow adjust as needed  HLD-continue atorvastatin. Obesity-BMI 38.57.  weight loss/dietary changes/exercise. AURELIANO-does wear CPAP at home. We will continue. NOTE: This report was transcribed using voice recognition software. Every effort was made to ensure accuracy; however, inadvertent computerized transcription errors may be present. In Review of EMR,  evaluation, management and diagnosis. Care plan has been discussed with attending. Time spent 22  minutes. Electronically signed by Pato Vega CNP on 1/10/2023 at 9:27 AM

## 2023-01-10 NOTE — PROGRESS NOTES
1/10/2023 11:52 AM  Brandin Neely  [id number]    Subjective:    Doing well   No acute events overnight  No flank pain   Reports he is voiding comfortably  Wife at bedside     Review of Systems  Constitutional: No fever or chills   Respiratory: negative for cough and hemoptysis  Cardiovascular: negative for chest pain and dyspnea  Gastrointestinal: negative for abdominal pain, diarrhea, nausea and vomiting   : See above  Derm: negative for rash and skin lesion(s)  Neurological: negative for seizures and tremors  Musculoskeletal: Negative    Psychiatric: Negative   All other reviews are negative      Scheduled Meds:   metoprolol succinate  100 mg Oral Daily    atorvastatin  20 mg Oral Nightly    cefTRIAXone (ROCEPHIN) IV  2,000 mg IntraVENous Once    sodium chloride flush  5-40 mL IntraVENous 2 times per day    famotidine  20 mg Oral Daily    [Held by provider] apixaban  5 mg Oral BID       Objective:  Vitals:    01/10/23 0930   BP:    Pulse:    Resp:    Temp:    SpO2: 94%         Allergies: Adhesive tape    General Appearance: alert and oriented to person, place and time and in no acute distress  Skin: no rash or erythema  Head: normocephalic and atraumatic  Pulmonary/Chest: normal air movement, no respiratory distress  Abdomen: soft, non-tender, non-distended  Genitourinary: no solomon   Extremities: no cyanosis, clubbing or edema         Labs:     Recent Labs     01/10/23  0652   *   K 4.0   CL 97*   CO2 19*   BUN 83*   CREATININE 3.3*   GLUCOSE 85   CALCIUM 8.9       Lab Results   Component Value Date/Time    HGB 12.8 01/10/2023 06:52 AM    HCT 38.0 01/10/2023 06:52 AM       Lab Results   Component Value Date    PSA 1.15 08/24/2015         Assessment/Plan:  Complicated UTI   Pyelonephritis   Bilateral renal calculi   Azotemia     Continue to watch the creatinine, 3.7>>3.3  Leukocytosis improving, 29>>18.3  Urine and blood cultures pending at this time  He will continue antibiotics per infectious disease recommendations, currently on Ceftriaxone   Check PVR  Bilateral renal calculi are nonobstructing at this time and require no acute  interventions such as stenting currently. No hydronephrosis noted.  Will need outpatient follow up to discuss potential management of stones in the future   Will follow       MARIA E Vargas - [facility]  Urology

## 2023-01-10 NOTE — CONSULTS
Inpatient Cardiology Consultation      Reason for Consult:  A Fib on [facility] Physician: Dr. Mariana Monet    Requesting Physician:  Dr. Kate Brooks    Date of Consultation: 1/10/2023    HISTORY OF PRESENT ILLNESS:   Mr. Lesly Boeck is a 71 yr old male who follows with Dr Fran Macdonald.  Seen last in  his office on 2/10/2022    Patient also follows with EP Dr. Anika Rdz. Seen in office 11/11/2022    PMH: CAD / HFiEF (50% >> 64%); A Fib / Flutter s/p multiple EP procedures (see below) on Tikosyn 375 mcg every 12 hours & Toprol  mg QD- OAC Eliquis 5 mg BID; Mild MR/AR, RBBB, HTN, Obesity, Former Tobacco, AURELIANO - CPA, HLD, CKD, chronic back pain    [facility] ED: 1/9/2023 1347 via walk in for R Flank pain, fevers, urinary frequency, diarrhea - recent UTI dx 10 days ago with UTI - finished ATB  ED arrival vitals: T 99 HR 68 RR 16 /64 SPO2 95% RA   ED Tx: NS IV bolus 1 L, Rocephin, Toradol, - Tikosyn placed on hold due to    Significant labs:  >> 131 BUN 83 Cr 3.7 >> BUN 83 Cr 3.3  K 4.0 Mag 2.5 Lactic acid 1.1 Alb 3.3 Alk Phos 205 WBC 29.3 >> 18.3 H/H 12.8 / 38.0   UA: + nitrates & leukocytes  RAD CT abd / pelvis w/o contrast: fatty liver, gallbladder stones, multiple nonobstructive bilateral renal stones with stranding of the Ladi renal fat and thickening of the pararenal fascia. Thickening of the bladder wall, may be due to suboptimal distension versus cystitis. Pt. Seen & examined:  Recent Vitals: T 96.9 HR 72 /66 SPO2 97% RA  Patient tells me that he has had diarrhea for 1 month (no blood) and that about 10 days ago he was diagnosed with a UTI at an outpatient ready care and was prescribed an unknown antibiotic (records requested). He says 1 week ago, he developed a rash on his entire body - pruritic. He continued to have urinary frequency R flank pain intermittent fevers, so reported to ED.   Patient denies chest pain, SOB, BREWER - climbs 12 steps at home slowly with no difficulty, no palpations, no orthopnea or PND. Tele Strips:  SR BBB 59-75   12 lead EKG: SB 59 RBBB QTc 526      Please note: past medical records were reviewed per electronic medical record (EMR) - see detailed reports under Past Medical/ Surgical History. Past Medical History:    CAD / HFiEF (50% >> 64%) / Mild VHD  Exercise MPS: 03/27/2013. 7.3 minutes  Naga protocol without chest pain or ST changes. There was no ventricular ectopy. Baseline RBBB. He developed atrial flutter with 1:1 conduction at peak exercise, HR approximately 200. He had no symptoms. BP response normal.   Perfusion normal, no ischemia, infarction or TID. Gated wall motion normal  EF 49%. Rate slowed to about 80 bpm after 5 mg IV metoprolol. Coronary CT angiogram 07/07/2016: Dilated left atrium. Mild to moderate atherosclerotic calcifications of the coronary arteries mainly LAD. TTE: 2/1/2017: (Dr. Jcarlos Interiano): Normal left ventricular systolic function. Ejection fraction is calculated at 55%. Normal right ventricular function (TAPSE 1.7 cm). There is doppler evidence of stage II diastolic dysfunction. Mildly dilated left atrium by volume index. Unable to estimate PASP due to incomplete tricuspid regurgitation envelope. Mild pulmonic regurgitation. BUSHRA: 10/20/2017: (Dr. Janet Paul): EF 50%, no thrombus in the left atrial appendage, mild aortic atherosclerosis  Intolerant of metoprolol-fatigue    Pharmacological MPS: 2/20/2020: No EKG changes, normal imaging, low risk pharmacological stress test, EF 64%. BUSHRA: 10/20/21 (Dr. Alesia Yuan)  Normal left ventricle size and systolic function. Normal sized left atrium. There is no left atrial appendage thrombus. No evidence of patent foramen ovale during color flow doppler study. Mild mitral regurgitation is present. Mild aortic regurgitation is noted. Physiologic and/or trace tricuspid regurgitation. Mild atherosclerotic change of ascending aorta.   A pericardial clear space is present suggesting a prominent pericardial  fat pad. A Fib / Flutter   OAC Eliquis   Atrial Tachycardia S/P successful RF ablation of atrial tachycardia originated from tricuspid annulus on 10/2021. All pulmonary veins remained in electrical isolation. Cavotricuspid isthmus line remained in bidirectional block. S/p AF CTI ablation 2/0/9863  procedure complicated by airway compromise, anatomical variants c/w Eustachian ridge and pouch, extended procedure time and multiple lines required to achieve bidirectional block. Redo flutter ablation and PVI (RFA) on 8/10/16. October 20, 2017 he underwent a successful BUSHRA/DCCV with Tikosyn drug loading for persistent symptomatic atrial flutter - Tikosyn 375 mcg every 12 hours. 24-hour Holter monitor 1/3/19: revealed sinus rhythm from  bpm.   There was no evidence of AF, AFl, SVT, VT, or AV block. No symptoms were reported. Presented on 8/22/21,  9/23/21 and 10/2/21 with appeared to be typical atrial flutter and underwent DC-cardioversion's  Underwent EP study and successful RF ablation of atrial tachycardia originated from tricuspid annulus on 10/2021. All pulmonary veins remained in electrical isolation.  Cavotricuspid isthmus line remained in bidirectional block    RBBB  HTN  Obesity BMI 39.4 1/2023  AURELIANO - CPAP  Former Tobacco quit 2007 - 60 pack years  HLD  BPH  CKD III (baseline Cr 1.1 - 1.7)  Kidney stone s/p lithotripsy 1986   Erectile dysfunction  Eczema  OA s/p TKR bilateral  Chronic back pain with sciatica s/p lumbar disc surgery / chronic R foot drop / hx injections  COVID 19 1/2022    Past Surgical History:    Past Surgical History:   Procedure Laterality Date    ABLATION OF DYSRHYTHMIC FOCUS  05/2013    heart ablation dr Curry Grade DYSRHYTHMIC FOCUS  10/20/2021    Successful cryoballoon catheter ablation by Dr. Suleiman Heredia  08/10/2016    CARDIOVERSION  03/24/2016    Dr. Jose Silvestre  10/20/2017    Dr. Isabelle Wilder CARDIOVERSION  09/23/2021    Successful cardioversion by Dr. Astrid Gonzales      ECHOCARDIOGRAM TRANSESOPHAGEAL  05/07/2013         KNEE SURGERY Left 1969    repair left knee ligaments    LITHOTRIPSY  [address] [facility] [address] [address]  11/30/2016    [facility] [place]    TONSILLECTOMY      TOTAL KNEE ARTHROPLASTY Right 06/01/2020    RIGHT KNEE TOTAL ARTHROPLASTY -- SHRUTHI performed by Shaylee Rodriguez MD at [address] [facility] Left 03/08/2021    LEFT KNEE TOTAL ARTHROPLASTY -- SHRUTHI performed by Shaylee Rodriguez MD at [address] [place]    TRANSESOPHAGEAL ECHOCARDIOGRAM  03/24/2016    Dr. Aron Vyas           Medications Prior to admit:  Prior to Admission medications    Medication Sig Start Date End Date Taking? Authorizing Provider   chlorthalidone (HYGROTON) 25 MG tablet TAKE ONE TABLET BY MOUTH EVERY DAY 12/27/22   Jordi RashDO   dofetilide (TIKOSYN) 125 MCG capsule Take 1 capsule by mouth 2 times daily 11/11/22   Sravan Patton MD   dofetilide (TIKOSYN) 250 MCG capsule Take 1 capsule by mouth 2 times daily TAKE 1 CAPSULE BY MOUTH 2 TIMES DAILY ALONG WITH THE 125MCG DOSE TO EQUAL 375MCG DOSE 2 TIMES DAILY 11/11/22   Ever Hemphill MD   metoprolol succinate (TOPROL XL) 100 MG extended release tablet TAKE ONE TABLET BY MOUTH TWO TIMES A DAY 9/20/22   MARIA E Lane - CNP   ELIQUIS 5 MG TABS tablet TAKE ONE TABLET BY MOUTH TWO TIMES A DAY 6/16/22   Sravan Patton MD   atorvastatin (LIPITOR) 20 MG tablet TAKE ONE TABLET BY MOUTH EVERY DAY 2/22/22   Anna Terrell MD   QUERCETIN PO Take 500 mg by mouth daily    Historical Provider, MD   tamsulosin (FLOMAX) 0.4 MG capsule TAKE ONE CAPSULE BY MOUTH EVERY DAY AT BEDTIME 9/24/21   Historical Provider, MD   gabapentin (NEURONTIN) 100 MG capsule Take 1 capsule by mouth 2 times daily as needed (pain) for up to 90 days.  12/15/21 3/15/22  Shaylee Rodriguez MD   gabapentin (NEURONTIN) 100 MG capsule Take 1 capsule by mouth 2 times daily for 30 days. Patient not taking: Reported on 2/10/2022 8/11/21 9/10/21  Nanette Khan MD   acetaminophen (TYLENOL) 500 MG tablet Take 2 tablets by mouth 3 times daily as needed for Pain 3/22/21   Hernandez Rapp PA-C   losartan (COZAAR) 50 MG tablet Take 50 mg by mouth daily    Historical Provider, MD   Misc Natural Products (TART CHERRY ADVANCED PO) Take by mouth STOP PREOP MED    Historical Provider, MD   magnesium oxide (MAG-OX) 400 (240 Mg) MG tablet Take 1 tablet by mouth 2 times daily  Patient taking differently: Take 400 mg by mouth daily 10/23/17   Danny Lozoya, DO   Multiple Vitamins-Minerals (THERAPEUTIC MULTIVITAMIN-MINERALS) tablet Take 1 tablet by mouth daily. Historical Provider, MD   Coenzyme Q10 (COQ10 PO) Take 1 tablet by mouth daily. Historical Provider, MD   Alpha-Lipoic Acid 300 MG CAPS Take 300 mg by mouth daily On hold    Historical Provider, MD   ascorbic acid (VITAMIN C) 500 MG tablet Take 500 mg by mouth daily. Historical Provider, MD   Vitamin D (CHOLECALCIFEROL) 1000 UNITS CAPS capsule Take 1,000 Units by mouth daily. Historical Provider, MD   Cyanocobalamin (VITAMIN B 12 PO) Take 500 mcg by mouth daily.     Historical Provider, MD       Current Medications:    Current Facility-Administered Medications: sodium chloride flush 0.9 % injection 5-40 mL, 5-40 mL, IntraVENous, 2 times per day  sodium chloride flush 0.9 % injection 5-40 mL, 5-40 mL, IntraVENous, PRN  0.9 % sodium chloride infusion, , IntraVENous, PRN  acetaminophen (TYLENOL) tablet 650 mg, 650 mg, Oral, Q6H PRN **OR** acetaminophen (TYLENOL) suppository 650 mg, 650 mg, Rectal, Q6H PRN  polyethylene glycol (GLYCOLAX) packet 17 g, 17 g, Oral, Daily PRN  melatonin tablet 3 mg, 3 mg, Oral, Nightly PRN  HYDROmorphone (DILAUDID) injection 0.5 mg, 0.5 mg, IntraVENous, Q3H PRN  famotidine (PEPCID) tablet 20 mg, 20 mg, Oral, Daily  apixaban (ELIQUIS) tablet 5 mg, 5 mg, Oral, BID  prochlorperazine (COMPAZINE) injection 10 mg, 10 mg, IntraVENous, Q6H PRN  Calamine 8-8 % lotion, , Topical, PRN    Allergies:  Adhesive tape    Social History:      Tobacco - quit 2007 1 PPD for 20 years - 20 pack years  ETOH - 3 shots per week  Illicit: denies     Functional capacity: can walk slowly - chronic R foot drop & back pain does not use assistive device, drives, can push cart around aisle at stores. Family History: noncontributory secondary to age      REVIEW OF SYSTEMS:     See HPI     PHYSICAL EXAM:   BP (!) 147/66   Pulse 72   Temp 96.9 °F (36.1 °C) (Temporal)   Resp 18   Ht 5' 9.5\" (1.765 m)   Wt 270 lb 8 oz (122.7 kg)   SpO2 97%   BMI 39.37 kg/m²   CONST:  Well developed, well nourished obese white male, who appears of stated age. Awake, alert and cooperative. No apparent distress at rest   HEENT:   Head- Normocephalic, atraumatic   Eyes- Conjunctivae pink  Throat- Oral mucosa pink and moist  Neck-  Thick No stridor, trachea midline, no jugular venous distention. No carotid bruit. CHEST: Chest symmetrical and non-tender to palpation. No accessory muscle use or intercostal retractions  RESPIRATORY: Lung sounds - clear throughout fields   CARDIOVASCULAR:     Heart Inspection- shows no noted pulsations  Heart Palpation- no heaves or thrills; Heart Ausculation- Regular rate and rhythm, no murmur. No s3, s4 or rub   PV: No lower extremity edema. No varicosities. Pedal pulses palpable, no clubbing or cyanosis / R foot drop - chronic  ABDOMEN: Soft, obese, non-tender to light palpation. Bowel sounds present. No palpable masses   MS: Good muscle strength and tone. No atrophy or abnormal movements. : Deferred  SKIN: Warm and dry red - welt rash to arms, legs, abdomen (worst on legs)   NEURO / PSYCH: Oriented to person, place and time. Speech clear and appropriate. Follows all commands.  Pleasant affect     DATA:    See HPI     No intake or output data in the 24 hours ending 01/10/23 0829    Labs:   CBC:   Recent Labs     01/09/23  1454 01/10/23  0652   WBC 29.3* 18.3*   HGB 12.6 12.8   HCT 38.1 38.0    334     BMP:   Recent Labs     01/09/23  1454 01/10/23  0652   * 131*   K 4.3 4.0   CO2 18* 19*   BUN 83* 83*   CREATININE 3.7* 3.3*   LABGLOM 17 19   CALCIUM 9.0 8.9     Mag:   Recent Labs     01/09/23  1454   MG 2.5     Phos: No results for input(s): PHOS in the last 72 hours. TSH: No results for input(s): TSH in the last 72 hours. HgA1c:   Lab Results   Component Value Date    LABA1C 5.2 03/09/2021     No results found for: EAG  proBNP: No results for input(s): PROBNP in the last 72 hours. PT/INR: No results for input(s): PROTIME, INR in the last 72 hours. APTT:No results for input(s): APTT in the last 72 hours. CARDIAC ENZYMES:No results for input(s): CKTOTAL, CKMB, CKMBINDEX, TROPHS in the last 72 hours. FASTING LIPID PANEL:  Lab Results   Component Value Date/Time    CHOL 108 03/09/2021 05:33 AM    HDL 43 03/09/2021 05:33 AM    LDLCALC 33 03/09/2021 05:33 AM    TRIG 161 03/09/2021 05:33 AM     LIVER PROFILE:  Recent Labs     01/09/23  1454 01/10/23  0652   AST 14 10   ALT 22 19   LABALBU 3.3* 3.3*       Assessment:   A Fib / Flutter s/p multiple EP procedures on Tikosyn 375 mcg every 12 hours & Toprol  QD- OAC Eliquis   & NATALI on CKD -Tikosyn on hold  Currently in NSR   UTI / Pyelonephritis / Bilateral renal calculi - as per urology  NATALI on CKD  Hyponatremia  Diarrhea - being ruled out for C Diff  Rash ?  ATB allergy reaction - Tx per Primary  Mild MR / Mild AR on TTE 10/2021  HFiEF - appears euvolemic   CAD per Coronary CTA 2016  / nonischemic stress test 2020/ no chest pain   GDMT: BB, Statin - no aspirin due to Eliquis   AURELIANO - reports compliance with home CPAP  RBBB  HTN  Obesity  HLD - on statin   Chronic back pain / chronic R foot drop / limited functional capacity   Hypoalbuminemia     Plan:   Continue to hold Tikosyn due to QTC prolonged and NATALI on CKD; monitor EKG QTC daily   Continue Toprol XL 100mg QD   Eliquis held until urology decides if patient needs procedure   Will follow       Above Assessment & Plan were made in collaboration with Dr. Geovanny Villeda    Electronically signed by MARIA E Pederson on 1/10/2023 at 8:29 AM      I independently performed an evaluation on the patient. I have reviewed the above documentation completed by the advance practitioner. Please see my additional contributions to the HPI, physical exam, assessment and medical decision making. Physical Exam   /64   Pulse 60   Temp 97 °F (36.1 °C) (Temporal)   Resp 18   Ht 5' 9.5\" (1.765 m)   Wt 270 lb 8 oz (122.7 kg)   SpO2 94%   BMI 39.37 kg/m²   Constitutional: Oriented to person, place, and time. Well-developed and well-nourished. No distress. Head: Normocephalic and atraumatic. Eyes: EOM are normal. Pupils are equal, round, and reactive to light. Neck: Normal range of motion. Neck supple. No hepatojugular reflux and no JVD present. Carotid bruit is not present. No tracheal deviation present. No thyromegaly present. Cardiovascular: Normal rate, regular rhythm, normal heart sounds and intact distal pulses. Exam reveals no gallop and no friction rub. No murmur heard. Pulmonary/Chest: Effort normal and breath sounds normal. No respiratory distress. No wheezes. No rales. No tenderness. Abdominal: Soft. Bowel sounds are normal. No distension and no mass. No tenderness. No rebound and no guarding. Musculoskeletal: Normal range of motion. No edema and no tenderness. Lymphadenopathy:   No cervical adenopathy. Neurological: Alert and oriented to person, place, and time. Skin: Skin is warm and dry. No rash noted. Not diaphoretic. No erythema. Psychiatric: Normal mood and affect. Behavior is normal.     See accompanying documentation for full consult. ASSESSMENT AND PLAN:  1. Hx of Afib:    Hx of ablation and redo.     Typically rhythm controlled with tikosyn, hold for NATALI and prolonged QTc. D/w EP.    BB. Hold Eliquis until seen by urology in case they plan an intervention. 2. CAD:     Coronary CT angiogram 07/07/2016: Dilated left atrium. Mild to moderate atherosclerotic calcifications of the coronary arteries mainly LAD. Pharmacological MPS: 2/20/2020: No EKG changes, normal imaging, low risk pharmacological stress test, EF 64%. Medically manage. BB/statin. No ASA due to eliquis. 3. Hx of Heart failure with improved/HFpEF: Observe volume. 4. VHD: Mild AI/mild MR by 10/20/2021 BUSHRA. Observe. 5. HTN: Observe. 6. Lipids: Statin. 7. AURELIANO: CPAP. 8. Acute on CKD: Follow labs. Consider renal.     9.  issues: Kidney stone s/p lithotripsy 1986. Per urology. 10. Diarrhea: Cdif rule out    11. DJD/Chronic back pain with sciatica s/p lumbar disc surgery/chronic foot drop    CUBA Aguero.O.   Cardiologist  Cardiology, [address]

## 2023-01-10 NOTE — CONSULTS
I independently performed an evaluation on the patient. I have reviewed the above documentation completed by the advance practitioner. Please see my additional contributions to the HPI, physical exam, assessment and medical decision making. Physical Exam   BP (!) 147/66   Pulse 72   Temp 96.9 °F (36.1 °C) (Temporal)   Resp 18   Ht 5' 9.5\" (1.765 m)   Wt 270 lb 8 oz (122.7 kg)   SpO2 94%   BMI 39.37 kg/m²   Constitutional: Oriented to person, place, and time. Well-developed and well-nourished. No distress. Head: Normocephalic and atraumatic. Eyes: EOM are normal. Pupils are equal, round, and reactive to light. Neck: Normal range of motion. Neck supple. No hepatojugular reflux and no JVD present. Carotid bruit is not present. No tracheal deviation present. No thyromegaly present. Cardiovascular: Normal rate, regular rhythm, normal heart sounds and intact distal pulses. Exam reveals no gallop and no friction rub. No murmur heard. Pulmonary/Chest: Effort normal and breath sounds normal. No respiratory distress. No wheezes. No rales. No tenderness. Abdominal: Soft. Bowel sounds are normal. No distension and no mass. No tenderness. No rebound and no guarding. Musculoskeletal: Normal range of motion. No edema and no tenderness. Lymphadenopathy:   No cervical adenopathy. Neurological: Alert and oriented to person, place, and time. Skin: Skin is warm and dry. No rash noted. Not diaphoretic. No erythema. Psychiatric: Normal mood and affect. Behavior is normal.     See accompanying documentation for full consult. ASSESSMENT AND PLAN:  1. Hx of Afib:    Hx of ablation and redo. Typically rhythm controlled with tikosyn, hold for NATALI and prolonged QTc. D/w EP.    BB. Hold Eliquis until seen by urology in case they plan an intervention. 2. CAD:     Coronary CT angiogram 07/07/2016: Dilated left atrium.   Mild to moderate atherosclerotic calcifications of the coronary arteries mainly LAD. Pharmacological MPS: 2/20/2020: No EKG changes, normal imaging, low risk pharmacological stress test, EF 64%. Medically manage. BB/statin. No ASA due to eliquis. 3. Hx of Heart failure with improved/HFpEF: Observe volume. 4. VHD: Mild AI/mild MR by 10/20/2021 BUSHRA. Observe. 5. HTN: Observe. 6. Lipids: Statin. 7. AURELIANO: CPAP. 8. Acute on CKD: Follow labs. Consider renal.     9.  issues: Kidney stone s/p lithotripsy 1986. Per urology. 10. Diarrhea: Cdif rule out    11. DJD/Chronic back pain with sciatica s/p lumbar disc surgery/chronic foot drop    Dionne Dorsey D.O.   Cardiologist  Cardiology, [facility]

## 2023-01-10 NOTE — PROGRESS NOTES
Message sent to Dr. Marla Mathis regarding no planned urological procedure planned, does he want to unhold eliquis?

## 2023-01-10 NOTE — CONSULTS
[address] [place] [address] [facility]  Consultation Note     Admit Date: 1/9/2023  1:59 PM    Reason for Consult:   Pyelonephritis. Failed outpatient treatment    Attending Physician:  Jairon Red MD    HISTORY OF PRESENT ILLNESS:             The history is obtained from extensive review of available past medical records. The patient is a 71 y.o. male who is not known to the ID service. The patient developed uncontrollable shaking chills, followed by a fever on 12/25/2022. He also had diarrhea as well as nausea and vomiting. He does report some flank pain and was having some burning on urination. He also reports some hematuria. He had gone to an urgent care in [place] and was prescribed Nitrofurantoin. He reports some improvement. The patient presented to the ED at [facility] on 1/9/2022 complaining of intermittent fevers, right-sided flank pain, frequency on urination and loose stools. He had also developed a pruritic rash on his torso and upper extremities. He was treated with Ceftriaxone. He currently denies any flank pain. Stools are described as mushy and no longer watery.     Past Medical History:        Diagnosis Date    Arthritis     Atrial fibrillation (HCC)     Bulging lumbar disc     L4-L5    CAD (coronary artery disease) 07/07/2016    ct scan heart    Chronic lower back pain     BREWER (dyspnea on exertion) 03/26/2013    Eczema     H/O atrial flutter 09/2021    Hyperlipidemia     Hypertension     Kidney stones     Obesity     weight 250#    Sleep apnea     c pap  stting 2     Past Surgical History:        Procedure Laterality Date    ABLATION OF DYSRHYTHMIC FOCUS  05/2013    heart ablation dr [facility] DYSRHYTHMIC FOCUS  10/20/2021    Successful cryoballoon catheter ablation by Dr. Alia Londono  08/10/2016    CARDIOVERSION  03/24/2016    Dr. Dalton Delacruz  10/20/2017    Dr. Imani Vail  09/23/2021    Successful cardioversion by Dr. Mika Johnson      ECHOCARDIOGRAM TRANSESOPHAGEAL  2013         KNEE SURGERY Left 1969    repair left knee ligaments    [address]  2016    [facility] [place]    TONSILLECTOMY      TOTAL KNEE ARTHROPLASTY Right 2020    RIGHT KNEE TOTAL ARTHROPLASTY -- SHRUTHI performed by Christell Romberg, MD at [facility] Left 2021    LEFT KNEE TOTAL ARTHROPLASTY -- SHRUTHI performed by Christell Romberg, MD at [facility] OR    TRANSESOPHAGEAL ECHOCARDIOGRAM  2016    Dr. Nalini Kirkpatrick     Current Medications:   Scheduled Meds:   metoprolol succinate  100 mg Oral Daily    atorvastatin  20 mg Oral Nightly    sodium chloride flush  5-40 mL IntraVENous 2 times per day    famotidine  20 mg Oral Daily    [Held by provider] apixaban  5 mg Oral BID     Continuous Infusions:   sodium chloride 100 mL/hr at 01/10/23 0926    sodium chloride       PRN Meds:sodium chloride flush, sodium chloride, acetaminophen **OR** acetaminophen, polyethylene glycol, melatonin, HYDROmorphone, prochlorperazine, Calamine    Allergies:  Adhesive tape    Social History:   Social History     Socioeconomic History    Marital status:    Tobacco Use    Smoking status: Former     Packs/day: 1.00     Years: 20.00     Pack years: 20.00     Types: Cigarettes     Quit date: 2007     Years since quittin.0    Smokeless tobacco: Never    Tobacco comments:     1.5 PACKS EACH DAY stop    Vaping Use    Vaping Use: Never used   Substance and Sexual Activity    Alcohol use: Yes     Alcohol/week: 3.0 standard drinks     Types: 3 Shots of liquor per week     Comment: 1 x week    Drug use: No      Pets: None  Travel: No  The patient lives at home with his wife.   He retired as a manager in a kitchen    Family History:       Problem Relation Age of Onset    Cancer Mother     Diabetes Mother     Cancer Father     Diabetes Sister     Kidney Disease Sister     Early Death Neg Hx    . Otherwise non-pertinent to the chief complaint. REVIEW OF SYSTEMS:    Constitutional: Positive for fevers, chills, diaphoresis  Neurologic: Negative   Psychiatric: Negative  Rheumatologic: Negative   Endocrine: Negative  Hematologic: Negative  Immunologic: Negative  ENT: Negative  Respiratory: Negative   Cardiovascular: Negative  GI: Negative  : The patient has a history of urolithiasis. He had shockwave lithotripsy back in the 80s and went into renal failure afterwards. He will still passes stones every once in a while. Musculoskeletal: Negative  Skin: As in the HPI    PHYSICAL EXAM:    Vitals:   BP (!) 147/66   Pulse 72   Temp 96.9 °F (36.1 °C) (Temporal)   Resp 18   Ht 5' 9.5\" (1.765 m)   Wt 270 lb 8 oz (122.7 kg)   SpO2 94%   BMI 39.37 kg/m²   Constitutional: The patient is awake, alert, and oriented. Lying in bed. No distress. Wife in room. Skin: Warm and dry. No rashes were noted. Diffuse, pink, nonraised, macular rash on trunk and upper extremities. HEENT: Eyes show round, and reactive pupils. No jaundice. Moist mucous membranes, no ulcerations, no thrush. Neck: Supple to movements. No lymphadenopathy. Chest: No use of accessory muscles to breathe. Symmetrical expansion. Auscultation reveals no wheezing, crackles, or rhonchi. Cardiovascular: S1 and S2 are rhythmic and regular. No murmurs appreciated. Abdomen: Positive bowel sounds to auscultation. Benign to palpation. No masses felt. No hepatosplenomegaly. No CVA tenderness. Extremities: No clubbing, no cyanosis, no edema.   Lines: Peripheral.      CBC+dif:  Recent Labs     01/09/23  1454 01/10/23  0652   WBC 29.3* 18.3*   HGB 12.6 12.8   HCT 38.1 38.0   MCV 97.2 96.4    334   NEUTROABS 25.78* 14.36*     Lab Results   Component Value Date    CRP 6.6 (H) 01/10/2023    CRP 3.3 (H) 06/30/2020      No results found for: CRP  Lab Results   Component Value Date    SEDRATE 47 (H) 06/30/2020     Lab Results Component Value Date    ALT 19 01/10/2023    AST 10 01/10/2023    ALKPHOS 205 (H) 01/10/2023    BILITOT 0.3 01/10/2023     Lab Results   Component Value Date/Time     01/10/2023 06:52 AM    K 4.0 01/10/2023 06:52 AM    CL 97 01/10/2023 06:52 AM    CO2 19 01/10/2023 06:52 AM    BUN 83 01/10/2023 06:52 AM    CREATININE 3.3 01/10/2023 06:52 AM    GFRAA >60 10/20/2021 06:30 AM    LABGLOM 19 01/10/2023 06:52 AM    GLUCOSE 85 01/10/2023 06:52 AM    PROT 7.6 01/10/2023 06:52 AM    LABALBU 3.3 01/10/2023 06:52 AM    CALCIUM 8.9 01/10/2023 06:52 AM    BILITOT 0.3 01/10/2023 06:52 AM    ALKPHOS 205 01/10/2023 06:52 AM    AST 10 01/10/2023 06:52 AM    ALT 19 01/10/2023 06:52 AM       Lab Results   Component Value Date/Time    PROTIME 12.1 10/20/2021 06:30 AM    INR 1.1 10/20/2021 06:30 AM       Lab Results   Component Value Date/Time    TSH 4.640 10/20/2021 06:30 AM       Lab Results   Component Value Date/Time    COLORU Yellow 01/09/2023 02:54 PM    PHUR 5.5 01/09/2023 02:54 PM    WBCUA >20 01/09/2023 02:54 PM    RBCUA 2-5 01/09/2023 02:54 PM    BACTERIA MANY 01/09/2023 02:54 PM    CLARITYU Clear 01/09/2023 02:54 PM    SPECGRAV 1.015 01/09/2023 02:54 PM    LEUKOCYTESUR MODERATE 01/09/2023 02:54 PM    UROBILINOGEN 0.2 01/09/2023 02:54 PM    BILIRUBINUR Negative 01/09/2023 02:54 PM    BLOODU LARGE 01/09/2023 02:54 PM    GLUCOSEU Negative 01/09/2023 02:54 PM       No results found for: HCO3, BE, O2SAT, PH, THGB, PCO2, PO2, TCO2  Radiology:  CT reviewed    Microbiology:  Pending  No results for input(s): BC in the last 72 hours. No results for input(s): ORG in the last 72 hours. No results for input(s): Alexandrea Courts in the last 72 hours. No results for input(s): STREPNEUMAGU in the last 72 hours. No results for input(s): LP1UAG in the last 72 hours. No results for input(s): ASO in the last 72 hours. No results for input(s): CULTRESP in the last 72 hours.   No results for input(s): PROCAL in the last 72 hours.    Assessment:  Complicated UTI with sepsis  Pyelonephritis associated complicated UTI  Bilateral nonobstructive nephrolithiasis   Fever with leukocytosis secondary to complicated UTI  Asymptomatic cholelithiasis    Plan:    Resume Ceftriaxone  Check cultures  Will follow with you    Thank you for having us see this patient in consultation. I will be discussing this case with the treating physicians.     Lana Coronel MD  11:02 AM  1/10/2023

## 2023-01-10 NOTE — PROGRESS NOTES
Physician Progress Note      Maria Alejandra Hull  [facility] #:                  [id number]  :                       1950  ADMIT DATE:       2023 1:59 PM  [address] [place] DATE:  RESPONDING  PROVIDER #:        Claude Melvin MD          QUERY TEXT:    Dr. Codey Whitmore,    Patient admitted with acute pyelonephritis/UTI and noted to have NATALI,   leukocytosis and elevated crp. If possible, please document in the progress   notes and discharge summary if you are evaluating and /or treating any of the   following: The medical record reflects the following:  Risk Factors: Multiple bilateral renal stones/UTI  Clinical Indicators: wbc 29.3, crp 6.6, Creatinine 3.7  Treatment: IVFs, IV Rocephin, ID & Urology Consults    Thank you,  Debbie Knox RN, CCDS  Clinical Documentation Integrity  [email or web address]  Options provided:  -- Sepsis, present on admission  -- Acute pyelonephritis/UTI without Sepsis  -- Other - I will add my own diagnosis  -- Disagree - Not applicable / Not valid  -- Disagree - Clinically unable to determine / Unknown  -- Refer to Clinical Documentation Reviewer    PROVIDER RESPONSE TEXT:    This patient has acute pyelonephritis/UTI without Sepsis.     Query created by: Linden Patton on 1/10/2023 11:29 AM      Electronically signed by:  Claude Melvin MD 1/10/2023 11:34 AM

## 2023-01-10 NOTE — PROGRESS NOTES
01/09/23 2302   NIV Type   $NIV $Daily Charge   Skin Assessment Clean, dry, & intact   Skin Protection for O2 Device Yes   Orientation Middle   Location Nose   NIV Started/Stopped On   Equipment Type v60   Mode CPAP   Mask Type Full face mask   Mask Size Medium   Settings/Measurements   PIP Observed 12 cm H20   CPAP/EPAP 7 cmH2O   Vt (Measured) 456 mL   Resp 18   FiO2  21 %   Minute Volume (L/min) 7.2 Liters   Mask Leak (lpm) 36 lpm   Comfort Level Good   Using Accessory Muscles No   SpO2 98   Patient states he is \"more comfortable\" with the cpap of 7

## 2023-01-11 ENCOUNTER — APPOINTMENT (OUTPATIENT)
Dept: ULTRASOUND IMAGING | Age: 73
End: 2023-01-11
Payer: MEDICARE

## 2023-01-11 LAB
ALBUMIN SERPL-MCNC: 3.2 G/DL (ref 3.5–5.2)
ALP BLD-CCNC: 211 U/L (ref 40–129)
ALT SERPL-CCNC: 18 U/L (ref 0–40)
ANION GAP SERPL CALCULATED.3IONS-SCNC: 13 MMOL/L (ref 7–16)
AST SERPL-CCNC: 9 U/L (ref 0–39)
BASOPHILS ABSOLUTE: 0.05 E9/L (ref 0–0.2)
BASOPHILS RELATIVE PERCENT: 0.4 % (ref 0–2)
BILIRUB SERPL-MCNC: 0.2 MG/DL (ref 0–1.2)
BUN BLDV-MCNC: 67 MG/DL (ref 6–23)
C-REACTIVE PROTEIN: 4 MG/DL (ref 0–0.4)
CALCIUM SERPL-MCNC: 9.1 MG/DL (ref 8.6–10.2)
CHLORIDE BLD-SCNC: 100 MMOL/L (ref 98–107)
CHLORIDE URINE RANDOM: 62 MMOL/L
CO2: 22 MMOL/L (ref 22–29)
CREAT SERPL-MCNC: 2.3 MG/DL (ref 0.7–1.2)
CREATININE URINE: 50 MG/DL (ref 40–278)
EKG ATRIAL RATE: 51 BPM
EKG P AXIS: 75 DEGREES
EKG P-R INTERVAL: 178 MS
EKG Q-T INTERVAL: 520 MS
EKG QRS DURATION: 146 MS
EKG QTC CALCULATION (BAZETT): 479 MS
EKG R AXIS: 13 DEGREES
EKG T AXIS: 21 DEGREES
EKG VENTRICULAR RATE: 51 BPM
EOSINOPHILS ABSOLUTE: 1.64 E9/L (ref 0.05–0.5)
EOSINOPHILS RELATIVE PERCENT: 12.4 % (ref 0–6)
GFR SERPL CREATININE-BSD FRML MDRD: 29 ML/MIN/1.73
GLUCOSE BLD-MCNC: 180 MG/DL (ref 74–99)
HCT VFR BLD CALC: 39.2 % (ref 37–54)
HEMOGLOBIN: 13.1 G/DL (ref 12.5–16.5)
IMMATURE GRANULOCYTES #: 0.12 E9/L
IMMATURE GRANULOCYTES %: 0.9 % (ref 0–5)
LYMPHOCYTES ABSOLUTE: 1.76 E9/L (ref 1.5–4)
LYMPHOCYTES RELATIVE PERCENT: 13.3 % (ref 20–42)
MCH RBC QN AUTO: 32.3 PG (ref 26–35)
MCHC RBC AUTO-ENTMCNC: 33.4 % (ref 32–34.5)
MCV RBC AUTO: 96.8 FL (ref 80–99.9)
MONOCYTES ABSOLUTE: 0.62 E9/L (ref 0.1–0.95)
MONOCYTES RELATIVE PERCENT: 4.7 % (ref 2–12)
NEUTROPHILS ABSOLUTE: 9.01 E9/L (ref 1.8–7.3)
NEUTROPHILS RELATIVE PERCENT: 68.3 % (ref 43–80)
PDW BLD-RTO: 12.8 FL (ref 11.5–15)
PLATELET # BLD: 374 E9/L (ref 130–450)
PMV BLD AUTO: 10.1 FL (ref 7–12)
POTASSIUM REFLEX MAGNESIUM: 3.8 MMOL/L (ref 3.5–5)
POTASSIUM, UR: 8.6 MMOL/L
RBC # BLD: 4.05 E12/L (ref 3.8–5.8)
SODIUM BLD-SCNC: 135 MMOL/L (ref 132–146)
SODIUM URINE: 71 MMOL/L
TOTAL PROTEIN: 7.8 G/DL (ref 6.4–8.3)
UREA NITROGEN, UR: 508 MG/DL (ref 800–1666)
WBC # BLD: 13.2 E9/L (ref 4.5–11.5)

## 2023-01-11 PROCEDURE — 2060000000 HC ICU INTERMEDIATE R&B

## 2023-01-11 PROCEDURE — 93010 ELECTROCARDIOGRAM REPORT: CPT | Performed by: INTERNAL MEDICINE

## 2023-01-11 PROCEDURE — 86140 C-REACTIVE PROTEIN: CPT

## 2023-01-11 PROCEDURE — 99233 SBSQ HOSP IP/OBS HIGH 50: CPT | Performed by: INTERNAL MEDICINE

## 2023-01-11 PROCEDURE — 6370000000 HC RX 637 (ALT 250 FOR IP): Performed by: INTERNAL MEDICINE

## 2023-01-11 PROCEDURE — 36415 COLL VENOUS BLD VENIPUNCTURE: CPT

## 2023-01-11 PROCEDURE — 76770 US EXAM ABDO BACK WALL COMP: CPT

## 2023-01-11 PROCEDURE — 84540 ASSAY OF URINE/UREA-N: CPT

## 2023-01-11 PROCEDURE — 87205 SMEAR GRAM STAIN: CPT

## 2023-01-11 PROCEDURE — 84133 ASSAY OF URINE POTASSIUM: CPT

## 2023-01-11 PROCEDURE — 80053 COMPREHEN METABOLIC PANEL: CPT

## 2023-01-11 PROCEDURE — 93005 ELECTROCARDIOGRAM TRACING: CPT | Performed by: INTERNAL MEDICINE

## 2023-01-11 PROCEDURE — 2580000003 HC RX 258: Performed by: SPECIALIST

## 2023-01-11 PROCEDURE — 84300 ASSAY OF URINE SODIUM: CPT

## 2023-01-11 PROCEDURE — 6360000002 HC RX W HCPCS: Performed by: FAMILY MEDICINE

## 2023-01-11 PROCEDURE — 82436 ASSAY OF URINE CHLORIDE: CPT

## 2023-01-11 PROCEDURE — 94660 CPAP INITIATION&MGMT: CPT

## 2023-01-11 PROCEDURE — 2580000003 HC RX 258: Performed by: NURSE PRACTITIONER

## 2023-01-11 PROCEDURE — 6370000000 HC RX 637 (ALT 250 FOR IP): Performed by: FAMILY MEDICINE

## 2023-01-11 PROCEDURE — 6360000002 HC RX W HCPCS: Performed by: SPECIALIST

## 2023-01-11 PROCEDURE — 82570 ASSAY OF URINE CREATININE: CPT

## 2023-01-11 PROCEDURE — 85025 COMPLETE CBC W/AUTO DIFF WBC: CPT

## 2023-01-11 RX ADMIN — APIXABAN 5 MG: 5 TABLET, FILM COATED ORAL at 08:24

## 2023-01-11 RX ADMIN — SODIUM CHLORIDE: 9 INJECTION, SOLUTION INTRAVENOUS at 18:41

## 2023-01-11 RX ADMIN — APIXABAN 5 MG: 5 TABLET, FILM COATED ORAL at 20:25

## 2023-01-11 RX ADMIN — HYDROMORPHONE HYDROCHLORIDE 0.5 MG: 0.5 INJECTION, SOLUTION INTRAMUSCULAR; INTRAVENOUS; SUBCUTANEOUS at 13:48

## 2023-01-11 RX ADMIN — CETIRIZINE HYDROCHLORIDE 5 MG: 10 TABLET, FILM COATED ORAL at 08:25

## 2023-01-11 RX ADMIN — Medication 3 MG: at 20:25

## 2023-01-11 RX ADMIN — WATER 2000 MG: 1 INJECTION INTRAMUSCULAR; INTRAVENOUS; SUBCUTANEOUS at 12:03

## 2023-01-11 RX ADMIN — METOPROLOL SUCCINATE 100 MG: 100 TABLET, EXTENDED RELEASE ORAL at 08:25

## 2023-01-11 RX ADMIN — HYDROMORPHONE HYDROCHLORIDE 0.5 MG: 0.5 INJECTION, SOLUTION INTRAMUSCULAR; INTRAVENOUS; SUBCUTANEOUS at 23:57

## 2023-01-11 RX ADMIN — HYDROMORPHONE HYDROCHLORIDE 0.5 MG: 0.5 INJECTION, SOLUTION INTRAMUSCULAR; INTRAVENOUS; SUBCUTANEOUS at 02:26

## 2023-01-11 RX ADMIN — FAMOTIDINE 20 MG: 20 TABLET, FILM COATED ORAL at 08:25

## 2023-01-11 RX ADMIN — ATORVASTATIN CALCIUM 20 MG: 20 TABLET, FILM COATED ORAL at 20:25

## 2023-01-11 RX ADMIN — HYDROMORPHONE HYDROCHLORIDE 0.5 MG: 0.5 INJECTION, SOLUTION INTRAMUSCULAR; INTRAVENOUS; SUBCUTANEOUS at 08:43

## 2023-01-11 RX ADMIN — SODIUM CHLORIDE: 9 INJECTION, SOLUTION INTRAVENOUS at 08:45

## 2023-01-11 RX ADMIN — HYDROMORPHONE HYDROCHLORIDE 0.5 MG: 0.5 INJECTION, SOLUTION INTRAMUSCULAR; INTRAVENOUS; SUBCUTANEOUS at 20:25

## 2023-01-11 ASSESSMENT — PAIN SCALES - GENERAL
PAINLEVEL_OUTOF10: 7
PAINLEVEL_OUTOF10: 4
PAINLEVEL_OUTOF10: 3

## 2023-01-11 ASSESSMENT — PAIN DESCRIPTION - ORIENTATION
ORIENTATION: RIGHT

## 2023-01-11 ASSESSMENT — PAIN DESCRIPTION - LOCATION
LOCATION: BACK

## 2023-01-11 ASSESSMENT — PAIN DESCRIPTION - PAIN TYPE
TYPE: ACUTE PAIN
TYPE: ACUTE PAIN

## 2023-01-11 ASSESSMENT — PAIN DESCRIPTION - DESCRIPTORS
DESCRIPTORS: ACHING
DESCRIPTORS: ACHING;DISCOMFORT;DULL
DESCRIPTORS: ACHING

## 2023-01-11 ASSESSMENT — PAIN DESCRIPTION - ONSET: ONSET: GRADUAL

## 2023-01-11 ASSESSMENT — PAIN - FUNCTIONAL ASSESSMENT
PAIN_FUNCTIONAL_ASSESSMENT: ACTIVITIES ARE NOT PREVENTED

## 2023-01-11 ASSESSMENT — PAIN DESCRIPTION - FREQUENCY: FREQUENCY: INTERMITTENT

## 2023-01-11 NOTE — CARE COORDINATION
CASE MANAGEMENT. .. Ruth Canas Renal consulted today for NATALI. Renal us completed. Urology/Cardio/ID continue to follow. Tikosyn remains on hold. On ivf 100/hr and Eliquis-not new. On iv rocephin. Per ID, consider switching antibiotic to orals by tomorrow. Discharge plan is home. Will follow for needs.

## 2023-01-11 NOTE — PROGRESS NOTES
[address] [facility] Cardiology Inpatient Progress Note    Patient is a 71 y.o. male of Yamile Sepulveda DO seen in hospital follow up. Chief complaint: Afib    HPI: Some SOB. No CP. Patient Active Problem List   Diagnosis    Typical atrial flutter (HCC)    Hypertension    Eczema    Former smoker    Midline low back pain with right-sided sciatica    Persistent atrial fibrillation    Chronic systolic heart failure (HCC)    CKD (chronic kidney disease)    Atrial flutter (HCC)    Status post catheter ablation of atrial fibrillation    Status post catheter ablation of atrial flutter    Right bundle-branch block    Tachyarrhythmia    Atrial fibrillation (HCC)    Wrist pain, chronic, right    Chronic pain syndrome    Primary osteoarthritis involving multiple joints    Right foot drop    Lumbar facet arthropathy    COVID-19    NATALI (acute kidney injury) ([facility] [address]    HLD (hyperlipidemia)    Redness and swelling of knee    Obesity (BMI 30-39. 9)    CAD (coronary artery disease)    H/O total knee replacement, bilateral    Osteoarthritis of left knee    Localized osteoarthritis of left knee    Atrial fibrillation with RVR (HCC)    Abnormal ECG    Status post ablation of atrial fibrillation    Atrial tachycardia (HCC)    Aortic atherosclerosis (HCC)    Arthropathy    Benign prostatic hyperplasia with urinary frequency    Bicuspid aortic valve    Erectile dysfunction due to arterial insufficiency    Fall from ladder    Impaired fasting glucose    AURELIANO (obstructive sleep apnea)    Vitamin D deficiency    Acute pyelonephritis    Complicated UTI (urinary tract infection)    Renal calculi    Leukocytosis    Rash    Diarrhea       Allergies   Allergen Reactions    Adhesive Tape Rash     bandaids    Macrobid [Nitrofurantoin] Rash       Current Facility-Administered Medications   Medication Dose Route Frequency Provider Last Rate Last Admin    0.9 % sodium chloride infusion   IntraVENous Continuous Emaline Ko, APRN -  mL/hr at 01/11/23 0845 New Bag at 01/11/23 0845    metoprolol succinate (TOPROL XL) extended release tablet 100 mg  100 mg Oral Daily Bear Luis Daniel, DO   100 mg at 01/11/23 0825    atorvastatin (LIPITOR) tablet 20 mg  20 mg Oral Nightly Bear Luis Daniel, DO   20 mg at 01/10/23 2032    cefTRIAXone (ROCEPHIN) 2,000 mg in sterile water 20 mL IV syringe  2,000 mg IntraVENous Q24H Milagro Ramachandran MD        cetirizine (ZYRTEC) tablet 5 mg  5 mg Oral Daily Farida Peraza MD   5 mg at 01/11/23 0825    sodium chloride flush 0.9 % injection 5-40 mL  5-40 mL IntraVENous 2 times per day Gustavo Wiley MD   10 mL at 01/10/23 2033    sodium chloride flush 0.9 % injection 5-40 mL  5-40 mL IntraVENous PRJESSE Wiley MD        0.9 % sodium chloride infusion   IntraVENous AMNA Wiley MD        acetaminophen (TYLENOL) tablet 650 mg  650 mg Oral Q6H MINALN Gustavo Wiley MD        Or    acetaminophen (TYLENOL) suppository 650 mg  650 mg Rectal Q6H PRN Gustavo Wiley MD        polyethylene glycol (GLYCOLAX) packet 17 g  17 g Oral Daily AMNA Wiley MD        melatonin tablet 3 mg  3 mg Oral Nightly AMNA Wiley MD   3 mg at 01/10/23 2032    HYDROmorphone (DILAUDID) injection 0.5 mg  0.5 mg IntraVENous Q3H AMNA Wiley MD   0.5 mg at 01/11/23 0843    famotidine (PEPCID) tablet 20 mg  20 mg Oral Daily Gustavo Wiley MD   20 mg at 01/11/23 0825    apixaban (ELIQUIS) tablet 5 mg  5 mg Oral BID Gustavo Wiley MD   5 mg at 01/11/23 6928    prochlorperazine (COMPAZINE) injection 10 mg  10 mg IntraVENous Q6H PRN Gustavo Wiley MD        Calamine 8-8 % lotion   Topical AMNA Wiley MD         Review of systems:   Heart: as above   Lungs: as above   Eyes: denies changes in vision or discharge. Ears: denies changes in hearing or pain. Nose: denies epistaxis or masses   Throat: denies sore throat or trouble swallowing. Neuro: denies numbness, tingling, tremors. Skin: denies rashes or itching. : denies hematuria, dysuria   GI: denies vomiting, diarrhea   Psych: denies mood changed, anxiety, depression. Physical Exam   /61   Pulse 66   Temp 97.8 °F (36.6 °C) (Oral)   Resp 18   Ht 5' 9.5\" (1.765 m)   Wt 274 lb 8 oz (124.5 kg)   SpO2 96%   BMI 39.96 kg/m²   Constitutional: Oriented to person, place, and time. No distress. Well developed. Head: Normocephalic and atraumatic. Neck: Neck supple. No hepatojugular reflux. No JVD present. Carotid bruit is not present. No tracheal deviation present. No thyromegaly present. Cardiovascular: Normal rate, regular rhythm, normal heart sounds. intact distal pulses. No gallop and no friction rub. No murmur heard. Pulmonary: Breath sounds normal. No respiratory distress. No wheezes. No rales. Chest: Effort normal. No tenderness. Abdominal: Soft. Bowel sounds are normal. No distension or mass. No tenderness, rebound or guarding. Musculoskeletal: . No tenderness. No clubbing or cyanosis. Extremitites: Intact distal pulses. No edema  Neurological: Alert and oriented to person, place, and time. Skin: Skin is warm and dry. No rash noted. Not diaphoretic. No erythema. Psychiatric: Normal mood and affect. Behavior is normal.   Lymphadenopathy: No cervical adenopathy. No groin adenopathy.     CBC:   Lab Results   Component Value Date/Time    WBC 13.2 01/11/2023 08:39 AM    RBC 4.05 01/11/2023 08:39 AM    HGB 13.1 01/11/2023 08:39 AM    HCT 39.2 01/11/2023 08:39 AM    MCV 96.8 01/11/2023 08:39 AM    MCH 32.3 01/11/2023 08:39 AM    MCHC 33.4 01/11/2023 08:39 AM    RDW 12.8 01/11/2023 08:39 AM     01/11/2023 08:39 AM    MPV 10.1 01/11/2023 08:39 AM     BMP:   Lab Results   Component Value Date/Time     01/11/2023 08:39 AM    K 3.8 01/11/2023 08:39 AM     01/11/2023 08:39 AM    CO2 22 01/11/2023 08:39 AM    BUN 67 01/11/2023 08:39 AM    LABALBU 3.2 01/11/2023 08:39 AM    CREATININE 2.3 01/11/2023 08:39 AM    CALCIUM 9.1 01/11/2023 08:39 AM    GFRAA >60 10/20/2021 06:30 AM    LABGLOM 29 01/11/2023 08:39 AM     Magnesium:    Lab Results   Component Value Date/Time    MG 2.5 01/09/2023 02:54 PM     Cardiac Enzymes:   Lab Results   Component Value Date    CKTOTAL 160 06/30/2020    CKMB 9.2 (H) 06/30/2020    TROPHS 44 (H) 08/22/2021      PT/INR:    Lab Results   Component Value Date/Time    PROTIME 12.1 10/20/2021 06:30 AM    INR 1.1 10/20/2021 06:30 AM     TSH:    Lab Results   Component Value Date/Time    TSH 1.700 01/10/2023 10:07 AM     Rhythm Strip: normal sinus rhythm. ASSESSMENT & PLAN:    Patient Active Problem List   Diagnosis    Typical atrial flutter (HCC)    Hypertension    Eczema    Former smoker    Midline low back pain with right-sided sciatica    Persistent atrial fibrillation    Chronic systolic heart failure (HCC)    CKD (chronic kidney disease)    Atrial flutter (HCC)    Status post catheter ablation of atrial fibrillation    Status post catheter ablation of atrial flutter    Right bundle-branch block    Tachyarrhythmia    Atrial fibrillation (HCC)    Wrist pain, chronic, right    Chronic pain syndrome    Primary osteoarthritis involving multiple joints    Right foot drop    Lumbar facet arthropathy    COVID-19    NATALI (acute kidney injury) ([facility] [address]    HLD (hyperlipidemia)    Redness and swelling of knee    Obesity (BMI 30-39. 9)    CAD (coronary artery disease)    H/O total knee replacement, bilateral    Osteoarthritis of left knee    Localized osteoarthritis of left knee    Atrial fibrillation with RVR (HCC)    Abnormal ECG    Status post ablation of atrial fibrillation    Atrial tachycardia (HCC)    Aortic atherosclerosis (HCC)    Arthropathy    Benign prostatic hyperplasia with urinary frequency    Bicuspid aortic valve    Erectile dysfunction due to arterial insufficiency    Fall from ladder    Impaired fasting glucose    AURELIANO (obstructive sleep apnea)    Vitamin D deficiency    Acute pyelonephritis    Complicated UTI (urinary tract infection)    Renal calculi    Leukocytosis    Rash    Diarrhea     1. Hx of Afib:    Hx of ablation and redo. Typically rhythm controlled with tikosyn, hold for NATALI and prolonged QTc. BB/eliquis. 2. CAD:     Coronary CT angiogram 07/07/2016: Dilated left atrium. Mild to moderate atherosclerotic calcifications of the coronary arteries mainly LAD. Pharmacological MPS: 2/20/2020: No EKG changes, normal imaging, low risk pharmacological stress test, EF 64%. Medically manage. BB/statin. No ASA due to eliquis. 3. Hx of Heart failure with improved/HFpEF: Observe volume. 4. VHD: Mild AI/mild MR by 10/20/2021 BUSHRA. Observe. 5. HTN: Observe. 6. Lipids: Statin. 7. AURELIANO: CPAP. 8. Acute on CKD: Follow labs. Improving. Per renal.     9.  issues: Kidney stone s/p lithotripsy 1986. Per urology. 10. Diarrhea: Cdif rule out    11. DJD/Chronic back pain with sciatica s/p lumbar disc surgery/chronic foot drop    Fer Bhakta D.O.   Cardiologist  Cardiology, [facility]

## 2023-01-11 NOTE — PROGRESS NOTES
1/11/2023 8:20 AM  Service: Urology  Group: [facility] ([facility])    Laura Brown  [id number]    Subjective:  afebrile  He is ambulatory. I did review his medications with the wife and he was on Macrobid which could have caused his rash they will list this as an allergy in the future empirically. He is voiding well with good caliber stream seems to be emptying. He has had a bowel movement. His appetite is slowly coming back as his azotemia is resolved    Review of Systems  Respiratory: negative  Cardiovascular: negative  Gastrointestinal: negative  Hematologic/lymphatic: negative  Musculoskeletal:negative  Neurological: negative  Endocrine: negative    Scheduled Meds:   metoprolol succinate  100 mg Oral Daily    atorvastatin  20 mg Oral Nightly    cefTRIAXone (ROCEPHIN) IV  2,000 mg IntraVENous Q24H    cetirizine  5 mg Oral Daily    sodium chloride flush  5-40 mL IntraVENous 2 times per day    famotidine  20 mg Oral Daily    apixaban  5 mg Oral BID       Objective:  Vitals:    01/10/23 1837   BP: 133/75   Pulse: 64   Resp: 18   Temp: 97.7 °F (36.5 °C)   SpO2: 96%         Allergies: Adhesive tape and Macrobid [nitrofurantoin]    General Appearance: alert and oriented to person, place and time and in no acute distress. He is moderately obese  Skin: no rash or erythema  Head: normocephalic and atraumatic  Pulmonary/Chest: clear to auscultation bilaterally- no wheezes, rales or rhonchi, normal air movement, no respiratory distress and no chest wall tenderness  Abdomen: soft, non-tender, non-distended, normal bowel sounds, no masses or organomegaly and no inguinal adenopathy  Genitalia: No penile or scrotal swelling or masses.  Extremities: no cyanosis, clubbing or edema and Livier's sign negative bilaterally      Labs:     Recent Labs     01/10/23  0652   *   K 4.0   CL 97*   CO2 19*   BUN 83*   CREATININE 3.3*   GLUCOSE 85   CALCIUM 8.9       Lab Results   Component Value Date/Time    HGB 12.8 01/10/2023 06:52 AM    HCT 38.0 01/10/2023 06:52 AM         Assessment:  Sean Smith 71 y.o. male     Principal Problem:    Acute pyelonephritis  Active Problems:    Hypertension    AURELIANO (obstructive sleep apnea)    Complicated UTI (urinary tract infection)    Renal calculi    Leukocytosis    Rash    Diarrhea  Resolved Problems:    * No resolved hospital problems. *      Resolving azotemia  Resolving acute pyelonephritis without obstructive uropathy  I think he may have had a ureteral calculus passed out and was left with the pyelonephritis and azotemia referable to dehydration  Plan:  He will need to be hospitalized until his BUN and creatinine stabilizes I predict this will be in the next 48 hours. Burke Pope   He may need a metabolic evaluation for calculus disease once he is stabilized      Diony Reyes MD   [facility]  Urology

## 2023-01-11 NOTE — PROGRESS NOTES
[address] [place] [address] [facility]  Progress Note    SUBJECTIVE:  Chief Complaint   Patient presents with    Flank Pain     Rt flank pain     The patient is doing well. Tolerating antibiotic. Denies flank pain. No fevers, chills or diaphoresis. No nausea, vomiting or diarrhea. Review of systems:  As stated above in the chief complaint, otherwise negative. Medications:  Scheduled Meds:   metoprolol succinate  100 mg Oral Daily    atorvastatin  20 mg Oral Nightly    cefTRIAXone (ROCEPHIN) IV  2,000 mg IntraVENous Q24H    cetirizine  5 mg Oral Daily    sodium chloride flush  5-40 mL IntraVENous 2 times per day    famotidine  20 mg Oral Daily    apixaban  5 mg Oral BID     Continuous Infusions:   sodium chloride 100 mL/hr at 23 0845    sodium chloride       PRN Meds:sodium chloride flush, sodium chloride, acetaminophen **OR** acetaminophen, polyethylene glycol, melatonin, HYDROmorphone, prochlorperazine, Calamine    OBJECTIVE:  /61   Pulse 66   Temp 97.8 °F (36.6 °C) (Oral)   Resp 18   Ht 5' 9.5\" (1.765 m)   Wt 274 lb 8 oz (124.5 kg)   SpO2 96%   BMI 39.96 kg/m²   Temp  Av.5 °F (36.4 °C)  Min: 97 °F (36.1 °C)  Max: 97.8 °F (36.6 °C)  Constitutional: The patient is awake, alert, and oriented. Sitting on side of the bed in a dark room. No distress. Skin: Warm and dry. No rashes were noted. HEENT: Round and reactive pupils. Moist mucous membranes. No ulcerations or thrush. Neck: Supple to movements. Chest: No use of accessory muscles to breathe. Symmetrical expansion. No wheezing, crackles or rhonchi. Cardiovascular: S1 and S2 are rhythmic and regular. No murmurs appreciated. Abdomen: Positive bowel sounds to auscultation. Benign to palpation. No CVA tenderness. Extremities: No clubbing, no cyanosis, no edema.   Lines: peripheral    Laboratory and Tests Review:  Lab Results   Component Value Date    WBC 13.2 (H) 2023    WBC 18.3 (H) 01/10/2023    WBC 29.3 (H) 01/09/2023    HGB 13.1 01/11/2023    HCT 39.2 01/11/2023    MCV 96.8 01/11/2023     01/11/2023     Lab Results   Component Value Date    NEUTROABS 9.01 (H) 01/11/2023    NEUTROABS 14.36 (H) 01/10/2023    NEUTROABS 25.78 (H) 01/09/2023     No results found for: CRPHS  Lab Results   Component Value Date    ALT 18 01/11/2023    AST 9 01/11/2023    ALKPHOS 211 (H) 01/11/2023    BILITOT 0.2 01/11/2023     Lab Results   Component Value Date/Time     01/11/2023 08:39 AM    K 3.8 01/11/2023 08:39 AM     01/11/2023 08:39 AM    CO2 22 01/11/2023 08:39 AM    BUN 67 01/11/2023 08:39 AM    CREATININE 2.3 01/11/2023 08:39 AM    CREATININE 3.3 01/10/2023 06:52 AM    CREATININE 3.7 01/09/2023 02:54 PM    GFRAA >60 10/20/2021 06:30 AM    LABGLOM 29 01/11/2023 08:39 AM    GLUCOSE 180 01/11/2023 08:39 AM    PROT 7.8 01/11/2023 08:39 AM    LABALBU 3.2 01/11/2023 08:39 AM    CALCIUM 9.1 01/11/2023 08:39 AM    BILITOT 0.2 01/11/2023 08:39 AM    ALKPHOS 211 01/11/2023 08:39 AM    AST 9 01/11/2023 08:39 AM    ALT 18 01/11/2023 08:39 AM     Lab Results   Component Value Date    CRP 6.6 (H) 01/10/2023    CRP 3.3 (H) 06/30/2020     Lab Results   Component Value Date    SEDRATE 52 (H) 06/30/2020     Radiology:      Microbiology:   Urine culture 1/9/2022: Pending  C. difficile: Negative  Blood cultures 1/9/2023: Negative so far    ASSESSMENT:  Pyelonephritis associated to complicated UTI with sepsis  Bilateral nonobstructive nephrolithiasis  Fever secondary to complicated UTI-improved  Leukocytosis associated to complicated UTI-improving    PLAN:  Continue Ceftriaxone  Check final cultures  We will consider oral antibiotics by tomorrow    Spoke with nursing    Lana Coronel MD  11:11 AM  1/11/2023

## 2023-01-11 NOTE — PROGRESS NOTES
[address] [facility] Hospitalist   Progress Note    Admitting Date and Time: 1/9/2023  1:59 PM  Admit Dx: Acute pyelonephritis [N10]    Subjective:    Pt in bed in no acute distress. States he is tired this afternoon. Like to take a nap. Down for renal ultrasound this morning. Unable to sleep through the night. He denies any new concerns at this time. Per RN: The unit for renal ultrasound. ROS: denies fever, chills, cp, sob, n/v, HA unless stated above.     metoprolol succinate  100 mg Oral Daily    atorvastatin  20 mg Oral Nightly    cefTRIAXone (ROCEPHIN) IV  2,000 mg IntraVENous Q24H    cetirizine  5 mg Oral Daily    sodium chloride flush  5-40 mL IntraVENous 2 times per day    famotidine  20 mg Oral Daily    apixaban  5 mg Oral BID     sodium chloride flush, 5-40 mL, PRN  sodium chloride, , PRN  acetaminophen, 650 mg, Q6H PRN   Or  acetaminophen, 650 mg, Q6H PRN  polyethylene glycol, 17 g, Daily PRN  melatonin, 3 mg, Nightly PRN  HYDROmorphone, 0.5 mg, Q3H PRN  prochlorperazine, 10 mg, Q6H PRN  Calamine, , PRN       Objective:    /75   Pulse 64   Temp 97.7 °F (36.5 °C) (Oral)   Resp 18   Ht 5' 9.5\" (1.765 m)   Wt 274 lb 8 oz (124.5 kg)   SpO2 96%   BMI 39.96 kg/m²   General Appearance: alert and oriented to person, place and time and in no acute distress  Skin: warm and dry.   Rash improving  Head: normocephalic and atraumatic  Eyes: pupils equal, round, and reactive to light, extraocular eye movements intact, conjunctivae normal  Neck: neck supple and non tender without mass   Pulmonary/Chest: clear to auscultation bilaterally- no wheezes, rales or rhonchi, normal air movement, no respiratory distress  Cardiovascular: normal rate, normal S1 and S2 and no RGM  Abdomen: soft, non-tender, non-distended, normal bowel sounds  Extremities: no cyanosis, no clubbing and no edema  Neurologic: no cranial nerve deficit and speech normal      Recent Labs     01/09/23  1454 01/10/23  0652   NA 128* 131*   K 4.3 4.0   CL 92* 97*   CO2 18* 19*   BUN 83* 83*   CREATININE 3.7* 3.3*   GLUCOSE 113* 85   CALCIUM 9.0 8.9         Recent Labs     01/09/23  1454 01/10/23  0652   ALKPHOS 218* 205*   PROT 7.7 7.6   LABALBU 3.3* 3.3*   BILITOT 0.6 0.3   AST 14 10   ALT 22 19         Recent Labs     01/09/23  1454 01/10/23  0652   WBC 29.3* 18.3*   RBC 3.92 3.94   HGB 12.6 12.8   HCT 38.1 38.0   MCV 97.2 96.4   MCH 32.1 32.5   MCHC 33.1 33.7   RDW 13.4 13.0    334   MPV 10.4 10.4              Radiology:   CT ABDOMEN PELVIS WO CONTRAST Additional Contrast? None   Final Result   Multiple nonobstructing bilateral renal stones with stranding of the Ladi   renal fat and thickening of the pararenal fascia. Cholelithiasis. Thickening of the bladder wall, may be due to suboptimal distension versus   cystitis. Assessment:  Principal Problem:    Acute pyelonephritis  Active Problems:    Hypertension    AURELIANO (obstructive sleep apnea)    Complicated UTI (urinary tract infection)    Renal calculi    Leukocytosis    Rash    Diarrhea  Resolved Problems:    * No resolved hospital problems. *      Plan:  Complicated UTI/Acute Pyelonephritis-recent UTI in.  Prescribed Macrobid 12/31/2022. Would complete Macrobid today. Continues to feel poorly/symptoms ongoing. CT A/P multiple nonobstructing bilateral renal stones with stranding of the perirenal fat and thickening of the pararenal fascia. Received Ceftriaxone 2g IV in ED course. Continue Abx. Follow cultures. ID input appreciated. Leukocytosis- Improving Recent Macrobid completed 10 day course. WBC 29.3>18.3>13.2 UA+ urine culture pending. Blood cultures pending. Received ceftriaxone 2 g IV x1/1 mL NSS bolus in ED course. Continue to follow cultures. ID input appreciated. Hyponatremia- Na+ 128>131>135. Received 1L NSS bolus in ED course. Continue IVF hydration follow.    Diarrhea- Stool R/O Cdiff pending  Nonobstructing bilateral renal stones-CT A/P multiple nonobstructing bilateral renal stones noted. Urology input appreciated. NATALI-continues to improve BUN/Crt 67/2.3. Received 1L NSS bolus in ED course. Continue IVF hydration. Avoid nephrotoxic agents. Renal ultrasound with bilateral intrarenal stones of nonobstructing bilateral nephrolithiasis without hydronephrosis. Urology input appreciated. Rash-likely 2/2 ABX. Calamine lotion. Monitor for worsening  Atrial fibrillation-continue Eliquis. Qtc Prolongation, Toprol-XL/Tikosyn held. Repeat EKG. Telemetry monitoring. Cardiology input appreciated. HTN-Toprol-XL held 2/2 hypotension and bradycardia. Holding Losartan. Follow adjust as needed  HLD-continue atorvastatin. Obesity-BMI 38.57.  weight loss/dietary changes/exercise. AURELIANO-does wear CPAP at home. We will continue. NOTE: This report was transcribed using voice recognition software. Every effort was made to ensure accuracy; however, inadvertent computerized transcription errors may be present. In Review of EMR,  evaluation, management and diagnosis. Care plan has been discussed with attending. Time spent  17 minutes. Electronically signed by Lucia Vega CNP on 1/11/2023 at 7:25 AM

## 2023-01-11 NOTE — CONSULTS
Nephrology Consult  [facility]    CC: Acute kidney injury    HPI:   Xiao Friend is 80-year-old male we were asked to see in regards to acute kidney injury. He presented to the emergency department on 1/9 for complaints of right-sided flank pain with urinary frequency and loose stools. Additionally had been having intermittent fevers. He had been diagnosed 10 days prior with a urinary tract infection and he was placed on antibiotics. He presented to the emergency department his creatinine was noted to be 3.7 mg/dL trended to 3.3 mg/dL on 1/10 and this morning is 2.3 mg/dL. Renal consultation was requested regarding acute kidney injury this morning. He did have a CT of the abdomen without contrast upon admission with bilateral renal calculi with nonobstruction at this time. There is no planned  intervention per urology notes. He does have a longstanding history of renal calculi and does follow with urology. To his recollection he has not seen a nephrologist in the past.  His baseline serum creatinine is 1.3 to 1.4 mg/dL  Patient had been taking Macrobid 100 mg twice daily for his urinary tract infection prior to admission. He developed a rash but did finish the medication. He had also been on Tikosyn which has been discontinued by cardiology. Prior to coming to the hospital he had been having some low-grade fevers for which he took Tylenol has not taken any NSAIDs. Does admit to poor intake prior to admission. He is awake alert and oriented in no acute distress upon examination.     PMH:    Past Medical History:   Diagnosis Date    Arthritis     Atrial fibrillation ([address]     Bulging lumbar disc     L4-L5    CAD (coronary artery disease) 07/07/2016    ct scan heart    Chronic lower back pain     BREWER (dyspnea on exertion) 03/26/2013    Eczema     H/O atrial flutter 09/2021    Hyperlipidemia     Hypertension     Kidney stones     Obesity     weight 250#    Sleep apnea     c pap  stting 2 Patient Active Problem List   Diagnosis    Typical atrial flutter (HCC)    Hypertension    Eczema    Former smoker    Midline low back pain with right-sided sciatica    Persistent atrial fibrillation    Chronic systolic heart failure (HCC)    CKD (chronic kidney disease)    Atrial flutter (HCC)    Status post catheter ablation of atrial fibrillation    Status post catheter ablation of atrial flutter    Right bundle-branch block    Tachyarrhythmia    Atrial fibrillation (HCC)    Wrist pain, chronic, right    Chronic pain syndrome    Primary osteoarthritis involving multiple joints    Right foot drop    Lumbar facet arthropathy    COVID-19    NATALI (acute kidney injury) ([facility] [address]    HLD (hyperlipidemia)    Redness and swelling of knee    Obesity (BMI 30-39. 9)    CAD (coronary artery disease)    H/O total knee replacement, bilateral    Osteoarthritis of left knee    Localized osteoarthritis of left knee    Atrial fibrillation with RVR (HCC)    Abnormal ECG    Status post ablation of atrial fibrillation    Atrial tachycardia (HCC)    Aortic atherosclerosis (HCC)    Arthropathy    Benign prostatic hyperplasia with urinary frequency    Bicuspid aortic valve    Erectile dysfunction due to arterial insufficiency    Fall from ladder    Impaired fasting glucose    AURELIANO (obstructive sleep apnea)    Vitamin D deficiency    Acute pyelonephritis    Complicated UTI (urinary tract infection)    Renal calculi    Leukocytosis    Rash    Diarrhea       Meds:     metoprolol succinate  100 mg Oral Daily    atorvastatin  20 mg Oral Nightly    cefTRIAXone (ROCEPHIN) IV  2,000 mg IntraVENous Q24H    cetirizine  5 mg Oral Daily    sodium chloride flush  5-40 mL IntraVENous 2 times per day    famotidine  20 mg Oral Daily    apixaban  5 mg Oral BID        sodium chloride 100 mL/hr at 01/11/23 0845    sodium chloride         Meds prn:     sodium chloride flush, sodium chloride, acetaminophen **OR** acetaminophen, polyethylene glycol, melatonin, HYDROmorphone, prochlorperazine, Calamine    Meds prior to admission:     No current facility-administered medications on file prior to encounter. Current Outpatient Medications on File Prior to Encounter   Medication Sig Dispense Refill    chlorthalidone (HYGROTON) 25 MG tablet TAKE ONE TABLET BY MOUTH EVERY DAY 90 tablet 3    dofetilide (TIKOSYN) 125 MCG capsule Take 1 capsule by mouth 2 times daily 180 capsule 1    dofetilide (TIKOSYN) 250 MCG capsule Take 1 capsule by mouth 2 times daily TAKE 1 CAPSULE BY MOUTH 2 TIMES DAILY ALONG WITH THE 125MCG DOSE TO EQUAL 375MCG DOSE 2 TIMES DAILY 180 capsule 1    metoprolol succinate (TOPROL XL) 100 MG extended release tablet TAKE ONE TABLET BY MOUTH TWO TIMES A  tablet 0    ELIQUIS 5 MG TABS tablet TAKE ONE TABLET BY MOUTH TWO TIMES A  tablet 3    atorvastatin (LIPITOR) 20 MG tablet TAKE ONE TABLET BY MOUTH EVERY DAY 30 tablet 11    QUERCETIN PO Take 500 mg by mouth daily      tamsulosin (FLOMAX) 0.4 MG capsule TAKE ONE CAPSULE BY MOUTH EVERY DAY AT BEDTIME      gabapentin (NEURONTIN) 100 MG capsule Take 1 capsule by mouth 2 times daily as needed (pain) for up to 90 days. 60 capsule 2    gabapentin (NEURONTIN) 100 MG capsule Take 1 capsule by mouth 2 times daily for 30 days. (Patient not taking: Reported on 2/10/2022) 60 capsule 0    acetaminophen (TYLENOL) 500 MG tablet Take 2 tablets by mouth 3 times daily as needed for Pain 180 tablet 1    losartan (COZAAR) 50 MG tablet Take 50 mg by mouth daily      Misc Natural Products (TART CHERRY ADVANCED PO) Take by mouth STOP PREOP MED      magnesium oxide (MAG-OX) 400 (240 Mg) MG tablet Take 1 tablet by mouth 2 times daily (Patient taking differently: Take 400 mg by mouth daily) 60 tablet 0    Multiple Vitamins-Minerals (THERAPEUTIC MULTIVITAMIN-MINERALS) tablet Take 1 tablet by mouth daily. Coenzyme Q10 (COQ10 PO) Take 1 tablet by mouth daily.       Alpha-Lipoic Acid 300 MG CAPS Take 300 mg by mouth daily On hold      ascorbic acid (VITAMIN C) 500 MG tablet Take 500 mg by mouth daily. Vitamin D (CHOLECALCIFEROL) 1000 UNITS CAPS capsule Take 1,000 Units by mouth daily. Cyanocobalamin (VITAMIN B 12 PO) Take 500 mcg by mouth daily. Allergies:    Adhesive tape and Macrobid [nitrofurantoin]    Social History:     reports that he quit smoking about 16 years ago. His smoking use included cigarettes. He has a 20.00 pack-year smoking history. He has never used smokeless tobacco. He reports current alcohol use of about 3.0 standard drinks per week. He reports that he does not use drugs.     Family History:         Problem Relation Age of Onset    Cancer Mother     Diabetes Mother     Cancer Father     Diabetes Sister     Kidney Disease Sister     Early Death Neg Hx        ROS:     All pertinent positives discussed in HPI  All other sx negative     Physical Exam:      Patient Vitals for the past 24 hrs:   BP Temp Temp src Pulse Resp SpO2 Weight   01/11/23 0815 137/61 97.8 °F (36.6 °C) Oral 66 18 96 % --   01/11/23 0318 -- -- -- -- -- -- 274 lb 8 oz (124.5 kg)   01/10/23 1837 133/75 97.7 °F (36.5 °C) Oral 64 18 96 % --   01/10/23 1454 127/64 97 °F (36.1 °C) Temporal 60 18 -- --   01/10/23 1242 -- -- -- -- 16 -- --         Intake/Output Summary (Last 24 hours) at 1/11/2023 1003  Last data filed at 1/10/2023 2033  Gross per 24 hour   Intake 1108.49 ml   Output --   Net 1108.49 ml       Constitutional: Patient in no acute distress   Head: normocephalic, atraumatic   Neck: no jvd  Cardiovascular: S1-S2 no S3 or rub  Respiratory: Clear with equal expansion  Gastrointestinal: soft, nontender, nondistended, no hepatosplenomegaly  Ext: No edema  Neuro: Awake alert and oriented  Skin: dry, erythematous dry rash on the lower ext      Data:    Recent Labs     01/09/23  1454 01/10/23  0652 01/11/23  0839   WBC 29.3* 18.3* 13.2*   HGB 12.6 12.8 13.1   HCT 38.1 38.0 39.2   MCV 97.2 96.4 96.8    334 374       Recent Labs     01/09/23  1454 01/10/23  0652 01/11/23  0839   * 131* 135   K 4.3 4.0 3.8   CL 92* 97* 100   CO2 18* 19* 22   CREATININE 3.7* 3.3* 2.3*   BUN 83* 83* 67*   LABGLOM 17 19 29   GLUCOSE 113* 85 180*   CALCIUM 9.0 8.9 9.1   MG 2.5  --   --        No results found for: VITD25    No results found for: PTH    Recent Labs     01/09/23  1454 01/10/23  0652 01/11/23  0839   ALT 22 19 18   AST 14 10 9   ALKPHOS 218* 205* 211*   BILITOT 0.6 0.3 0.2       Recent Labs     01/09/23  1454 01/10/23  0652 01/11/23  0839   LABALBU 3.3* 3.3* 3.2*       No results found for: FERRITIN, IRON, TIBC    No results found for: [id number]    No results found for: FOLATE      Lab Results   Component Value Date/Time    COLORU Yellow 01/09/2023 02:54 PM    NITRU POSITIVE 01/09/2023 02:54 PM    GLUCOSEU Negative 01/09/2023 02:54 PM    KETUA Negative 01/09/2023 02:54 PM    UROBILINOGEN 0.2 01/09/2023 02:54 PM    BILIRUBINUR Negative 01/09/2023 02:54 PM       No results found for: VINNIE, CREURRAN, MACREATRATIO, OSMOU    No components found for: URIC    No results found for: LIPIDPAN    CT ABDOMEN PELVIS WO CONTRAST Additional Contrast? None [[id number]] Collected: 01/09/23 1445      Order Status: Completed Updated: 01/09/23 1458     Narrative:       EXAMINATION:   CT OF THE ABDOMEN AND PELVIS WITHOUT CONTRAST 1/9/2023 2:32 pm     TECHNIQUE:   CT of the abdomen and pelvis was performed without the administration of   intravenous contrast. Multiplanar reformatted images are provided for review. Automated exposure control, iterative reconstruction, and/or weight based   adjustment of the mA/kV was utilized to reduce the radiation dose to as low   as reasonably achievable.      COMPARISON:   June 16, 2021     HISTORY:   ORDERING SYSTEM PROVIDED HISTORY: right flank pain r/o stone   TECHNOLOGIST PROVIDED HISTORY:   Reason for exam:->right flank pain r/o stone   Additional Contrast?->None   Decision Support Exception - unselect if not a suspected or confirmed   emergency medical condition->Emergency Medical Condition (MA)     FINDINGS:   Lower Chest: No infiltrates or pleural effusion. Atelectasis and patchy   opacities in the left lung base. Organs: Fatty liver with no focal lesions. Gallbladder is present with   multiple calcified stones. Spleen is not enlarged. Pancreas and adrenal   glands appear unremarkable. There are multiple nonobstructing bilateral   renal stones with stranding of Ladi renal fat and thickening of the pararenal   fascia. GI/Bowel: There are no obstructing or constricting lesions. Appendix is   normal.     Pelvis: Bladder is suboptimally distended. There is no significant free   fluid. Peritoneum/Retroperitoneum: No free air or significant adenopathy. Bones/Soft Tissues: Unremarkable. Impression:       Multiple nonobstructing bilateral renal stones with stranding of the Ladi   renal fat and thickening of the pararenal fascia. Cholelithiasis. Thickening of the bladder wall, may be due to suboptimal distension versus   cystitis. US RETROPERITONEAL COMPLETE [[id number]] Collected: 01/11/23 1126      Order Status: Completed Updated: 01/11/23 1130     Narrative:       EXAMINATION:   RETROPERITONEAL ULTRASOUND OF THE KIDNEYS AND URINARY BLADDER     1/11/2023     COMPARISON:   CT abdomen and pelvis 01/09/2023     HISTORY:   ORDERING SYSTEM PROVIDED HISTORY: NATALI   TECHNOLOGIST PROVIDED HISTORY:     Reason for exam:->NATALI   What reading provider will be dictating this exam?->CRC     FINDINGS:     Kidneys: The right kidney measures 11.8 cm in length and the left kidney measures 12   cm in length. Kidneys demonstrate normal cortical echogenicity. No evidence for   hydronephrosis with bilateral echogenic areas demonstrating shadowing of   bilateral nonobstructing nephrolithiasis measuring up to 6 mm right and 5 mm   left. Bladder:     Unremarkable appearance of the bladder.   No significant post void residual.      Impression:       Bilateral intrarenal stones of nonobstructing bilateral nephrolithiasis   without hydronephrosis. Assessment and Plan:    Acute kidney injury on CKD G3A withBaseline creatinine 1.3 to 1.4 mg/dL with an e-GFR 51-60ml/min  Presumed prerenal in the setting of ARB therapy but could have also been exacerbated by the use of Macrobid 100 mg twice daily for 10 days prior to admission. Unfortunately urine studies were not obtained prior to patient being hydrated. Will check urine studies now as well as a urine eosinophil given patient had a rash with use of Macrobid. He had been on Tikosyn in the past and this has been discontinued  Creatinine at presentation 3.7 mg/dL which is trended down to 2.3 mg/dL  Will continue IV fluids for the current time  Renal ultrasound obtained without evidence of hydronephrosis but showing bilateral nonobstructing nephrolithiasis. 2.  Hypertension with chronic kidney disease 1-4-  Blood pressure goal less than 130/80  Blood pressure currently at/near goal      3. Urinary tract infection-  UA with large blood positive nitrates  Currently being treated with ceftriaxone        MARIA E Mena - CNP  Patient seen and examined. I had a face to face encounter with the patient. His wife is at bedside. Agree with the Hx as outlined above  Agree with exam.    Agree with  formulation, assessment and plan as outlined above and directed by me. Addition and corrections were done as deemed appropriate.    My exam and plan include:    A/P:  Stage II NATALI secondary to the combined effects of the UTI with the inflammation exacerbated by the outpt ARB + thiazide-possible there is a component of AIN from the nitrofurantoin but the rapid improvement with IVF alone supports a hemodynamic mediated cause  Agree with the remainder as above-Continue current treatment as outlined above    NEFTALI Martin MD

## 2023-01-12 LAB
ALBUMIN SERPL-MCNC: 3.1 G/DL (ref 3.5–5.2)
ALP BLD-CCNC: 205 U/L (ref 40–129)
ALT SERPL-CCNC: 20 U/L (ref 0–40)
ANION GAP SERPL CALCULATED.3IONS-SCNC: 12 MMOL/L (ref 7–16)
AST SERPL-CCNC: 14 U/L (ref 0–39)
BASOPHILS ABSOLUTE: 0.08 E9/L (ref 0–0.2)
BASOPHILS RELATIVE PERCENT: 0.5 % (ref 0–2)
BILIRUB SERPL-MCNC: 0.2 MG/DL (ref 0–1.2)
BUN BLDV-MCNC: 54 MG/DL (ref 6–23)
C-REACTIVE PROTEIN: 2.4 MG/DL (ref 0–0.4)
CALCIUM SERPL-MCNC: 9.1 MG/DL (ref 8.6–10.2)
CHLORIDE BLD-SCNC: 106 MMOL/L (ref 98–107)
CO2: 20 MMOL/L (ref 22–29)
CREAT SERPL-MCNC: 1.8 MG/DL (ref 0.7–1.2)
EKG ATRIAL RATE: 54 BPM
EKG P AXIS: 66 DEGREES
EKG P-R INTERVAL: 170 MS
EKG Q-T INTERVAL: 482 MS
EKG QRS DURATION: 138 MS
EKG QTC CALCULATION (BAZETT): 457 MS
EKG R AXIS: 16 DEGREES
EKG T AXIS: 23 DEGREES
EKG VENTRICULAR RATE: 54 BPM
EOSINOPHIL, URINE: 0 % (ref 0–1)
EOSINOPHILS ABSOLUTE: 2.14 E9/L (ref 0.05–0.5)
EOSINOPHILS RELATIVE PERCENT: 14.7 % (ref 0–6)
GFR SERPL CREATININE-BSD FRML MDRD: 40 ML/MIN/1.73
GLUCOSE BLD-MCNC: 106 MG/DL (ref 74–99)
HCT VFR BLD CALC: 35.3 % (ref 37–54)
HEMOGLOBIN: 11.8 G/DL (ref 12.5–16.5)
IMMATURE GRANULOCYTES #: 0.11 E9/L
IMMATURE GRANULOCYTES %: 0.8 % (ref 0–5)
LYMPHOCYTES ABSOLUTE: 2.8 E9/L (ref 1.5–4)
LYMPHOCYTES RELATIVE PERCENT: 19.2 % (ref 20–42)
MAGNESIUM: 2 MG/DL (ref 1.6–2.6)
MCH RBC QN AUTO: 32.5 PG (ref 26–35)
MCHC RBC AUTO-ENTMCNC: 33.4 % (ref 32–34.5)
MCV RBC AUTO: 97.2 FL (ref 80–99.9)
MONOCYTES ABSOLUTE: 0.9 E9/L (ref 0.1–0.95)
MONOCYTES RELATIVE PERCENT: 6.2 % (ref 2–12)
NEUTROPHILS ABSOLUTE: 8.55 E9/L (ref 1.8–7.3)
NEUTROPHILS RELATIVE PERCENT: 58.6 % (ref 43–80)
ORGANISM: ABNORMAL
PDW BLD-RTO: 12.9 FL (ref 11.5–15)
PHOSPHORUS: 4.6 MG/DL (ref 2.5–4.5)
PLATELET # BLD: 327 E9/L (ref 130–450)
PMV BLD AUTO: 10 FL (ref 7–12)
POTASSIUM REFLEX MAGNESIUM: 4.5 MMOL/L (ref 3.5–5)
POTASSIUM SERPL-SCNC: 4.5 MMOL/L (ref 3.5–5)
RBC # BLD: 3.63 E12/L (ref 3.8–5.8)
RBC # BLD: NORMAL 10*6/UL
SODIUM BLD-SCNC: 138 MMOL/L (ref 132–146)
TOTAL PROTEIN: 7.2 G/DL (ref 6.4–8.3)
URINE CULTURE, ROUTINE: ABNORMAL
WBC # BLD: 14.6 E9/L (ref 4.5–11.5)

## 2023-01-12 PROCEDURE — 6360000002 HC RX W HCPCS: Performed by: FAMILY MEDICINE

## 2023-01-12 PROCEDURE — 80053 COMPREHEN METABOLIC PANEL: CPT

## 2023-01-12 PROCEDURE — 2580000003 HC RX 258: Performed by: FAMILY MEDICINE

## 2023-01-12 PROCEDURE — 99233 SBSQ HOSP IP/OBS HIGH 50: CPT | Performed by: INTERNAL MEDICINE

## 2023-01-12 PROCEDURE — 6370000000 HC RX 637 (ALT 250 FOR IP): Performed by: FAMILY MEDICINE

## 2023-01-12 PROCEDURE — 93010 ELECTROCARDIOGRAM REPORT: CPT | Performed by: INTERNAL MEDICINE

## 2023-01-12 PROCEDURE — 36415 COLL VENOUS BLD VENIPUNCTURE: CPT

## 2023-01-12 PROCEDURE — 2060000000 HC ICU INTERMEDIATE R&B

## 2023-01-12 PROCEDURE — 86140 C-REACTIVE PROTEIN: CPT

## 2023-01-12 PROCEDURE — 83735 ASSAY OF MAGNESIUM: CPT

## 2023-01-12 PROCEDURE — 6370000000 HC RX 637 (ALT 250 FOR IP): Performed by: INTERNAL MEDICINE

## 2023-01-12 PROCEDURE — 80048 BASIC METABOLIC PNL TOTAL CA: CPT

## 2023-01-12 PROCEDURE — 85025 COMPLETE CBC W/AUTO DIFF WBC: CPT

## 2023-01-12 PROCEDURE — 84100 ASSAY OF PHOSPHORUS: CPT

## 2023-01-12 PROCEDURE — 6370000000 HC RX 637 (ALT 250 FOR IP): Performed by: NURSE PRACTITIONER

## 2023-01-12 PROCEDURE — 99233 SBSQ HOSP IP/OBS HIGH 50: CPT | Performed by: FAMILY MEDICINE

## 2023-01-12 PROCEDURE — 93005 ELECTROCARDIOGRAM TRACING: CPT | Performed by: INTERNAL MEDICINE

## 2023-01-12 PROCEDURE — 2580000003 HC RX 258: Performed by: NURSE PRACTITIONER

## 2023-01-12 RX ORDER — CEFDINIR 300 MG/1
300 CAPSULE ORAL EVERY 12 HOURS SCHEDULED
Status: DISCONTINUED | OUTPATIENT
Start: 2023-01-12 | End: 2023-01-13 | Stop reason: HOSPADM

## 2023-01-12 RX ORDER — CEFDINIR 300 MG/1
300 CAPSULE ORAL EVERY 12 HOURS SCHEDULED
Qty: 28 CAPSULE | Refills: 0 | Status: SHIPPED | OUTPATIENT
Start: 2023-01-12 | End: 2023-01-13 | Stop reason: SDUPTHER

## 2023-01-12 RX ADMIN — CEFDINIR 300 MG: 300 CAPSULE ORAL at 19:23

## 2023-01-12 RX ADMIN — ATORVASTATIN CALCIUM 20 MG: 20 TABLET, FILM COATED ORAL at 19:23

## 2023-01-12 RX ADMIN — HYDROMORPHONE HYDROCHLORIDE 0.5 MG: 0.5 INJECTION, SOLUTION INTRAMUSCULAR; INTRAVENOUS; SUBCUTANEOUS at 21:05

## 2023-01-12 RX ADMIN — APIXABAN 5 MG: 5 TABLET, FILM COATED ORAL at 08:07

## 2023-01-12 RX ADMIN — SODIUM CHLORIDE, PRESERVATIVE FREE 10 ML: 5 INJECTION INTRAVENOUS at 19:23

## 2023-01-12 RX ADMIN — HYDROMORPHONE HYDROCHLORIDE 0.5 MG: 0.5 INJECTION, SOLUTION INTRAMUSCULAR; INTRAVENOUS; SUBCUTANEOUS at 12:04

## 2023-01-12 RX ADMIN — HYDROMORPHONE HYDROCHLORIDE 0.5 MG: 0.5 INJECTION, SOLUTION INTRAMUSCULAR; INTRAVENOUS; SUBCUTANEOUS at 05:25

## 2023-01-12 RX ADMIN — CETIRIZINE HYDROCHLORIDE 5 MG: 10 TABLET, FILM COATED ORAL at 08:07

## 2023-01-12 RX ADMIN — APIXABAN 5 MG: 5 TABLET, FILM COATED ORAL at 19:23

## 2023-01-12 RX ADMIN — FAMOTIDINE 20 MG: 20 TABLET, FILM COATED ORAL at 08:07

## 2023-01-12 RX ADMIN — HYDROMORPHONE HYDROCHLORIDE 0.5 MG: 0.5 INJECTION, SOLUTION INTRAMUSCULAR; INTRAVENOUS; SUBCUTANEOUS at 17:10

## 2023-01-12 RX ADMIN — SODIUM CHLORIDE, PRESERVATIVE FREE 10 ML: 5 INJECTION INTRAVENOUS at 08:08

## 2023-01-12 RX ADMIN — Medication 3 MG: at 22:15

## 2023-01-12 RX ADMIN — SODIUM CHLORIDE: 9 INJECTION, SOLUTION INTRAVENOUS at 03:45

## 2023-01-12 RX ADMIN — METOPROLOL SUCCINATE 100 MG: 100 TABLET, EXTENDED RELEASE ORAL at 08:07

## 2023-01-12 ASSESSMENT — PAIN SCALES - WONG BAKER: WONGBAKER_NUMERICALRESPONSE: 0

## 2023-01-12 ASSESSMENT — PAIN SCALES - GENERAL
PAINLEVEL_OUTOF10: 7
PAINLEVEL_OUTOF10: 0
PAINLEVEL_OUTOF10: 7
PAINLEVEL_OUTOF10: 7

## 2023-01-12 ASSESSMENT — PAIN DESCRIPTION - LOCATION: LOCATION: FLANK

## 2023-01-12 NOTE — PROGRESS NOTES
[address] [place] [address] [facility]  Progress Note    SUBJECTIVE:  Chief Complaint   Patient presents with    Flank Pain     Rt flank pain     The patient is doing well. Tolerating antibiotic. Denies flank pain. No fevers, chills or diaphoresis. No nausea, vomiting, + diarrhea improved    Review of systems:  As stated above in the chief complaint, otherwise negative. Medications:  Scheduled Meds:   metoprolol succinate  100 mg Oral Daily    atorvastatin  20 mg Oral Nightly    cefTRIAXone (ROCEPHIN) IV  2,000 mg IntraVENous Q24H    cetirizine  5 mg Oral Daily    sodium chloride flush  5-40 mL IntraVENous 2 times per day    famotidine  20 mg Oral Daily    apixaban  5 mg Oral BID     Continuous Infusions:   sodium chloride 50 mL/hr at 23 0941    sodium chloride       PRN Meds:sodium chloride flush, sodium chloride, acetaminophen **OR** acetaminophen, polyethylene glycol, melatonin, HYDROmorphone, prochlorperazine, Calamine    OBJECTIVE:  /67   Pulse 73   Temp 97.9 °F (36.6 °C) (Temporal)   Resp 16   Ht 5' 9.5\" (1.765 m)   Wt 271 lb 4.8 oz (123.1 kg)   SpO2 97%   BMI 39.49 kg/m²   Temp  Av.6 °F (36.4 °C)  Min: 96.2 °F (35.7 °C)  Max: 98.3 °F (36.8 °C)  Constitutional: The patient is awake, alert, and oriented. Resting with cpap on in bed in a dark room. No distress. Skin: Warm and dry. No rashes were noted. HEENT: Round and reactive pupils. Moist mucous membranes. No ulcerations or thrush. Neck: Supple to movements. Chest: No use of accessory muscles to breathe. Symmetrical expansion. No wheezing, crackles or rhonchi. Cardiovascular: S1 and S2 are rhythmic and regular. No murmurs appreciated. Abdomen: Positive bowel sounds to auscultation. Benign to palpation. No CVA tenderness. Extremities: No clubbing, no cyanosis, no edema.   Lines: peripheral    Laboratory and Tests Review:  Lab Results   Component Value Date    WBC 14.6 (H) 2023    WBC 13.2 (H) 01/11/2023    WBC 18.3 (H) 01/10/2023    HGB 11.8 (L) 01/12/2023    HCT 35.3 (L) 01/12/2023    MCV 97.2 01/12/2023     01/12/2023     Lab Results   Component Value Date    NEUTROABS 8.55 (H) 01/12/2023    NEUTROABS 9.01 (H) 01/11/2023    NEUTROABS 14.36 (H) 01/10/2023     No results found for: CRPHS  Lab Results   Component Value Date    ALT 20 01/12/2023    AST 14 01/12/2023    ALKPHOS 205 (H) 01/12/2023    BILITOT 0.2 01/12/2023     Lab Results   Component Value Date/Time     01/12/2023 03:50 AM    K 4.5 01/12/2023 03:50 AM    K 4.5 01/12/2023 03:50 AM     01/12/2023 03:50 AM    CO2 20 01/12/2023 03:50 AM    BUN 54 01/12/2023 03:50 AM    CREATININE 1.8 01/12/2023 03:50 AM    CREATININE 2.3 01/11/2023 08:39 AM    CREATININE 3.3 01/10/2023 06:52 AM    GFRAA >60 10/20/2021 06:30 AM    LABGLOM 40 01/12/2023 03:50 AM    GLUCOSE 106 01/12/2023 03:50 AM    PROT 7.2 01/12/2023 03:50 AM    LABALBU 3.1 01/12/2023 03:50 AM    CALCIUM 9.1 01/12/2023 03:50 AM    BILITOT 0.2 01/12/2023 03:50 AM    ALKPHOS 205 01/12/2023 03:50 AM    AST 14 01/12/2023 03:50 AM    ALT 20 01/12/2023 03:50 AM     Lab Results   Component Value Date    CRP 2.4 (H) 01/12/2023    CRP 4.0 (H) 01/11/2023    CRP 6.6 (H) 01/10/2023     Lab Results   Component Value Date    SEDRATE 52 (H) 06/30/2020     Radiology:      Microbiology:   Urine culture 1/9/2022: E. coli pan sensitive except to macrodantin  C. difficile: Negative  Blood cultures 1/9/2023: Negative so far    ASSESSMENT:  Pyelonephritis associated to complicated UTI with sepsis  Bilateral nonobstructive nephrolithiasis  Fever secondary to complicated UTI-improved  Leukocytosis associated to complicated UTI-improving    PLAN:  Continue Ceftriaxone and switch to omnicef  Check final cultures  Monitor labs    MARIA E Robles CNP  10:53 AM  1/12/2023     Patient seen and examined. I had a face to face encounter with the patient.  Agree with exam.  Assessment and plan as outlined above and directed by me. Addition and corrections were done as deemed appropriate. My exam and plan include: The patient is lying in bed. He is in no distress. He has improved significantly. Ceftriaxone has been changed over to oral Cefdinir. Reconciled x2 weeks. He can be discharged from ID standpoint. Follow-up with PCP and urology.     Reginald Esteban MD  1/12/2023  12:50 PM

## 2023-01-12 NOTE — PROGRESS NOTES
[facility] Cardiology Inpatient Progress Note    Patient is a 71 y.o. male of Narcisa Baker DO seen in hospital follow up. Chief complaint: Afib    HPI: Some SOB. No CP. Patient Active Problem List   Diagnosis    Typical atrial flutter (HCC)    Hypertension    Eczema    Former smoker    Midline low back pain with right-sided sciatica    Persistent atrial fibrillation    Chronic systolic heart failure (HCC)    CKD (chronic kidney disease)    Atrial flutter (HCC)    Status post catheter ablation of atrial fibrillation    Status post catheter ablation of atrial flutter    Right bundle-branch block    Tachyarrhythmia    Atrial fibrillation (HCC)    Wrist pain, chronic, right    Chronic pain syndrome    Primary osteoarthritis involving multiple joints    Right foot drop    Lumbar facet arthropathy    COVID-19    NATALI (acute kidney injury) ([facility] [address]    HLD (hyperlipidemia)    Redness and swelling of knee    Obesity (BMI 30-39. 9)    CAD (coronary artery disease)    H/O total knee replacement, bilateral    Osteoarthritis of left knee    Localized osteoarthritis of left knee    Atrial fibrillation with RVR (HCC)    Abnormal ECG    Status post ablation of atrial fibrillation    Atrial tachycardia (HCC)    Aortic atherosclerosis (HCC)    Arthropathy    Benign prostatic hyperplasia with urinary frequency    Bicuspid aortic valve    Erectile dysfunction due to arterial insufficiency    Fall from ladder    Impaired fasting glucose    AURELIANO (obstructive sleep apnea)    Vitamin D deficiency    Acute pyelonephritis    Complicated UTI (urinary tract infection)    Renal calculi    Leukocytosis    Rash    Diarrhea       Allergies   Allergen Reactions    Adhesive Tape Rash     bandaids    Macrobid [Nitrofurantoin] Rash       Current Facility-Administered Medications   Medication Dose Route Frequency Provider Last Rate Last Admin    0.9 % sodium chloride infusion   IntraVENous Continuous Sharyn Char, APRN -  mL/hr at 01/12/23 0625 Rate Verify at 01/12/23 9083    metoprolol succinate (TOPROL XL) extended release tablet 100 mg  100 mg Oral Daily Maple Brine, DO   100 mg at 01/12/23 3122    atorvastatin (LIPITOR) tablet 20 mg  20 mg Oral Nightly Maple Brine, DO   20 mg at 01/11/23 2025    cefTRIAXone (ROCEPHIN) 2,000 mg in sterile water 20 mL IV syringe  2,000 mg IntraVENous Q24H Simran Noguera MD   2,000 mg at 01/11/23 1203    cetirizine (ZYRTEC) tablet 5 mg  5 mg Oral Daily Ovi Garcia MD   5 mg at 01/12/23 1795    sodium chloride flush 0.9 % injection 5-40 mL  5-40 mL IntraVENous 2 times per day Ovi Garcia MD   10 mL at 01/12/23 2117    sodium chloride flush 0.9 % injection 5-40 mL  5-40 mL IntraVENous PRN Ovi Garcia MD        0.9 % sodium chloride infusion   IntraVENous PRN Ovi Garcia MD        acetaminophen (TYLENOL) tablet 650 mg  650 mg Oral Q6H PRN Ovi Garcia MD        Or    acetaminophen (TYLENOL) suppository 650 mg  650 mg Rectal Q6H PRN Ovi Garcia MD        polyethylene glycol (GLYCOLAX) packet 17 g  17 g Oral Daily PRN Ovi Garcia MD        melatonin tablet 3 mg  3 mg Oral Nightly PRN Ovi Garcia MD   3 mg at 01/11/23 2025    HYDROmorphone (DILAUDID) injection 0.5 mg  0.5 mg IntraVENous Q3H PRN Ovi Garcia MD   0.5 mg at 01/12/23 0525    famotidine (PEPCID) tablet 20 mg  20 mg Oral Daily Ovi Garcia MD   20 mg at 01/12/23 8921    apixaban (ELIQUIS) tablet 5 mg  5 mg Oral BID Ovi Garcia MD   5 mg at 01/12/23 9359    prochlorperazine (COMPAZINE) injection 10 mg  10 mg IntraVENous Q6H PRN Ovi Garcia MD        Calamine 8-8 % lotion   Topical PRN Ovi Garcia MD         Review of systems:   Heart: as above   Lungs: as above   Eyes: denies changes in vision or discharge. Ears: denies changes in hearing or pain. Nose: denies epistaxis or masses   Throat: denies sore throat or trouble swallowing. Neuro: denies numbness, tingling, tremors. Skin: denies rashes or itching. : denies hematuria, dysuria   GI: denies vomiting, diarrhea   Psych: denies mood changed, anxiety, depression. Physical Exam   /67   Pulse 73   Temp 97.9 °F (36.6 °C) (Temporal)   Resp 16   Ht 5' 9.5\" (1.765 m)   Wt 271 lb 4.8 oz (123.1 kg)   SpO2 97%   BMI 39.49 kg/m²   Constitutional: Oriented to person, place, and time. No distress. Well developed. Head: Normocephalic and atraumatic. Neck: Neck supple. No hepatojugular reflux. No JVD present. Carotid bruit is not present. No tracheal deviation present. No thyromegaly present. Cardiovascular: Normal rate, regular rhythm, normal heart sounds. intact distal pulses. No gallop and no friction rub. No murmur heard. Pulmonary: Breath sounds normal. No respiratory distress. No wheezes. No rales. Chest: Effort normal. No tenderness. Abdominal: Soft. Bowel sounds are normal. No distension or mass. No tenderness, rebound or guarding. Musculoskeletal: . No tenderness. No clubbing or cyanosis. Extremitites: Intact distal pulses. No edema  Neurological: Alert and oriented to person, place, and time. Skin: Skin is warm and dry. No rash noted. Not diaphoretic. No erythema. Psychiatric: Normal mood and affect. Behavior is normal.   Lymphadenopathy: No cervical adenopathy. No groin adenopathy.     CBC:   Lab Results   Component Value Date/Time    WBC 14.6 01/12/2023 03:50 AM    RBC 3.63 01/12/2023 03:50 AM    HGB 11.8 01/12/2023 03:50 AM    HCT 35.3 01/12/2023 03:50 AM    MCV 97.2 01/12/2023 03:50 AM    MCH 32.5 01/12/2023 03:50 AM    MCHC 33.4 01/12/2023 03:50 AM    RDW 12.9 01/12/2023 03:50 AM     01/12/2023 03:50 AM    MPV 10.0 01/12/2023 03:50 AM     BMP:   Lab Results   Component Value Date/Time     01/12/2023 03:50 AM    K 4.5 01/12/2023 03:50 AM    K 4.5 01/12/2023 03:50 AM     01/12/2023 03:50 AM    CO2 20 01/12/2023 03:50 AM    BUN 54 01/12/2023 03:50 AM    LABALBU 3.1 01/12/2023 03:50 AM    CREATININE 1.8 01/12/2023 03:50 AM    CALCIUM 9.1 01/12/2023 03:50 AM    GFRAA >60 10/20/2021 06:30 AM    LABGLOM 40 01/12/2023 03:50 AM     Magnesium:    Lab Results   Component Value Date/Time    MG 2.0 01/12/2023 03:50 AM     Cardiac Enzymes:   Lab Results   Component Value Date    CKTOTAL 160 06/30/2020    CKMB 9.2 (H) 06/30/2020    TROPHS 44 (H) 08/22/2021      PT/INR:    Lab Results   Component Value Date/Time    PROTIME 12.1 10/20/2021 06:30 AM    INR 1.1 10/20/2021 06:30 AM     TSH:    Lab Results   Component Value Date/Time    TSH 1.700 01/10/2023 10:07 AM     Rhythm Strip: normal sinus rhythm. ASSESSMENT & PLAN:    Patient Active Problem List   Diagnosis    Typical atrial flutter (HCC)    Hypertension    Eczema    Former smoker    Midline low back pain with right-sided sciatica    Persistent atrial fibrillation    Chronic systolic heart failure (HCC)    CKD (chronic kidney disease)    Atrial flutter (HCC)    Status post catheter ablation of atrial fibrillation    Status post catheter ablation of atrial flutter    Right bundle-branch block    Tachyarrhythmia    Atrial fibrillation (HCC)    Wrist pain, chronic, right    Chronic pain syndrome    Primary osteoarthritis involving multiple joints    Right foot drop    Lumbar facet arthropathy    COVID-19    NATALI (acute kidney injury) ([facility] [address]    HLD (hyperlipidemia)    Redness and swelling of knee    Obesity (BMI 30-39. 9)    CAD (coronary artery disease)    H/O total knee replacement, bilateral    Osteoarthritis of left knee    Localized osteoarthritis of left knee    Atrial fibrillation with RVR (HCC)    Abnormal ECG    Status post ablation of atrial fibrillation    Atrial tachycardia (HCC)    Aortic atherosclerosis (HCC)    Arthropathy    Benign prostatic hyperplasia with urinary frequency    Bicuspid aortic valve    Erectile dysfunction due to arterial insufficiency    Fall from ladder    Impaired fasting glucose    AURELIANO (obstructive sleep apnea) Vitamin D deficiency    Acute pyelonephritis    Complicated UTI (urinary tract infection)    Renal calculi    Leukocytosis    Rash    Diarrhea     1. Hx of Afib:    Hx of ablation and redo. Typically rhythm controlled with tikosyn, hold for NATALI and prolonged QTc. BB/eliquis. 2. CAD:     Coronary CT angiogram 07/07/2016: Dilated left atrium. Mild to moderate atherosclerotic calcifications of the coronary arteries mainly LAD. Pharmacological MPS: 2/20/2020: No EKG changes, normal imaging, low risk pharmacological stress test, EF 64%. Medically manage. BB/statin. No ASA due to eliquis. 3. Hx of Heart failure with improved/HFpEF: Observe volume. 4. VHD: Mild AI/mild MR by 10/20/2021 BUSHRA. Observe. 5. HTN: Observe. 6. Lipids: Statin. 7. AURELIANO: CPAP. 8. Acute on CKD: Follow labs. Improving. Per renal.     9.  issues: Kidney stone s/p lithotripsy 1986. Per urology. 10. Diarrhea: Cdif rule out    11. DJD/Chronic back pain with sciatica s/p lumbar disc surgery/chronic foot drop    Yessenia Gomez D.O.   Cardiologist  Cardiology, [facility]

## 2023-01-12 NOTE — PROGRESS NOTES
[facility] Progress Note    Admitting Date and Time: 1/9/2023  1:59 PM  Admit Dx: Acute pyelonephritis [N10]    Subjective:  Patient is being followed for Acute pyelonephritis [N10]   Pt sleeping. Did not awaken with exam.  Nursing reports no issues. Per RN:   E.coli on UC R to [facility]; S to all other antibiotics. QTc 457. [facility] Await plan. Creatinine down to 1.8. Still with 100 ml/hr.     ROS: denies fever, chills, cp, sob, n/v, HA unless stated above.      metoprolol succinate  100 mg Oral Daily    atorvastatin  20 mg Oral Nightly    cefTRIAXone (ROCEPHIN) IV  2,000 mg IntraVENous Q24H    cetirizine  5 mg Oral Daily    sodium chloride flush  5-40 mL IntraVENous 2 times per day    famotidine  20 mg Oral Daily    apixaban  5 mg Oral BID     sodium chloride flush, 5-40 mL, PRN  sodium chloride, , PRN  acetaminophen, 650 mg, Q6H PRN   Or  acetaminophen, 650 mg, Q6H PRN  polyethylene glycol, 17 g, Daily PRN  melatonin, 3 mg, Nightly PRN  HYDROmorphone, 0.5 mg, Q3H PRN  prochlorperazine, 10 mg, Q6H PRN  Calamine, , PRN         Objective:    /67   Pulse 73   Temp 97.9 °F (36.6 °C) (Temporal)   Resp 16   Ht 5' 9.5\" (1.765 m)   Wt 271 lb 4.8 oz (123.1 kg)   SpO2 97%   BMI 39.49 kg/m²     General Appearance: sleeping on CPAP  Skin: warm and dry  Head: normocephalic and atraumatic  Eyes: pupils equal, round, and reactive to light, extraocular eye movements intact, conjunctivae normal  Neck: neck supple and non tender without mass   Pulmonary/Chest: clear to auscultation bilaterally- no wheezes, rales or rhonchi, normal air movement, no respiratory distress  Cardiovascular: normal rate, normal S1 and S2 and no carotid bruits  Abdomen: soft, non-tender, non-distended, normal bowel sounds, no masses or organomegaly  Extremities: no cyanosis, no clubbing and no edema  Neurologic: no cranial nerve deficit and speech normal        Recent Labs     01/10/23  3428 01/11/23  0839 01/12/23  0350   * 135 138   K 4.0 3.8 4.5  4.5   CL 97* 100 106   CO2 19* 22 20*   BUN 83* 67* 54*   CREATININE 3.3* 2.3* 1.8*   GLUCOSE 85 180* 106*   CALCIUM 8.9 9.1 9.1       Recent Labs     01/10/23  0652 01/11/23  0839 01/12/23  0350   WBC 18.3* 13.2* 14.6*   RBC 3.94 4.05 3.63*   HGB 12.8 13.1 11.8*   HCT 38.0 39.2 35.3*   MCV 96.4 96.8 97.2   MCH 32.5 32.3 32.5   MCHC 33.7 33.4 33.4   RDW 13.0 12.8 12.9    374 327   MPV 10.4 10.1 10.0       Radiology:   US RETROPERITONEAL COMPLETE    Result Date: 1/11/2023  EXAMINATION: RETROPERITONEAL ULTRASOUND OF THE KIDNEYS AND URINARY BLADDER 1/11/2023 COMPARISON: CT abdomen and pelvis 01/09/2023 HISTORY: ORDERING SYSTEM PROVIDED HISTORY: NATALI TECHNOLOGIST PROVIDED HISTORY: Reason for exam:->NATALI What reading provider will be dictating this exam?->CRC FINDINGS: Kidneys: The right kidney measures 11.8 cm in length and the left kidney measures 12 cm in length. Kidneys demonstrate normal cortical echogenicity. No evidence for hydronephrosis with bilateral echogenic areas demonstrating shadowing of bilateral nonobstructing nephrolithiasis measuring up to 6 mm right and 5 mm left. Bladder: Unremarkable appearance of the bladder. No significant post void residual.     Bilateral intrarenal stones of nonobstructing bilateral nephrolithiasis without hydronephrosis. Assessment:    Principal Problem:    Acute pyelonephritis  Active Problems:    Hypertension    AURELIANO (obstructive sleep apnea)    Complicated UTI (urinary tract infection)    Renal calculi    Leukocytosis    Rash    Diarrhea  Resolved Problems:    * No resolved hospital problems. *      Plan:  1. Acute Pyelonephritis - Urology and Infectious disease consults. Failed outpatient therapy. Given Rocephin in ED. ID recommending Rocephin IV. UC with E.coli R to [address] but S to all other antibiotics. Continue Rocephin  2. Drug exanthem - most likely from [address].   Calamine lotion. Monitor for worsening. 3.   Atrial fibrillation - telemetry monitoring. Cardiology consult. 4.   Prolonged QTc - Cardiology and Pharmacy consulted. Tikosyn on hold. QTc 457. Tikosyn management deferred to Cardiology. 5.  HTN - Continue meds. Monitor vitals and adjust accordingly  6. Hyperlipidemia - Continue meds  7. AURELIANO - continue respiratory support. Use hospital CPAP. NOTE: This report was transcribed using voice recognition software. Every effort was made to ensure accuracy; however, inadvertent computerized transcription errors may be present.     Electronically signed by Vidal Mccollum MD on 1/12/2023 at 9:37 AM

## 2023-01-12 NOTE — CARE COORDINATION
CASE MANAGEMENT. .. Eileen Mcneill Met with patient. Luz Mota is up and about in room. Ambulating to the bathroom. Still with c/o pain. Receiving iv dilaudid prn as ordered. Continues on ivf and iv rocephin. Per id, anticipate switching antibiotics to po soon. Tikosyn still on hold-await cardio input today. Await urology input also. Discharge plan remains home. Anticipate soon. No needs at this time. Will follow.

## 2023-01-12 NOTE — PROGRESS NOTES
Nephrology Progress Note  [facility]    From consult 1/11/23-  HPI:   Isiah Shelton is 78-year-old male we were asked to see in regards to acute kidney injury. He presented to the emergency department on 1/9 for complaints of right-sided flank pain with urinary frequency and loose stools. Additionally had been having intermittent fevers. He had been diagnosed 10 days prior with a urinary tract infection and he was placed on antibiotics. He presented to the emergency department his creatinine was noted to be 3.7 mg/dL trended to 3.3 mg/dL on 1/10 and this morning is 2.3 mg/dL. Renal consultation was requested regarding acute kidney injury this morning. He did have a CT of the abdomen without contrast upon admission with bilateral renal calculi with nonobstruction at this time. There is no planned  intervention per urology notes. He does have a longstanding history of renal calculi and does follow with urology. To his recollection he has not seen a nephrologist in the past.  His baseline serum creatinine is 1.3 to 1.4 mg/dL  Patient had been taking Macrobid 100 mg twice daily for his urinary tract infection prior to admission. He developed a rash but did finish the medication. He had also been on Tikosyn which has been discontinued by cardiology. Prior to coming to the hospital he had been having some low-grade fevers for which he took Tylenol has not taken any NSAIDs. Does admit to poor intake prior to admission. He is awake alert and oriented in no acute distress upon examination. 1/12/23-patient seen and examined. He is awake and alert did state he still had some diarrhea but is feeling overall better.     PMH:    Past Medical History:   Diagnosis Date    Arthritis     Atrial fibrillation ([address]     Bulging lumbar disc     L4-L5    CAD (coronary artery disease) 07/07/2016    ct scan heart    Chronic lower back pain     BREWER (dyspnea on exertion) 03/26/2013    Eczema     H/O atrial flutter 09/2021    Hyperlipidemia     Hypertension     Kidney stones     Obesity     weight 250#    Sleep apnea     c pap  stting 2       Patient Active Problem List   Diagnosis    Typical atrial flutter ([facility]    Hypertension    Eczema    Former smoker    Midline low back pain with right-sided sciatica    Persistent atrial fibrillation    Chronic systolic heart failure (HCC)    CKD (chronic kidney disease)    Atrial flutter (HCC)    Status post catheter ablation of atrial fibrillation    Status post catheter ablation of atrial flutter    Right bundle-branch block    Tachyarrhythmia    Atrial fibrillation (HCC)    Wrist pain, chronic, right    Chronic pain syndrome    Primary osteoarthritis involving multiple joints    Right foot drop    Lumbar facet arthropathy    COVID-19    NATALI (acute kidney injury) ([facility] [address]    HLD (hyperlipidemia)    Redness and swelling of knee    Obesity (BMI 30-39. 9)    CAD (coronary artery disease)    H/O total knee replacement, bilateral    Osteoarthritis of left knee    Localized osteoarthritis of left knee    Atrial fibrillation with RVR (HCC)    Abnormal ECG    Status post ablation of atrial fibrillation    Atrial tachycardia (HCC)    Aortic atherosclerosis (HCC)    Arthropathy    Benign prostatic hyperplasia with urinary frequency    Bicuspid aortic valve    Erectile dysfunction due to arterial insufficiency    Fall from ladder    Impaired fasting glucose    AURELIANO (obstructive sleep apnea)    Vitamin D deficiency    Acute pyelonephritis    Complicated UTI (urinary tract infection)    Renal calculi    Leukocytosis    Rash    Diarrhea       Meds:     metoprolol succinate  100 mg Oral Daily    atorvastatin  20 mg Oral Nightly    cefTRIAXone (ROCEPHIN) IV  2,000 mg IntraVENous Q24H    cetirizine  5 mg Oral Daily    sodium chloride flush  5-40 mL IntraVENous 2 times per day    famotidine  20 mg Oral Daily    apixaban  5 mg Oral BID        sodium chloride 50 mL/hr at 01/12/23 0941    sodium chloride Meds prn:     sodium chloride flush, sodium chloride, acetaminophen **OR** acetaminophen, polyethylene glycol, melatonin, HYDROmorphone, prochlorperazine, Calamine    Meds prior to admission:     No current facility-administered medications on file prior to encounter. Current Outpatient Medications on File Prior to Encounter   Medication Sig Dispense Refill    chlorthalidone (HYGROTON) 25 MG tablet TAKE ONE TABLET BY MOUTH EVERY DAY 90 tablet 3    dofetilide (TIKOSYN) 125 MCG capsule Take 1 capsule by mouth 2 times daily 180 capsule 1    dofetilide (TIKOSYN) 250 MCG capsule Take 1 capsule by mouth 2 times daily TAKE 1 CAPSULE BY MOUTH 2 TIMES DAILY ALONG WITH THE 125MCG DOSE TO EQUAL 375MCG DOSE 2 TIMES DAILY 180 capsule 1    metoprolol succinate (TOPROL XL) 100 MG extended release tablet TAKE ONE TABLET BY MOUTH TWO TIMES A  tablet 0    ELIQUIS 5 MG TABS tablet TAKE ONE TABLET BY MOUTH TWO TIMES A  tablet 3    atorvastatin (LIPITOR) 20 MG tablet TAKE ONE TABLET BY MOUTH EVERY DAY 30 tablet 11    QUERCETIN PO Take 500 mg by mouth daily      tamsulosin (FLOMAX) 0.4 MG capsule TAKE ONE CAPSULE BY MOUTH EVERY DAY AT BEDTIME      gabapentin (NEURONTIN) 100 MG capsule Take 1 capsule by mouth 2 times daily as needed (pain) for up to 90 days. 60 capsule 2    gabapentin (NEURONTIN) 100 MG capsule Take 1 capsule by mouth 2 times daily for 30 days.  (Patient not taking: Reported on 2/10/2022) 60 capsule 0    acetaminophen (TYLENOL) 500 MG tablet Take 2 tablets by mouth 3 times daily as needed for Pain 180 tablet 1    losartan (COZAAR) 50 MG tablet Take 50 mg by mouth daily      Misc Natural Products (TART CHERRY ADVANCED PO) Take by mouth STOP PREOP MED      magnesium oxide (MAG-OX) 400 (240 Mg) MG tablet Take 1 tablet by mouth 2 times daily (Patient taking differently: Take 400 mg by mouth daily) 60 tablet 0    Multiple Vitamins-Minerals (THERAPEUTIC MULTIVITAMIN-MINERALS) tablet Take 1 tablet by mouth daily. Coenzyme Q10 (COQ10 PO) Take 1 tablet by mouth daily. Alpha-Lipoic Acid 300 MG CAPS Take 300 mg by mouth daily On hold      ascorbic acid (VITAMIN C) 500 MG tablet Take 500 mg by mouth daily. Vitamin D (CHOLECALCIFEROL) 1000 UNITS CAPS capsule Take 1,000 Units by mouth daily. Cyanocobalamin (VITAMIN B 12 PO) Take 500 mcg by mouth daily. Allergies:    Adhesive tape and Macrobid [nitrofurantoin]    Social History:     reports that he quit smoking about 16 years ago. His smoking use included cigarettes. He has a 20.00 pack-year smoking history. He has never used smokeless tobacco. He reports current alcohol use of about 3.0 standard drinks per week. He reports that he does not use drugs.     Family History:         Problem Relation Age of Onset    Cancer Mother     Diabetes Mother     Cancer Father     Diabetes Sister     Kidney Disease Sister     Early Death Neg Hx        ROS:     All pertinent positives discussed in HPI  All other sx negative     Physical Exam:      Patient Vitals for the past 24 hrs:   BP Temp Temp src Pulse Resp SpO2 Weight   01/12/23 0805 131/67 97.9 °F (36.6 °C) Temporal 73 16 97 % --   01/12/23 0607 -- -- -- -- -- -- 271 lb 4.8 oz (123.1 kg)   01/12/23 0345 124/72 98 °F (36.7 °C) Oral 63 16 98 % --   01/11/23 1849 139/66 98.3 °F (36.8 °C) Oral 64 18 -- --   01/11/23 1539 135/69 (!) 96.2 °F (35.7 °C) Infrared 52 16 97 % --   01/11/23 1348 -- -- -- -- 16 -- --         Intake/Output Summary (Last 24 hours) at 1/12/2023 1104  Last data filed at 1/12/2023 0809  Gross per 24 hour   Intake 3109.01 ml   Output 550 ml   Net 2559.01 ml       Constitutional: Patient in no acute distress   Head: normocephalic, atraumatic   Neck: no jvd  Cardiovascular: S1-S2 no S3 or rub  Respiratory: Clear with equal expansion  Gastrointestinal: soft, nontender, nondistended, no hepatosplenomegaly  Ext: No edema  Neuro: Awake alert and oriented  Skin: dry, erythematous dry rash on the lower ext      Data:    Recent Labs     01/10/23  0652 01/11/23  0839 01/12/23  0350   WBC 18.3* 13.2* 14.6*   HGB 12.8 13.1 11.8*   HCT 38.0 39.2 35.3*   MCV 96.4 96.8 97.2    374 327       Recent Labs     01/09/23  1454 01/10/23  0652 01/11/23  0839 01/12/23  0350   * 131* 135 138   K 4.3 4.0 3.8 4.5  4.5   CL 92* 97* 100 106   CO2 18* 19* 22 20*   CREATININE 3.7* 3.3* 2.3* 1.8*   BUN 83* 83* 67* 54*   LABGLOM 17 19 29 40   GLUCOSE 113* 85 180* 106*   CALCIUM 9.0 8.9 9.1 9.1   PHOS  --   --   --  4.6*   MG 2.5  --   --  2.0       No results found for: VITD25    No results found for: PTH    Recent Labs     01/10/23  0652 01/11/23  0839 01/12/23  0350   ALT 19 18 20   AST 10 9 14   ALKPHOS 205* 211* 205*   BILITOT 0.3 0.2 0.2       Recent Labs     01/10/23  0652 01/11/23  0839 01/12/23  0350   LABALBU 3.3* 3.2* 3.1*       No results found for: FERRITIN, IRON, TIBC    No results found for: SVLDZVCL74    No results found for: FOLATE      Lab Results   Component Value Date/Time    COLORU Yellow 01/09/2023 02:54 PM    NITRU POSITIVE 01/09/2023 02:54 PM    GLUCOSEU Negative 01/09/2023 02:54 PM    KETUA Negative 01/09/2023 02:54 PM    UROBILINOGEN 0.2 01/09/2023 02:54 PM    BILIRUBINUR Negative 01/09/2023 02:54 PM       No results found for: VINNIE, CREURRAN, MACREATRATIO, OSMOU    No components found for: URIC    No results found for: LIPIDPAN    CT ABDOMEN PELVIS WO CONTRAST Additional Contrast? None [[id number]] Collected: 01/09/23 1445      Order Status: Completed Updated: 01/09/23 1458     Narrative:       EXAMINATION:   CT OF THE ABDOMEN AND PELVIS WITHOUT CONTRAST 1/9/2023 2:32 pm     TECHNIQUE:   CT of the abdomen and pelvis was performed without the administration of   intravenous contrast. Multiplanar reformatted images are provided for review.    Automated exposure control, iterative reconstruction, and/or weight based   adjustment of the mA/kV was utilized to reduce the radiation dose to as low   as reasonably achievable. COMPARISON:   June 16, 2021     HISTORY:   ORDERING SYSTEM PROVIDED HISTORY: right flank pain r/o stone   TECHNOLOGIST PROVIDED HISTORY:   Reason for exam:->right flank pain r/o stone   Additional Contrast?->None   Decision Support Exception - unselect if not a suspected or confirmed   emergency medical condition->Emergency Medical Condition (MA)     FINDINGS:   Lower Chest: No infiltrates or pleural effusion. Atelectasis and patchy   opacities in the left lung base. Organs: Fatty liver with no focal lesions. Gallbladder is present with   multiple calcified stones. Spleen is not enlarged. Pancreas and adrenal   glands appear unremarkable. There are multiple nonobstructing bilateral   renal stones with stranding of Ladi renal fat and thickening of the pararenal   fascia. GI/Bowel: There are no obstructing or constricting lesions. Appendix is   normal.     Pelvis: Bladder is suboptimally distended. There is no significant free   fluid. Peritoneum/Retroperitoneum: No free air or significant adenopathy. Bones/Soft Tissues: Unremarkable. Impression:       Multiple nonobstructing bilateral renal stones with stranding of the Ladi   renal fat and thickening of the pararenal fascia. Cholelithiasis. Thickening of the bladder wall, may be due to suboptimal distension versus   cystitis. US RETROPERITONEAL COMPLETE [[id number]] Collected: 01/11/23 1126      Order Status: Completed Updated: 01/11/23 1130     Narrative:       EXAMINATION:   RETROPERITONEAL ULTRASOUND OF THE KIDNEYS AND URINARY BLADDER     1/11/2023     COMPARISON:   CT abdomen and pelvis 01/09/2023     HISTORY:   ORDERING SYSTEM PROVIDED HISTORY: NATALI   TECHNOLOGIST PROVIDED HISTORY:     Reason for exam:->NATALI   What reading provider will be dictating this exam?->CRC     FINDINGS:     Kidneys: The right kidney measures 11.8 cm in length and the left kidney measures 12   cm in length.      Kidneys demonstrate normal cortical echogenicity. No evidence for   hydronephrosis with bilateral echogenic areas demonstrating shadowing of   bilateral nonobstructing nephrolithiasis measuring up to 6 mm right and 5 mm   left. Bladder:     Unremarkable appearance of the bladder. No significant post void residual.      Impression:       Bilateral intrarenal stones of nonobstructing bilateral nephrolithiasis   without hydronephrosis. Assessment and Plan:    Stage II NATALI secondary to the combined effects of the UTI with the  inflammation exacerbated by the outpt ARB + thiazide-possible there is a component of AIN from the nitrofurantoin but the rapid improvement with IVF alone supports a hemodynamic mediated cause. Baseline creatinine 1.3 to 1.4 mg/dL with an e-GFR 51-60ml/min  He had been on Tikosyn in the past and this has been discontinued  Creatinine at presentation 3.7 mg/dL which is trended down to 1.8 mg/dL  Renal ultrasound obtained without evidence of hydronephrosis but showing bilateral nonobstructing nephrolithiasis. Will stop IV fluids as he has been taking in well per his account      2. Hypertension with chronic kidney disease 1-4-  Blood pressure goal less than 130/80  Blood pressure currently at/near goal      3. Urinary tract infection-  UA with large blood positive nitrates  Was treated with ceftriaxone  Being started on [facility], APRN - CNP  Patient seen and examined. I had a face to face encounter with the patient. Pt states the RLQ pain improved but not resolved. He states he still had a small amount of loose stool  Agree with exam.    Agree with  formulation, assessment and plan as outlined above and directed by me. Addition and corrections were done as deemed appropriate. My exam and plan include:   A/P:  Stage II NATALI creatinine trending down-follow off the IVF  Continue current treatment as outlined above    2.  Anemia in CKD-HgB trending down with IVF-will check FOB, Fe++, B12, folate and SPEP and UPEP    NEFTALI Martin MD

## 2023-01-12 NOTE — PROGRESS NOTES
1/12/2023 2:11 PM  Gerhardt Jones  [id number]    Subjective:    No fevers  Wife present   Voiding comfortably   No nausea or emesis     Review of Systems  Constitutional: No fever or chills   Respiratory: negative for cough and hemoptysis  Cardiovascular: negative for chest pain and dyspnea  Gastrointestinal: negative for abdominal pain, diarrhea, nausea and vomiting   : See above  Derm: negative for rash and skin lesion(s)  Neurological: negative for seizures and tremors  Musculoskeletal: Negative    Psychiatric: Negative   All other reviews are negative      Scheduled Meds:   cefdinir  300 mg Oral 2 times per day    metoprolol succinate  100 mg Oral Daily    atorvastatin  20 mg Oral Nightly    cetirizine  5 mg Oral Daily    sodium chloride flush  5-40 mL IntraVENous 2 times per day    famotidine  20 mg Oral Daily    apixaban  5 mg Oral BID       Objective:  Vitals:    01/12/23 1313   BP: (!) 180/83   Pulse: 54   Resp: 16   Temp: 98.5 °F (36.9 °C)   SpO2: 97%         Allergies: Adhesive tape and Macrobid [nitrofurantoin]    General Appearance: alert and oriented to person, place and time and in no acute distress  Skin: no rash or erythema  Head: normocephalic and atraumatic  Pulmonary/Chest: normal air movement, no respiratory distress  Abdomen: soft, non-tender, non-distended  Genitourinary: no solomon   Extremities: no cyanosis, clubbing or edema         Labs:     Recent Labs     01/12/23  0350      K 4.5  4.5      CO2 20*   BUN 54*   CREATININE 1.8*   GLUCOSE 106*   CALCIUM 9.1       Lab Results   Component Value Date/Time    HGB 11.8 01/12/2023 03:50 AM    HCT 35.3 01/12/2023 03:50 AM       Lab Results   Component Value Date    PSA 1.15 08/24/2015     Component 1/9/23 1454    Organism Escherichia coli Abnormal     Urine Culture, Routine >100,000 CFU/ml    [facility]        Susceptibility    Escherichia coli (1)    Antibiotic Interpretation Microscan  Method Status    amoxicillin-clavulanate Sensitive ^4 mcg/mL BACTERIAL SUSCEPTIBILITY PANEL BY CHEO     ceFAZolin Sensitive <=^4 mcg/mL BACTERIAL SUSCEPTIBILITY PANEL BY CHEO     cefepime Sensitive <=^0.12 mcg/mL BACTERIAL SUSCEPTIBILITY PANEL BY CHEO     cefotaxime Sensitive <=^0.25 mcg/mL BACTERIAL SUSCEPTIBILITY PANEL BY CHEO     cefOXitin Sensitive <=^4 mcg/mL BACTERIAL SUSCEPTIBILITY PANEL BY CHEO     cefTAZidime-avibactam Sensitive <=^0.12 mcg/mL BACTERIAL SUSCEPTIBILITY PANEL BY CHEO     gentamicin Sensitive <=^1 mcg/mL BACTERIAL SUSCEPTIBILITY PANEL BY CHEO     levofloxacin Sensitive <=^0.12 mcg/mL BACTERIAL SUSCEPTIBILITY PANEL BY CHEO     meropenem Sensitive <=^0.25 mcg/mL BACTERIAL SUSCEPTIBILITY PANEL BY CHEO     nitrofurantoin Resistant ^128 mcg/mL BACTERIAL SUSCEPTIBILITY PANEL BY CHEO     piperacillin-tazobactam Sensitive <=^4 mcg/mL BACTERIAL SUSCEPTIBILITY PANEL BY CHEO     trimethoprim-sulfamethoxazole Sensitive <=^20 mcg/mL BACTERIAL SUSCEPTIBILITY PANEL BY CHEO                  Assessment/Plan:  Complicated UTI   Pyelonephritis   Bilateral renal calculi   Azotemia     Creatinine continues to trend down , 3.7>>1.8  Urine culture +E. Coli  Blood cultures no growth to date  Antibiotics per ID   Voiding comfortably   Hold on any further acute  interventions at this time   Please call with additional questions or concerns       Hope Ibanez, APRN - CNP   [facility]rology

## 2023-01-13 VITALS
WEIGHT: 274.5 LBS | OXYGEN SATURATION: 96 % | RESPIRATION RATE: 18 BRPM | BODY MASS INDEX: 39.3 KG/M2 | SYSTOLIC BLOOD PRESSURE: 142 MMHG | DIASTOLIC BLOOD PRESSURE: 77 MMHG | TEMPERATURE: 98 F | HEIGHT: 70 IN | HEART RATE: 71 BPM

## 2023-01-13 LAB
ANION GAP SERPL CALCULATED.3IONS-SCNC: 10 MMOL/L (ref 7–16)
BUN BLDV-MCNC: 41 MG/DL (ref 6–23)
CALCIUM SERPL-MCNC: 9.2 MG/DL (ref 8.6–10.2)
CHLORIDE BLD-SCNC: 106 MMOL/L (ref 98–107)
CO2: 20 MMOL/L (ref 22–29)
CREAT SERPL-MCNC: 1.5 MG/DL (ref 0.7–1.2)
EKG ATRIAL RATE: 54 BPM
EKG P AXIS: 63 DEGREES
EKG P-R INTERVAL: 170 MS
EKG Q-T INTERVAL: 474 MS
EKG QRS DURATION: 136 MS
EKG QTC CALCULATION (BAZETT): 449 MS
EKG R AXIS: 17 DEGREES
EKG T AXIS: 39 DEGREES
EKG VENTRICULAR RATE: 54 BPM
FERRITIN: 478 NG/ML
FOLATE: 19.5 NG/ML (ref 4.8–24.2)
GFR SERPL CREATININE-BSD FRML MDRD: 49 ML/MIN/1.73
GLUCOSE BLD-MCNC: 114 MG/DL (ref 74–99)
HCT VFR BLD CALC: 37.5 % (ref 37–54)
HEMOGLOBIN: 12.4 G/DL (ref 12.5–16.5)
IRON SATURATION: 21 % (ref 20–55)
IRON: 46 MCG/DL (ref 59–158)
MAGNESIUM: 1.8 MG/DL (ref 1.6–2.6)
MCH RBC QN AUTO: 32.1 PG (ref 26–35)
MCHC RBC AUTO-ENTMCNC: 33.1 % (ref 32–34.5)
MCV RBC AUTO: 97.2 FL (ref 80–99.9)
PDW BLD-RTO: 12.9 FL (ref 11.5–15)
PHOSPHORUS: 4.1 MG/DL (ref 2.5–4.5)
PLATELET # BLD: 320 E9/L (ref 130–450)
PMV BLD AUTO: 9.7 FL (ref 7–12)
POTASSIUM SERPL-SCNC: 4.8 MMOL/L (ref 3.5–5)
RBC # BLD: 3.86 E12/L (ref 3.8–5.8)
SODIUM BLD-SCNC: 136 MMOL/L (ref 132–146)
TOTAL IRON BINDING CAPACITY: 222 MCG/DL (ref 250–450)
VITAMIN B-12: 1759 PG/ML (ref 211–946)
WBC # BLD: 14.2 E9/L (ref 4.5–11.5)

## 2023-01-13 PROCEDURE — 84166 PROTEIN E-PHORESIS/URINE/CSF: CPT

## 2023-01-13 PROCEDURE — 2580000003 HC RX 258: Performed by: FAMILY MEDICINE

## 2023-01-13 PROCEDURE — 82728 ASSAY OF FERRITIN: CPT

## 2023-01-13 PROCEDURE — 84100 ASSAY OF PHOSPHORUS: CPT

## 2023-01-13 PROCEDURE — 83550 IRON BINDING TEST: CPT

## 2023-01-13 PROCEDURE — 80048 BASIC METABOLIC PNL TOTAL CA: CPT

## 2023-01-13 PROCEDURE — 99233 SBSQ HOSP IP/OBS HIGH 50: CPT | Performed by: INTERNAL MEDICINE

## 2023-01-13 PROCEDURE — 6370000000 HC RX 637 (ALT 250 FOR IP): Performed by: INTERNAL MEDICINE

## 2023-01-13 PROCEDURE — 86334 IMMUNOFIX E-PHORESIS SERUM: CPT

## 2023-01-13 PROCEDURE — 82607 VITAMIN B-12: CPT

## 2023-01-13 PROCEDURE — 83735 ASSAY OF MAGNESIUM: CPT

## 2023-01-13 PROCEDURE — 36415 COLL VENOUS BLD VENIPUNCTURE: CPT

## 2023-01-13 PROCEDURE — 6370000000 HC RX 637 (ALT 250 FOR IP): Performed by: FAMILY MEDICINE

## 2023-01-13 PROCEDURE — 99239 HOSP IP/OBS DSCHRG MGMT >30: CPT | Performed by: FAMILY MEDICINE

## 2023-01-13 PROCEDURE — 6360000002 HC RX W HCPCS: Performed by: FAMILY MEDICINE

## 2023-01-13 PROCEDURE — 93005 ELECTROCARDIOGRAM TRACING: CPT | Performed by: INTERNAL MEDICINE

## 2023-01-13 PROCEDURE — 83540 ASSAY OF IRON: CPT

## 2023-01-13 PROCEDURE — 82746 ASSAY OF FOLIC ACID SERUM: CPT

## 2023-01-13 PROCEDURE — 93010 ELECTROCARDIOGRAM REPORT: CPT | Performed by: INTERNAL MEDICINE

## 2023-01-13 PROCEDURE — 85027 COMPLETE CBC AUTOMATED: CPT

## 2023-01-13 PROCEDURE — 6370000000 HC RX 637 (ALT 250 FOR IP): Performed by: NURSE PRACTITIONER

## 2023-01-13 PROCEDURE — 84165 PROTEIN E-PHORESIS SERUM: CPT

## 2023-01-13 RX ORDER — CEFDINIR 300 MG/1
300 CAPSULE ORAL EVERY 12 HOURS SCHEDULED
Qty: 28 CAPSULE | Refills: 0 | Status: SHIPPED | OUTPATIENT
Start: 2023-01-13 | End: 2023-01-27

## 2023-01-13 RX ORDER — CEFDINIR 300 MG/1
300 CAPSULE ORAL EVERY 12 HOURS SCHEDULED
Qty: 28 CAPSULE | Refills: 0
Start: 2023-01-13 | End: 2023-01-13 | Stop reason: SDUPTHER

## 2023-01-13 RX ORDER — GREEN TEA/HOODIA GORDONII 315-12.5MG
1 CAPSULE ORAL DAILY
Qty: 30 TABLET | Refills: 0 | Status: SHIPPED | OUTPATIENT
Start: 2023-01-13 | End: 2023-02-12

## 2023-01-13 RX ORDER — TRAMADOL HYDROCHLORIDE 50 MG/1
50 TABLET ORAL EVERY 6 HOURS PRN
Qty: 12 TABLET | Refills: 0 | Status: SHIPPED | OUTPATIENT
Start: 2023-01-13 | End: 2023-01-16

## 2023-01-13 RX ADMIN — HYDROMORPHONE HYDROCHLORIDE 0.5 MG: 0.5 INJECTION, SOLUTION INTRAMUSCULAR; INTRAVENOUS; SUBCUTANEOUS at 03:15

## 2023-01-13 RX ADMIN — CEFDINIR 300 MG: 300 CAPSULE ORAL at 08:36

## 2023-01-13 RX ADMIN — CETIRIZINE HYDROCHLORIDE 5 MG: 10 TABLET, FILM COATED ORAL at 08:36

## 2023-01-13 RX ADMIN — HYDROMORPHONE HYDROCHLORIDE 0.5 MG: 0.5 INJECTION, SOLUTION INTRAMUSCULAR; INTRAVENOUS; SUBCUTANEOUS at 13:37

## 2023-01-13 RX ADMIN — APIXABAN 5 MG: 5 TABLET, FILM COATED ORAL at 08:36

## 2023-01-13 RX ADMIN — HYDROMORPHONE HYDROCHLORIDE 0.5 MG: 0.5 INJECTION, SOLUTION INTRAMUSCULAR; INTRAVENOUS; SUBCUTANEOUS at 10:42

## 2023-01-13 RX ADMIN — FAMOTIDINE 20 MG: 20 TABLET, FILM COATED ORAL at 08:36

## 2023-01-13 RX ADMIN — SODIUM CHLORIDE, PRESERVATIVE FREE 10 ML: 5 INJECTION INTRAVENOUS at 08:36

## 2023-01-13 RX ADMIN — METOPROLOL SUCCINATE 100 MG: 100 TABLET, EXTENDED RELEASE ORAL at 08:36

## 2023-01-13 ASSESSMENT — PAIN SCALES - GENERAL: PAINLEVEL_OUTOF10: 7

## 2023-01-13 NOTE — CARE COORDINATION
CASE MANAGEMENT. ... Per conversation with nursing, pain is controlled without requiring iv dilaudid. Iv rocephin switched to po omnicef - ID following. Ivf stopped - renal following. Remains off Tikosyn - cardio following. Per urology, no  interventions needed at this time. Anticipate soon. Await input from pcp and consults. Discharge plan is home with no needs. Will follow.

## 2023-01-13 NOTE — DISCHARGE INSTRUCTIONS
Your information:  Name: Nadja Garcia  : 1950    Your instructions:        What to do after you leave the hospital:    Recommended diet:  no added salt    Recommended activity: activity as tolerated        The following personal items were collected during your admission and were returned to you:    Belongings  Dental Appliances: None  Vision - Corrective Lenses: None  Hearing Aid: None  Clothing: Pants, Shirt, Socks, Footwear  Jewelry: Bracelet, Necklace, Ring  Electronic Devices: Cell Phone  Weapons (Notify Protective Services/Security): None  Home Medications: None  Valuables Given To: Patient  Provide Name(s) of Who Valuable(s) Were Given To: patient    Information obtained by:  By signing below, I understand that if any problems occur once I leave the hospital I am to contact my primary doctor. I understand and acknowledge receipt of the instructions indicated above.

## 2023-01-13 NOTE — PROGRESS NOTES
[facility] Inpatient Progress Note    Patient is a 71 y.o. male of Vernon Jackson DO seen in hospital follow up. Chief complaint: PAfib    HPI: No SOB. No CP. Patient Active Problem List   Diagnosis    Typical atrial flutter (HCC)    Benign essential HTN    Eczema    Former smoker    Midline low back pain with right-sided sciatica    Persistent atrial fibrillation    Chronic systolic heart failure (HCC)    CKD (chronic kidney disease)    Atrial flutter (HCC)    Status post catheter ablation of atrial fibrillation    Status post catheter ablation of atrial flutter    Right bundle-branch block    Tachyarrhythmia    Atrial fibrillation (HCC)    Wrist pain, chronic, right    Chronic pain syndrome    Primary osteoarthritis involving multiple joints    Right foot drop    Lumbar facet arthropathy    COVID-19    NATALI (acute kidney injury) ([facility] [address]    HLD (hyperlipidemia)    Redness and swelling of knee    Obesity (BMI 30-39. 9)    CAD (coronary artery disease)    H/O total knee replacement, bilateral    Osteoarthritis of left knee    Localized osteoarthritis of left knee    Atrial fibrillation with RVR (HCC)    QT prolongation    Status post ablation of atrial fibrillation    Atrial tachycardia (HCC)    Aortic atherosclerosis (HCC)    Arthropathy    Benign prostatic hyperplasia with urinary frequency    Bicuspid aortic valve    Erectile dysfunction due to arterial insufficiency    Fall from ladder    Impaired fasting glucose    AURELIANO (obstructive sleep apnea)    Vitamin D deficiency    Acute pyelonephritis    Complicated UTI (urinary tract infection)    Renal calculus    Leukocytosis    Rash    Diarrhea       Allergies   Allergen Reactions    Adhesive Tape Rash     bandaids    Macrobid [Nitrofurantoin] Rash       Current Facility-Administered Medications   Medication Dose Route Frequency Provider Last Rate Last Admin    cefdinir (OMNICEF) capsule 300 mg  300 mg Oral 2 times per day MARIA E Coles - CNP   300 mg at 01/12/23 1923    metoprolol succinate (TOPROL XL) extended release tablet 100 mg  100 mg Oral Daily Tess Adas, DO   100 mg at 01/12/23 3053    atorvastatin (LIPITOR) tablet 20 mg  20 mg Oral Nightly Tess Adas, DO   20 mg at 01/12/23 1923    cetirizine (ZYRTEC) tablet 5 mg  5 mg Oral Daily Ryley Linn MD   5 mg at 01/12/23 8301    sodium chloride flush 0.9 % injection 5-40 mL  5-40 mL IntraVENous 2 times per day Ryley Linn MD   10 mL at 01/12/23 1923    sodium chloride flush 0.9 % injection 5-40 mL  5-40 mL IntraVENous PRN Ryley Linn MD        0.9 % sodium chloride infusion   IntraVENous PRN Ryley Linn MD        acetaminophen (TYLENOL) tablet 650 mg  650 mg Oral Q6H PRN Ryley Linn MD        Or    acetaminophen (TYLENOL) suppository 650 mg  650 mg Rectal Q6H PRN Ryley Linn MD        polyethylene glycol (GLYCOLAX) packet 17 g  17 g Oral Daily PRN Ryley Linn MD        melatonin tablet 3 mg  3 mg Oral Nightly PRN Ryley Linn MD   3 mg at 01/12/23 2215    HYDROmorphone (DILAUDID) injection 0.5 mg  0.5 mg IntraVENous Q3H PRN Ryley Linn MD   0.5 mg at 01/13/23 0315    famotidine (PEPCID) tablet 20 mg  20 mg Oral Daily Ryley Linn MD   20 mg at 01/12/23 6209    apixaban (ELIQUIS) tablet 5 mg  5 mg Oral BID Ryley Linn MD   5 mg at 01/12/23 1923    prochlorperazine (COMPAZINE) injection 10 mg  10 mg IntraVENous Q6H PRN Ryley Linn MD        Calamine 8-8 % lotion   Topical PRN Ryley Linn MD         Review of systems:   Heart: as above   Lungs: as above   Eyes: denies changes in vision or discharge. Ears: denies changes in hearing or pain. Nose: denies epistaxis or masses   Throat: denies sore throat or trouble swallowing. Neuro: denies numbness, tingling, tremors. Skin: denies rashes or itching. : denies hematuria, dysuria   GI: denies vomiting, diarrhea   Psych: denies mood changed, anxiety, depression.     Physical Exam   BP (!) 142/77   Pulse 53   Temp 98 °F (36.7 °C) (Oral)   Resp 18   Ht 5' 9.5\" (1.765 m)   Wt 274 lb 8 oz (124.5 kg)   SpO2 96%   BMI 39.96 kg/m²   Constitutional: Oriented to person, place, and time. No distress. Well developed. Head: Normocephalic and atraumatic. Neck: Neck supple. No hepatojugular reflux. No JVD present. Carotid bruit is not present. No tracheal deviation present. No thyromegaly present. Cardiovascular: Normal rate, regular rhythm, normal heart sounds. intact distal pulses. No gallop and no friction rub. No murmur heard. Pulmonary: Breath sounds normal. No respiratory distress. No wheezes. No rales. Chest: Effort normal. No tenderness. Abdominal: Soft. Bowel sounds are normal. No distension or mass. No tenderness, rebound or guarding. Musculoskeletal: . No tenderness. No clubbing or cyanosis. Extremitites: Intact distal pulses. No edema  Neurological: Alert and oriented to person, place, and time. Skin: Skin is warm and dry. No rash noted. Not diaphoretic. No erythema. Psychiatric: Normal mood and affect. Behavior is normal.   Lymphadenopathy: No cervical adenopathy. No groin adenopathy.     CBC:   Lab Results   Component Value Date/Time    WBC 14.2 01/13/2023 05:28 AM    RBC 3.86 01/13/2023 05:28 AM    HGB 12.4 01/13/2023 05:28 AM    HCT 37.5 01/13/2023 05:28 AM    MCV 97.2 01/13/2023 05:28 AM    MCH 32.1 01/13/2023 05:28 AM    MCHC 33.1 01/13/2023 05:28 AM    RDW 12.9 01/13/2023 05:28 AM     01/13/2023 05:28 AM    MPV 9.7 01/13/2023 05:28 AM     BMP:   Lab Results   Component Value Date/Time     01/13/2023 05:28 AM    K 4.8 01/13/2023 05:28 AM    K 4.5 01/12/2023 03:50 AM     01/13/2023 05:28 AM    CO2 20 01/13/2023 05:28 AM    BUN 41 01/13/2023 05:28 AM    LABALBU 3.1 01/12/2023 03:50 AM    CREATININE 1.5 01/13/2023 05:28 AM    CALCIUM 9.2 01/13/2023 05:28 AM    GFRAA >60 10/20/2021 06:30 AM    LABGLOM 49 01/13/2023 05:28 AM     Magnesium: Lab Results   Component Value Date/Time    MG 1.8 01/13/2023 05:28 AM     Cardiac Enzymes:   Lab Results   Component Value Date    CKTOTAL 160 06/30/2020    CKMB 9.2 (H) 06/30/2020    TROPHS 44 (H) 08/22/2021      PT/INR:    Lab Results   Component Value Date/Time    PROTIME 12.1 10/20/2021 06:30 AM    INR 1.1 10/20/2021 06:30 AM     TSH:    Lab Results   Component Value Date/Time    TSH 1.700 01/10/2023 10:07 AM     Rhythm Strip: normal sinus rhythm. ASSESSMENT & PLAN:    Patient Active Problem List   Diagnosis    Typical atrial flutter (HCC)    Benign essential HTN    Eczema    Former smoker    Midline low back pain with right-sided sciatica    Persistent atrial fibrillation    Chronic systolic heart failure (HCC)    CKD (chronic kidney disease)    Atrial flutter (HCC)    Status post catheter ablation of atrial fibrillation    Status post catheter ablation of atrial flutter    Right bundle-branch block    Tachyarrhythmia    Atrial fibrillation (HCC)    Wrist pain, chronic, right    Chronic pain syndrome    Primary osteoarthritis involving multiple joints    Right foot drop    Lumbar facet arthropathy    COVID-19    NATALI (acute kidney injury) ([facility] [address]    HLD (hyperlipidemia)    Redness and swelling of knee    Obesity (BMI 30-39. 9)    CAD (coronary artery disease)    H/O total knee replacement, bilateral    Osteoarthritis of left knee    Localized osteoarthritis of left knee    Atrial fibrillation with RVR (HCC)    QT prolongation    Status post ablation of atrial fibrillation    Atrial tachycardia (HCC)    Aortic atherosclerosis (HCC)    Arthropathy    Benign prostatic hyperplasia with urinary frequency    Bicuspid aortic valve    Erectile dysfunction due to arterial insufficiency    Fall from ladder    Impaired fasting glucose    AURELIANO (obstructive sleep apnea)    Vitamin D deficiency    Acute pyelonephritis    Complicated UTI (urinary tract infection)    Renal calculus    Leukocytosis    Rash    Diarrhea     1.  Hx of Afib:    Hx of ablation and redo. Typically rhythm controlled with tikosyn, was held due to NATALI and prolonged QTc. Cr improving. QTc improved. Discuss with EP. In sinus now. Plan to hold off on restarting given is history of ablation due to potential renal and QTc issues and observe. BB/eliquis. 2. CAD:     Coronary CT angiogram 07/07/2016: Dilated left atrium. Mild to moderate atherosclerotic calcifications of the coronary arteries mainly LAD. Pharmacological MPS: 2/20/2020: No EKG changes, normal imaging, low risk pharmacological stress test, EF 64%. Medically manage. BB/statin. No ASA due to eliquis. 3. Hx of Heart failure with improved/HFpEF: Observe volume. 4. VHD: Mild AI/mild MR by 10/20/2021 BUSHRA. Observe. 5. HTN: Observe. 6. Lipids: Statin. 7. AURELIANO: CPAP. 8. Acute on CKD: Follow labs. Improving. Per renal.     9.  issues: Kidney stone s/p lithotripsy 1986. Per urology. 10. Diarrhea: Cdif rule out    11. DJD/Chronic back pain with sciatica s/p lumbar disc surgery/chronic foot drop    Patient stable from CV standpoint. Please call if needed. TYVM. Arun Long D.O.   Cardiologist  Cardiology, [address]

## 2023-01-13 NOTE — PROGRESS NOTES
[address] [place] [address] [facility]  [facility]  Progress Note    SUBJECTIVE:  Chief Complaint   Patient presents with    Flank Pain     Rt flank pain     No new complaints. Tolerating antibiotics. Feels well. No fever. Review of systems:  As stated above in the chief complaint, otherwise negative. Medications:  Scheduled Meds:   cefdinir  300 mg Oral 2 times per day    metoprolol succinate  100 mg Oral Daily    atorvastatin  20 mg Oral Nightly    cetirizine  5 mg Oral Daily    sodium chloride flush  5-40 mL IntraVENous 2 times per day    famotidine  20 mg Oral Daily    apixaban  5 mg Oral BID     Continuous Infusions:   sodium chloride       PRN Meds:sodium chloride flush, sodium chloride, acetaminophen **OR** acetaminophen, polyethylene glycol, melatonin, HYDROmorphone, prochlorperazine, Calamine    OBJECTIVE:  BP (!) 142/77   Pulse 71   Temp 98 °F (36.7 °C) (Oral)   Resp 18   Ht 5' 9.5\" (1.765 m)   Wt 274 lb 8 oz (124.5 kg)   SpO2 96%   BMI 39.96 kg/m²   Temp  Av.2 °F (36.8 °C)  Min: 98 °F (36.7 °C)  Max: 98.5 °F (36.9 °C)  Constitutional: The patient is awake, alert, and oriented. Resting with cpap on in bed in a dark room. No distress. Skin: Warm and dry. No rashes were noted. HEENT: Round and reactive pupils. Moist mucous membranes. No ulcerations or thrush. Neck: Supple to movements. Chest: No use of accessory muscles to breathe. Symmetrical expansion. No wheezing, crackles or rhonchi. Cardiovascular: S1 and S2 are rhythmic and regular. No murmurs appreciated. Abdomen: Positive bowel sounds to auscultation. Benign to palpation. No CVA tenderness. Extremities: No edema.   Lines: peripheral    Laboratory and Tests Review:  Lab Results   Component Value Date    WBC 14.2 (H) 2023    WBC 14.6 (H) 2023    WBC 13.2 (H) 2023    HGB 12.4 (L) 2023    HCT 37.5 2023    MCV 97.2 2023     2023     Lab Results   Component Value Date NEUTROABS 8.55 (H) 01/12/2023    NEUTROABS 9.01 (H) 01/11/2023    NEUTROABS 14.36 (H) 01/10/2023     No results found for: CRPHS  Lab Results   Component Value Date    ALT 20 01/12/2023    AST 14 01/12/2023    ALKPHOS 205 (H) 01/12/2023    BILITOT 0.2 01/12/2023     Lab Results   Component Value Date/Time     01/13/2023 05:28 AM    K 4.8 01/13/2023 05:28 AM    K 4.5 01/12/2023 03:50 AM     01/13/2023 05:28 AM    CO2 20 01/13/2023 05:28 AM    BUN 41 01/13/2023 05:28 AM    CREATININE 1.5 01/13/2023 05:28 AM    CREATININE 1.8 01/12/2023 03:50 AM    CREATININE 2.3 01/11/2023 08:39 AM    GFRAA >60 10/20/2021 06:30 AM    LABGLOM 49 01/13/2023 05:28 AM    GLUCOSE 114 01/13/2023 05:28 AM    PROT 7.2 01/12/2023 03:50 AM    LABALBU 3.1 01/12/2023 03:50 AM    CALCIUM 9.2 01/13/2023 05:28 AM    BILITOT 0.2 01/12/2023 03:50 AM    ALKPHOS 205 01/12/2023 03:50 AM    AST 14 01/12/2023 03:50 AM    ALT 20 01/12/2023 03:50 AM     Lab Results   Component Value Date    CRP 2.4 (H) 01/12/2023    CRP 4.0 (H) 01/11/2023    CRP 6.6 (H) 01/10/2023     Lab Results   Component Value Date    SEDRATE 47 (H) 06/30/2020     Radiology:      Microbiology:   Urine culture 1/9/2022: E. coli pan sensitive except to macrodantin  C. difficile: Negative  Blood cultures 1/9/2023: Negative so far    ASSESSMENT:  Pyelonephritis associated to complicated UTI with sepsis  Bilateral nonobstructive nephrolithiasis  Fever secondary to complicated UTI-improved  Leukocytosis associated to complicated UTI-improving    PLAN:  Continue Cefdinir x2 weeks. Reconciled  The patient can be discharged from ID standpoint  Follow-up with urology.     Spoke with nursing    Anamaria Sanchez MD  12:10 PM  1/13/2023

## 2023-01-13 NOTE — PROGRESS NOTES
Nephrology Progress Note  [facility]    From consult 1/11/23-  HPI:   Kathleen Anthony is 69-year-old male we were asked to see in regards to acute kidney injury. He presented to the emergency department on 1/9 for complaints of right-sided flank pain with urinary frequency and loose stools. Additionally had been having intermittent fevers. He had been diagnosed 10 days prior with a urinary tract infection and he was placed on antibiotics. He presented to the emergency department his creatinine was noted to be 3.7 mg/dL trended to 3.3 mg/dL on 1/10 and this morning is 2.3 mg/dL. Renal consultation was requested regarding acute kidney injury this morning. He did have a CT of the abdomen without contrast upon admission with bilateral renal calculi with nonobstruction at this time. There is no planned  intervention per urology notes. He does have a longstanding history of renal calculi and does follow with urology. To his recollection he has not seen a nephrologist in the past.  His baseline serum creatinine is 1.3 to 1.4 mg/dL  Patient had been taking Macrobid 100 mg twice daily for his urinary tract infection prior to admission. He developed a rash but did finish the medication. He had also been on Tikosyn which has been discontinued by cardiology. Prior to coming to the hospital he had been having some low-grade fevers for which he took Tylenol has not taken any NSAIDs. Does admit to poor intake prior to admission. He is awake alert and oriented in no acute distress upon examination. 1/13/23-he is awake and alert. Likely going home today.     PMH:    Past Medical History:   Diagnosis Date    Arthritis     Atrial fibrillation ([address]     Bulging lumbar disc     L4-L5    CAD (coronary artery disease) 07/07/2016    ct scan heart    Chronic lower back pain     BREWER (dyspnea on exertion) 03/26/2013    Eczema     H/O atrial flutter 09/2021    Hyperlipidemia     Hypertension     Kidney stones     Obesity weight 250#    Sleep apnea     c pap  stting 2       Patient Active Problem List   Diagnosis    Typical atrial flutter (HCC)    Benign essential HTN    Eczema    Former smoker    Midline low back pain with right-sided sciatica    Persistent atrial fibrillation    Chronic systolic heart failure (HCC)    CKD (chronic kidney disease)    Atrial flutter (HCC)    Status post catheter ablation of atrial fibrillation    Status post catheter ablation of atrial flutter    Right bundle-branch block    Tachyarrhythmia    Atrial fibrillation (HCC)    Wrist pain, chronic, right    Chronic pain syndrome    Primary osteoarthritis involving multiple joints    Right foot drop    Lumbar facet arthropathy    COVID-19    NATALI (acute kidney injury) ([facility]    HLD (hyperlipidemia)    Redness and swelling of knee    Obesity (BMI 30-39. 9)    CAD (coronary artery disease)    H/O total knee replacement, bilateral    Osteoarthritis of left knee    Localized osteoarthritis of left knee    Atrial fibrillation with RVR (HCC)    QT prolongation    Status post ablation of atrial fibrillation    Atrial tachycardia (HCC)    Aortic atherosclerosis (HCC)    Arthropathy    Benign prostatic hyperplasia with urinary frequency    Bicuspid aortic valve    Erectile dysfunction due to arterial insufficiency    Fall from ladder    Impaired fasting glucose    AURELIANO (obstructive sleep apnea)    Vitamin D deficiency    Acute pyelonephritis    Complicated UTI (urinary tract infection)    Renal calculus    Leukocytosis    Rash    Diarrhea       Meds:     cefdinir  300 mg Oral 2 times per day    metoprolol succinate  100 mg Oral Daily    atorvastatin  20 mg Oral Nightly    cetirizine  5 mg Oral Daily    sodium chloride flush  5-40 mL IntraVENous 2 times per day    famotidine  20 mg Oral Daily    apixaban  5 mg Oral BID        sodium chloride         Meds prn:     sodium chloride flush, sodium chloride, acetaminophen **OR** acetaminophen, polyethylene glycol, melatonin, HYDROmorphone, prochlorperazine, Calamine    Meds prior to admission:     No current facility-administered medications on file prior to encounter. Current Outpatient Medications on File Prior to Encounter   Medication Sig Dispense Refill    metoprolol succinate (TOPROL XL) 100 MG extended release tablet TAKE ONE TABLET BY MOUTH TWO TIMES A  tablet 0    ELIQUIS 5 MG TABS tablet TAKE ONE TABLET BY MOUTH TWO TIMES A  tablet 3    atorvastatin (LIPITOR) 20 MG tablet TAKE ONE TABLET BY MOUTH EVERY DAY 30 tablet 11    tamsulosin (FLOMAX) 0.4 MG capsule TAKE ONE CAPSULE BY MOUTH EVERY DAY AT BEDTIME      acetaminophen (TYLENOL) 500 MG tablet Take 2 tablets by mouth 3 times daily as needed for Pain 180 tablet 1    magnesium oxide (MAG-OX) 400 (240 Mg) MG tablet Take 1 tablet by mouth 2 times daily (Patient taking differently: Take 400 mg by mouth daily) 60 tablet 0    Multiple Vitamins-Minerals (THERAPEUTIC MULTIVITAMIN-MINERALS) tablet Take 1 tablet by mouth daily. Coenzyme Q10 (COQ10 PO) Take 1 tablet by mouth daily. Alpha-Lipoic Acid 300 MG CAPS Take 300 mg by mouth daily On hold      ascorbic acid (VITAMIN C) 500 MG tablet Take 500 mg by mouth daily. Vitamin D (CHOLECALCIFEROL) 1000 UNITS CAPS capsule Take 1,000 Units by mouth daily. Allergies:    Adhesive tape and Macrobid [nitrofurantoin]    Social History:     reports that he quit smoking about 16 years ago. His smoking use included cigarettes. He has a 20.00 pack-year smoking history. He has never used smokeless tobacco. He reports current alcohol use of about 3.0 standard drinks per week. He reports that he does not use drugs.     Family History:         Problem Relation Age of Onset    Cancer Mother     Diabetes Mother     Cancer Father     Diabetes Sister     Kidney Disease Sister     Early Death Neg Hx        ROS:     All pertinent positives discussed in HPI  All other sx negative     Physical Exam:      Patient Vitals for the past 24 hrs:   BP Temp Temp src Pulse Resp SpO2 Weight   01/13/23 0832 -- -- -- 71 -- -- --   01/13/23 0627 (!) 142/77 98 °F (36.7 °C) Oral 53 18 96 % --   01/13/23 0305 (!) 164/73 98.2 °F (36.8 °C) Temporal 55 17 98 % --   01/13/23 0118 -- -- -- -- -- -- 274 lb 8 oz (124.5 kg)   01/12/23 1839 (!) 177/65 98.1 °F (36.7 °C) Oral 63 18 98 % --   01/12/23 1500 (!) 161/68 -- -- -- -- -- --       No intake or output data in the 24 hours ending 01/13/23 1427      Constitutional: Patient in no acute distress   Head: normocephalic, atraumatic   Neck: no jvd  Cardiovascular: S1-S2 no S3 or rub  Respiratory: Clear with equal expansion  Gastrointestinal: soft, nontender, nondistended, no hepatosplenomegaly  Ext: No edema  Neuro: Awake alert and oriented  Skin: dry, erythematous dry rash on the lower ext      Data:    Recent Labs     01/11/23  0839 01/12/23  0350 01/13/23  0528   WBC 13.2* 14.6* 14.2*   HGB 13.1 11.8* 12.4*   HCT 39.2 35.3* 37.5   MCV 96.8 97.2 97.2    327 320       Recent Labs     01/11/23  0839 01/12/23  0350 01/13/23  0528    138 136   K 3.8 4.5  4.5 4.8    106 106   CO2 22 20* 20*   CREATININE 2.3* 1.8* 1.5*   BUN 67* 54* 41*   LABGLOM 29 40 49   GLUCOSE 180* 106* 114*   CALCIUM 9.1 9.1 9.2   PHOS  --  4.6* 4.1   MG  --  2.0 1.8       No results found for: VITD25    No results found for: PTH    Recent Labs     01/11/23  0839 01/12/23 0350   ALT 18 20   AST 9 14   ALKPHOS 211* 205*   BILITOT 0.2 0.2       Recent Labs     01/11/23  0839 01/12/23  0350   LABALBU 3.2* 3.1*       Ferritin   Date Value Ref Range Status   01/13/2023 478 ng/mL Final     Comment:     FERRITIN Reference Ranges:  Adult Males   20 - 60 years:    30 - 400 ng/mL  Adult females 17 - 60 years:    13 - 150 ng/mL  Adults greater than 60 years:   no established reference range  Pediatrics:                     no established reference range       Iron   Date Value Ref Range Status   01/13/2023 46 (L) 59 - 158 mcg/dL Final     TIBC   Date Value Ref Range Status   01/13/2023 222 (L) 250 - 450 mcg/dL Final       Vitamin B-12   Date Value Ref Range Status   01/13/2023 1759 (H) 211 - 946 pg/mL Final       Folate   Date Value Ref Range Status   01/13/2023 19.5 4.8 - 24.2 ng/mL Final         Lab Results   Component Value Date/Time    COLORU Yellow 01/09/2023 02:54 PM    NITRU POSITIVE 01/09/2023 02:54 PM    GLUCOSEU Negative 01/09/2023 02:54 PM    KETUA Negative 01/09/2023 02:54 PM    UROBILINOGEN 0.2 01/09/2023 02:54 PM    BILIRUBINUR Negative 01/09/2023 02:54 PM       No results found for: VINNIE, CREURRAN, MACREATRATIO, OSMOU    No components found for: URIC    No results found for: LIPIDPAN    CT ABDOMEN PELVIS WO CONTRAST Additional Contrast? None [[id number]] Collected: 01/09/23 1445      Order Status: Completed Updated: 01/09/23 1458     Narrative:       EXAMINATION:   CT OF THE ABDOMEN AND PELVIS WITHOUT CONTRAST 1/9/2023 2:32 pm     TECHNIQUE:   CT of the abdomen and pelvis was performed without the administration of   intravenous contrast. Multiplanar reformatted images are provided for review. Automated exposure control, iterative reconstruction, and/or weight based   adjustment of the mA/kV was utilized to reduce the radiation dose to as low   as reasonably achievable. COMPARISON:   June 16, 2021     HISTORY:   ORDERING SYSTEM PROVIDED HISTORY: right flank pain r/o stone   TECHNOLOGIST PROVIDED HISTORY:   Reason for exam:->right flank pain r/o stone   Additional Contrast?->None   Decision Support Exception - unselect if not a suspected or confirmed   emergency medical condition->Emergency Medical Condition (MA)     FINDINGS:   Lower Chest: No infiltrates or pleural effusion. Atelectasis and patchy   opacities in the left lung base. Organs: Fatty liver with no focal lesions. Gallbladder is present with   multiple calcified stones. Spleen is not enlarged. Pancreas and adrenal   glands appear unremarkable. There are multiple nonobstructing bilateral   renal stones with stranding of Ladi renal fat and thickening of the pararenal   fascia. GI/Bowel: There are no obstructing or constricting lesions. Appendix is   normal.     Pelvis: Bladder is suboptimally distended. There is no significant free   fluid. Peritoneum/Retroperitoneum: No free air or significant adenopathy. Bones/Soft Tissues: Unremarkable. Impression:       Multiple nonobstructing bilateral renal stones with stranding of the Ladi   renal fat and thickening of the pararenal fascia. Cholelithiasis. Thickening of the bladder wall, may be due to suboptimal distension versus   cystitis. US RETROPERITONEAL COMPLETE [[id number]] Collected: 01/11/23 1126      Order Status: Completed Updated: 01/11/23 1130     Narrative:       EXAMINATION:   RETROPERITONEAL ULTRASOUND OF THE KIDNEYS AND URINARY BLADDER     1/11/2023     COMPARISON:   CT abdomen and pelvis 01/09/2023     HISTORY:   ORDERING SYSTEM PROVIDED HISTORY: NATALI   TECHNOLOGIST PROVIDED HISTORY:     Reason for exam:->NATALI   What reading provider will be dictating this exam?->CRC     FINDINGS:     Kidneys: The right kidney measures 11.8 cm in length and the left kidney measures 12   cm in length. Kidneys demonstrate normal cortical echogenicity. No evidence for   hydronephrosis with bilateral echogenic areas demonstrating shadowing of   bilateral nonobstructing nephrolithiasis measuring up to 6 mm right and 5 mm   left. Bladder:     Unremarkable appearance of the bladder. No significant post void residual.      Impression:       Bilateral intrarenal stones of nonobstructing bilateral nephrolithiasis   without hydronephrosis.         Assessment and Plan:    Stage II NATALI secondary to the combined effects of the UTI with the  inflammation exacerbated by the outpt ARB + thiazide-possible there is a component of AIN from the nitrofurantoin but the rapid improvement with IVF alone supports a hemodynamic mediated cause. Baseline creatinine 1.3 to 1.4 mg/dL with an e-GFR 51-60ml/min  He had been on Tikosyn in the past and this has been discontinued  Creatinine at presentation 3.7 mg/dL which is trended down to 1.5 mg/dL  Renal ultrasound obtained without evidence of hydronephrosis but showing bilateral nonobstructing nephrolithiasis. Currently off IV fluids  Approaching baseline      2. Hypertension with chronic kidney disease 1-4-  Blood pressure goal less than 130/80  Blood pressure currently at/near goal      3. Urinary tract infection-  UA with large blood positive nitrates  Was treated with ceftriaxone  On  Omnicef    4. Anemia with chronic kidney disease-  Checking stool for occult blood  SPEP and UPEP pending  B12 elevated at 1759, folate WNL iron saturation 21%  Ferritin 478 with Iron Sat 21%      MARIA E Moreno - CNP  Patient seen and examined. I had a face to face encounter with the patigoneent. He is for D/C today. He states the pain in the RLQ improved but not completely  Agree with exam.    Agree with  formulation, assessment and plan as outlined above and directed by me. Addition and corrections were done as deemed appropriate.    My exam and plan include:     A/P:  Stage II NATALI-cr continues to trend down -OK for D/C-will followup labs as an out pt  Continue current treatment as outlined above    NEFTALI Martin MD

## 2023-01-13 NOTE — PROGRESS NOTES
Patient refused.  He wants scripts sent to [address] [place] [address] & [facility] in [place]

## 2023-01-13 NOTE — DISCHARGE SUMMARY
[facility] Physician Discharge Summary       No follow-up provider specified. Activity level: As tolerated     Dispo:home      Condition on discharge: Stable     Patient ID:  Efrain Gerber  [id number]  9 y.o.  1950    Admit date: 1/9/2023    Discharge date and time:  1/13/2023  12:15 PM    Admission Diagnoses: Principal Problem:    Acute pyelonephritis  Active Problems:    Benign essential HTN    AURELIANO (obstructive sleep apnea)    Complicated UTI (urinary tract infection)    Renal calculus    Leukocytosis    Rash    Diarrhea    QT prolongation  Resolved Problems:    * No resolved hospital problems. *      Discharge Diagnoses: Principal Problem:    Acute pyelonephritis  Active Problems:    Benign essential HTN    AURELIANO (obstructive sleep apnea)    Complicated UTI (urinary tract infection)    Renal calculus    Leukocytosis    Rash    Diarrhea    QT prolongation  Resolved Problems:    * No resolved hospital problems. *      Consults:  IP CONSULT TO UROLOGY  IP CONSULT TO INFECTIOUS DISEASES  IP CONSULT TO CARDIOLOGY  IP CONSULT TO IV TEAM  IP CONSULT TO NEPHROLOGY    Procedures:   n/a    Hospital Course:   Patient Efrain Gerber is a 71 y.o. presented with Acute pyelonephritis [N10]. He was admitted for treatment of his Pyelonephritis and NATALI. He was seen by Urology, ID, Cardiology and Nephrology. He was placed on IV antibiotics with improvement. He had a prolonged QT interval and Cardiology was consulted. He was on Tikosyn. It was decided to hold the Tikosyn. His QT interval slowly improved. Cardiology decided not to resume his Tikosyn. Nephrology was consulted due to NATALI. He did improve with hydration. Renal ultrasound did show hydronephrosis. Urology did not feel surgical intervention was necessary during this admission. On discharge his creatinine was borderline normal at 1.5. He was advised to hold Losartan and Chlorthalidone until his labs were checked in a week by his PCP. Patient was medically stable for discharge to home. Discharge Exam:    General Appearance: alert and oriented to person, place and time and in no acute distress  Skin: warm and dry  Head: normocephalic and atraumatic  Eyes: pupils equal, round, and reactive to light, extraocular eye movements intact, conjunctivae normal  Neck: neck supple and non tender without mass   Pulmonary/Chest: clear to auscultation bilaterally- no wheezes, rales or rhonchi, normal air movement, no respiratory distress  Cardiovascular: normal rate, normal S1 and S2 and no carotid bruits  Abdomen: soft, non-tender, non-distended, normal bowel sounds, no masses or organomegaly  Extremities: no cyanosis, no clubbing and no edema  Neurologic: no cranial nerve deficit and speech normal    I/O last 3 completed shifts: In: 1466.2 [I.V.:1466.2]  Out: 550 [Urine:550]  No intake/output data recorded. LABS:  Recent Labs     01/11/23  0839 01/12/23 0350 01/13/23  0528    138 136   K 3.8 4.5  4.5 4.8    106 106   CO2 22 20* 20*   BUN 67* 54* 41*   CREATININE 2.3* 1.8* 1.5*   GLUCOSE 180* 106* 114*   CALCIUM 9.1 9.1 9.2       Recent Labs     01/11/23  0839 01/12/23  0350 01/13/23  0528   WBC 13.2* 14.6* 14.2*   RBC 4.05 3.63* 3.86   HGB 13.1 11.8* 12.4*   HCT 39.2 35.3* 37.5   MCV 96.8 97.2 97.2   MCH 32.3 32.5 32.1   MCHC 33.4 33.4 33.1   RDW 12.8 12.9 12.9    327 320   MPV 10.1 10.0 9.7       Imaging:  CT ABDOMEN PELVIS WO CONTRAST Additional Contrast? None    Result Date: 1/9/2023  EXAMINATION: CT OF THE ABDOMEN AND PELVIS WITHOUT CONTRAST 1/9/2023 2:32 pm TECHNIQUE: CT of the abdomen and pelvis was performed without the administration of intravenous contrast. Multiplanar reformatted images are provided for review. Automated exposure control, iterative reconstruction, and/or weight based adjustment of the mA/kV was utilized to reduce the radiation dose to as low as reasonably achievable.  COMPARISON: June 16, 2021 HISTORY: ORDERING SYSTEM PROVIDED HISTORY: right flank pain r/o stone TECHNOLOGIST PROVIDED HISTORY: Reason for exam:->right flank pain r/o stone Additional Contrast?->None Decision Support Exception - unselect if not a suspected or confirmed emergency medical condition->Emergency Medical Condition (MA) FINDINGS: Lower Chest: No infiltrates or pleural effusion. Atelectasis and patchy opacities in the left lung base. Organs: Fatty liver with no focal lesions. Gallbladder is present with multiple calcified stones. Spleen is not enlarged. Pancreas and adrenal glands appear unremarkable. There are multiple nonobstructing bilateral renal stones with stranding of Ladi renal fat and thickening of the pararenal fascia. GI/Bowel: There are no obstructing or constricting lesions. Appendix is normal. Pelvis: Bladder is suboptimally distended. There is no significant free fluid. Peritoneum/Retroperitoneum: No free air or significant adenopathy. Bones/Soft Tissues: Unremarkable. Multiple nonobstructing bilateral renal stones with stranding of the Ladi renal fat and thickening of the pararenal fascia. Cholelithiasis. Thickening of the bladder wall, may be due to suboptimal distension versus cystitis. Patient Instructions:      Medication List        START taking these medications      cefdinir 300 MG capsule  Commonly known as: OMNICEF  Take 1 capsule by mouth every 12 hours for 14 days     Probiotic Acidophilus Tabs  Take 1 tablet by mouth daily     traMADol 50 MG tablet  Commonly known as: Ultram  Take 1 tablet by mouth every 6 hours as needed for Pain for up to 3 days. Intended supply: 3 days.  Take lowest dose possible to manage pain Max Daily Amount: 200 mg            CONTINUE taking these medications      acetaminophen 500 MG tablet  Commonly known as: TYLENOL  Take 2 tablets by mouth 3 times daily as needed for Pain     Alpha-Lipoic Acid 300 MG Caps     ascorbic acid 500 MG tablet  Commonly known as: VITAMIN C atorvastatin 20 MG tablet  Commonly known as: LIPITOR  TAKE ONE TABLET BY MOUTH EVERY DAY     COQ10 PO     Eliquis 5 MG Tabs tablet  Generic drug: apixaban  TAKE ONE TABLET BY MOUTH TWO TIMES A DAY     metoprolol succinate 100 MG extended release tablet  Commonly known as: TOPROL XL  TAKE ONE TABLET BY MOUTH TWO TIMES A DAY     tamsulosin 0.4 MG capsule  Commonly known as: FLOMAX     therapeutic multivitamin-minerals tablet     Vitamin D 1000 units Caps capsule  Commonly known as: CHOLECALCIFEROL            STOP taking these medications      chlorthalidone 25 MG tablet  Commonly known as: HYGROTON     dofetilide 125 MCG capsule  Commonly known as: TIKOSYN     dofetilide 250 MCG capsule  Commonly known as: TIKOSYN     gabapentin 100 MG capsule  Commonly known as: NEURONTIN     losartan 50 MG tablet  Commonly known as: COZAAR     QUERCETIN PO     TART CHERRY ADVANCED PO     VITAMIN B 12 PO            ASK your doctor about these medications      magnesium oxide 400 (240 Mg) MG tablet  Commonly known as: MAG-OX  Take 1 tablet by mouth 2 times daily               Where to Get Your Medications        These medications were sent to [address] [place] [address] [address] [facility]  [address] [place], [place] [id number]      Phone: [phone]   Probiotic Acidophilus Tabs  traMADol 50 MG tablet       These medications were sent to [address] [place] [address] [phone]  [address] [place] [address]      Phone: [phone]   cefdinir 300 MG capsule       Total discharge time:  35 minutes    Note that more than 30 minutes was spent in preparing discharge papers, discussing discharge with patient, medication review, etc.    Signed:  Electronically signed by Andrade Apodaca MD on 1/13/2023 at 12:15 PM

## 2023-01-14 LAB
BLOOD CULTURE, ROUTINE: NORMAL
CULTURE, BLOOD 2: NORMAL

## 2023-01-16 ENCOUNTER — TELEPHONE (OUTPATIENT)
Dept: CARDIOLOGY CLINIC | Age: 73
End: 2023-01-16

## 2023-01-16 LAB
ADDENDUM ELECTROPHORESIS URINE RANDOM: NORMAL
ALBUMIN SERPL-MCNC: 2.2 G/DL (ref 3.5–4.7)
ALPHA-1-GLOBULIN: 0.4 G/DL (ref 0.2–0.4)
ALPHA-2-GLOBULIN: 1 G/DL (ref 0.5–1)
BETA GLOBULIN: 1.2 G/DL (ref 0.8–1.3)
ELECTROPHORESIS: ABNORMAL
GAMMA GLOBULIN: 1.8 G/DL (ref 0.7–1.6)
IMMUNOFIXATION URINE: NORMAL
TOTAL PROTEIN: 6.7 G/DL (ref 6.4–8.3)

## 2023-01-17 NOTE — TELEPHONE ENCOUNTER
Sinus bradycardia with HR 50's on multiple recent EKG's. Continue current medications for now and can update EP (patient follows with Dr. Samaria Connell).

## 2023-01-17 NOTE — TELEPHONE ENCOUNTER
Patient's wife called stating patient's BP today is 131/61 (46). Pt came to [facility]  requesting health form be filled out for her [address]. Pt states she needs form by 07/08/19. Please call when ready or with questions. Form placed in [facility] inbox.

## 2023-01-18 ENCOUNTER — TELEPHONE (OUTPATIENT)
Dept: NON INVASIVE DIAGNOSTICS | Age: 73
End: 2023-01-18

## 2023-01-18 RX ORDER — METOPROLOL SUCCINATE 50 MG/1
75 TABLET, EXTENDED RELEASE ORAL 2 TIMES DAILY
Qty: 180 TABLET | Refills: 5 | Status: SHIPPED | OUTPATIENT
Start: 2023-01-18

## 2023-01-18 NOTE — TELEPHONE ENCOUNTER
Patient's wife called stating since leaving hospital Paco's HR has been running 46-49, just feeling tired. Please advise, thank you.

## 2023-01-18 NOTE — TELEPHONE ENCOUNTER
Patient's wife notified of Dr. Summers Hemp assessment/ recommendation. She was transferred to EP department.

## 2023-02-02 LAB
ORGANISM: ABNORMAL
URINE CULTURE, ROUTINE: ABNORMAL

## 2023-07-18 ENCOUNTER — TELEPHONE (OUTPATIENT)
Dept: NON INVASIVE DIAGNOSTICS | Age: 73
End: 2023-07-18
Payer: MEDICARE

## 2023-07-18 DIAGNOSIS — Z51.81 VISIT FOR MONITORING TIKOSYN THERAPY: Primary | ICD-10-CM

## 2023-07-18 DIAGNOSIS — Z79.899 VISIT FOR MONITORING TIKOSYN THERAPY: Primary | ICD-10-CM

## 2023-07-18 PROCEDURE — 93000 ELECTROCARDIOGRAM COMPLETE: CPT | Performed by: INTERNAL MEDICINE

## 2023-07-20 ENCOUNTER — TELEPHONE (OUTPATIENT)
Dept: NON INVASIVE DIAGNOSTICS | Age: 73
End: 2023-07-20

## 2023-07-20 DIAGNOSIS — I48.91 ATRIAL FIBRILLATION WITH RVR (HCC): Primary | ICD-10-CM

## 2023-07-20 RX ORDER — METOPROLOL SUCCINATE 50 MG/1
50 TABLET, EXTENDED RELEASE ORAL 2 TIMES DAILY
Qty: 180 TABLET | Refills: 1 | Status: SHIPPED | OUTPATIENT
Start: 2023-07-20

## 2023-07-20 RX ORDER — METOPROLOL SUCCINATE 25 MG/1
25 TABLET, EXTENDED RELEASE ORAL 2 TIMES DAILY
Qty: 180 TABLET | Refills: 1 | Status: SHIPPED | OUTPATIENT
Start: 2023-07-20

## 2023-07-20 NOTE — TELEPHONE ENCOUNTER
Patient called in and states that he has been off [address] since January 2023. At that time his Tikosyn was stopped due to NATALI and prolonged QTC. Per Dr. Alice Munoz note on 1/13/2023 (see below), he spoke to EP and the recommendation was to hold off restarting Tikosyn due to hx of ablation and potential renal issues. He reports he feels overall well and denies any associated palpitations, SOB, lightheadedness or dizziness. He continues on Eliquis for stroke prophylaxis and denies missing any doses. Also he remains on Toprol 75mg BID for rate control. He also asked if a additional 25mg of Toprol could be added to his 50mg (to total 75mg), this way he would not have to cut the pill. He states that sometimes he doesn't get the full amount due to the pill not cutting properly.  Will forward to Dr. Nathan Kahn just a update and if the the Toprol could be prescribed as his request.

## 2023-11-29 ENCOUNTER — TELEPHONE (OUTPATIENT)
Dept: NON INVASIVE DIAGNOSTICS | Age: 73
End: 2023-11-29

## 2023-11-29 NOTE — TELEPHONE ENCOUNTER
Wife called to report the patient is currently in the ED, [facility] in [place], for AF found today by pain management doctor. Wife wants to know if she should stay there or come to hospital here. CK pt, Please advise.      Electronically signed by Shona Watson MA on 11/29/2023 at 12:46 PM

## 2023-11-29 NOTE — TELEPHONE ENCOUNTER
Wife provided with recommendation and will follow up as scheduled with NP.     Electronically signed by Stacey Steward MA on 11/29/2023 at 2:13 PM

## (undated) DEVICE — Z INACTIVE USE 2660664 SOLUTION IRRIG 3000ML 0.9% SOD CHL USP UROMATIC PLAS CONT

## (undated) DEVICE — BOWL AND CEMENT CARTRIDGE WITH BREAKAWAY FEMORAL NOZZLE: Brand: ACM

## (undated) DEVICE — BANDAGE COMPR W6INXL12FT SMOOTH FOR LIMB EXSANG ESMARCH

## (undated) DEVICE — DRIP REDUCTION MANIFOLD

## (undated) DEVICE — BLADE CLIPPER GEN PURP NS

## (undated) DEVICE — PATIENT RETURN ELECTRODE, SINGLE-USE, CONTACT QUALITY MONITORING, ADULT, WITH 9FT CORD, FOR PATIENTS WEIGING OVER 33LBS. (15KG): Brand: MEGADYNE

## (undated) DEVICE — SET TRIATHALON KNEE SZ 3-6 FEM/TIB TRIAL

## (undated) DEVICE — SET TRIATHALON MISC INSTR TRAY

## (undated) DEVICE — SOLUTION IV IRRIG POUR BRL 0.9% SODIUM CHL 2F7124

## (undated) DEVICE — KIT PLT RATIO DISPNS KT 2IN CANN TIP SPRY TIP DISP MAGELLAN

## (undated) DEVICE — GLOVE SURG SZ 8 L12IN FNGR THK79MIL GRN LTX FREE

## (undated) DEVICE — 3M™ STERI-DRAPE™ U-DRAPE 1015: Brand: STERI-DRAPE™

## (undated) DEVICE — DRILL SYSTEM 7

## (undated) DEVICE — GOWN,BREATHABLE SLV,AURORA,XLG,STRL: Brand: MEDLINE

## (undated) DEVICE — PIN FIX L3.5IN DIA1/8IN LNG HDLSS FOR MIS KNEE JT REPL

## (undated) DEVICE — GOWN,AURORA,BRTHSLV,2XL,18/CS: Brand: MEDLINE

## (undated) DEVICE — NEEDLE HYPO 18GA L1.5IN PNK POLYPR HUB S STL REG BVL STR

## (undated) DEVICE — Z DISCONTINUED USE 2275686 GLOVE SURG SZ 8 L12IN FNGR THK13MIL WHT ISOLEX POLYISOPRENE

## (undated) DEVICE — SYRINGE 20ML LL S/C 50

## (undated) DEVICE — SIGNATURE SET

## (undated) DEVICE — SET ORTHO STD STORTSTD1

## (undated) DEVICE — PADDING CAST W6INXL4YD COT LO LINTING WYTEX

## (undated) DEVICE — SYRINGE MED 50ML LUERLOCK TIP

## (undated) DEVICE — PEEL-AWAY HOOD: Brand: FLYTE, SURGICOOL

## (undated) DEVICE — 3M™ IOBAN™ 2 ANTIMICROBIAL INCISE DRAPE 6650EZ: Brand: IOBAN™ 2

## (undated) DEVICE — SOLUTION IV IRRIG WATER 1000ML POUR BRL 2F7114

## (undated) DEVICE — SET TRIATH PATELLA PREP TRIAL TRAY

## (undated) DEVICE — ZIMMER® STERILE DISPOSABLE TOURNIQUET CUFF WITH PROTECTIVE SLEEVE AND PLC, DUAL PORT, SINGLE BLADDER, 34 IN. (86 CM)

## (undated) DEVICE — TOTAL KNEE PK

## (undated) DEVICE — APPLICATOR PREP 26ML 0.7% IOD POVACRYLEX 74% ISO ALC ST

## (undated) DEVICE — STRYKER PERFORMANCE SERIES SAGITTAL BLADE: Brand: STRYKER PERFORMANCE SERIES

## (undated) DEVICE — ZINACTIVE USE 2539607 PIN FIX L3.5IN DIA1/8IN LNG HDLSS FOR MIS KNEE JT REPL

## (undated) DEVICE — INTENDED FOR TISSUE SEPARATION, AND OTHER PROCEDURES THAT REQUIRE A SHARP SURGICAL BLADE TO PUNCTURE OR CUT.: Brand: BARD-PARKER ® STAINLESS STEEL BLADES

## (undated) DEVICE — NEEDLE HYPO 21GA L1.5IN GRN POLYPR HUB S STL REG BVL STR

## (undated) DEVICE — Device

## (undated) DEVICE — CLOTH SURG PREP PREOPERATIVE CHLORHEXIDINE GLUC 2% READYPREP

## (undated) DEVICE — 2108 SERIES SAGITTAL BLADE FAN, OFFSET  (34.5 X 0.8 X 64.0MM)

## (undated) DEVICE — SURGICAL PROCEDURE PACK BASIC

## (undated) DEVICE — SET STRYKER GLUE GUN

## (undated) DEVICE — SET TRIATHALON KNEE SZ 3-6 FEM/TIB PREP

## (undated) DEVICE — GOWN,BREATHABLE,IMP SLV 3XL AURORA: Brand: MEDLINE

## (undated) DEVICE — SET ORTHO STD STORTSTD2

## (undated) DEVICE — SHEET,DRAPE,53X77,STERILE: Brand: MEDLINE

## (undated) DEVICE — HANDPIECE SET WITH BONE CLEANING TIP AND SUCTION TUBE: Brand: INTERPULSE

## (undated) DEVICE — BANDAGE COMPR W4INXL10YD WHITE/BEIGE E MTRX HK LOOP CLSR